# Patient Record
Sex: MALE | Race: BLACK OR AFRICAN AMERICAN | NOT HISPANIC OR LATINO | Employment: OTHER | URBAN - METROPOLITAN AREA
[De-identification: names, ages, dates, MRNs, and addresses within clinical notes are randomized per-mention and may not be internally consistent; named-entity substitution may affect disease eponyms.]

---

## 2019-01-14 ENCOUNTER — TELEPHONE (OUTPATIENT)
Dept: NEUROSURGERY | Facility: CLINIC | Age: 64
End: 2019-01-14

## 2019-01-15 ENCOUNTER — OFFICE VISIT (OUTPATIENT)
Dept: NEUROSURGERY | Facility: CLINIC | Age: 64
End: 2019-01-15
Payer: OTHER MISCELLANEOUS

## 2019-01-15 VITALS
WEIGHT: 200.88 LBS | DIASTOLIC BLOOD PRESSURE: 64 MMHG | TEMPERATURE: 98 F | HEART RATE: 60 BPM | BODY MASS INDEX: 30.45 KG/M2 | SYSTOLIC BLOOD PRESSURE: 131 MMHG | HEIGHT: 68 IN

## 2019-01-15 DIAGNOSIS — M25.519 SHOULDER PAIN, UNSPECIFIED CHRONICITY, UNSPECIFIED LATERALITY: ICD-10-CM

## 2019-01-15 DIAGNOSIS — M54.2 NECK PAIN: Primary | ICD-10-CM

## 2019-01-15 PROCEDURE — 99244 PR OFFICE CONSULTATION,LEVEL IV: ICD-10-PCS | Mod: S$GLB,,, | Performed by: NEUROLOGICAL SURGERY

## 2019-01-15 PROCEDURE — 99999 PR PBB SHADOW E&M-EST. PATIENT-LVL III: CPT | Mod: PBBFAC,,, | Performed by: NEUROLOGICAL SURGERY

## 2019-01-15 PROCEDURE — 99244 OFF/OP CNSLTJ NEW/EST MOD 40: CPT | Mod: S$GLB,,, | Performed by: NEUROLOGICAL SURGERY

## 2019-01-15 PROCEDURE — 99999 PR PBB SHADOW E&M-EST. PATIENT-LVL III: ICD-10-PCS | Mod: PBBFAC,,, | Performed by: NEUROLOGICAL SURGERY

## 2019-01-15 NOTE — PROGRESS NOTES
Subjective:    I, Janett Myers, attest that this documentation has been prepared under the direction and in the presence of MADELAINE Blair MD.     Patient ID: Jeff Hope is a 63 y.o. male.    Chief Complaint: No chief complaint on file.    HPI   Pt is a 63 y.o. male who presents per referral by Dr. Vinicius Sherman for evaluation of  cervical stenosis. Pt states that he endures neck pain radiating to shoulder and LUE into fingers for over 6 months, but is now beginning to endure weakness in RUE. Pt states that he has undergone 2 pervious shoulder surgeries done prior to 2005. Pt has failed conservative management including PT and injections.     Review of Systems   Constitutional: Negative for chills, diaphoresis, fatigue and fever.   HENT: Negative for congestion, rhinorrhea, sinus pressure, sneezing, sore throat and trouble swallowing.    Eyes: Negative.  Negative for visual disturbance.   Respiratory: Negative for cough, choking, chest tightness and shortness of breath.    Cardiovascular: Negative for chest pain.   Gastrointestinal: Negative for abdominal pain, diarrhea, nausea and vomiting.   Endocrine: Negative.    Genitourinary: Negative for dysuria.   Musculoskeletal: Positive for neck pain (Radiating to shoulder and LUE).   Skin: Negative for color change, pallor, rash and wound.   Neurological: Negative for syncope.   Hematological: Does not bruise/bleed easily.   Psychiatric/Behavioral: Negative for confusion.       Objective:      Physical Exam:  Nursing note and vitals reviewed.    Constitutional: He appears well-developed.     Eyes: Pupils are equal, round, and reactive to light. Conjunctivae and EOM are normal.     Cardiovascular: Normal rate, regular rhythm, normal pulses and intact distal pulses.     Abdominal: Soft.     Psych/Behavior: He is alert. He is oriented to person, place, and time. He has a normal mood and affect.     Musculoskeletal: Gait is normal.        Neck: Range of motion is full.  There is no tenderness. Muscle strength is 5/5. Tone is normal.        Back: Range of motion is full. There is no tenderness. Muscle strength is 5/5. Tone is normal.        Right Upper Extremities: Range of motion is full. There is no tenderness. Muscle strength is 5/5. Tone is normal.        Left Upper Extremities: Range of motion is full. There is no tenderness. Muscle strength is 5/5. Tone is normal.       Right Lower Extremities: Range of motion is full. There is no tenderness. Muscle strength is 5/5. Tone is normal.        Left Lower Extremities: Range of motion is full. There is no tenderness. Muscle strength is 5/5. Tone is normal.     Neurological:        Coordination: He has a normal Romberg Test, normal finger to nose coordination, normal heel to shin coordination and normal tandem walking coordination.        DTRs: DTRs are normal. Tricep reflexes are 2+ on the right side and 2+ on the left side. Bicep reflexes are 2+ on the right side and 2+ on the left side. Brachioradialis reflexes are 2+ on the right side and 2+ on the left side. Patellar reflexes are 2+ on the right side and 2+ on the left side. Achilles reflexes are 2+ on the right side and 2+ on the left side.        Cranial nerves: Cranial nerve(s) II, III, IV, V, VI, VII, VIII, IX, X, XI and XII are intact.       Pt has a normal neurologic exam.  He has a lot of paraspinal pain in the lower neck but it seems to be more around the shoulder area.  He has got some restricted range of motion in the shoulder and some pain with range of motion.  He has no Tanner's no clonus no weakness or numbness.    Imaging:     His MRI scan of the cervical and lumbar spine done at outside facility was reviewed I do not see any significant disc herniation no foraminal stenosis new central stenosis.  I, MADELAINE Blair MD, personally reviewed the imaging and interpreted independent of the radiology report.      Assessment/Plan:   Pt with possible referred neck pain from  shoulder issues.  He has had 2 previous shoulder surgery to.  I do not see anything on the MRI scan of the cervical or lumbar spine to explain any of his symptoms and dental seeding neurosurgical to offer him.  I went over the films with the and explain our position.  We have continue follow-up with his primary care physician.  He may need physical mass rehab or pain management [address].    I, MADELAINE Blair MD, personally performed the services described in this documentation. All medical record entries made by the scribe, Janett Myers, were at my direction and in my presence.  I have reviewed the chart and agree that the record reflects my personal performance and is accurate and complete.

## 2019-01-15 NOTE — LETTER
January 24, 2019      Vinicius Sherman MD  [address]  [facility] [address]           [facility] - Neurosurgery 7th Fl  [address]  [facility] [address]  Phone: [phone]          Patient: Jeff Hope   MR Number: [id number]   YOB: 1955   Date of Visit: 1/15/2019       Dear Dr. Vinicius Sherman:    Thank you for referring Jeff Hope to me for evaluation. Attached you will find relevant portions of my assessment and plan of care.    If you have questions, please do not hesitate to call me. I look forward to following Jeff Hope along with you.    Sincerely,    Romulo Blair MD    Enclosure  CC:  No Recipients    If you would like to receive this communication electronically, please contact [email or web address] or [phone] to request more information on [employer] Link access.    For providers and/or their staff who would like to refer a patient to [facility], please contact us through our one-stop-shop provider referral line, [facility], at [phone].    If you feel you have received this communication in error or would no longer like to receive these types of communications, please e-mail [email or web address]

## 2019-01-17 RX ORDER — HYDROXYZINE PAMOATE 50 MG/1
50 CAPSULE ORAL NIGHTLY
COMMUNITY
End: 2020-05-11

## 2019-01-17 RX ORDER — PREGABALIN 150 MG/1
150 CAPSULE ORAL DAILY
COMMUNITY

## 2019-01-17 RX ORDER — HYDROCODONE BITARTRATE AND ACETAMINOPHEN 7.5; 325 MG/1; MG/1
1 TABLET ORAL EVERY 6 HOURS PRN
COMMUNITY
End: 2020-01-28

## 2019-01-17 RX ORDER — CELECOXIB 200 MG/1
200 CAPSULE ORAL 2 TIMES DAILY
COMMUNITY
End: 2019-12-10

## 2019-09-23 ENCOUNTER — TELEPHONE (OUTPATIENT)
Dept: PRIMARY CARE CLINIC | Facility: CLINIC | Age: 64
End: 2019-09-23

## 2019-09-23 NOTE — TELEPHONE ENCOUNTER
----- Message from Nancy No sent at 9/23/2019  4:02 PM CDT -----  Contact: Patient [phone]  Requesting an appt for 10/01/2019.    Please call and advise.    Thank You

## 2019-09-24 ENCOUNTER — TELEPHONE (OUTPATIENT)
Dept: PRIMARY CARE CLINIC | Facility: CLINIC | Age: 64
End: 2019-09-24

## 2019-09-24 NOTE — TELEPHONE ENCOUNTER
----- Message from Aiyana Perales sent at 9/24/2019  8:40 AM CDT -----  Contact: [phone]  Patient is returning a phone call.  Who left a message for the patient:   Does patient know what this is regarding:    Comments: please advise, thanks .

## 2019-09-30 PROBLEM — Z79.01 CHRONIC ANTICOAGULATION: Status: ACTIVE | Noted: 2019-09-30

## 2019-09-30 PROBLEM — J47.9 BRONCHIECTASIS: Status: ACTIVE | Noted: 2019-02-12

## 2019-09-30 PROBLEM — C82.90 FOLLICULAR NON-HODGKIN'S LYMPHOMA: Status: ACTIVE | Noted: 2019-02-12

## 2019-09-30 PROBLEM — D70.9 NEUTROPENIA: Status: ACTIVE | Noted: 2019-09-30

## 2019-09-30 PROBLEM — I26.99 PULMONARY EMBOLISM, BILATERAL: Status: ACTIVE | Noted: 2019-04-15

## 2019-09-30 PROBLEM — F32.A DEPRESSION: Status: ACTIVE | Noted: 2019-02-12

## 2019-09-30 PROBLEM — E78.5 HYPERLIPIDEMIA: Status: ACTIVE | Noted: 2019-02-12

## 2019-09-30 PROBLEM — Z90.49 S/P LAPAROSCOPIC CHOLECYSTECTOMY: Status: ACTIVE | Noted: 2019-05-27

## 2019-11-07 ENCOUNTER — TELEPHONE (OUTPATIENT)
Dept: INTERNAL MEDICINE | Facility: CLINIC | Age: 64
End: 2019-11-07

## 2019-11-07 NOTE — TELEPHONE ENCOUNTER
----- Message from Kathi Kincaid sent at 11/7/2019  7:56 AM CST -----  Contact: wife/felicita/[phone]  Pt called in regards to getting a npp appointment with the dr. They would like the appointment  on 11-18 or 19 or 26 in the morning. I tried to schedule but not time came up.      Please advise

## 2019-11-18 NOTE — PROGRESS NOTES
Primary Care Provider Appointment    Subjective:      Patient ID: Jeff Hope is a 63 y.o. male here for establishment of care.      Chief Complaint: Establish Care (left testical pain, flu shot)    HPI:  This is a pleasant 63-year-old male with bronchiectasis hyperlipidemia history of pulmonary embolism on chronic anticoagulation with a follicular non-Hodgkin's lymphoma here for establishment of care.  He is a former patient of mine but no old records from [facility] available.  Records will be requested today.  In anticipation of this visit Care everywhere and [facility] was reviewed.  Greater than 45 min was spent in preparation for this visit.  He presents today with his wife.  Patient is followed by [facility] onc[facility], for his follicular lymphoma.  He just completed 6/6 CHOP with noted neutropenia and thrombocytopenia.  Current we are waiting on a PET CT for restaging.  He states that he is doing well but complains today of testicular pain that has been chronic in the past.  He is followed by Dr. Mancuso at [facility] for this chronic pain. He has had 3 procedures in the past for this without resolution of his pain. In the past he has had some improvement with muscle relaxers.  He notes a recent appointment with Dr. Mancuso.  Pt the also notes that he saw orthopedist for his left shoulder pain and was placed on Celebrex.  For his pulmonary embolism associated with his lymphoma, patient is to be taking daily anticoagulation.  He is out of his medication for over 1 month.  Patient also notes concerns for financial pressures.  Starting at the beginning of next year he will no longer have Medicaid and will be required to pay a co-pay for his medications and procedures.  This will be an issue since he will likely need further in, chemotherapy, and due to the cost of his anticoagulation.  He has also noted the food insecurities.  He denies any transportation issues.  Patient states he continues to have difficulty with  sleeping.  He also notes he also notes some nasal congestion. He has not been using his Flonase, which he has used in the past with good results.    Past Surgical History:   Procedure Laterality Date    GROIN EXPLORATION  2 pre 2005 and 1 post 2005    total of 3 procedures    LAPAROSCOPIC CHOLECYSTECTOMY  05/2018    SHOULDER SURGERY Left        No past medical history on file.    Social History     Socioeconomic History    Marital status:      Spouse name: Not on file    Number of children: 2    Years of education: Not on file    Highest education level: Not on file   Occupational History    Occupation: disabled [employer]    Social Needs    Financial resource strain: Somewhat hard    Food insecurity:     Worry: Often true     Inability: Sometimes true    Transportation needs:     Medical: No     Non-medical: No   Tobacco Use    Smoking status: Never Smoker    Smokeless tobacco: Never Used   Substance and Sexual Activity    Alcohol use: No     Frequency: Never    Drug use: No    Sexual activity: Yes     Partners: Female   Lifestyle    Physical activity:     Days per week: 0 days     Minutes per session: 0 min    Stress: Rather much   Relationships    Social connections:     Talks on phone: Twice a week     Gets together: Twice a week     Attends [employer] service: Never     Active member of club or organization: No     Attends meetings of clubs or organizations: Never     Relationship status:    Other Topics Concern    Not on file   Social History Narrative    Lives with wife.         Review of Systems   Constitutional: Negative for activity change, chills and fever.   HENT: Positive for congestion, sinus pressure and sinus pain. Negative for ear discharge, ear pain, postnasal drip, rhinorrhea, sneezing and sore throat.    Eyes: Negative.    Respiratory: Negative.    Cardiovascular: Negative.    Gastrointestinal: Negative for abdominal pain, anal bleeding, blood in stool,  "constipation, diarrhea, nausea, rectal pain and vomiting.        Hemorrhoids self treated   Endocrine: Negative.    Genitourinary: Positive for testicular pain (chronic pain). Negative for decreased urine volume, difficulty urinating, frequency, hematuria, penile swelling, scrotal swelling and urgency.   Musculoskeletal: Positive for arthralgias (shoulder pain) and back pain.       Objective:   /80 (BP Location: Right arm, Patient Position: Sitting, BP Method: Medium (Manual))   Pulse 69   Temp 97.9 °F (36.6 °C)   Ht 5' 9" (1.753 m)   Wt 91.2 kg (201 lb 1 oz)   SpO2 100%   BMI 29.69 kg/m²     Physical Exam   Constitutional: He appears well-developed and well-nourished. No distress.   HENT:   Head: Normocephalic and atraumatic.   Right Ear: Ear canal normal. A middle ear effusion is present.   Left Ear: Ear canal normal. A middle ear effusion is present.   Mouth/Throat: Oropharynx is clear and moist. No oropharyngeal exudate or posterior oropharyngeal erythema.   Eyes: Pupils are equal, round, and reactive to light. Conjunctivae are normal. No scleral icterus.   Neck: No thyromegaly present.   Cardiovascular: Normal rate, regular rhythm, S1 normal, S2 normal, normal heart sounds and intact distal pulses.   No murmur heard.  Pulmonary/Chest: Breath sounds normal. No respiratory distress. He has no wheezes. He has no rhonchi.   Abdominal: Soft. Bowel sounds are normal. He exhibits no distension. There is no tenderness. There is no rebound and no guarding.   Musculoskeletal: He exhibits no edema.   Lymphadenopathy:     He has no cervical adenopathy.   Neurological: He is alert.   Skin: No bruising and no ecchymosis noted. No erythema.   Psychiatric: He has a normal mood and affect.           No results found for: WBC, HGB, HCT, PLT, CHOL, TRIG, HDL, LDLDIRECT, ALT, AST, NA, K, CL, CREATININE, BUN, CO2, TSH, PSA, INR, GLUF, HGBA1C, MICROALBUR    Current Outpatient Medications on File Prior to Visit " "  Medication Sig Dispense Refill    celecoxib (CELEBREX) 200 MG capsule Take 200 mg by mouth 2 (two) times daily.      cyclobenzaprine (FLEXERIL) 10 MG tablet Take 10 mg by mouth 2 (two) times daily as needed.      HYDROcodone-acetaminophen (NORCO) 7.5-325 mg per tablet Take 1 tablet by mouth every 6 (six) hours as needed for Pain.      hydrOXYzine pamoate (VISTARIL) 50 MG Cap Take 50 mg by mouth nightly.      hydrOXYzine pamoate (VISTARIL) 50 MG Cap Take 50 mg by mouth daily as needed.      pregabalin (LYRICA) 150 MG capsule Take 150 mg by mouth once daily.       No current facility-administered medications on file prior to visit.          Assessment:   63 y.o. male with multiple co-morbid illnesses here for continued follow up of medical problems.      Plan:     Problem List Items Addressed This Visit        Psychiatric    Depression      Pt with vistaril only.  · Old records from [facility] and PHQ9 at next visit to further eval            Pulmonary    Bronchiectasis     Noted on CT of chest in [place] 11/2018:  "Minimal bibasilar bronchiectasis "  · Asymptomatic  · Flu vaccine in a few weeks once no longer acutely immune compromised.            Cardiac/Vascular    Hyperlipidemia     No meds currently.    · Old records from [facility] and last labs to eval if any tx needed.         Aortic atherosclerosis     On Ct 11/29/2018 in [place]: "Scattered calcified atheromatous disease of the aortic arch"   · Asymptomatic, will follow            Renal/    Testicular pain       Hematology    Pulmonary embolism, bilateral - Primary    Relevant Medications    rivaroxaban (XARELTO) 15 mg Tab    rivaroxaban (XARELTO) 20 mg Tab    Chronic anticoagulation     Patient self DC Xarelto.  High risk for recurrence of PE with lymphoma.  · Start Xarelto 15 mg b.i.d. for 21 days then 20 mg p.o. q.day until no longer high risk.         Thrombocytopenia     Noted on most recent labs with Dr Fraooq.  Pt on chronic anticoagulation.    · Will follow " for bleeding.  Likely associated with chemo.              Oncology    Follicular non-Hodgkin's lymphoma     Completed the 6/6 planned R-CHOP for recurrent follicular lymphoma. Now time for restaging.    · Continue f/u Dr Farooq.    · PET pending.                 Orthopedic    Chronic low back pain     Patient with chronic low back pain. Taking Celebrex 200 b.i.d..  · Patient is high risk for bleeding with Xarelto.  To stop the Celebrex.  Patient to use for narcotics and muscle relaxers for pain control.         Chronic left shoulder pain     Pt with chronic shoulder pain and followed by ortho.  Taking Celebrex daily.  · Patient is high risk for bleeding with Xarelto.  To stop the Celebrex.  Patient to use for narcotics and muscle relaxers for pain control.              Other    Healthcare maintenance     · PHQ9 at next visit  · Records from [facility]  · LW and POA from [facility] or to be completed at future visit.  · Records form [facility] with yearly labs  · Flu in a few weeks since completed chemo only about 10 days ago  ·            Other Visit Diagnoses     Allergic rhinitis, unspecified seasonality, unspecified trigger        Relevant Medications    fluticasone propionate (FLONASE) 50 mcg/actuation nasal spray          Health Maintenance       Date Due Completion Date    Hepatitis C Screening 1955 ---    Lipid Panel 1955 ---    TETANUS VACCINE 11/30/1973 ---    Pneumococcal Vaccine (Highest Risk) (1 of 3 - PCV13) 11/30/1974 ---    Shingles Vaccine (1 of 2) 11/30/2005 ---    Colonoscopy 11/30/2005 ---    Influenza Vaccine (1) 09/01/2019 ---      .  Next visit:  Review results of recent PET CT scan, eval if flu vaccine done, reviewed [facility] records    Follow up in about 3 weeks (around 12/10/2019). Total face-to-face time was 60 min, 50% of this was spent on counseling and coordination of care. The following issues were discussed:  Risk of bleeding with White 2 and NSAIDs combined with anticoagulation, fever  chronic anticoagulation, treatment for testicular pain and management, plan for future flu vaccine when no longer acutely immune compromised, risk of bleeding with current situation and medical problems    Medications Reconciliation:   I have reconciled the patient's home medications with the patient/family. I have updated all changes.  Refer to After-Visit Medication List.     Medication List           Accurate as of November 19, 2019  1:40 PM. If you have any questions, ask your nurse or doctor.               START taking these medications    fluticasone propionate 50 mcg/actuation nasal spray  Commonly known as:  FLONASE  2 sprays (100 mcg total) by Each Nostril route once daily.  Started by:  Roly Flannery MD     * rivaroxaban 15 mg Tab  Commonly known as:  XARELTO  Take 1 tablet (15 mg total) by mouth 2 (two) times daily with meals. for 21 days  Started by:  Roly Flannery MD     * rivaroxaban 20 mg Tab  Commonly known as:  XARELTO  Take 1 tablet (20 mg total) by mouth daily with dinner or evening meal.  Started by:  Roly Flannery MD         * This list has 2 medication(s) that are the same as other medications prescribed for you. Read the directions carefully, and ask your doctor or other care provider to review them with you.            CONTINUE taking these medications    celecoxib 200 MG capsule  Commonly known as:  CeleBREX     cyclobenzaprine 10 MG tablet  Commonly known as:  FLEXERIL     HYDROcodone-acetaminophen 7.5-325 mg per tablet  Commonly known as:  NORCO     * hydrOXYzine pamoate 50 MG Cap  Commonly known as:  VISTARIL     * hydrOXYzine pamoate 50 MG Cap  Commonly known as:  VISTARIL     pregabalin 150 MG capsule  Commonly known as:  LYRICA         * This list has 2 medication(s) that are the same as other medications prescribed for you. Read the directions carefully, and ask your doctor or other care provider to review them with you.               Where to Get Your Medications      These  medications were sent to [id number] - [place], [place] - [address]  [address], [facility] [address]    Phone:  [phone]   · fluticasone propionate 50 mcg/actuation nasal spray  · rivaroxaban 15 mg Tab  · rivaroxaban 20 mg Tab       Roly Flannery MD  Internal Medicine  [facility]  [phone]

## 2019-11-19 ENCOUNTER — OFFICE VISIT (OUTPATIENT)
Dept: PRIMARY CARE CLINIC | Facility: CLINIC | Age: 64
End: 2019-11-19
Payer: MEDICARE

## 2019-11-19 VITALS
HEIGHT: 69 IN | HEART RATE: 69 BPM | WEIGHT: 201.06 LBS | TEMPERATURE: 98 F | DIASTOLIC BLOOD PRESSURE: 80 MMHG | OXYGEN SATURATION: 100 % | BODY MASS INDEX: 29.78 KG/M2 | SYSTOLIC BLOOD PRESSURE: 120 MMHG

## 2019-11-19 DIAGNOSIS — Z00.00 HEALTHCARE MAINTENANCE: ICD-10-CM

## 2019-11-19 DIAGNOSIS — C82.90 FOLLICULAR NON-HODGKIN'S LYMPHOMA: ICD-10-CM

## 2019-11-19 DIAGNOSIS — Z79.01 CHRONIC ANTICOAGULATION: ICD-10-CM

## 2019-11-19 DIAGNOSIS — G89.29 CHRONIC LEFT SHOULDER PAIN: ICD-10-CM

## 2019-11-19 DIAGNOSIS — J30.9 ALLERGIC RHINITIS, UNSPECIFIED SEASONALITY, UNSPECIFIED TRIGGER: ICD-10-CM

## 2019-11-19 DIAGNOSIS — M54.50 CHRONIC LEFT-SIDED LOW BACK PAIN WITHOUT SCIATICA: ICD-10-CM

## 2019-11-19 DIAGNOSIS — N50.819 TESTICULAR PAIN: ICD-10-CM

## 2019-11-19 DIAGNOSIS — D69.6 THROMBOCYTOPENIA: ICD-10-CM

## 2019-11-19 DIAGNOSIS — E78.5 HYPERLIPIDEMIA, UNSPECIFIED HYPERLIPIDEMIA TYPE: ICD-10-CM

## 2019-11-19 DIAGNOSIS — J47.9 BRONCHIECTASIS WITHOUT COMPLICATION: ICD-10-CM

## 2019-11-19 DIAGNOSIS — F32.A DEPRESSION, UNSPECIFIED DEPRESSION TYPE: ICD-10-CM

## 2019-11-19 DIAGNOSIS — I26.99 PULMONARY EMBOLISM, BILATERAL: Primary | ICD-10-CM

## 2019-11-19 DIAGNOSIS — I70.0 AORTIC ATHEROSCLEROSIS: ICD-10-CM

## 2019-11-19 DIAGNOSIS — M25.512 CHRONIC LEFT SHOULDER PAIN: ICD-10-CM

## 2019-11-19 DIAGNOSIS — G89.29 CHRONIC LEFT-SIDED LOW BACK PAIN WITHOUT SCIATICA: ICD-10-CM

## 2019-11-19 PROCEDURE — 99499 UNLISTED E&M SERVICE: CPT | Mod: HCNC,S$GLB,, | Performed by: INTERNAL MEDICINE

## 2019-11-19 PROCEDURE — 99499 RISK ADDL DX/OHS AUDIT: ICD-10-PCS | Mod: HCNC,S$GLB,, | Performed by: INTERNAL MEDICINE

## 2019-11-19 PROCEDURE — 99205 PR OFFICE/OUTPT VISIT, NEW, LEVL V, 60-74 MIN: ICD-10-PCS | Mod: HCNC,S$GLB,, | Performed by: INTERNAL MEDICINE

## 2019-11-19 PROCEDURE — 3008F BODY MASS INDEX DOCD: CPT | Mod: HCNC,CPTII,S$GLB, | Performed by: INTERNAL MEDICINE

## 2019-11-19 PROCEDURE — 99205 OFFICE O/P NEW HI 60 MIN: CPT | Mod: HCNC,S$GLB,, | Performed by: INTERNAL MEDICINE

## 2019-11-19 PROCEDURE — 3008F PR BODY MASS INDEX (BMI) DOCUMENTED: ICD-10-PCS | Mod: HCNC,CPTII,S$GLB, | Performed by: INTERNAL MEDICINE

## 2019-11-19 RX ORDER — FLUTICASONE PROPIONATE 50 MCG
2 SPRAY, SUSPENSION (ML) NASAL DAILY
Qty: 1 BOTTLE | Refills: 11 | Status: SHIPPED | OUTPATIENT
Start: 2019-11-19 | End: 2020-05-11

## 2019-11-19 RX ORDER — HYDROXYZINE PAMOATE 50 MG/1
50 CAPSULE ORAL DAILY PRN
COMMUNITY
Start: 2017-07-10 | End: 2020-01-28 | Stop reason: SDUPTHER

## 2019-11-19 RX ORDER — CYCLOBENZAPRINE HCL 10 MG
10 TABLET ORAL 2 TIMES DAILY PRN
COMMUNITY
Start: 2019-09-16 | End: 2020-01-28

## 2019-11-19 NOTE — ASSESSMENT & PLAN NOTE
· PHQ9 at next visit  · Records from [facility]  · LW and POA from [facility] or to be completed at future visit.  · Records form [facility] with yearly labs  · Flu in a few weeks since completed chemo only about 10 days ago  ·

## 2019-11-19 NOTE — ASSESSMENT & PLAN NOTE
Completed the 6/6 planned R-CHOP for recurrent follicular lymphoma. Now time for restaging.    · Continue f/u Dr Farooq.    · PET pending.

## 2019-11-19 NOTE — ASSESSMENT & PLAN NOTE
"Noted on CT of chest in [place] 11/2018:  "Minimal bibasilar bronchiectasis "  · Asymptomatic  · Flu vaccine in a few weeks once no longer acutely immune compromised.  "

## 2019-11-19 NOTE — ASSESSMENT & PLAN NOTE
Pt with vistaril only.  · Old records from [facility] and PHQ9 at next visit to further eval

## 2019-11-19 NOTE — ASSESSMENT & PLAN NOTE
Noted on most recent labs with Dr Farooq.  Pt on chronic anticoagulation.    · Will follow for bleeding.  Likely associated with chemo.

## 2019-11-19 NOTE — ASSESSMENT & PLAN NOTE
Patient with chronic low back pain. Taking Celebrex 200 b.i.d..  · Patient is high risk for bleeding with Xarelto.  To stop the Celebrex.  Patient to use for narcotics and muscle relaxers for pain control.

## 2019-11-19 NOTE — ASSESSMENT & PLAN NOTE
No meds currently.    · Old records from [facility] and last labs to eval if any tx needed.

## 2019-11-19 NOTE — ASSESSMENT & PLAN NOTE
"On Ct 11/29/2018 in [facility]: "Scattered calcified atheromatous disease of the aortic arch"   · Asymptomatic, will follow  "

## 2019-11-19 NOTE — ASSESSMENT & PLAN NOTE
Pt with chronic shoulder pain and followed by ortho.  Taking Celebrex daily.  · Patient is high risk for bleeding with Xarelto.  To stop the Celebrex.  Patient to use for narcotics and muscle relaxers for pain control.

## 2019-11-19 NOTE — PATIENT INSTRUCTIONS
It was a pleasure to see you both today.  We will give you the flu shot today.  Please call me with any questions or concerns.  I will see back in 3-4 weeks after your PET scan.  We can schedule you to meet with the  at that time.  Take care and have a good day.

## 2019-11-19 NOTE — ASSESSMENT & PLAN NOTE
Patient self DC Xarelto.  High risk for recurrence of PE with lymphoma.  · Start Xarelto 15 mg b.i.d. for 21 days then 20 mg p.o. q.day until no longer high risk.

## 2019-11-21 ENCOUNTER — TELEPHONE (OUTPATIENT)
Dept: INTERNAL MEDICINE | Facility: CLINIC | Age: 64
End: 2019-11-21

## 2019-11-21 DIAGNOSIS — F32.A DEPRESSION, UNSPECIFIED DEPRESSION TYPE: Primary | ICD-10-CM

## 2019-11-25 ENCOUNTER — OUTPATIENT CASE MANAGEMENT (OUTPATIENT)
Dept: ADMINISTRATIVE | Facility: OTHER | Age: 64
End: 2019-11-25

## 2019-11-26 NOTE — PROGRESS NOTES
Outpatient Care Management   - Low Risk Patient Assessment    Patient: Jeff Hope  MRN:  [id number]  Date of Service:  11/26/2019  Completed by:  Monique Robertson LMSW  Referral Date: 11/21/2019  Program: [facility] Low Risk    Reason for Visit   Patient presents with    OPCM Chart Review     11/26/19    Social Work Assessment - Low/Mod Risk     11/26/19    Plan Of Care     11/26/19    PHQ-9     11/26/19       Patient Summary     OPCM Social Work Assessment (Low/Moderate Risk)    General  Level of Caregiver support:  Member independent and does not need caregiver assistance  Have you had to make a decision between paying for any of the following in the last 2 months?:  Medication (contacted [facility] pharmacy to provide assistance)  Transportation means:  Family  Employment status:  Not employed  Current symptoms:  None  Assessments  Was the PHQ Depression Screening completed this visit?:  Yes (score is 0)  Was the KATYA-7 Screening completed this visit?:  No         Complex Care Plan     Care plan was discussed and completed today with input from patient and/or caregiver.    Goals      Patient/caregiver will have knowledge of resources available in order to obtain the services that are needed prior to the end of this encounter.      Short Term Goals  Patient/caregiver will verbalize knowledge of available resources prior to the end of this encounter.   Interventions:  · Discuss and provide community resources telephonically and via US mail  Status:  · Met          Patient agrees with case closure.  Patient/Caregiver agrees to contact [facility] LMSW if any additional needs arise.     Patient Instructions     No follow-ups on file.    Todays OPC Self-Management Care Plan was developed with the patients/caregivers input and was based on identified barriers from todays assessment.  Goals were written today with the patient/caregiver and the patient has agreed to work towards these goals to improve his/her  overall well-being. Patient verbalized understanding of the care plan, goals, and all of today's instructions. Encouraged patient/caregiver to communicate with his/her physician and health care team about health conditions and the treatment plan.  Provided my contact information today and encouraged patient/caregiver to call me with any questions as needed.

## 2019-12-10 ENCOUNTER — TELEPHONE (OUTPATIENT)
Dept: PRIMARY CARE CLINIC | Facility: CLINIC | Age: 64
End: 2019-12-10

## 2019-12-10 ENCOUNTER — OFFICE VISIT (OUTPATIENT)
Dept: PRIMARY CARE CLINIC | Facility: CLINIC | Age: 64
End: 2019-12-10
Payer: MEDICARE

## 2019-12-10 VITALS
HEART RATE: 69 BPM | DIASTOLIC BLOOD PRESSURE: 80 MMHG | BODY MASS INDEX: 29.95 KG/M2 | WEIGHT: 202.19 LBS | OXYGEN SATURATION: 98 % | HEIGHT: 69 IN | SYSTOLIC BLOOD PRESSURE: 124 MMHG | TEMPERATURE: 98 F

## 2019-12-10 DIAGNOSIS — E55.9 VITAMIN D DEFICIENCY: ICD-10-CM

## 2019-12-10 DIAGNOSIS — F99 ABNORMAL MMSE: ICD-10-CM

## 2019-12-10 DIAGNOSIS — Z00.00 HEALTHCARE MAINTENANCE: ICD-10-CM

## 2019-12-10 DIAGNOSIS — E78.2 MIXED HYPERLIPIDEMIA: ICD-10-CM

## 2019-12-10 DIAGNOSIS — T46.6X5A MYALGIA DUE TO STATIN: ICD-10-CM

## 2019-12-10 DIAGNOSIS — J30.2 SEASONAL ALLERGIC RHINITIS, UNSPECIFIED TRIGGER: ICD-10-CM

## 2019-12-10 DIAGNOSIS — Z95.828 PORT-A-CATH IN PLACE: ICD-10-CM

## 2019-12-10 DIAGNOSIS — L85.3 DRY SKIN: Primary | ICD-10-CM

## 2019-12-10 DIAGNOSIS — D61.810 ANTINEOPLASTIC CHEMOTHERAPY INDUCED PANCYTOPENIA: ICD-10-CM

## 2019-12-10 DIAGNOSIS — C79.52 MALIGNANT NEOPLASM METASTATIC TO BONE MARROW: ICD-10-CM

## 2019-12-10 DIAGNOSIS — F33.41 RECURRENT MAJOR DEPRESSIVE DISORDER, IN PARTIAL REMISSION: ICD-10-CM

## 2019-12-10 DIAGNOSIS — M79.10 MYALGIA DUE TO STATIN: ICD-10-CM

## 2019-12-10 DIAGNOSIS — T45.1X5A ANTINEOPLASTIC CHEMOTHERAPY INDUCED PANCYTOPENIA: ICD-10-CM

## 2019-12-10 DIAGNOSIS — I27.20 PULMONARY HYPERTENSION: ICD-10-CM

## 2019-12-10 PROBLEM — J30.9 ALLERGIC RHINITIS: Status: ACTIVE | Noted: 2019-12-10

## 2019-12-10 PROCEDURE — 90662 IIV NO PRSV INCREASED AG IM: CPT | Mod: HCNC,S$GLB,, | Performed by: INTERNAL MEDICINE

## 2019-12-10 PROCEDURE — 3008F BODY MASS INDEX DOCD: CPT | Mod: HCNC,CPTII,S$GLB, | Performed by: INTERNAL MEDICINE

## 2019-12-10 PROCEDURE — 3008F PR BODY MASS INDEX (BMI) DOCUMENTED: ICD-10-PCS | Mod: HCNC,CPTII,S$GLB, | Performed by: INTERNAL MEDICINE

## 2019-12-10 PROCEDURE — G0008 FLU VACCINE - HIGH DOSE (65+) PRESERVATIVE FREE IM: ICD-10-PCS | Mod: HCNC,S$GLB,, | Performed by: INTERNAL MEDICINE

## 2019-12-10 PROCEDURE — 99499 RISK ADDL DX/OHS AUDIT: ICD-10-PCS | Mod: HCNC,S$GLB,, | Performed by: INTERNAL MEDICINE

## 2019-12-10 PROCEDURE — G0008 ADMIN INFLUENZA VIRUS VAC: HCPCS | Mod: HCNC,S$GLB,, | Performed by: INTERNAL MEDICINE

## 2019-12-10 PROCEDURE — 99499 UNLISTED E&M SERVICE: CPT | Mod: HCNC,S$GLB,, | Performed by: INTERNAL MEDICINE

## 2019-12-10 PROCEDURE — 99214 OFFICE O/P EST MOD 30 MIN: CPT | Mod: 25,HCNC,S$GLB, | Performed by: INTERNAL MEDICINE

## 2019-12-10 PROCEDURE — 99214 PR OFFICE/OUTPT VISIT, EST, LEVL IV, 30-39 MIN: ICD-10-PCS | Mod: 25,HCNC,S$GLB, | Performed by: INTERNAL MEDICINE

## 2019-12-10 PROCEDURE — 90662 FLU VACCINE - HIGH DOSE (65+) PRESERVATIVE FREE IM: ICD-10-PCS | Mod: HCNC,S$GLB,, | Performed by: INTERNAL MEDICINE

## 2019-12-10 NOTE — ASSESSMENT & PLAN NOTE
Still present with flonase.  Pt with nasal congestion and pressure.   · Add saline nasal spray daily

## 2019-12-10 NOTE — ASSESSMENT & PLAN NOTE
Pt able to complete ADL/iADL.  Nl labs 7/2019 for eval (b12/folate/rpr)  · Repeat in 1 year, aprox 6/2020 for eval.
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
## 2019-12-10 NOTE — ASSESSMENT & PLAN NOTE
· PHQ9 today.  · DEIRDRE and POA from [facility] completed 4/2019.  · Yearly lab 2/2019.  · Flu today.  · Hep C and HIV done 2/2018.    · Optometry ordered

## 2019-12-10 NOTE — ASSESSMENT & PLAN NOTE
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  PET not done but will be done in December.    · Encouraged compliance with scan.

## 2019-12-10 NOTE — TELEPHONE ENCOUNTER
Used 2 pt identifiers and reviewed allergies prior to giving FLUZONE HD injection in the L/arm, VIS given then asked pt to wait 15 mins post injection for s/sx of adverse reactions.

## 2019-12-10 NOTE — ASSESSMENT & PLAN NOTE
No meds currently due to statin induced myalgias.  Indication for high dose statin in this pt.  · Yearly lab 2/2019.

## 2019-12-10 NOTE — ASSESSMENT & PLAN NOTE
Indication for high dose statin with increased ACC risk score but intol of statins.    · Lab 2/2019.

## 2019-12-10 NOTE — ASSESSMENT & PLAN NOTE
Associated with chemo noted on labs 11/2019 in [facility] from [facility].  · Infection risks discussed with pt and red flags reviewed  · High dose flu today.

## 2020-01-27 ENCOUNTER — OFFICE VISIT (OUTPATIENT)
Dept: PRIMARY CARE CLINIC | Facility: CLINIC | Age: 65
End: 2020-01-27
Payer: MEDICARE

## 2020-01-27 VITALS
WEIGHT: 207 LBS | HEIGHT: 68 IN | TEMPERATURE: 99 F | SYSTOLIC BLOOD PRESSURE: 114 MMHG | HEART RATE: 73 BPM | OXYGEN SATURATION: 97 % | BODY MASS INDEX: 31.37 KG/M2 | DIASTOLIC BLOOD PRESSURE: 80 MMHG

## 2020-01-27 DIAGNOSIS — T45.1X5A CHEMOTHERAPY-INDUCED NEUTROPENIA: ICD-10-CM

## 2020-01-27 DIAGNOSIS — E27.8 ADRENAL HYPERTROPHY: ICD-10-CM

## 2020-01-27 DIAGNOSIS — C82.90 FOLLICULAR NON-HODGKIN'S LYMPHOMA: ICD-10-CM

## 2020-01-27 DIAGNOSIS — C79.52 MALIGNANT NEOPLASM METASTATIC TO BONE MARROW: ICD-10-CM

## 2020-01-27 DIAGNOSIS — J06.9 VIRAL UPPER RESPIRATORY TRACT INFECTION: ICD-10-CM

## 2020-01-27 DIAGNOSIS — T45.1X5A ANTINEOPLASTIC CHEMOTHERAPY INDUCED PANCYTOPENIA: ICD-10-CM

## 2020-01-27 DIAGNOSIS — D69.6 THROMBOCYTOPENIA: Primary | ICD-10-CM

## 2020-01-27 DIAGNOSIS — I70.0 AORTIC ATHEROSCLEROSIS: ICD-10-CM

## 2020-01-27 DIAGNOSIS — D61.810 ANTINEOPLASTIC CHEMOTHERAPY INDUCED PANCYTOPENIA: ICD-10-CM

## 2020-01-27 DIAGNOSIS — Z00.00 HEALTHCARE MAINTENANCE: ICD-10-CM

## 2020-01-27 DIAGNOSIS — M75.51 ACUTE BURSITIS OF RIGHT SHOULDER: ICD-10-CM

## 2020-01-27 DIAGNOSIS — E78.2 MIXED HYPERLIPIDEMIA: ICD-10-CM

## 2020-01-27 DIAGNOSIS — Z79.01 CHRONIC ANTICOAGULATION: ICD-10-CM

## 2020-01-27 DIAGNOSIS — D70.1 CHEMOTHERAPY-INDUCED NEUTROPENIA: ICD-10-CM

## 2020-01-27 DIAGNOSIS — I26.99 PULMONARY EMBOLISM, BILATERAL: ICD-10-CM

## 2020-01-27 LAB
BACTERIA #/AREA URNS AUTO: NORMAL /HPF
BILIRUB UR QL STRIP: NEGATIVE
CLARITY UR REFRACT.AUTO: CLEAR
COLOR UR AUTO: ABNORMAL
GLUCOSE UR QL STRIP: NEGATIVE
HGB UR QL STRIP: ABNORMAL
KETONES UR QL STRIP: NEGATIVE
LEUKOCYTE ESTERASE UR QL STRIP: NEGATIVE
MICROSCOPIC COMMENT: NORMAL
NITRITE UR QL STRIP: NEGATIVE
PH UR STRIP: 5 [PH] (ref 5–8)
PROT UR QL STRIP: NEGATIVE
RBC #/AREA URNS AUTO: 2 /HPF (ref 0–4)
SP GR UR STRIP: 1.02 (ref 1–1.03)
SQUAMOUS #/AREA URNS AUTO: 0 /HPF
URN SPEC COLLECT METH UR: ABNORMAL
WBC #/AREA URNS AUTO: 1 /HPF (ref 0–5)

## 2020-01-27 PROCEDURE — 3008F PR BODY MASS INDEX (BMI) DOCUMENTED: ICD-10-PCS | Mod: HCNC,CPTII,S$GLB, | Performed by: INTERNAL MEDICINE

## 2020-01-27 PROCEDURE — 81001 URINALYSIS AUTO W/SCOPE: CPT | Mod: HCNC

## 2020-01-27 PROCEDURE — 99214 OFFICE O/P EST MOD 30 MIN: CPT | Mod: HCNC,S$GLB,, | Performed by: INTERNAL MEDICINE

## 2020-01-27 PROCEDURE — 3008F BODY MASS INDEX DOCD: CPT | Mod: HCNC,CPTII,S$GLB, | Performed by: INTERNAL MEDICINE

## 2020-01-27 PROCEDURE — 99214 PR OFFICE/OUTPT VISIT, EST, LEVL IV, 30-39 MIN: ICD-10-PCS | Mod: HCNC,S$GLB,, | Performed by: INTERNAL MEDICINE

## 2020-01-27 NOTE — ASSESSMENT & PLAN NOTE
Pt with PE s/p hospitalization.  He was placed on xarelto with high risk with cancer.  He has been out of the medication for the past month.  · Restart Xarelto 15 mg b.i.d. for 21 days, then 20 mg q.day until cancer no longer active.

## 2020-01-27 NOTE — ASSESSMENT & PLAN NOTE
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 PET with some improvement and some new lesions noted.    · Pt will continue f/u Dr Farooq and maurice harris per pt in April.

## 2020-01-27 NOTE — ASSESSMENT & PLAN NOTE
· PHQ9 last visit.  · DEIRDRE and POA from [facility] completed 4/2019.  · Yearly lab today  · Hep C and HIV done 2/2018.    · Optometry ordered

## 2020-01-27 NOTE — ASSESSMENT & PLAN NOTE
Status post Medrol Dosepak.    · Patient to avoid NSAIDs.  · Patient plans to follow up Ortho for joint injection.

## 2020-01-27 NOTE — PATIENT INSTRUCTIONS
Thank you so much for coming in to see me today.  Please do not hesitate to call with any questions or concerns or if you feel that you need to be seen.

## 2020-01-27 NOTE — ASSESSMENT & PLAN NOTE
"Completed the 6/6 planned R-CHOP for recurrent follicular lymphoma. "PET 12/30/2019: Interval Near complete resolution of retroperitoneal and right retrocrural lymphadenopathy. Decrease in size and number of mesenteric nodes. Development of 2 sub centimeter mesenteric nodes with SUV 5.Interval development of 2 subcentimeter hypermetabolic nodules I the left lower lung. Inflammatory or infectious process is favored." .  · Continue f/u Dr Farooq.   · discussion today about a second opinion with concern by the wife.     "

## 2020-01-27 NOTE — ASSESSMENT & PLAN NOTE
No meds currently due to statin induced myalgias.  Indication for high dose statin in this pt.  · Yearly lab today

## 2020-01-28 ENCOUNTER — TELEPHONE (OUTPATIENT)
Dept: PRIMARY CARE CLINIC | Facility: CLINIC | Age: 65
End: 2020-01-28

## 2020-01-28 ENCOUNTER — HOSPITAL ENCOUNTER (OUTPATIENT)
Dept: RADIOLOGY | Facility: HOSPITAL | Age: 65
Discharge: HOME | End: 2020-01-28
Attending: INTERNAL MEDICINE
Payer: MEDICARE

## 2020-01-28 ENCOUNTER — PATIENT OUTREACH (OUTPATIENT)
Dept: ADMINISTRATIVE | Facility: OTHER | Age: 65
End: 2020-01-28

## 2020-01-28 ENCOUNTER — OFFICE VISIT (OUTPATIENT)
Dept: PRIMARY CARE CLINIC | Facility: CLINIC | Age: 65
End: 2020-01-28
Payer: MEDICARE

## 2020-01-28 VITALS
SYSTOLIC BLOOD PRESSURE: 120 MMHG | WEIGHT: 205.5 LBS | BODY MASS INDEX: 30.44 KG/M2 | OXYGEN SATURATION: 97 % | DIASTOLIC BLOOD PRESSURE: 82 MMHG | HEART RATE: 82 BPM | HEIGHT: 69 IN | TEMPERATURE: 98 F

## 2020-01-28 DIAGNOSIS — R74.8 ELEVATED LIVER ENZYMES: Primary | ICD-10-CM

## 2020-01-28 DIAGNOSIS — J47.9 BRONCHIECTASIS WITHOUT COMPLICATION: ICD-10-CM

## 2020-01-28 DIAGNOSIS — K76.9 LIVER DISEASE, UNSPECIFIED: ICD-10-CM

## 2020-01-28 DIAGNOSIS — K75.9 HEPATITIS: ICD-10-CM

## 2020-01-28 DIAGNOSIS — R74.8 ELEVATED LIVER ENZYMES: ICD-10-CM

## 2020-01-28 DIAGNOSIS — D68.8 OTHER SPECIFIED COAGULATION DEFECTS: ICD-10-CM

## 2020-01-28 PROCEDURE — 76700 US EXAM ABDOM COMPLETE: CPT | Mod: TC,HCNC

## 2020-01-28 PROCEDURE — 3008F BODY MASS INDEX DOCD: CPT | Mod: HCNC,CPTII,S$GLB, | Performed by: INTERNAL MEDICINE

## 2020-01-28 PROCEDURE — 99214 PR OFFICE/OUTPT VISIT, EST, LEVL IV, 30-39 MIN: ICD-10-PCS | Mod: HCNC,S$GLB,, | Performed by: INTERNAL MEDICINE

## 2020-01-28 PROCEDURE — 76700 US EXAM ABDOM COMPLETE: CPT | Mod: 26,HCNC,, | Performed by: RADIOLOGY

## 2020-01-28 PROCEDURE — 76700 US ABDOMEN COMPLETE: ICD-10-PCS | Mod: 26,HCNC,, | Performed by: RADIOLOGY

## 2020-01-28 PROCEDURE — 99499 UNLISTED E&M SERVICE: CPT | Mod: HCNC,S$GLB,, | Performed by: INTERNAL MEDICINE

## 2020-01-28 PROCEDURE — 3008F PR BODY MASS INDEX (BMI) DOCUMENTED: ICD-10-PCS | Mod: HCNC,CPTII,S$GLB, | Performed by: INTERNAL MEDICINE

## 2020-01-28 PROCEDURE — 99214 OFFICE O/P EST MOD 30 MIN: CPT | Mod: HCNC,S$GLB,, | Performed by: INTERNAL MEDICINE

## 2020-01-28 PROCEDURE — 99499 RISK ADDL DX/OHS AUDIT: ICD-10-PCS | Mod: HCNC,S$GLB,, | Performed by: INTERNAL MEDICINE

## 2020-01-28 NOTE — ASSESSMENT & PLAN NOTE
"Noted on CT of chest in [facility] 11/2018:  "Minimal bibasilar bronchiectasis "  · Asymptomatic, will follow    "

## 2020-01-28 NOTE — TELEPHONE ENCOUNTER
Elevated LFTs noted.  Stat abd u/s ordered and pt will come in to see me at 1 and we will also repeat labs and check hep panel.

## 2020-01-28 NOTE — PROGRESS NOTES
Primary Care Provider Appointment    Subjective:      Patient ID: Jeff Hope is a 64 y.o. male here for follow up.     Chief Complaint: Establish Care    HPI:  This is a pleasant 63-year-old male with bronchiectasis hyperlipidemia history of pulmonary embolism on chronic anticoagulation with a follicular non-Hodgkin's lymphoma here for acute f/u with very elevated LFT.  Pt without severe abd pain.  Mildly elevated LFT noted 12/30/2019 with oncology.    Patient denies any significant abdominal pain, no increased bleeding or bruising.  only new medication noted as Medrol Dosepak given about 1 week ago by Ortho.  Today all medications and their risk of hepatitis and hepatic dosing were reviewed with family.                  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    Social History     Socioeconomic History    Marital status:      Spouse name: Not on file    Number of children: 2    Years of education: Not on  "file    Highest education level: Not on file   Occupational History    Occupation: disabled [employer]    Social Needs    Financial resource strain: Somewhat hard    Food insecurity:     Worry: Often true     Inability: Sometimes true    Transportation needs:     Medical: No     Non-medical: No   Tobacco Use    Smoking status: Never Smoker    Smokeless tobacco: Never Used   Substance and Sexual Activity    Alcohol use: No     Frequency: Never    Drug use: No    Sexual activity: Yes     Partners: Female   Lifestyle    Physical activity:     Days per week: 0 days     Minutes per session: 0 min    Stress: Rather much   Relationships    Social connections:     Talks on phone: Twice a week     Gets together: Twice a week     Attends Voodoo service: Never     Active member of club or organization: No     Attends meetings of clubs or organizations: Never     Relationship status:    Other Topics Concern    Not on file   Social History Narrative    Lives with wife.         Review of Systems    PHQ9 12/10/2019   Total Score 3        Checklist of Daily Activities:        Objective:   /82 (BP Location: Left arm, Patient Position: Sitting, BP Method: Medium (Manual))   Pulse 82   Temp 97.8 °F (36.6 °C)   Ht 5' 9" (1.753 m)   Wt 93.2 kg (205 lb 7.5 oz)   SpO2 97%   BMI 30.34 kg/m²     Physical Exam   Constitutional: He appears well-developed and well-nourished.   HENT:   Head: Normocephalic and atraumatic.   Abdominal: Soft. Bowel sounds are normal. He exhibits no distension and no mass. There is no hepatosplenomegaly. There is no tenderness. There is no rebound and no guarding.           Lab Results   Component Value Date    WBC 3.83 (L) 01/27/2020    HGB 15.0 01/27/2020    HCT 43.6 01/27/2020     01/27/2020    CHOL 205 (H) 01/27/2020    TRIG 106 01/27/2020    HDL 65 01/27/2020    ALT 1,114 (H) 01/28/2020     (H) 01/28/2020     01/28/2020    K 4.1 01/28/2020     " "01/28/2020    CREATININE 1.0 01/28/2020    BUN 9 01/28/2020    CO2 29 01/28/2020    TSH 0.972 01/27/2020       Current Outpatient Medications on File Prior to Visit   Medication Sig Dispense Refill    fluticasone propionate (FLONASE) 50 mcg/actuation nasal spray 2 sprays (100 mcg total) by Each Nostril route once daily. 1 Bottle 11    hydrOXYzine pamoate (VISTARIL) 50 MG Cap Take 50 mg by mouth nightly.      pregabalin (LYRICA) 150 MG capsule Take 150 mg by mouth once daily.      rivaroxaban (XARELTO) 20 mg Tab Take 1 tablet (20 mg total) by mouth daily with dinner or evening meal. 90 tablet 3    [DISCONTINUED] cyclobenzaprine (FLEXERIL) 10 MG tablet Take 10 mg by mouth 2 (two) times daily as needed.      [DISCONTINUED] HYDROcodone-acetaminophen (NORCO) 7.5-325 mg per tablet Take 1 tablet by mouth every 6 (six) hours as needed for Pain.      [DISCONTINUED] hydrOXYzine pamoate (VISTARIL) 50 MG Cap Take 50 mg by mouth daily as needed.       No current facility-administered medications on file prior to visit.          Assessment:   64 y.o. male with multiple co-morbid illnesses here for continued follow up of medical problems.      Plan:     Problem List Items Addressed This Visit        Pulmonary    Bronchiectasis     Noted on CT of chest in [facility] 11/2018:  "Minimal bibasilar bronchiectasis "  · Asymptomatic, will follow              GI    Hepatitis     lymphoma stage 4 s/p chemo and recent PET without dz noted in liver but new labs with very high LFT.  Celebrex, tylenol and PPI all held.  Pt without symptoms.  Recently had a medrol dose pack.  Very slightly increased LFT notes at end of December with onc in care everywhere.   ·   Further labs ordered.  abd u/s today.   · Hepatology ordered  · Hydrocodone with acetaminophen, Celebrex, and omeprazole all held.  Will not home Eliquis with risk of further thrombotic event.  Will need to defer to hepatology concerning this.         Relevant Orders    Ambulatory " Referral to Hepatology      Other Visit Diagnoses     Elevated liver enzymes    -  Primary    Relevant Orders    SHAKILA Screen w/Reflex    ANTI-SMOOTH MUSCLE ANTIBODY    ANTI-LIVER, KIDNEY, MICROSOME AB    HEPATITIS C RNA, QUANTITATIVE, PCR    APTT    Protime-INR    Ambulatory Referral to Hepatology    Other specified coagulation defects         Liver disease, unspecified         Relevant Orders    APTT    Protime-INR          Health Maintenance       Date Due Completion Date    HIV Screening 11/30/1970 ---    TETANUS VACCINE 11/30/1973 ---    Shingles Vaccine (1 of 2) 11/30/2005 ---    Pneumococcal Vaccine (Highest Risk) (2 of 3 - PPSV23) 08/01/2019 6/6/2019    Colonoscopy 09/08/2020 9/8/2015 (Done)    Override on 9/8/2015: Done (at [facility])    Lipid Panel 01/27/2025 1/27/2020        Medications Reconciliation:   I have reconciled the patient's home medications with the patient/family. I have updated all changes.  Refer to After-Visit Medication List.      Medication List           Accurate as of January 28, 2020  4:20 PM. If you have any questions, ask your nurse or doctor.               CONTINUE taking these medications    fluticasone propionate 50 mcg/actuation nasal spray  Commonly known as:  FLONASE  2 sprays (100 mcg total) by Each Nostril route once daily.     hydrOXYzine pamoate 50 MG Cap  Commonly known as:  VISTARIL     pregabalin 150 MG capsule  Commonly known as:  LYRICA     rivaroxaban 20 mg Tab  Commonly known as:  XARELTO  Take 1 tablet (20 mg total) by mouth daily with dinner or evening meal.        STOP taking these medications    cyclobenzaprine 10 MG tablet  Commonly known as:  FLEXERIL  Stopped by:  Roly Flannery MD     HYDROcodone-acetaminophen 7.5-325 mg per tablet  Commonly known as:  NORCO  Stopped by:  Roly Flannery MD             Next/future visit:  eval liver numbers.    Follow up in about 1 week (around 2/4/2020). Total face-to-face time was 25 min, 50% of this was spent on counseling and  coordination of care. The following issues were discussed: hepatitis and all meds and risks reviewed.       Roly Flannery MD  Internal Medicine  [facility]  [phone]

## 2020-01-28 NOTE — ASSESSMENT & PLAN NOTE
lymphoma stage 4 s/p chemo and recent PET without dz noted in liver but new labs with very high LFT.  Celebrex, tylenol and PPI all held.  Pt without symptoms.  Recently had a medrol dose pack.  Very slightly increased LFT notes at end of December with onc in care everywhere.   ·   Further labs ordered.  abd u/s today.   · Hepatology ordered  · Hydrocodone with acetaminophen, Celebrex, and omeprazole all held.  Will not home Eliquis with risk of further thrombotic event.  Will need to defer to hepatology concerning this.

## 2020-01-29 ENCOUNTER — OFFICE VISIT (OUTPATIENT)
Dept: HEPATOLOGY | Facility: CLINIC | Age: 65
End: 2020-01-29
Payer: MEDICARE

## 2020-01-29 VITALS
HEIGHT: 68 IN | DIASTOLIC BLOOD PRESSURE: 79 MMHG | WEIGHT: 205.25 LBS | TEMPERATURE: 99 F | HEART RATE: 72 BPM | SYSTOLIC BLOOD PRESSURE: 126 MMHG | BODY MASS INDEX: 31.11 KG/M2 | OXYGEN SATURATION: 95 %

## 2020-01-29 DIAGNOSIS — R74.01 ELEVATED TRANSAMINASE LEVEL: ICD-10-CM

## 2020-01-29 DIAGNOSIS — K75.9 HEPATITIS: Primary | ICD-10-CM

## 2020-01-29 DIAGNOSIS — D61.810 ANTINEOPLASTIC CHEMOTHERAPY INDUCED PANCYTOPENIA: ICD-10-CM

## 2020-01-29 DIAGNOSIS — T45.1X5A ANTINEOPLASTIC CHEMOTHERAPY INDUCED PANCYTOPENIA: ICD-10-CM

## 2020-01-29 PROCEDURE — 99205 PR OFFICE/OUTPT VISIT, NEW, LEVL V, 60-74 MIN: ICD-10-PCS | Mod: HCNC,S$GLB,, | Performed by: INTERNAL MEDICINE

## 2020-01-29 PROCEDURE — 99499 UNLISTED E&M SERVICE: CPT | Mod: HCNC,S$GLB,, | Performed by: INTERNAL MEDICINE

## 2020-01-29 PROCEDURE — 99499 RISK ADDL DX/OHS AUDIT: ICD-10-PCS | Mod: HCNC,S$GLB,, | Performed by: INTERNAL MEDICINE

## 2020-01-29 PROCEDURE — 99205 OFFICE O/P NEW HI 60 MIN: CPT | Mod: HCNC,S$GLB,, | Performed by: INTERNAL MEDICINE

## 2020-01-29 PROCEDURE — 3008F BODY MASS INDEX DOCD: CPT | Mod: HCNC,CPTII,S$GLB, | Performed by: INTERNAL MEDICINE

## 2020-01-29 PROCEDURE — 99999 PR PBB SHADOW E&M-EST. PATIENT-LVL IV: ICD-10-PCS | Mod: PBBFAC,HCNC,, | Performed by: INTERNAL MEDICINE

## 2020-01-29 PROCEDURE — 99999 PR PBB SHADOW E&M-EST. PATIENT-LVL IV: CPT | Mod: PBBFAC,HCNC,, | Performed by: INTERNAL MEDICINE

## 2020-01-29 PROCEDURE — 3008F PR BODY MASS INDEX (BMI) DOCUMENTED: ICD-10-PCS | Mod: HCNC,CPTII,S$GLB, | Performed by: INTERNAL MEDICINE

## 2020-01-29 NOTE — LETTER
January 29, 2020      Roly Flannery MD  [address]  [place] [address]           [facility] - Hepatology  [address]  [facility] [address]  Phone: [phone]  Fax: [phone]          Patient: Jeff Hope   MR Number: [id number]   YOB: 1955   Date of Visit: 1/29/2020       Dear Dr. Roly Flannery:    Thank you for referring Jeff Hope to me for evaluation. Attached you will find relevant portions of my assessment and plan of care.    If you have questions, please do not hesitate to call me. I look forward to following Jeff Hope along with you.    Sincerely,    Alla Haney MD    Enclosure  CC:  No Recipients    If you would like to receive this communication electronically, please contact [email or web address] or [phone] to request more information on [employer] Link access.    For providers and/or their staff who would like to refer a patient to [facility], please contact us through our one-stop-shop provider referral line, [facility], at [phone].    If you feel you have received this communication in error or would no longer like to receive these types of communications, please e-mail [email or web address]

## 2020-01-29 NOTE — PATIENT INSTRUCTIONS
Recommendations:  -  Labs today:    CBC, CMP, PT INR, Hepatitis A IgG, ceruloplasmin, alpha-1 antitrypsin,   -  Labs on Mondays and thursdyas x 2 weeks, then weekly on Mondays after that: CBC, CMP, PT INR.    -  Low salt in the diet, avoid canned, bottled and processed foods.  -  Continue current meds  -  Avoid alcohol, smoking, sedatives and meds with codeine.  -  Avoid high intake of Tylenol (more than 4 extra-strength pills in one day)  -  Call us if any bleeding, fevers, confusion, disorientation occur  -  Return in 1 month.

## 2020-01-29 NOTE — PROGRESS NOTES
Chart reviewed. Care Everywhere updates requested. Immunizations reconciled. HM updated.  Updated colonoscopy from media to , updated hx w/dx of colon polyps

## 2020-01-29 NOTE — PROGRESS NOTES
"   [facility] Consultation Note    Reason for Consult:  The primary encounter diagnosis was Hepatitis. Diagnoses of Elevated transaminase level and Antineoplastic chemotherapy induced pancytopenia were also pertinent to this visit.    PCP: Roly Flannery   [address] / [place] [address]    HPI:  This is a 64 y.o. male here for evaluation of: transaminase elevation    65 y/o male with H/o follicular non-Hodgkin's lymphoma, malignant mets to bone, anti-neoplastic chemotherapy with pancytopenia, Pulm HTN, PE, mixed hyperlipidemia, was found to have elevated transaminases on 1/27/20 and again the next day.  AST in 500 range and ALT in 1100 range.  Alk phos and T bili normal.      History give by patient's wife:  Patient was diagnosed with NHL in Nov-Dec 2018 with diffuse abd lymph node enlargement, stage 4 with mets in bone marrow.  R-CHOP was started in Jan 2020 under the care of Dr. Dominik Farooq at [facility].  After the first treatment, patient had acute upper abd pain, so they held off treatment with R-CHOP.  Evaluation showed abd pain was due to gallbladder, so he had a lap cholecystectomy (at [facility]) .  After surgery, he had PEs (from "upper part of his body", not lower extremities).  All of this was treated, then R-CHOP was resumed around June/July 2019.  He received 6-7 cycles, every 21 days.  Last treatment was completed just before Christmas 2019.  Then, he was told he would return in 3 months (March 2020) for any future treatments.      He did have an evaluation with a PET-CT on 12/30/19 which showed near complete resolution of the lymphadenopathy, however, 2 small 6 mm subpleural nodules on the left lower lobe of the lung and 2 small lymph nodes in the left anterior mesentery were seen on this PET-CT.  Labs that day 12/30/19 showed AST was already elevated to 55 and ALT to 88.  Then there were no labs until Jan 27/2020, when AT, ALT were elevated as above.      HBsAg neg, HBeAg " and HBcAb IgM were neg on 3/15/19 (prior to starting R-CHOP).       Current meds:   flonase, vistaril, lyrica, xarelto.  Took something (?steroids) for bursitis.        Elevated liver enzymes: Yes  Abnormal imaging: Abd masses from NHL   Cirrhosis: No  Hepatitis C: No  Hepatitis B: No  Fatty liver: No  Encephalopathy: No  Post-hospital discharge: No  Symptoms: right shoulder pain    Primary hepatic manifestations:  Fatigue:Yes  Edema:Yes  Ascites:Yes  Encephalopathy:Yes  Abdominal pain:Yes  GI bleeds: Yes  Pruritus:Yes  Weight Changes:Yes  Changes in Bowel habits: Yes  Muscle cramps:No    Risk factors for liver disease:  No jaundice  No transfusions  No IVDU  Did not snort cocaine or similar agents  Did not live with anyone with hepatitis B or C  Sexual partner not tested  No hepatotoxic medications  No exposure to industrial toxins  Alcohol: None      ROS:  Constitutional: No fevers, chills, weight changes, fatigue  ENT: No allergies, nosebleeds,   CV: No chest pain  Pulm: No cough, shortness of breath  Ophtho: No vision changes  GI/Liver: see HPI  Derm: No rash, itching  Heme: No swollen glands, bruising  MSK: No joint pains, joint swelling  : No dysuria, hematuria, decrease in urine output  Endo: No hot or cold intolerance  Neuro: No confusion, disorientation, difficulty with sleep, memory, concentration, syncope, seizure  Psych: No anxiety, depression    Medical History:  has a past medical history of Personal history of colonic polyps (09/08/2015).    Surgical History:  has a past surgical history that includes Shoulder surgery (Left); Groin exploration (2 pre 2005 and 1 post 2005); and Laparoscopic cholecystectomy (05/2018).    Family History: family history includes Breast cancer in his mother; Diabetes in his brother; Emphysema in his father; No Known Problems in his daughter and daughter; Stroke in his sister; Ulcers in his brother..     Social History:  reports that he has never smoked. He has never used  "smokeless tobacco. He reports that he does not drink alcohol or use drugs.    Review of patient's allergies indicates:   Allergen Reactions    Rosuvastatin      myalgias       Current Outpatient Rx   Medication Sig Dispense Refill    fluticasone propionate (FLONASE) 50 mcg/actuation nasal spray 2 sprays (100 mcg total) by Each Nostril route once daily. 1 Bottle 11    hydrOXYzine pamoate (VISTARIL) 50 MG Cap Take 50 mg by mouth nightly.      pregabalin (LYRICA) 150 MG capsule Take 150 mg by mouth once daily.      rivaroxaban (XARELTO) 20 mg Tab Take 1 tablet (20 mg total) by mouth daily with dinner or evening meal. 90 tablet 3       Objective Findings:    Vital Signs:  /79 (BP Location: Right arm, Patient Position: Sitting, BP Method: Medium (Automatic))   Pulse 72   Temp 98.7 °F (37.1 °C) (Oral)   Ht 5' 8" (1.727 m)   Wt 93.1 kg (205 lb 4 oz)   SpO2 95%   BMI 31.21 kg/m²   Body mass index is 31.21 kg/m².    Physical Exam:  General Appearance: Well appearing in no acute distress  Head:   Normocephalic, without obvious abnormality  Eyes:    No scleral icterus, EOMI  ENT: Neck supple, Lips, mucosa, and tongue normal; teeth and gums normal  Lungs: CTA bilaterally in anterior and posterior fields, no wheezes, no crackles.  Heart:  Regular rate and rhythm, S1, S2 normal, no murmurs heard  Abdomen: Soft, non tender, non distended with positive bowel sounds in all four quadrants. No hepatosplenomegaly, ascites, or mass  Extremities: 2+ pulses, no clubbing, cyanosis or edema  Skin: No rash  Neurologic: CN II-XII intact, alert, oriented x 3. No asterixis      Labs:  Lab Results   Component Value Date    WBC 3.83 (L) 01/27/2020    HGB 15.0 01/27/2020    HCT 43.6 01/27/2020     01/27/2020    CHOL 205 (H) 01/27/2020    TRIG 106 01/27/2020    HDL 65 01/27/2020    INR 1.3 (H) 01/28/2020    CREATININE 1.0 01/28/2020    BUN 9 01/28/2020    BILITOT 0.7 01/28/2020    ALT 1,114 (H) 01/28/2020     (H) " 01/28/2020    ALKPHOS 124 01/28/2020     01/28/2020    K 4.1 01/28/2020     01/28/2020    CO2 29 01/28/2020    TSH 0.972 01/27/2020       Imaging:       Endoscopy:      Assessment:  1. Hepatitis    2. Elevated transaminase level    3. Antineoplastic chemotherapy induced pancytopenia    Pt with NHL, HLD, PE, Pulm HTN, s/p lap valentina, s/p R-CHOP 6-7 cycles.   Here fr elevated transaminases:  elevated transaminases first noted on 12/30/19 when R-CHOP therapy was completed, then no labs until on 1/27/20 (and again the next day with a slight downward trend).  AST in 500 range and ALT in 1100 range.  Alk phos and T bili normal.    These elevations could be due to chemotherapy, but will check viral hepatitis serologies, autoimmune markers,  and A1AT and ceruloplasmin.  Patient's PCP, Dr. Roly Flannery, has already ordered the viral hep and autoimmune markers, so I will get the remainder.      Since there is a slight downward trend in transaminases even over last 24-hr period, I will monitor the labs twice weekly starting today, for 2 weeks.  If they continue to improve, it is most likely due to chemo.  If not, he may need a liver biopsy.      Response of NHL to R-CHOP:   PET-CT 12/30/19: showed near complete resolution of the mesenteric lymph nodes, but interval development of 2 small 6 mm subpleural nodules on the left lower lung. And 2 left mesenteric lymph nodes.       US abd 1/28/20:  1.2 cm hypoechoic structure in the region of the janine hepatis.  This finding is indeterminate and may represent prominent periportal lymph node or cystic duct remnant in this patient that is status-post cholecystectomy.  CT abdomen without and with contrast could be done if clinically warranted.  Serologies have been ordered by Dr. Roly Flannery.  Will check results this week.     Recommendations:  -  Labs today:    CBC, CMP, PT INR, Hepatitis A IgG, ceruloplasmin, alpha-1 antitrypsin,   -  Labs on Mondays and thursdyas x 2 weeks,  then weekly on Mondays after that: CBC, CMP, PT INR.    -  Low salt in the diet, avoid canned, bottled and processed foods.  -  Continue current meds  -  Avoid alcohol, smoking, sedatives and meds with codeine.  -  Avoid high intake of Tylenol (more than 4 extra-strength pills in one day)  -  Call us if any bleeding, fevers, confusion, disorientation occur  -  Return in 1 month.       Follow up in about 1 month (around 2/29/2020).      Order summary:  Orders Placed This Encounter   Procedures    Alpha 1 Antitrypsin Phenotype    Hepatitis A antibody, IgG    Ceruloplasmin    Comprehensive metabolic panel    CBC auto differential    Protime-INR       Thank you so much for allowing me to participate in the care of Jeff Haney MD

## 2020-01-29 NOTE — Clinical Note
Recommendations:-  Labs today:  CBC, CMP, PT INR, Hepatitis A IgG, ceruloplasmin, alpha-1 antitrypsin, -  Labs on Mondays and thursdyas x 2 weeks, then weekly on Mondays after that: CBC, CMP, PT INR.  -  Low salt in the diet, avoid canned, bottled and processed foods.-  Continue current meds-  Avoid alcohol, smoking, sedatives and meds with codeine.-  Avoid high intake of Tylenol (more than 4 extra-strength pills in one day)-  Call us if any bleeding, fevers, confusion, disorientation occur-  Return in 1 month.

## 2020-01-30 ENCOUNTER — TELEPHONE (OUTPATIENT)
Dept: HEPATOLOGY | Facility: CLINIC | Age: 65
End: 2020-01-30

## 2020-01-30 DIAGNOSIS — Z29.9 PREVENTIVE MEASURE: Primary | ICD-10-CM

## 2020-01-30 PROBLEM — B19.10 HEPATITIS B: Status: ACTIVE | Noted: 2020-01-30

## 2020-01-31 ENCOUNTER — TELEPHONE (OUTPATIENT)
Dept: HEPATOLOGY | Facility: CLINIC | Age: 65
End: 2020-01-31

## 2020-01-31 NOTE — TELEPHONE ENCOUNTER
MA called patient to inform him of his lab results below. He is unable to reached left him  to please give us a callback.

## 2020-01-31 NOTE — TELEPHONE ENCOUNTER
----- Message from Alla Haney MD sent at 1/30/2020  3:21 PM CST -----  Pl inform pt:  Liver enzymes on 1/29/20 are remaining elevated, they are slightly higher than previous two measurements on 1/27 and 1/28.  It is not due to hepatitis A as his antibody is negative indicating he was not previously exposed to this virus.  Therefore, he should take vaccine against hep A.  Other results are still pending.    Please give injection appointment (series of 2, six months apart) for Hep A vaccine.  Order placed.

## 2020-01-31 NOTE — TELEPHONE ENCOUNTER
MA called patient inform him of his lab results below, he understood and verbalized understanding. He said he will get his vaccination in our pharmacy when he comes back here for his blood work.

## 2020-02-03 ENCOUNTER — LAB VISIT (OUTPATIENT)
Dept: LAB | Facility: HOSPITAL | Age: 65
End: 2020-02-03
Attending: INTERNAL MEDICINE
Payer: MEDICARE

## 2020-02-03 DIAGNOSIS — R74.01 ELEVATED TRANSAMINASE LEVEL: ICD-10-CM

## 2020-02-03 DIAGNOSIS — K75.9 HEPATITIS: ICD-10-CM

## 2020-02-03 LAB
ALBUMIN SERPL BCP-MCNC: 3.9 G/DL (ref 3.5–5.2)
ALP SERPL-CCNC: 136 U/L (ref 55–135)
ALT SERPL W/O P-5'-P-CCNC: 1237 U/L (ref 10–44)
ANION GAP SERPL CALC-SCNC: 13 MMOL/L (ref 8–16)
AST SERPL-CCNC: 620 U/L (ref 10–40)
BASOPHILS # BLD AUTO: 0.02 K/UL (ref 0–0.2)
BASOPHILS NFR BLD: 0.6 % (ref 0–1.9)
BILIRUB SERPL-MCNC: 0.9 MG/DL (ref 0.1–1)
BUN SERPL-MCNC: 13 MG/DL (ref 8–23)
CALCIUM SERPL-MCNC: 9.4 MG/DL (ref 8.7–10.5)
CHLORIDE SERPL-SCNC: 106 MMOL/L (ref 95–110)
CO2 SERPL-SCNC: 24 MMOL/L (ref 23–29)
CREAT SERPL-MCNC: 0.9 MG/DL (ref 0.5–1.4)
DIFFERENTIAL METHOD: ABNORMAL
EOSINOPHIL # BLD AUTO: 0 K/UL (ref 0–0.5)
EOSINOPHIL NFR BLD: 1.1 % (ref 0–8)
ERYTHROCYTE [DISTWIDTH] IN BLOOD BY AUTOMATED COUNT: 12.8 % (ref 11.5–14.5)
EST. GFR  (AFRICAN AMERICAN): >60 ML/MIN/1.73 M^2
EST. GFR  (NON AFRICAN AMERICAN): >60 ML/MIN/1.73 M^2
GLUCOSE SERPL-MCNC: 85 MG/DL (ref 70–110)
HCT VFR BLD AUTO: 43.7 % (ref 40–54)
HGB BLD-MCNC: 14.4 G/DL (ref 14–18)
IMM GRANULOCYTES # BLD AUTO: 0.03 K/UL (ref 0–0.04)
IMM GRANULOCYTES NFR BLD AUTO: 0.9 % (ref 0–0.5)
INR PPP: 1.4 (ref 0.8–1.2)
LYMPHOCYTES # BLD AUTO: 1.5 K/UL (ref 1–4.8)
LYMPHOCYTES NFR BLD: 42 % (ref 18–48)
MCH RBC QN AUTO: 30.3 PG (ref 27–31)
MCHC RBC AUTO-ENTMCNC: 33 G/DL (ref 32–36)
MCV RBC AUTO: 92 FL (ref 82–98)
MONOCYTES # BLD AUTO: 0.6 K/UL (ref 0.3–1)
MONOCYTES NFR BLD: 16.6 % (ref 4–15)
NEUTROPHILS # BLD AUTO: 1.4 K/UL (ref 1.8–7.7)
NEUTROPHILS NFR BLD: 38.8 % (ref 38–73)
NRBC BLD-RTO: 0 /100 WBC
PLATELET # BLD AUTO: 191 K/UL (ref 150–350)
PMV BLD AUTO: 10.3 FL (ref 9.2–12.9)
POTASSIUM SERPL-SCNC: 4.2 MMOL/L (ref 3.5–5.1)
PROT SERPL-MCNC: 6.9 G/DL (ref 6–8.4)
PROTHROMBIN TIME: 13.5 SEC (ref 9–12.5)
RBC # BLD AUTO: 4.76 M/UL (ref 4.6–6.2)
SODIUM SERPL-SCNC: 143 MMOL/L (ref 136–145)
WBC # BLD AUTO: 3.5 K/UL (ref 3.9–12.7)

## 2020-02-03 PROCEDURE — 85610 PROTHROMBIN TIME: CPT | Mod: HCNC

## 2020-02-03 PROCEDURE — 80053 COMPREHEN METABOLIC PANEL: CPT | Mod: HCNC

## 2020-02-03 PROCEDURE — 36415 COLL VENOUS BLD VENIPUNCTURE: CPT | Mod: HCNC

## 2020-02-03 PROCEDURE — 85025 COMPLETE CBC W/AUTO DIFF WBC: CPT | Mod: HCNC

## 2020-02-04 ENCOUNTER — TELEPHONE (OUTPATIENT)
Dept: PRIMARY CARE CLINIC | Facility: CLINIC | Age: 65
End: 2020-02-04

## 2020-02-04 ENCOUNTER — TELEPHONE (OUTPATIENT)
Dept: HEPATOLOGY | Facility: CLINIC | Age: 65
End: 2020-02-04

## 2020-02-04 NOTE — TELEPHONE ENCOUNTER
Returned call to pt and moved his lab visit to accommodate his appt w/PCP and his wife's work schedule.    ----- Message from Sabiha Hewitt sent at 2/4/2020  3:02 PM CST -----  Contact: self  or (Iz[place])  Pt is calling about geting an appt scheduled for 2/6/2020. Pt states he has a lab appt on 2/6/2020 for 7:10. Pt states he needs to know about the appt b/c of transportation.  Please call and advise.

## 2020-02-06 ENCOUNTER — TELEPHONE (OUTPATIENT)
Dept: HEPATOLOGY | Facility: CLINIC | Age: 65
End: 2020-02-06

## 2020-02-06 ENCOUNTER — LAB VISIT (OUTPATIENT)
Dept: LAB | Facility: HOSPITAL | Age: 65
End: 2020-02-06
Attending: INTERNAL MEDICINE
Payer: MEDICARE

## 2020-02-06 ENCOUNTER — OFFICE VISIT (OUTPATIENT)
Dept: PRIMARY CARE CLINIC | Facility: CLINIC | Age: 65
End: 2020-02-06
Payer: MEDICARE

## 2020-02-06 ENCOUNTER — IMMUNIZATION (OUTPATIENT)
Dept: PHARMACY | Facility: CLINIC | Age: 65
End: 2020-02-06
Payer: MEDICARE

## 2020-02-06 VITALS
SYSTOLIC BLOOD PRESSURE: 120 MMHG | TEMPERATURE: 98 F | WEIGHT: 205.25 LBS | BODY MASS INDEX: 31.11 KG/M2 | HEART RATE: 77 BPM | DIASTOLIC BLOOD PRESSURE: 74 MMHG | OXYGEN SATURATION: 97 % | HEIGHT: 68 IN

## 2020-02-06 DIAGNOSIS — Z00.00 HEALTHCARE MAINTENANCE: ICD-10-CM

## 2020-02-06 DIAGNOSIS — B16.9 ACUTE VIRAL HEPATITIS B WITHOUT COMA AND WITHOUT DELTA AGENT: ICD-10-CM

## 2020-02-06 DIAGNOSIS — I27.20 PULMONARY HYPERTENSION: ICD-10-CM

## 2020-02-06 DIAGNOSIS — R74.8 ELEVATED LIVER ENZYMES: Primary | ICD-10-CM

## 2020-02-06 DIAGNOSIS — K75.9 HEPATITIS: ICD-10-CM

## 2020-02-06 DIAGNOSIS — R76.8 HEPATITIS B SURFACE ANTIGEN POSITIVE: ICD-10-CM

## 2020-02-06 DIAGNOSIS — F33.41 RECURRENT MAJOR DEPRESSIVE DISORDER, IN PARTIAL REMISSION: ICD-10-CM

## 2020-02-06 DIAGNOSIS — R74.01 ELEVATED TRANSAMINASE LEVEL: ICD-10-CM

## 2020-02-06 LAB
ALBUMIN SERPL BCP-MCNC: 3.9 G/DL (ref 3.5–5.2)
ALP SERPL-CCNC: 150 U/L (ref 55–135)
ALT SERPL W/O P-5'-P-CCNC: 1563 U/L (ref 10–44)
ANION GAP SERPL CALC-SCNC: 7 MMOL/L (ref 8–16)
AST SERPL-CCNC: 850 U/L (ref 10–40)
BASOPHILS # BLD AUTO: 0.02 K/UL (ref 0–0.2)
BASOPHILS NFR BLD: 0.7 % (ref 0–1.9)
BILIRUB SERPL-MCNC: 0.9 MG/DL (ref 0.1–1)
BUN SERPL-MCNC: 10 MG/DL (ref 8–23)
CALCIUM SERPL-MCNC: 9.4 MG/DL (ref 8.7–10.5)
CHLORIDE SERPL-SCNC: 104 MMOL/L (ref 95–110)
CO2 SERPL-SCNC: 28 MMOL/L (ref 23–29)
CREAT SERPL-MCNC: 1 MG/DL (ref 0.5–1.4)
DIFFERENTIAL METHOD: ABNORMAL
EOSINOPHIL # BLD AUTO: 0 K/UL (ref 0–0.5)
EOSINOPHIL NFR BLD: 0.7 % (ref 0–8)
ERYTHROCYTE [DISTWIDTH] IN BLOOD BY AUTOMATED COUNT: 12.8 % (ref 11.5–14.5)
EST. GFR  (AFRICAN AMERICAN): >60 ML/MIN/1.73 M^2
EST. GFR  (NON AFRICAN AMERICAN): >60 ML/MIN/1.73 M^2
GLUCOSE SERPL-MCNC: 134 MG/DL (ref 70–110)
HCT VFR BLD AUTO: 46.3 % (ref 40–54)
HGB BLD-MCNC: 15.2 G/DL (ref 14–18)
IMM GRANULOCYTES # BLD AUTO: 0.03 K/UL (ref 0–0.04)
IMM GRANULOCYTES NFR BLD AUTO: 1 % (ref 0–0.5)
INR PPP: 1.3 (ref 0.8–1.2)
LYMPHOCYTES # BLD AUTO: 1.3 K/UL (ref 1–4.8)
LYMPHOCYTES NFR BLD: 45.2 % (ref 18–48)
MCH RBC QN AUTO: 29.7 PG (ref 27–31)
MCHC RBC AUTO-ENTMCNC: 32.8 G/DL (ref 32–36)
MCV RBC AUTO: 91 FL (ref 82–98)
MONOCYTES # BLD AUTO: 0.6 K/UL (ref 0.3–1)
MONOCYTES NFR BLD: 19 % (ref 4–15)
NEUTROPHILS # BLD AUTO: 1 K/UL (ref 1.8–7.7)
NEUTROPHILS NFR BLD: 33.4 % (ref 38–73)
NRBC BLD-RTO: 0 /100 WBC
PLATELET # BLD AUTO: 201 K/UL (ref 150–350)
PLATELET BLD QL SMEAR: ABNORMAL
PMV BLD AUTO: 10.3 FL (ref 9.2–12.9)
POTASSIUM SERPL-SCNC: 3.8 MMOL/L (ref 3.5–5.1)
PROT SERPL-MCNC: 7.2 G/DL (ref 6–8.4)
PROTHROMBIN TIME: 12.7 SEC (ref 9–12.5)
RBC # BLD AUTO: 5.11 M/UL (ref 4.6–6.2)
SODIUM SERPL-SCNC: 139 MMOL/L (ref 136–145)
WBC # BLD AUTO: 2.94 K/UL (ref 3.9–12.7)

## 2020-02-06 PROCEDURE — 3008F PR BODY MASS INDEX (BMI) DOCUMENTED: ICD-10-PCS | Mod: HCNC,CPTII,S$GLB, | Performed by: INTERNAL MEDICINE

## 2020-02-06 PROCEDURE — 99213 OFFICE O/P EST LOW 20 MIN: CPT | Mod: HCNC,S$GLB,, | Performed by: INTERNAL MEDICINE

## 2020-02-06 PROCEDURE — 99499 UNLISTED E&M SERVICE: CPT | Mod: HCNC,S$GLB,, | Performed by: INTERNAL MEDICINE

## 2020-02-06 PROCEDURE — 3008F BODY MASS INDEX DOCD: CPT | Mod: HCNC,CPTII,S$GLB, | Performed by: INTERNAL MEDICINE

## 2020-02-06 PROCEDURE — 36415 COLL VENOUS BLD VENIPUNCTURE: CPT | Mod: HCNC

## 2020-02-06 PROCEDURE — 99213 PR OFFICE/OUTPT VISIT, EST, LEVL III, 20-29 MIN: ICD-10-PCS | Mod: HCNC,S$GLB,, | Performed by: INTERNAL MEDICINE

## 2020-02-06 PROCEDURE — 85025 COMPLETE CBC W/AUTO DIFF WBC: CPT | Mod: HCNC

## 2020-02-06 PROCEDURE — 99499 RISK ADDL DX/OHS AUDIT: ICD-10-PCS | Mod: HCNC,S$GLB,, | Performed by: INTERNAL MEDICINE

## 2020-02-06 PROCEDURE — 85610 PROTHROMBIN TIME: CPT | Mod: HCNC

## 2020-02-06 PROCEDURE — 80053 COMPREHEN METABOLIC PANEL: CPT | Mod: HCNC

## 2020-02-06 NOTE — TELEPHONE ENCOUNTER
MA called patient inform him of his results below. He understood. Linked his additional labs to the lab appt he have on 2/10.

## 2020-02-06 NOTE — TELEPHONE ENCOUNTER
----- Message from Alla Haney MD sent at 2/6/2020 12:18 PM CST -----  Enzymes remain elevated, similar to before I saw patient.  Please get HBV DNA quant and HB e antigen and e antibody ASAP.  Order placed.

## 2020-02-06 NOTE — ASSESSMENT & PLAN NOTE
PHQ9 3 12/2019.  Increased stressors.  Patient currently on no medications, good family support.  · Repeat PHQ9 in 6 month or change in symptoms.

## 2020-02-06 NOTE — TELEPHONE ENCOUNTER
Labs showed HBsAg positive and HBcAb positive. Patient has most likely reactivation of hep B in the face of rituxan (R-CHOP) administration. Prognosis guarded. Need to start anti-hep B med right away.      Please get HBV DNA quant, HBeAg and HBeAb, ASAP.  I talked to patient he will come in today to do these blood tests.  Continue weekly CBC, CMP and PT INR, every Wednesday.   And   I am starting patient on Vemlidy 25 mg daily. I have informed him that he needs to be on Hep B treatment right away.  Escript sent to [facility].     Please send patient to injection area (next to ID clinic first floor near [address] entrance) for Hep A vaccine.     Please remind patient of his appointment to see me on 2/28/20.

## 2020-02-06 NOTE — ASSESSMENT & PLAN NOTE
· LW and POA from [facility] completed 4/2019 and if not obtained, will complete here.  · Yearly lab 1/2021  · Hep C and HIV done 2/2018.    · Optometry pending

## 2020-02-06 NOTE — ASSESSMENT & PLAN NOTE
Pt with acte/reactiviated hep B and mildly increased INR with very increased LFT.  Pt without symptoms.    · Continue f/u hepatology clinic  · Continue Hep A vaccine.  · Twice weekly labs.  · Dx discussed and transmission and it was suggested that wife be tested.    · To continue to avoid immune suppressing medication and meds metabolized thru the liver  · No stigmata of liver disease including encephalopathy and no bleeding

## 2020-02-06 NOTE — PROGRESS NOTES
Primary Care Provider Appointment    Subjective:      Patient ID: Jeff Hope is a 64 y.o. male here for follow up.     Chief Complaint: Follow-up (routine follow up visit. no complaints or medical concerns.) and Testicle Pain (reports of chronic pain on the L testicle(pelvis), has had minor surgeries done in the past. )  HPI:  This is a pleasant 63-year-old male with bronchiectasis, hyperlipidemia, history of pulmonary embolism on chronic anticoagulation with a follicular non-Hodgkin's lymphoma here for acute/reactivated hepatitis B.    1/29/2020 pt seen by hepatology and further labs done as hep B was not evidnet at the time.  Hep A immunizations ordered.  Most recent labs with further increase in LFTs.  Slightly increased INR also noted.    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    Pulmonary hypertension    Myalgia due to statin    Vitamin D deficiency    Port-A-Cath in place    Abnormal MMSE    Antineoplastic chemotherapy induced pancytopenia    Allergic rhinitis    Adrenal hypertrophy    Acute bursitis of right shoulder    Viral upper respiratory tract infection    Hepatitis    Elevated transaminase level    Acute viral hepatitis B without coma and without delta agent        Past Surgical History:   Procedure Laterality Date    GROIN EXPLORATION  2 pre 2005 and 1 post 2005    total of 3 procedures    LAPAROSCOPIC CHOLECYSTECTOMY  05/2018    SHOULDER SURGERY Left        Past Medical History:   Diagnosis Date    Personal history of colonic  "polyps 09/08/2015    per [facility] report - repeat 9/2020       Social History     Socioeconomic History    Marital status:      Spouse name: Not on file    Number of children: 2    Years of education: Not on file    Highest education level: Not on file   Occupational History    Occupation: disabled [employer]    Social Needs    Financial resource strain: Somewhat hard    Food insecurity:     Worry: Often true     Inability: Sometimes true    Transportation needs:     Medical: No     Non-medical: No   Tobacco Use    Smoking status: Never Smoker    Smokeless tobacco: Never Used   Substance and Sexual Activity    Alcohol use: No     Frequency: Never    Drug use: No    Sexual activity: Yes     Partners: Female   Lifestyle    Physical activity:     Days per week: 0 days     Minutes per session: 0 min    Stress: Rather much   Relationships    Social connections:     Talks on phone: Twice a week     Gets together: Twice a week     Attends Restoration[employer] service: Never     Active member of club or organization: No     Attends meetings of clubs or organizations: Never     Relationship status:    Other Topics Concern    Not on file   Social History Narrative    Lives with wife.         Review of Systems   Constitutional: Negative for activity change, appetite change and fatigue.   Gastrointestinal: Positive for rectal pain. Negative for blood in stool.   Genitourinary: Negative for hematuria.   Hematological: Does not bruise/bleed easily.   Psychiatric/Behavioral: Negative for behavioral problems and confusion.       PHQ9 12/10/2019   Total Score 3        Checklist of Daily Activities:        Objective:   /74 (BP Location: Left arm, Patient Position: Sitting, BP Method: Large (Manual))   Pulse 77   Temp 98.4 °F (36.9 °C) (Oral)   Ht 5' 8" (1.727 m)   Wt 93.1 kg (205 lb 4 oz)   SpO2 97%   BMI 31.21 kg/m²     Physical Exam   Constitutional: He appears well-developed and well-nourished. "   HENT:   Head: Normocephalic and atraumatic.   Abdominal: Soft. Bowel sounds are normal. There is no hepatosplenomegaly. There is no tenderness.           Lab Results   Component Value Date    WBC 3.50 (L) 02/03/2020    HGB 14.4 02/03/2020    HCT 43.7 02/03/2020     02/03/2020    CHOL 205 (H) 01/27/2020    TRIG 106 01/27/2020    HDL 65 01/27/2020    ALT 1,237 (H) 02/03/2020     (H) 02/03/2020     02/03/2020    K 4.2 02/03/2020     02/03/2020    CREATININE 0.9 02/03/2020    BUN 13 02/03/2020    CO2 24 02/03/2020    TSH 0.972 01/27/2020    INR 1.4 (H) 02/03/2020       Current Outpatient Medications on File Prior to Visit   Medication Sig Dispense Refill    fluticasone propionate (FLONASE) 50 mcg/actuation nasal spray 2 sprays (100 mcg total) by Each Nostril route once daily. 1 Bottle 11    hydrOXYzine pamoate (VISTARIL) 50 MG Cap Take 50 mg by mouth nightly.      pregabalin (LYRICA) 150 MG capsule Take 150 mg by mouth once daily.      rivaroxaban (XARELTO) 20 mg Tab Take 1 tablet (20 mg total) by mouth daily with dinner or evening meal. 90 tablet 3     No current facility-administered medications on file prior to visit.          Assessment:   64 y.o. male with multiple co-morbid illnesses here for continued follow up of medical problems.      Plan:     Problem List Items Addressed This Visit        Psychiatric    Recurrent major depressive disorder, in partial remission     PHQ9 3 12/2019.  Increased stressors.  Patient currently on no medications, good family support.  · Repeat PHQ9 in 6 month or change in symptoms.            Pulmonary    Pulmonary hypertension     Noted on ECHO at [facility]. 12/2013.  · Asymptomatic, will follow.            GI    Acute viral hepatitis B without coma and without delta agent     Pt with acte/reactiviated hep B and mildly increased INR with very increased LFT.  Pt without symptoms.    · Continue f/u hepatology clinic  · Continue Hep A vaccine.  · Twice  weekly labs.  · Dx discussed and transmission and it was suggested that wife be tested.    · To continue to avoid immune suppressing medication and meds metabolized thru the liver  · No stigmata of liver disease including encephalopathy and no bleeding            Other    Healthcare maintenance       · LW and POA from [facility] completed 4/2019 and if not obtained, will complete here.  · Yearly lab 1/2021  · Hep C and HIV done 2/2018.    · Optometry pending               Health Maintenance       Date Due Completion Date    HIV Screening 11/30/1970 ---    TETANUS VACCINE 11/30/1973 ---    Shingles Vaccine (1 of 2) 11/30/2005 ---    Pneumococcal Vaccine (Highest Risk) (2 of 3 - PPSV23) 08/01/2019 6/6/2019    Colonoscopy 09/08/2020 9/8/2015    Lipid Panel 01/27/2025 1/27/2020        Medications Reconciliation:   I have not reconciled the patient's home medications with the patient/family. I have updated all changes.  Refer to After-Visit Medication List.      Medication List           Accurate as of February 6, 2020 11:50 AM. If you have any questions, ask your nurse or doctor.               START taking these medications    hepatitis A virus vaccine (PF) 1,440 HUMAIRA unit/mL Syrg  Commonly known as:  Havrix (PF)  Inject 1 mL (1,140 Units total) into the muscle once. For one dose. for 1 dose        CONTINUE taking these medications    fluticasone propionate 50 mcg/actuation nasal spray  Commonly known as:  FLONASE  2 sprays (100 mcg total) by Each Nostril route once daily.     hydrOXYzine pamoate 50 MG Cap  Commonly known as:  VISTARIL     pregabalin 150 MG capsule  Commonly known as:  LYRICA     rivaroxaban 20 mg Tab  Commonly known as:  XARELTO  Take 1 tablet (20 mg total) by mouth daily with dinner or evening meal.           Where to Get Your Medications      These medications were sent to [facility]  [address], [place] [address]    Hours:  Mon-Fri, 8a-5:30p Phone:  [phone]    · hepatitis A virus vaccine (PF) 1,440 HUMAIRA unit/mL Syrg          Next/future visit:  Review last labs.    Follow up in about 2 weeks (around 2/20/2020). Total face-to-face time was 20 min, >50% of this was spent on counseling and coordination of care. The following issues were discussed: info on hep B and review of labs.       Roly Flannery MD  Internal Medicine  [facility]  [phone]

## 2020-02-07 ENCOUNTER — TELEPHONE (OUTPATIENT)
Dept: HEPATOLOGY | Facility: CLINIC | Age: 65
End: 2020-02-07

## 2020-02-07 NOTE — TELEPHONE ENCOUNTER
Ma CALLED patient inform him of his results below, he was not able to get the medication per [facility] still waiting for PA for the medication. Patient said [facility] suppose to give him a call once the medication is available.

## 2020-02-07 NOTE — TELEPHONE ENCOUNTER
----- Message from Alla Haney MD sent at 2/7/2020  4:36 AM CST -----  Pl inform patient liver enzymes slightly higher than prior levels, Hep B medication was sent to pharmacy yesterday.  Needs to start taking it as soon as it is available.  Continue CBC, CMP, PT INR every Monday, Thursday.  Please Check with [facility] when Hep B med will be available to patient.  Pls send me secure chat message.

## 2020-02-10 ENCOUNTER — TELEPHONE (OUTPATIENT)
Dept: HEPATOLOGY | Facility: CLINIC | Age: 65
End: 2020-02-10

## 2020-02-10 ENCOUNTER — LAB VISIT (OUTPATIENT)
Dept: LAB | Facility: HOSPITAL | Age: 65
End: 2020-02-10
Attending: INTERNAL MEDICINE
Payer: MEDICARE

## 2020-02-10 DIAGNOSIS — K75.9 HEPATITIS: ICD-10-CM

## 2020-02-10 DIAGNOSIS — R74.8 ELEVATED LIVER ENZYMES: ICD-10-CM

## 2020-02-10 DIAGNOSIS — R74.01 ELEVATED TRANSAMINASE LEVEL: ICD-10-CM

## 2020-02-10 LAB
ALBUMIN SERPL BCP-MCNC: 3.6 G/DL (ref 3.5–5.2)
ALP SERPL-CCNC: 141 U/L (ref 55–135)
ALT SERPL W/O P-5'-P-CCNC: 1475 U/L (ref 10–44)
ANION GAP SERPL CALC-SCNC: 8 MMOL/L (ref 8–16)
ANISOCYTOSIS BLD QL SMEAR: SLIGHT
AST SERPL-CCNC: 777 U/L (ref 10–40)
BASOPHILS # BLD AUTO: 0.02 K/UL (ref 0–0.2)
BASOPHILS NFR BLD: 0.6 % (ref 0–1.9)
BILIRUB SERPL-MCNC: 1.3 MG/DL (ref 0.1–1)
BUN SERPL-MCNC: 10 MG/DL (ref 8–23)
CALCIUM SERPL-MCNC: 9.5 MG/DL (ref 8.7–10.5)
CHLORIDE SERPL-SCNC: 106 MMOL/L (ref 95–110)
CO2 SERPL-SCNC: 29 MMOL/L (ref 23–29)
CREAT SERPL-MCNC: 0.9 MG/DL (ref 0.5–1.4)
DIFFERENTIAL METHOD: ABNORMAL
EOSINOPHIL # BLD AUTO: 0 K/UL (ref 0–0.5)
EOSINOPHIL NFR BLD: 0.6 % (ref 0–8)
ERYTHROCYTE [DISTWIDTH] IN BLOOD BY AUTOMATED COUNT: 13.1 % (ref 11.5–14.5)
EST. GFR  (AFRICAN AMERICAN): >60 ML/MIN/1.73 M^2
EST. GFR  (NON AFRICAN AMERICAN): >60 ML/MIN/1.73 M^2
GLUCOSE SERPL-MCNC: 87 MG/DL (ref 70–110)
HCT VFR BLD AUTO: 44.5 % (ref 40–54)
HGB BLD-MCNC: 14.4 G/DL (ref 14–18)
IMM GRANULOCYTES # BLD AUTO: 0.04 K/UL (ref 0–0.04)
IMM GRANULOCYTES NFR BLD AUTO: 1.2 % (ref 0–0.5)
INR PPP: 1.3 (ref 0.8–1.2)
LYMPHOCYTES # BLD AUTO: 1.8 K/UL (ref 1–4.8)
LYMPHOCYTES NFR BLD: 55 % (ref 18–48)
MCH RBC QN AUTO: 29.8 PG (ref 27–31)
MCHC RBC AUTO-ENTMCNC: 32.4 G/DL (ref 32–36)
MCV RBC AUTO: 92 FL (ref 82–98)
MONOCYTES # BLD AUTO: 0.6 K/UL (ref 0.3–1)
MONOCYTES NFR BLD: 18 % (ref 4–15)
NEUTROPHILS # BLD AUTO: 0.8 K/UL (ref 1.8–7.7)
NEUTROPHILS NFR BLD: 24.6 % (ref 38–73)
NRBC BLD-RTO: 0 /100 WBC
OVALOCYTES BLD QL SMEAR: ABNORMAL
PLATELET # BLD AUTO: 176 K/UL (ref 150–350)
PLATELET BLD QL SMEAR: ABNORMAL
PMV BLD AUTO: 10.2 FL (ref 9.2–12.9)
POIKILOCYTOSIS BLD QL SMEAR: SLIGHT
POTASSIUM SERPL-SCNC: 4.4 MMOL/L (ref 3.5–5.1)
PROT SERPL-MCNC: 6.6 G/DL (ref 6–8.4)
PROTHROMBIN TIME: 13 SEC (ref 9–12.5)
RBC # BLD AUTO: 4.84 M/UL (ref 4.6–6.2)
SODIUM SERPL-SCNC: 143 MMOL/L (ref 136–145)
WBC # BLD AUTO: 3.27 K/UL (ref 3.9–12.7)

## 2020-02-10 PROCEDURE — 85610 PROTHROMBIN TIME: CPT | Mod: HCNC

## 2020-02-10 PROCEDURE — 80053 COMPREHEN METABOLIC PANEL: CPT | Mod: HCNC

## 2020-02-10 PROCEDURE — 36415 COLL VENOUS BLD VENIPUNCTURE: CPT | Mod: HCNC

## 2020-02-10 PROCEDURE — 86707 HEPATITIS BE ANTIBODY: CPT | Mod: HCNC

## 2020-02-10 PROCEDURE — 85025 COMPLETE CBC W/AUTO DIFF WBC: CPT | Mod: HCNC

## 2020-02-10 PROCEDURE — 87350 HEPATITIS BE AG IA: CPT | Mod: HCNC

## 2020-02-10 NOTE — TELEPHONE ENCOUNTER
----- Message from Alla Haney MD sent at 2/10/2020  1:19 PM CST -----  Labs are about the same as last week on 2/6/20.  I have called patient, he feels ok. He said he has not received Vemlidy.  I have called [facility] to see if they can get it approved by insurance.

## 2020-02-11 ENCOUNTER — TELEPHONE (OUTPATIENT)
Dept: PHARMACY | Facility: CLINIC | Age: 65
End: 2020-02-11

## 2020-02-11 ENCOUNTER — TELEPHONE (OUTPATIENT)
Dept: HEPATOLOGY | Facility: CLINIC | Age: 65
End: 2020-02-11

## 2020-02-11 LAB
HBV E AB SER QL: NORMAL
HBV E AG SERPL QL IA: ABNORMAL

## 2020-02-11 NOTE — TELEPHONE ENCOUNTER
Initial Medication Consultation: Vemlidy    Confirmed 2 patient identifiers - name and . Patient received Vemlidy counseling in person at [facility] at time of pick-up on . Patient will start Vemlidy (tenofovir alafenamide) 25mg on . $0.00 copay. Address confirmed.     Vemlidy (tenofovir) 25mg- Take one tablet by mouth once daily WITH food.  Counseling was reviewed:   1. Patient MUST take Vemlidy at the SAME time every day with food to reduce stomach irritation.   2. Side effects include, but not limited to: headache, cough, upset stomach/vomiting, stomach pains, fatigue, back pain.   Headache: Patient may treat with OTC remedies. If Tylenol is used, dose should  not exceed 2000mg per day.   Contact MD if any signs of allergic reaction, kidney problems (weight gain, urine changes, blood in urine), liver problems (dark urine/light stools, jaundice, abdominal pains), too much lactic acid in the blood (fast heartbeat/breathing, upset stomach or vomiting, SOB, major fatigue/dizziness, feeling cold), bone pain, muscle pain/weakness.   4. Medication list reviewed. No DDIs or allergies noted. Patient MUST contact myself or provider prior to starting any new OTC, herbal, or prescription drugs to avoid potential DDIs.     This drug does not stop the spread of diseases like HIV or hepatitis that are passed through blood or having sex. Do not have any kind of sex without using a latex or polyurethane condom. Do not share needles or other things like toothbrushes or razors. Talk with your doctor.    Discussed the importance of staying well hydrated while on therapy. Compliance stressed - patient to take missed doses as soon as remembered, but NOT to take 2 doses in one day. Patient will report questions or concerns to myself or practitioner. Patient verbalizes understanding. Patient plans to start Vemlidy on . Consultation included: indication; goals of treatment; administration; storage and handling; side effects; how  to handle side effects; the importance of compliance; how to handle missed doses; the importance of laboratory monitoring; the importance of keeping all follow up appointments.  Patient understands to report any medication changes to [facility] and provider. All questions answered and addressed to patients satisfaction. I will f/u with her in 1 week from start, [facility] to contact patient in 3 weeks for refills.     Wendy VelásquezD, [facility]  Clinical Pharmacist   [facility]  Phone: [phone]

## 2020-02-11 NOTE — TELEPHONE ENCOUNTER
----- Message from Alal Haney MD sent at 2/10/2020  4:57 PM CST -----  Please inform patient he has hepatitis B, he needs to inform his oncology doctor.  I have ordered his medication, which the specialty pharmacy will get insurnace approval for and it should be available in a day or two.  [facility] will call him about this, and if they will send it to him or he picks it up.

## 2020-02-12 ENCOUNTER — TELEPHONE (OUTPATIENT)
Dept: HEPATOLOGY | Facility: CLINIC | Age: 65
End: 2020-02-12

## 2020-02-12 DIAGNOSIS — B16.9 ACUTE HEPATITIS B: Primary | ICD-10-CM

## 2020-02-13 ENCOUNTER — LAB VISIT (OUTPATIENT)
Dept: LAB | Facility: HOSPITAL | Age: 65
End: 2020-02-13
Attending: INTERNAL MEDICINE
Payer: MEDICARE

## 2020-02-13 DIAGNOSIS — B16.9 ACUTE HEPATITIS B: ICD-10-CM

## 2020-02-13 DIAGNOSIS — R74.01 ELEVATED TRANSAMINASE LEVEL: ICD-10-CM

## 2020-02-13 DIAGNOSIS — K75.9 HEPATITIS: ICD-10-CM

## 2020-02-13 PROCEDURE — 87517 HEPATITIS B DNA QUANT: CPT | Mod: HCNC

## 2020-02-13 PROCEDURE — 36415 COLL VENOUS BLD VENIPUNCTURE: CPT | Mod: HCNC

## 2020-02-17 ENCOUNTER — LAB VISIT (OUTPATIENT)
Dept: LAB | Facility: HOSPITAL | Age: 65
End: 2020-02-17
Attending: INTERNAL MEDICINE
Payer: MEDICARE

## 2020-02-17 ENCOUNTER — TELEPHONE (OUTPATIENT)
Dept: HEPATOLOGY | Facility: CLINIC | Age: 65
End: 2020-02-17

## 2020-02-17 DIAGNOSIS — B16.9 ACUTE HEPATITIS B: Primary | ICD-10-CM

## 2020-02-17 DIAGNOSIS — K75.9 HEPATITIS: ICD-10-CM

## 2020-02-17 DIAGNOSIS — R74.01 ELEVATED TRANSAMINASE LEVEL: ICD-10-CM

## 2020-02-17 LAB
ALBUMIN SERPL BCP-MCNC: 3.6 G/DL (ref 3.5–5.2)
ALP SERPL-CCNC: 145 U/L (ref 55–135)
ALT SERPL W/O P-5'-P-CCNC: 1507 U/L (ref 10–44)
ANION GAP SERPL CALC-SCNC: 10 MMOL/L (ref 8–16)
AST SERPL-CCNC: 844 U/L (ref 10–40)
BASOPHILS # BLD AUTO: 0.02 K/UL (ref 0–0.2)
BASOPHILS NFR BLD: 0.6 % (ref 0–1.9)
BILIRUB SERPL-MCNC: 1.7 MG/DL (ref 0.1–1)
BUN SERPL-MCNC: 10 MG/DL (ref 8–23)
CALCIUM SERPL-MCNC: 9.5 MG/DL (ref 8.7–10.5)
CHLORIDE SERPL-SCNC: 105 MMOL/L (ref 95–110)
CO2 SERPL-SCNC: 27 MMOL/L (ref 23–29)
CREAT SERPL-MCNC: 0.9 MG/DL (ref 0.5–1.4)
DIFFERENTIAL METHOD: ABNORMAL
EOSINOPHIL # BLD AUTO: 0 K/UL (ref 0–0.5)
EOSINOPHIL NFR BLD: 0.6 % (ref 0–8)
ERYTHROCYTE [DISTWIDTH] IN BLOOD BY AUTOMATED COUNT: 13.6 % (ref 11.5–14.5)
EST. GFR  (AFRICAN AMERICAN): >60 ML/MIN/1.73 M^2
EST. GFR  (NON AFRICAN AMERICAN): >60 ML/MIN/1.73 M^2
GLUCOSE SERPL-MCNC: 129 MG/DL (ref 70–110)
HBV DNA SERPL NAA+PROBE-ACNC: ABNORMAL IU/ML
HBV DNA SERPL NAA+PROBE-LOG IU: 6.05 LOG (10) IU/ML
HBV DNA SERPL QL NAA+PROBE: DETECTED
HCT VFR BLD AUTO: 47.5 % (ref 40–54)
HGB BLD-MCNC: 15.3 G/DL (ref 14–18)
IMM GRANULOCYTES # BLD AUTO: 0.01 K/UL (ref 0–0.04)
IMM GRANULOCYTES NFR BLD AUTO: 0.3 % (ref 0–0.5)
INR PPP: 1.1 (ref 0.8–1.2)
LYMPHOCYTES # BLD AUTO: 1.9 K/UL (ref 1–4.8)
LYMPHOCYTES NFR BLD: 57.2 % (ref 18–48)
MCH RBC QN AUTO: 29.4 PG (ref 27–31)
MCHC RBC AUTO-ENTMCNC: 32.2 G/DL (ref 32–36)
MCV RBC AUTO: 91 FL (ref 82–98)
MONOCYTES # BLD AUTO: 0.3 K/UL (ref 0.3–1)
MONOCYTES NFR BLD: 9.8 % (ref 4–15)
NEUTROPHILS # BLD AUTO: 1 K/UL (ref 1.8–7.7)
NEUTROPHILS NFR BLD: 31.5 % (ref 38–73)
NRBC BLD-RTO: 0 /100 WBC
PLATELET # BLD AUTO: 150 K/UL (ref 150–350)
PMV BLD AUTO: 10.6 FL (ref 9.2–12.9)
POTASSIUM SERPL-SCNC: 4.2 MMOL/L (ref 3.5–5.1)
PROT SERPL-MCNC: 6.8 G/DL (ref 6–8.4)
PROTHROMBIN TIME: 11.7 SEC (ref 9–12.5)
RBC # BLD AUTO: 5.2 M/UL (ref 4.6–6.2)
SODIUM SERPL-SCNC: 142 MMOL/L (ref 136–145)
WBC # BLD AUTO: 3.27 K/UL (ref 3.9–12.7)

## 2020-02-17 PROCEDURE — 85025 COMPLETE CBC W/AUTO DIFF WBC: CPT | Mod: HCNC

## 2020-02-17 PROCEDURE — 80053 COMPREHEN METABOLIC PANEL: CPT | Mod: HCNC

## 2020-02-17 PROCEDURE — 85610 PROTHROMBIN TIME: CPT | Mod: HCNC

## 2020-02-17 PROCEDURE — 36415 COLL VENOUS BLD VENIPUNCTURE: CPT | Mod: HCNC

## 2020-02-18 ENCOUNTER — TELEPHONE (OUTPATIENT)
Dept: HEPATOLOGY | Facility: CLINIC | Age: 65
End: 2020-02-18

## 2020-02-18 NOTE — TELEPHONE ENCOUNTER
----- Message from Alla Haney MD sent at 2/17/2020  3:39 PM CST -----  Please inform patient:  Liver enzymes are slightly higher than previous labs from a week ago.  Continue Vemlidy.  (medication for hep B). Continue weekly labs.

## 2020-02-18 NOTE — TELEPHONE ENCOUNTER
MA called patient inform him of his lab results below. He understood and verbalized understanding.

## 2020-02-24 ENCOUNTER — LAB VISIT (OUTPATIENT)
Dept: LAB | Facility: HOSPITAL | Age: 65
End: 2020-02-24
Attending: INTERNAL MEDICINE
Payer: MEDICARE

## 2020-02-24 DIAGNOSIS — K75.9 HEPATITIS: ICD-10-CM

## 2020-02-24 DIAGNOSIS — R74.01 ELEVATED TRANSAMINASE LEVEL: ICD-10-CM

## 2020-02-24 DIAGNOSIS — B16.9 ACUTE HEPATITIS B: ICD-10-CM

## 2020-02-24 PROBLEM — Z00.00 HEALTHCARE MAINTENANCE: Status: RESOLVED | Noted: 2019-11-19 | Resolved: 2020-02-24

## 2020-02-24 LAB
ALBUMIN SERPL BCP-MCNC: 3.5 G/DL (ref 3.5–5.2)
ALP SERPL-CCNC: 117 U/L (ref 55–135)
ALT SERPL W/O P-5'-P-CCNC: 1245 U/L (ref 10–44)
ANION GAP SERPL CALC-SCNC: 8 MMOL/L (ref 8–16)
AST SERPL-CCNC: 592 U/L (ref 10–40)
BASOPHILS # BLD AUTO: 0.02 K/UL (ref 0–0.2)
BASOPHILS NFR BLD: 0.7 % (ref 0–1.9)
BILIRUB SERPL-MCNC: 1.4 MG/DL (ref 0.1–1)
BUN SERPL-MCNC: 10 MG/DL (ref 8–23)
CALCIUM SERPL-MCNC: 9.4 MG/DL (ref 8.7–10.5)
CHLORIDE SERPL-SCNC: 104 MMOL/L (ref 95–110)
CO2 SERPL-SCNC: 28 MMOL/L (ref 23–29)
CREAT SERPL-MCNC: 1 MG/DL (ref 0.5–1.4)
DIFFERENTIAL METHOD: ABNORMAL
EOSINOPHIL # BLD AUTO: 0 K/UL (ref 0–0.5)
EOSINOPHIL NFR BLD: 0.7 % (ref 0–8)
ERYTHROCYTE [DISTWIDTH] IN BLOOD BY AUTOMATED COUNT: 13.9 % (ref 11.5–14.5)
EST. GFR  (AFRICAN AMERICAN): >60 ML/MIN/1.73 M^2
EST. GFR  (NON AFRICAN AMERICAN): >60 ML/MIN/1.73 M^2
GLUCOSE SERPL-MCNC: 155 MG/DL (ref 70–110)
HCT VFR BLD AUTO: 44.2 % (ref 40–54)
HGB BLD-MCNC: 14.5 G/DL (ref 14–18)
IMM GRANULOCYTES # BLD AUTO: 0.01 K/UL (ref 0–0.04)
IMM GRANULOCYTES NFR BLD AUTO: 0.4 % (ref 0–0.5)
INR PPP: 1.1 (ref 0.8–1.2)
LYMPHOCYTES # BLD AUTO: 1.4 K/UL (ref 1–4.8)
LYMPHOCYTES NFR BLD: 51.3 % (ref 18–48)
MCH RBC QN AUTO: 30.2 PG (ref 27–31)
MCHC RBC AUTO-ENTMCNC: 32.8 G/DL (ref 32–36)
MCV RBC AUTO: 92 FL (ref 82–98)
MONOCYTES # BLD AUTO: 0.3 K/UL (ref 0.3–1)
MONOCYTES NFR BLD: 11.8 % (ref 4–15)
NEUTROPHILS # BLD AUTO: 1 K/UL (ref 1.8–7.7)
NEUTROPHILS NFR BLD: 35.1 % (ref 38–73)
NRBC BLD-RTO: 0 /100 WBC
PLATELET # BLD AUTO: 146 K/UL (ref 150–350)
PMV BLD AUTO: 10.8 FL (ref 9.2–12.9)
POTASSIUM SERPL-SCNC: 4.1 MMOL/L (ref 3.5–5.1)
PROT SERPL-MCNC: 6.5 G/DL (ref 6–8.4)
PROTHROMBIN TIME: 11.6 SEC (ref 9–12.5)
RBC # BLD AUTO: 4.8 M/UL (ref 4.6–6.2)
SODIUM SERPL-SCNC: 140 MMOL/L (ref 136–145)
WBC # BLD AUTO: 2.71 K/UL (ref 3.9–12.7)

## 2020-02-24 PROCEDURE — 80053 COMPREHEN METABOLIC PANEL: CPT | Mod: HCNC

## 2020-02-24 PROCEDURE — 85610 PROTHROMBIN TIME: CPT | Mod: HCNC

## 2020-02-24 PROCEDURE — 87517 HEPATITIS B DNA QUANT: CPT | Mod: HCNC

## 2020-02-24 PROCEDURE — 85025 COMPLETE CBC W/AUTO DIFF WBC: CPT | Mod: HCNC

## 2020-02-25 LAB
HBV DNA SERPL NAA+PROBE-ACNC: ABNORMAL IU/ML
HBV DNA SERPL NAA+PROBE-LOG IU: 4.89 LOG (10) IU/ML
HBV DNA SERPL QL NAA+PROBE: DETECTED

## 2020-02-26 ENCOUNTER — TELEPHONE (OUTPATIENT)
Dept: HEPATOLOGY | Facility: CLINIC | Age: 65
End: 2020-02-26

## 2020-02-26 NOTE — TELEPHONE ENCOUNTER
----- Message from Alla Haney MD sent at 2/24/2020  8:51 PM CST -----  Please inform patient, his liver chemistries have improved since starting Vemlidy, the hep B medication. Continue to take the Vemlidy.

## 2020-02-27 ENCOUNTER — TELEPHONE (OUTPATIENT)
Dept: HEPATOLOGY | Facility: CLINIC | Age: 65
End: 2020-02-27

## 2020-02-27 ENCOUNTER — PATIENT OUTREACH (OUTPATIENT)
Dept: ADMINISTRATIVE | Facility: OTHER | Age: 65
End: 2020-02-27

## 2020-02-27 NOTE — TELEPHONE ENCOUNTER
----- Message from Alla Haney MD sent at 2/26/2020  8:09 PM CST -----  Please inform labs are improving, enzymes are lower and viral count has also gone down (from 1.1 million to 76,900).  Continue to take hep B medication.

## 2020-02-28 ENCOUNTER — OFFICE VISIT (OUTPATIENT)
Dept: HEPATOLOGY | Facility: CLINIC | Age: 65
End: 2020-02-28
Payer: MEDICARE

## 2020-02-28 VITALS
WEIGHT: 203.69 LBS | OXYGEN SATURATION: 98 % | HEART RATE: 85 BPM | SYSTOLIC BLOOD PRESSURE: 112 MMHG | HEIGHT: 69 IN | DIASTOLIC BLOOD PRESSURE: 74 MMHG | BODY MASS INDEX: 30.17 KG/M2

## 2020-02-28 DIAGNOSIS — B16.9 ACUTE VIRAL HEPATITIS B WITHOUT COMA AND WITHOUT DELTA AGENT: Primary | ICD-10-CM

## 2020-02-28 PROCEDURE — 99499 RISK ADDL DX/OHS AUDIT: ICD-10-PCS | Mod: HCNC,S$GLB,, | Performed by: INTERNAL MEDICINE

## 2020-02-28 PROCEDURE — 99999 PR PBB SHADOW E&M-EST. PATIENT-LVL III: ICD-10-PCS | Mod: PBBFAC,HCNC,, | Performed by: INTERNAL MEDICINE

## 2020-02-28 PROCEDURE — 3008F BODY MASS INDEX DOCD: CPT | Mod: HCNC,CPTII,S$GLB, | Performed by: INTERNAL MEDICINE

## 2020-02-28 PROCEDURE — 3008F PR BODY MASS INDEX (BMI) DOCUMENTED: ICD-10-PCS | Mod: HCNC,CPTII,S$GLB, | Performed by: INTERNAL MEDICINE

## 2020-02-28 PROCEDURE — 99499 UNLISTED E&M SERVICE: CPT | Mod: HCNC,S$GLB,, | Performed by: INTERNAL MEDICINE

## 2020-02-28 PROCEDURE — 99215 PR OFFICE/OUTPT VISIT, EST, LEVL V, 40-54 MIN: ICD-10-PCS | Mod: HCNC,S$GLB,, | Performed by: INTERNAL MEDICINE

## 2020-02-28 PROCEDURE — 99215 OFFICE O/P EST HI 40 MIN: CPT | Mod: HCNC,S$GLB,, | Performed by: INTERNAL MEDICINE

## 2020-02-28 PROCEDURE — 99999 PR PBB SHADOW E&M-EST. PATIENT-LVL III: CPT | Mod: PBBFAC,HCNC,, | Performed by: INTERNAL MEDICINE

## 2020-02-28 NOTE — PATIENT INSTRUCTIONS
Recommendations:  -  Labs CBC, CMP, PT INR on Mondays and thursdays until enzymes below 100, then biweekly on Mondays: CMP.    -  HBV DNA quant every 2 weeks x 4, then every 4 weeks.   -  Low salt in the diet, avoid canned, bottled and processed foods.  -  Continue current meds  -  Avoid alcohol, smoking, sedatives and meds with codeine.  -  Avoid high intake of Tylenol (more than 4 extra-strength pills in one day)  -  Call us if any bleeding, fevers, confusion, disorientation occur  -  Return in 2 months.

## 2020-02-28 NOTE — Clinical Note
Recommendations:-  Labs CBC, CMP, PT INR on Mondays and thursdays until enzymes below 100, then biweekly on Mondays: CMP.  -  HBV DNA quant every 2 weeks x 4, then every 4 weeks. -  Low salt in the diet, avoid canned, bottled and processed foods.-  Continue current meds-  Avoid alcohol, smoking, sedatives and meds with codeine.-  Avoid high intake of Tylenol (more than 4 extra-strength pills in one day)-  Call us if any bleeding, fevers, confusion, disorientation occur-  Return in 2 months.

## 2020-02-28 NOTE — Clinical Note
Recommendations:-  Labs CBC, CMP, PT INR on Mondays and thursdays until enzymes below 100, then biweekly on Mondays: CMP.  -  Low salt in the diet, avoid canned, bottled and processed foods.-  Continue current meds-  Avoid alcohol, smoking, sedatives and meds with codeine.-  Avoid high intake of Tylenol (more than 4 extra-strength pills in one day)-  Call us if any bleeding, fevers, confusion, disorientation occur-  Return in 2 months.

## 2020-02-28 NOTE — PROGRESS NOTES
"   [facility] Consultation Note    Reason for Consult:  There were no encounter diagnoses.    PCP: Roly Flannery       HPI:  This is a 64 y.o. male here for evaluation of: transaminase elevation    63 y/o male with H/o follicular non-Hodgkin's lymphoma, malignant mets to bone, anti-neoplastic chemotherapy with pancytopenia, Pulm HTN, PE, mixed hyperlipidemia, was found to have elevated transaminases on 1/27/20 and again the next day.  AST in 500 range and ALT in 1100 range.  Alk phos and T bili normal.      History give by patient's wife:  Patient was diagnosed with NHL in Nov-Dec 2018 with diffuse abd lymph node enlargement, stage 4 with mets in bone marrow.  R-CHOP was started in Jan 2020 under the care of Dr. Dominik Farooq at [facility].  After the first treatment, patient had acute upper abd pain, so they held off treatment with R-CHOP.  Evaluation showed abd pain was due to gallbladder, so he had a lap cholecystectomy (at [facility]) .  After surgery, he had PEs (from "upper part of his body", not lower extremities).  All of this was treated, then R-CHOP was resumed around June/July 2019.  He received 6-7 cycles, every 21 days.  Last treatment was completed just before Christmas 2019.  Then, he was told he would return in 3 months (March 2020) for any future treatments.      He did have an evaluation with a PET-CT on 12/30/19 which showed near complete resolution of the lymphadenopathy, however, 2 small 6 mm subpleural nodules on the left lower lobe of the lung and 2 small lymph nodes in the left anterior mesentery were seen on this PET-CT.  Labs that day 12/30/19 showed AST was already elevated to 55 and ALT to 88.  Then there were no labs until Jan 27/2020, when AT, ALT were elevated as above.      HBsAg neg, HBeAg and HBcAb IgM were neg on 3/15/19 (prior to starting R-CHOP).     Now HBsAg +, HBeAg +, HBV DNA 1.13 million IU/mL. Vemlidy started on 2/12/20.         Current meds:   flonase, " vistaril, lyrica, xarelto.  Took something (?steroids) for bursitis.        Elevated liver enzymes: Yes  Abnormal imaging: Abd masses from NHL   Cirrhosis: No  Hepatitis C: No  Hepatitis B: No  Fatty liver: No  Encephalopathy: No  Post-hospital discharge: No  Symptoms: right shoulder pain    Primary hepatic manifestations:  Fatigue:Yes  Edema:Yes  Ascites:Yes  Encephalopathy:Yes  Abdominal pain:Yes  GI bleeds: Yes  Pruritus:Yes  Weight Changes:Yes  Changes in Bowel habits: Yes  Muscle cramps:No    Risk factors for liver disease:  No jaundice  No transfusions  No IVDU  Did not snort cocaine or similar agents  Did not live with anyone with hepatitis B or C  Sexual partner not tested  No hepatotoxic medications  No exposure to industrial toxins  Alcohol: None      ROS:  Constitutional: No fevers, chills, weight changes, fatigue  ENT: No allergies, nosebleeds,   CV: No chest pain  Pulm: No cough, shortness of breath  Ophtho: No vision changes  GI/Liver: see HPI  Derm: No rash, itching  Heme: No swollen glands, bruising  MSK: No joint pains, joint swelling  : No dysuria, hematuria, decrease in urine output  Endo: No hot or cold intolerance  Neuro: No confusion, disorientation, difficulty with sleep, memory, concentration, syncope, seizure  Psych: No anxiety, depression    Medical History:  has a past medical history of Personal history of colonic polyps (09/08/2015).    Surgical History:  has a past surgical history that includes Shoulder surgery (Left); Groin exploration (2 pre 2005 and 1 post 2005); and Laparoscopic cholecystectomy (05/2018).    Family History: family history includes Breast cancer in his mother; Diabetes in his brother; Emphysema in his father; No Known Problems in his daughter and daughter; Stroke in his sister; Ulcers in his brother..     Social History:  reports that he has never smoked. He has never used smokeless tobacco. He reports that he does not drink alcohol or use drugs.    Review of  "patient's allergies indicates:   Allergen Reactions    Rosuvastatin      myalgias       Current Outpatient Rx   Medication Sig Dispense Refill    fluticasone propionate (FLONASE) 50 mcg/actuation nasal spray 2 sprays (100 mcg total) by Each Nostril route once daily. 1 Bottle 11    rivaroxaban (XARELTO) 20 mg Tab Take 1 tablet (20 mg total) by mouth daily with dinner or evening meal. 90 tablet 3    tenofovir alafenamide (VEMLIDY) 25 mg Tab Take 1 tablet (25 mg total) by mouth once daily. 30 tablet 11    hydrOXYzine pamoate (VISTARIL) 50 MG Cap Take 50 mg by mouth nightly.      pregabalin (LYRICA) 150 MG capsule Take 150 mg by mouth once daily.         Objective Findings:    Vital Signs:  /74 (BP Location: Right arm, Patient Position: Sitting, BP Method: Medium (Automatic))   Pulse 85   Ht 5' 9" (1.753 m)   Wt 92.4 kg (203 lb 11.3 oz)   SpO2 98%   BMI 30.08 kg/m²   Body mass index is 30.08 kg/m².    Physical Exam:  General Appearance: Well appearing in no acute distress  Head:   Normocephalic, without obvious abnormality  Eyes:    No scleral icterus, EOMI  ENT: Neck supple, Lips, mucosa, and tongue normal; teeth and gums normal  Lungs: CTA bilaterally in anterior and posterior fields, no wheezes, no crackles.  Heart:  Regular rate and rhythm, S1, S2 normal, no murmurs heard  Abdomen: Soft, non tender, non distended with positive bowel sounds in all four quadrants. No hepatosplenomegaly, ascites, or mass  Extremities: 2+ pulses, no clubbing, cyanosis or edema  Skin: No rash  Neurologic: CN II-XII intact, alert, oriented x 3. No asterixis      Labs:  Lab Results   Component Value Date    WBC 2.71 (L) 02/24/2020    HGB 14.5 02/24/2020    HCT 44.2 02/24/2020     (L) 02/24/2020    CHOL 205 (H) 01/27/2020    TRIG 106 01/27/2020    HDL 65 01/27/2020    INR 1.1 02/24/2020    CREATININE 1.0 02/24/2020    BUN 10 02/24/2020    BILITOT 1.4 (H) 02/24/2020    ALT 1,245 (H) 02/24/2020     (H) " 02/24/2020    ALKPHOS 117 02/24/2020     02/24/2020    K 4.1 02/24/2020     02/24/2020    CO2 28 02/24/2020    TSH 0.972 01/27/2020       Imaging:       Endoscopy:      Assessment:  No diagnosis found.     Pt with NHL, HLD, PE, Pulm HTN, s/p lap valentina, s/p R-CHOP 6-7 cycles.   Here fr elevated transaminases:  elevated transaminases first noted on 12/30/19 when R-CHOP therapy was completed, then no labs until on 1/27/20 (and again the next day with a slight downward trend).  AST in 500 range and ALT in 1100 range.  Alk phos and T bili normal.    These elevations could be due to chemotherapy, but will check viral hepatitis serologies, autoimmune markers,  and A1AT and ceruloplasmin.  Patient's PCP, Dr. Roly Flannery, has already ordered the viral hep and autoimmune markers, so I will get the remainder.      Since there is a downward trend in transaminases over last 7 days, I will monitor the labs twice weekly  Mondays and Thursdays, until enzymes below 100.  If they continue to improve, it is most likely due to chemo.  If not, he may need a liver biopsy.      Response of NHL to R-CHOP:   PET-CT 12/30/19: showed near complete resolution of the mesenteric lymph nodes, but interval development of 2 small 6 mm subpleural nodules on the left lower lung. And 2 left mesenteric lymph nodes.       US abd 1/28/20:  1.2 cm hypoechoic structure in the region of the janine hepatis.  This finding is indeterminate and may represent prominent periportal lymph node or cystic duct remnant in this patient that is status-post cholecystectomy.  CT abdomen without and with contrast could be done if clinically warranted.    Serologies have been ordered by Dr. Roly Flannery.  Will check results this week.     Recommendations:  -  Labs CBC, CMP, PT INR on Mondays and thursdays until enzymes below 100, then biweekly on Mondays: CMP.    -  HBV DNA quant every 2 weeks x 4, then every 4 weeks.   -  Low salt in the diet, avoid canned,  bottled and processed foods.  -  Continue current meds  -  Avoid alcohol, smoking, sedatives and meds with codeine.  -  Avoid high intake of Tylenol (more than 4 extra-strength pills in one day)  -  Call us if any bleeding, fevers, confusion, disorientation occur  -  Return in 2 months.       Follow up in about 2 months (around 4/28/2020).      Order summary:  No orders of the defined types were placed in this encounter.      Thank you so much for allowing me to participate in the care of Jeff Haney MD

## 2020-03-02 ENCOUNTER — OFFICE VISIT (OUTPATIENT)
Dept: PRIMARY CARE CLINIC | Facility: CLINIC | Age: 65
End: 2020-03-02
Payer: MEDICARE

## 2020-03-02 ENCOUNTER — LAB VISIT (OUTPATIENT)
Dept: LAB | Facility: HOSPITAL | Age: 65
End: 2020-03-02
Attending: INTERNAL MEDICINE
Payer: MEDICARE

## 2020-03-02 VITALS
WEIGHT: 209.56 LBS | DIASTOLIC BLOOD PRESSURE: 72 MMHG | BODY MASS INDEX: 31.04 KG/M2 | SYSTOLIC BLOOD PRESSURE: 116 MMHG | RESPIRATION RATE: 18 BRPM | TEMPERATURE: 98 F | HEIGHT: 69 IN | HEART RATE: 70 BPM | OXYGEN SATURATION: 98 %

## 2020-03-02 DIAGNOSIS — R74.01 ELEVATED TRANSAMINASE LEVEL: ICD-10-CM

## 2020-03-02 DIAGNOSIS — B16.9 ACUTE VIRAL HEPATITIS B WITHOUT COMA AND WITHOUT DELTA AGENT: ICD-10-CM

## 2020-03-02 DIAGNOSIS — M75.51 ACUTE BURSITIS OF RIGHT SHOULDER: ICD-10-CM

## 2020-03-02 DIAGNOSIS — K75.9 HEPATITIS: ICD-10-CM

## 2020-03-02 DIAGNOSIS — L30.9 DERMATITIS: ICD-10-CM

## 2020-03-02 DIAGNOSIS — Z00.00 HEALTHCARE MAINTENANCE: ICD-10-CM

## 2020-03-02 PROBLEM — J06.9 VIRAL UPPER RESPIRATORY TRACT INFECTION: Status: RESOLVED | Noted: 2020-01-27 | Resolved: 2020-03-02

## 2020-03-02 LAB
ALBUMIN SERPL BCP-MCNC: 3.4 G/DL (ref 3.5–5.2)
ALP SERPL-CCNC: 94 U/L (ref 55–135)
ALT SERPL W/O P-5'-P-CCNC: 634 U/L (ref 10–44)
ANION GAP SERPL CALC-SCNC: 9 MMOL/L (ref 8–16)
AST SERPL-CCNC: 247 U/L (ref 10–40)
BASOPHILS # BLD AUTO: 0.02 K/UL (ref 0–0.2)
BASOPHILS NFR BLD: 0.6 % (ref 0–1.9)
BILIRUB SERPL-MCNC: 0.9 MG/DL (ref 0.1–1)
BUN SERPL-MCNC: 10 MG/DL (ref 8–23)
CALCIUM SERPL-MCNC: 9.2 MG/DL (ref 8.7–10.5)
CHLORIDE SERPL-SCNC: 105 MMOL/L (ref 95–110)
CO2 SERPL-SCNC: 28 MMOL/L (ref 23–29)
CREAT SERPL-MCNC: 0.9 MG/DL (ref 0.5–1.4)
DIFFERENTIAL METHOD: ABNORMAL
EOSINOPHIL # BLD AUTO: 0 K/UL (ref 0–0.5)
EOSINOPHIL NFR BLD: 0.9 % (ref 0–8)
ERYTHROCYTE [DISTWIDTH] IN BLOOD BY AUTOMATED COUNT: 13.8 % (ref 11.5–14.5)
EST. GFR  (AFRICAN AMERICAN): >60 ML/MIN/1.73 M^2
EST. GFR  (NON AFRICAN AMERICAN): >60 ML/MIN/1.73 M^2
GLUCOSE SERPL-MCNC: 119 MG/DL (ref 70–110)
HCT VFR BLD AUTO: 43.3 % (ref 40–54)
HGB BLD-MCNC: 14.2 G/DL (ref 14–18)
IMM GRANULOCYTES # BLD AUTO: 0.03 K/UL (ref 0–0.04)
IMM GRANULOCYTES NFR BLD AUTO: 0.9 % (ref 0–0.5)
INR PPP: 1.4 (ref 0.8–1.2)
LYMPHOCYTES # BLD AUTO: 1.9 K/UL (ref 1–4.8)
LYMPHOCYTES NFR BLD: 56.2 % (ref 18–48)
MCH RBC QN AUTO: 30 PG (ref 27–31)
MCHC RBC AUTO-ENTMCNC: 32.8 G/DL (ref 32–36)
MCV RBC AUTO: 92 FL (ref 82–98)
MONOCYTES # BLD AUTO: 0.4 K/UL (ref 0.3–1)
MONOCYTES NFR BLD: 12.1 % (ref 4–15)
NEUTROPHILS # BLD AUTO: 1 K/UL (ref 1.8–7.7)
NEUTROPHILS NFR BLD: 29.3 % (ref 38–73)
NRBC BLD-RTO: 0 /100 WBC
PLATELET # BLD AUTO: 166 K/UL (ref 150–350)
PMV BLD AUTO: 10.3 FL (ref 9.2–12.9)
POTASSIUM SERPL-SCNC: 4.1 MMOL/L (ref 3.5–5.1)
PROT SERPL-MCNC: 6.3 G/DL (ref 6–8.4)
PROTHROMBIN TIME: 13.7 SEC (ref 9–12.5)
RBC # BLD AUTO: 4.73 M/UL (ref 4.6–6.2)
SODIUM SERPL-SCNC: 142 MMOL/L (ref 136–145)
WBC # BLD AUTO: 3.31 K/UL (ref 3.9–12.7)

## 2020-03-02 PROCEDURE — 85025 COMPLETE CBC W/AUTO DIFF WBC: CPT | Mod: HCNC

## 2020-03-02 PROCEDURE — 36415 COLL VENOUS BLD VENIPUNCTURE: CPT | Mod: HCNC

## 2020-03-02 PROCEDURE — 85610 PROTHROMBIN TIME: CPT | Mod: HCNC

## 2020-03-02 PROCEDURE — 99214 PR OFFICE/OUTPT VISIT, EST, LEVL IV, 30-39 MIN: ICD-10-PCS | Mod: HCNC,S$GLB,, | Performed by: INTERNAL MEDICINE

## 2020-03-02 PROCEDURE — 99214 OFFICE O/P EST MOD 30 MIN: CPT | Mod: HCNC,S$GLB,, | Performed by: INTERNAL MEDICINE

## 2020-03-02 PROCEDURE — 3008F BODY MASS INDEX DOCD: CPT | Mod: HCNC,CPTII,S$GLB, | Performed by: INTERNAL MEDICINE

## 2020-03-02 PROCEDURE — 99499 UNLISTED E&M SERVICE: CPT | Mod: HCNC,S$GLB,, | Performed by: INTERNAL MEDICINE

## 2020-03-02 PROCEDURE — 80053 COMPREHEN METABOLIC PANEL: CPT | Mod: HCNC

## 2020-03-02 PROCEDURE — 99499 RISK ADDL DX/OHS AUDIT: ICD-10-PCS | Mod: HCNC,S$GLB,, | Performed by: INTERNAL MEDICINE

## 2020-03-02 PROCEDURE — 3008F PR BODY MASS INDEX (BMI) DOCUMENTED: ICD-10-PCS | Mod: HCNC,CPTII,S$GLB, | Performed by: INTERNAL MEDICINE

## 2020-03-02 NOTE — PATIENT INSTRUCTIONS
Thank you so much for coming in to see me today.  Please do not hesitate to call with any questions or concerns or if you feel that you need to be seen.    Please try hydrocortisone cream over the counter on your forehead.

## 2020-03-02 NOTE — PROGRESS NOTES
Primary Care Provider Appointment    Subjective:      Patient ID: Jeff Hope is a 64 y.o. male here for follow up.     Chief Complaint: Follow-up (4 week follow up); Shoulder Pain (Bilateral shoulder pain for 2 months); and Testicle Pain  HPI:  This is a pleasant 63-year-old male with bronchiectasis, hyperlipidemia, history of pulmonary embolism on chronic anticoagulation with a follicular non-Hodgkin's lymphoma here for acute/reactivated hepatitis B.  Followed by Hepatology.  Improving LFT with treatment. Vemlidy started on 2/12/20.   Rash noted on forehead.  No puritits.  Notes increased crawfish.  No other place but forehead.  No oral lesion/swelling.  No chg in lotions/face wash.  No chg in detergent.      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    Pulmonary hypertension    Myalgia due to statin    Vitamin D deficiency    Port-A-Cath in place    Abnormal MMSE    Antineoplastic chemotherapy induced pancytopenia    Allergic rhinitis    Adrenal hypertrophy    Acute bursitis of right shoulder    Elevated transaminase level    Acute viral hepatitis B without coma and without delta agent    Dermatitis        Past Surgical History:   Procedure Laterality Date    GROIN EXPLORATION  2 pre 2005 and 1 post 2005    total of 3 procedures    LAPAROSCOPIC CHOLECYSTECTOMY  05/2018    SHOULDER SURGERY Left        Past Medical History:   Diagnosis Date    Personal history of colonic polyps 09/08/2015    per [facility] report - repeat 9/2020       Social History  "    Socioeconomic History    Marital status:      Spouse name: Not on file    Number of children: 2    Years of education: Not on file    Highest education level: Not on file   Occupational History    Occupation: disabled [employer]    Social Needs    Financial resource strain: Somewhat hard    Food insecurity:     Worry: Often true     Inability: Sometimes true    Transportation needs:     Medical: No     Non-medical: No   Tobacco Use    Smoking status: Never Smoker    Smokeless tobacco: Never Used   Substance and Sexual Activity    Alcohol use: No     Frequency: Never    Drug use: No    Sexual activity: Yes     Partners: Female   Lifestyle    Physical activity:     Days per week: 0 days     Minutes per session: 0 min    Stress: Rather much   Relationships    Social connections:     Talks on phone: Twice a week     Gets together: Twice a week     Attends Synagogue service: Never     Active member of club or organization: No     Attends meetings of clubs or organizations: Never     Relationship status:    Other Topics Concern    Not on file   Social History Narrative    Lives with wife.         Review of Systems    PHQ9 12/10/2019   Total Score 3        Checklist of Daily Activities:        Objective:   /72 (BP Location: Left arm, Patient Position: Sitting, BP Method: Medium (Manual))   Pulse 70   Temp 98.4 °F (36.9 °C)   Resp 18   Ht 5' 9" (1.753 m)   Wt 95.1 kg (209 lb 8.8 oz)   SpO2 98%   BMI 30.94 kg/m²     Physical Exam   Constitutional: He appears well-developed and well-nourished.   HENT:   Head: Normocephalic and atraumatic.   Cardiovascular: Normal rate, regular rhythm and normal heart sounds.   Pulmonary/Chest: Effort normal and breath sounds normal.   Abdominal: Soft. Bowel sounds are normal.   Musculoskeletal:   No assistive devise   Skin:   Fine rash noted over forehead.  Fine.  No pustules noted           Lab Results   Component Value Date    WBC 3.31 " (L) 03/02/2020    HGB 14.2 03/02/2020    HCT 43.3 03/02/2020     03/02/2020    CHOL 205 (H) 01/27/2020    TRIG 106 01/27/2020    HDL 65 01/27/2020     (H) 03/02/2020     (H) 03/02/2020     03/02/2020    K 4.1 03/02/2020     03/02/2020    CREATININE 0.9 03/02/2020    BUN 10 03/02/2020    CO2 28 03/02/2020    TSH 0.972 01/27/2020    INR 1.4 (H) 03/02/2020       Current Outpatient Medications on File Prior to Visit   Medication Sig Dispense Refill    fluticasone propionate (FLONASE) 50 mcg/actuation nasal spray 2 sprays (100 mcg total) by Each Nostril route once daily. 1 Bottle 11    rivaroxaban (XARELTO) 20 mg Tab Take 1 tablet (20 mg total) by mouth daily with dinner or evening meal. 90 tablet 3    tenofovir alafenamide (VEMLIDY) 25 mg Tab Take 1 tablet (25 mg total) by mouth once daily. 30 tablet 11    hydrOXYzine pamoate (VISTARIL) 50 MG Cap Take 50 mg by mouth nightly.      pregabalin (LYRICA) 150 MG capsule Take 150 mg by mouth once daily.       No current facility-administered medications on file prior to visit.          Assessment:   64 y.o. male with multiple co-morbid illnesses here for continued follow up of medical problems.      Plan:     Problem List Items Addressed This Visit        Derm    Dermatitis     nonpuritic rash noted on forehead.  ROS negative.  · Pt to start using free and clear detergents, facial wash and lotion   · Possibly due to antiviral meds.  Will follow  · OTC hydrocortisone cream BID for 10 days            GI    Acute viral hepatitis B without coma and without delta agent     Pt with acute/reactiviated hep B.  Improved liver numbers.  · Continue f/u hepatology clinic  · To continue to avoid immune suppressing medication and meds metabolized thru the liver  · No stigmata of liver disease including encephalopathy and no bleeding            Orthopedic    Acute bursitis of right shoulder     No NSAIDS.  No tylenol for now.    · ROM demonstrated             Other    Healthcare maintenance       · LW and POA from [facility] completed 4/2019 and if not obtained, will complete here.  · Yearly lab 1/2021  · Hep C and HIV done 2/2018.    · Optometry pending  · Immunizations from [facility] needed               Health Maintenance       Date Due Completion Date    HIV Screening 11/30/1970 ---    TETANUS VACCINE 11/30/1973 ---    Shingles Vaccine (1 of 2) 11/30/2005 ---    Pneumococcal Vaccine (Highest Risk) (2 of 3 - PPSV23) 08/01/2019 6/6/2019    Colonoscopy 09/08/2020 9/8/2015    Lipid Panel 01/27/2025 1/27/2020        Medications Reconciliation:   I have not reconciled the patient's home medications with the patient/family. I have updated all changes.  Refer to After-Visit Medication List.      Medication List           Accurate as of March 2, 2020  4:58 PM. If you have any questions, ask your nurse or doctor.               CONTINUE taking these medications    fluticasone propionate 50 mcg/actuation nasal spray  Commonly known as:  FLONASE  2 sprays (100 mcg total) by Each Nostril route once daily.     hydrOXYzine pamoate 50 MG Cap  Commonly known as:  VISTARIL     pregabalin 150 MG capsule  Commonly known as:  LYRICA     rivaroxaban 20 mg Tab  Commonly known as:  XARELTO  Take 1 tablet (20 mg total) by mouth daily with dinner or evening meal.     Vemlidy 25 mg Tab  Generic drug:  tenofovir alafenamide  Take 1 tablet (25 mg total) by mouth once daily.             Next/future visit:  eval rash on forehead, eval labs, ACP.  immunizations from [facility]    Follow up in about 6 weeks (around 4/13/2020). Total face-to-face time was 25 min, >50% of this was spent on counseling and coordination of care. The following issues were discussed: rash, reviewed labs.       Roly Flannery MD  Internal Medicine  [facility]  [phone]

## 2020-03-02 NOTE — ASSESSMENT & PLAN NOTE
· LW and POA from [facility] completed 4/2019 and if not obtained, will complete here.  · Yearly lab 1/2021  · Hep C and HIV done 2/2018.    · Optometry pending  · Immunizations from [facility] needed

## 2020-03-02 NOTE — PROGRESS NOTES
Identified  Pt by name and  , pt stated he is having problems with his right shoulder. Pt stated the orthopedic dr said he has bursitis. Pt stated he has stiffness and pain, he does do exercises that he was instructed to to. Pain 5 out of 10.  Pt also stated he has left testicular pain, 5 out of 10

## 2020-03-02 NOTE — ASSESSMENT & PLAN NOTE
nonpuritic rash noted on forehead.  ROS negative.  · Pt to start using free and clear detergents, facial wash and lotion   · Possibly due to antiviral meds.  Will follow  · OTC hydrocortisone cream BID for 10 days

## 2020-03-02 NOTE — ASSESSMENT & PLAN NOTE
Pt with acute/reactiviated hep B.  Improved liver numbers.  · Continue f/u hepatology clinic  · To continue to avoid immune suppressing medication and meds metabolized thru the liver  · No stigmata of liver disease including encephalopathy and no bleeding

## 2020-03-04 ENCOUNTER — TELEPHONE (OUTPATIENT)
Dept: HEPATOLOGY | Facility: CLINIC | Age: 65
End: 2020-03-04

## 2020-03-04 NOTE — TELEPHONE ENCOUNTER
----- Message from Alla Haney MD sent at 3/3/2020  7:01 PM CST -----  Pl inform patient:  Liver enzymes have improved significantly.  Continue the hep B medication, vemlidy

## 2020-03-05 ENCOUNTER — TELEPHONE (OUTPATIENT)
Dept: PHARMACY | Facility: CLINIC | Age: 65
End: 2020-03-05

## 2020-03-05 NOTE — TELEPHONE ENCOUNTER
Refill and call for Vemlidy. Patient confirmed need of the refill. Will pickup from [facility] on 3/5 to with patient consent. Copay $8.95 at 004. Patient has 8 doses remaining (8 day supply). Patient denies missed doses and no side effects.  No new medications/allergies/medical conditions. Labs are stable. No ER/Urgent care visits in past month. Patient taking the medication as directed. Patient denies any further questions. Confirmed 2 patient identifiers - Name and .    Jean-Pierre Chou, PharmD  Clinical Pharmacist  [facility]  P: [phone]

## 2020-03-09 ENCOUNTER — TELEPHONE (OUTPATIENT)
Dept: HEPATOLOGY | Facility: CLINIC | Age: 65
End: 2020-03-09

## 2020-03-09 NOTE — TELEPHONE ENCOUNTER
----- Message from Alla Haney MD sent at 2/28/2020  1:49 PM CST -----  Recommendations:  -  Labs CBC, CMP, PT INR on Mondays and thursdays until enzymes below 100, then biweekly on Mondays: CMP.    -  Low salt in the diet, avoid canned, bottled and processed foods.  -  Continue current meds  -  Avoid alcohol, smoking, sedatives and meds with codeine.  -  Avoid high intake of Tylenol (more than 4 extra-strength pills in one day)  -  Call us if any bleeding, fevers, confusion, disorientation occur  -  Return in 2 months.

## 2020-03-23 ENCOUNTER — TELEPHONE (OUTPATIENT)
Dept: PRIMARY CARE CLINIC | Facility: CLINIC | Age: 65
End: 2020-03-23

## 2020-03-23 ENCOUNTER — LAB VISIT (OUTPATIENT)
Dept: LAB | Facility: HOSPITAL | Age: 65
End: 2020-03-23
Attending: INTERNAL MEDICINE
Payer: MEDICARE

## 2020-03-23 DIAGNOSIS — B16.9 ACUTE HEPATITIS B: ICD-10-CM

## 2020-03-23 PROCEDURE — 36415 COLL VENOUS BLD VENIPUNCTURE: CPT | Mod: HCNC

## 2020-03-23 PROCEDURE — 87517 HEPATITIS B DNA QUANT: CPT | Mod: HCNC

## 2020-03-23 NOTE — TELEPHONE ENCOUNTER
Spoke to patient, he states he is doing well. No flu like symptoms. He is doing self quarantining and does not need any refills on his meds at this. Patient will call back when his wife returns home and set up his MY chart over the phone.

## 2020-03-24 NOTE — TELEPHONE ENCOUNTER
Spoke to the patient's daughter regarding the virtual visit, informed daughter that We set up his [employer] account yesterday and is scheduled for 4/6/2020.

## 2020-03-24 NOTE — TELEPHONE ENCOUNTER
Pt called, he wants to speak with you this morning, his dtr can help him with the telemed, she can do this around 1pm,     Please call him.  Thx,

## 2020-03-25 LAB
HBV DNA SERPL NAA+PROBE-ACNC: 424 IU/ML
HBV DNA SERPL NAA+PROBE-LOG IU: 2.63 LOG (10) IU/ML
HBV DNA SERPL QL NAA+PROBE: DETECTED

## 2020-03-26 ENCOUNTER — TELEPHONE (OUTPATIENT)
Dept: HEPATOLOGY | Facility: CLINIC | Age: 65
End: 2020-03-26

## 2020-03-26 NOTE — TELEPHONE ENCOUNTER
MA called patient inform him of his results below, he understood and verbalized understanding.     Schedule all his future labs.

## 2020-03-26 NOTE — TELEPHONE ENCOUNTER
----- Message from Alla Haney MD sent at 3/25/2020  5:15 PM CDT -----  Please inform patient his hep B viral count is now down to 424 from 77,000.    My clinic note asked for following on 3/2/2020 and that's not what's happening:   -  Labs CBC, CMP, PT INR on Mondays and thursdays until enzymes below 100, (they are not below 100), then biweekly on Mondays: CMP.    -  HBV DNA quant every 2 weeks x 4, then every 4 weeks.     To make it easier, please get CBC, CMP, every 2 weeks, start on 4/6/2020 and get HBV DNA quant every 4 weeks (start 4 wks from 3/23/20).

## 2020-03-31 ENCOUNTER — TELEPHONE (OUTPATIENT)
Dept: PRIMARY CARE CLINIC | Facility: CLINIC | Age: 65
End: 2020-03-31

## 2020-03-31 ENCOUNTER — TELEPHONE (OUTPATIENT)
Dept: PHARMACY | Facility: CLINIC | Age: 65
End: 2020-03-31

## 2020-03-31 NOTE — TELEPHONE ENCOUNTER
----- Message from Sabiha Hewitt sent at 3/31/2020 12:30 PM CDT -----  Contact: self   Patient is returning a phone call.  Who left a message for the patient: Joanne Garcia MA  Does patient know what this is regarding: pt well being    Comments:

## 2020-03-31 NOTE — TELEPHONE ENCOUNTER
Spoke to patient, he states he is doing well. He will continue to self quarantine. He states he is not currently taking any meds that require a refill. He is complaining of a sore right thumb that has a lump on it for the past two weeks. Requesting a call back from Dr. Flannery

## 2020-03-31 NOTE — TELEPHONE ENCOUNTER
Refill call regarding Vem[facility] at [facility]. Will prepare for shipment with consent of patient on  to arrive 4/3. Copay 8.95 AR. Patient has not started any new medications including OTC drugs. Patient has not had any medication/ dose or instruction changes. No new allergies or side effects reported with this shipment. Medication is being taken as prescribed by physician and properly stored. Two patient identifiers:  and Address verified. Patient has no questions or concerns for RP. Patient has 8 days of medication on hand.

## 2020-04-01 NOTE — TELEPHONE ENCOUNTER
[employer] Check In Call:    Patient called to evaluate wellbeing and needs during the COVID pandemic.    Pt location:  home  Length of call:  18m  Pt concerns addressed:  COVID testing.  Thumb swelling.    Epic risk score:  3 %    Patient called and we discussed her health factors of cancer and hepatitis which could indicate a higher risk of poor outcome should they develop a COVID infection.  Patient encouraged to self isolate and avoid community contacts.  Encouraged to have groceries delivered.  Encouraged to not run out of medications and reach out to the clinic for refills if needed.  Discussed that medication delivery is available.  Community services also discussed including:   [employer](Free delivery of groceries, prescriptions and toiletries, free laundry service call [phone]) and [employer] (delivering food to senior citizens > 61yo.  Call 311).  Pt encouraged to call the clinic to be evaluated for any acute issues including possible COVID infection to reduce any preventable or inappropriate ED utilization.      Pt offered telemed, telephone, or face to face visit for followup:  Next week.      Pt notes that his right thumb with some swelling and tenderness for 3 to 4 days.  COVID testing on Sunday.  No symptoms.  No fever.  Drive thru.  No sick contacts.  Does not sound like a trigger finger.  No erythema.  Pt has been exercising the hand.  Notes a small nodule on palmar surface at PIP joint?.  Slight tenderness.  He will call with any issues or problems or red flags.  Pt admits that he going to the grocery store himself and states that he needs to get out of the house at times.

## 2020-04-06 ENCOUNTER — OFFICE VISIT (OUTPATIENT)
Dept: PRIMARY CARE CLINIC | Facility: CLINIC | Age: 65
End: 2020-04-06
Payer: MEDICARE

## 2020-04-06 ENCOUNTER — LAB VISIT (OUTPATIENT)
Dept: LAB | Facility: HOSPITAL | Age: 65
End: 2020-04-06
Attending: INTERNAL MEDICINE
Payer: MEDICARE

## 2020-04-06 ENCOUNTER — TELEPHONE (OUTPATIENT)
Dept: HEPATOLOGY | Facility: CLINIC | Age: 65
End: 2020-04-06

## 2020-04-06 DIAGNOSIS — M75.51 CHRONIC BURSITIS OF RIGHT SHOULDER: ICD-10-CM

## 2020-04-06 DIAGNOSIS — B16.9 ACUTE VIRAL HEPATITIS B WITHOUT COMA AND WITHOUT DELTA AGENT: ICD-10-CM

## 2020-04-06 DIAGNOSIS — M65.312 TRIGGER FINGER OF LEFT THUMB: ICD-10-CM

## 2020-04-06 DIAGNOSIS — K75.9 HEPATITIS: ICD-10-CM

## 2020-04-06 DIAGNOSIS — R74.01 ELEVATED TRANSAMINASE LEVEL: ICD-10-CM

## 2020-04-06 DIAGNOSIS — L30.9 DERMATITIS: ICD-10-CM

## 2020-04-06 LAB
ALBUMIN SERPL BCP-MCNC: 4.3 G/DL (ref 3.5–5.2)
ALP SERPL-CCNC: 73 U/L (ref 55–135)
ALT SERPL W/O P-5'-P-CCNC: 16 U/L (ref 10–44)
ANION GAP SERPL CALC-SCNC: 10 MMOL/L (ref 8–16)
AST SERPL-CCNC: 20 U/L (ref 10–40)
BASOPHILS # BLD AUTO: 0.03 K/UL (ref 0–0.2)
BASOPHILS NFR BLD: 0.6 % (ref 0–1.9)
BILIRUB SERPL-MCNC: 0.5 MG/DL (ref 0.1–1)
BUN SERPL-MCNC: 13 MG/DL (ref 8–23)
CALCIUM SERPL-MCNC: 9.9 MG/DL (ref 8.7–10.5)
CHLORIDE SERPL-SCNC: 101 MMOL/L (ref 95–110)
CO2 SERPL-SCNC: 28 MMOL/L (ref 23–29)
CREAT SERPL-MCNC: 1 MG/DL (ref 0.5–1.4)
DIFFERENTIAL METHOD: NORMAL
EOSINOPHIL # BLD AUTO: 0 K/UL (ref 0–0.5)
EOSINOPHIL NFR BLD: 0.4 % (ref 0–8)
ERYTHROCYTE [DISTWIDTH] IN BLOOD BY AUTOMATED COUNT: 13.4 % (ref 11.5–14.5)
EST. GFR  (AFRICAN AMERICAN): >60 ML/MIN/1.73 M^2
EST. GFR  (NON AFRICAN AMERICAN): >60 ML/MIN/1.73 M^2
GLUCOSE SERPL-MCNC: 85 MG/DL (ref 70–110)
HCT VFR BLD AUTO: 46.3 % (ref 40–54)
HGB BLD-MCNC: 15.4 G/DL (ref 14–18)
IMM GRANULOCYTES # BLD AUTO: 0.02 K/UL (ref 0–0.04)
IMM GRANULOCYTES NFR BLD AUTO: 0.4 % (ref 0–0.5)
INR PPP: 1 (ref 0.8–1.2)
LYMPHOCYTES # BLD AUTO: 2.2 K/UL (ref 1–4.8)
LYMPHOCYTES NFR BLD: 45.5 % (ref 18–48)
MCH RBC QN AUTO: 30.3 PG (ref 27–31)
MCHC RBC AUTO-ENTMCNC: 33.3 G/DL (ref 32–36)
MCV RBC AUTO: 91 FL (ref 82–98)
MONOCYTES # BLD AUTO: 0.5 K/UL (ref 0.3–1)
MONOCYTES NFR BLD: 10.7 % (ref 4–15)
NEUTROPHILS # BLD AUTO: 2.1 K/UL (ref 1.8–7.7)
NEUTROPHILS NFR BLD: 42.4 % (ref 38–73)
NRBC BLD-RTO: 0 /100 WBC
PLATELET # BLD AUTO: 168 K/UL (ref 150–350)
PMV BLD AUTO: 10.1 FL (ref 9.2–12.9)
POTASSIUM SERPL-SCNC: 3.9 MMOL/L (ref 3.5–5.1)
PROT SERPL-MCNC: 7.6 G/DL (ref 6–8.4)
PROTHROMBIN TIME: 10.5 SEC (ref 9–12.5)
RBC # BLD AUTO: 5.09 M/UL (ref 4.6–6.2)
SODIUM SERPL-SCNC: 139 MMOL/L (ref 136–145)
WBC # BLD AUTO: 4.84 K/UL (ref 3.9–12.7)

## 2020-04-06 PROCEDURE — 85610 PROTHROMBIN TIME: CPT | Mod: HCNC

## 2020-04-06 PROCEDURE — 85025 COMPLETE CBC W/AUTO DIFF WBC: CPT | Mod: HCNC

## 2020-04-06 PROCEDURE — 99214 PR OFFICE/OUTPT VISIT, EST, LEVL IV, 30-39 MIN: ICD-10-PCS | Mod: HCNC,95,, | Performed by: INTERNAL MEDICINE

## 2020-04-06 PROCEDURE — 36415 COLL VENOUS BLD VENIPUNCTURE: CPT | Mod: HCNC

## 2020-04-06 PROCEDURE — 80053 COMPREHEN METABOLIC PANEL: CPT | Mod: HCNC

## 2020-04-06 PROCEDURE — 99214 OFFICE O/P EST MOD 30 MIN: CPT | Mod: HCNC,95,, | Performed by: INTERNAL MEDICINE

## 2020-04-06 NOTE — TELEPHONE ENCOUNTER
----- Message from Alla Haney MD sent at 4/6/2020 12:53 PM CDT -----  Please inform patient results are OK.

## 2020-04-06 NOTE — ASSESSMENT & PLAN NOTE
Patient with continued shoulder pain.  He has been exercising the shoulder.  Worsening symptoms at night.  Left shoulder has now started to have problems.  Patient notes that he is frequently wiping down the house.  · Patient to decrease activity was shoulder.  Continue discussed range of motion.  · Discussed topical over-the-counter treatments.  Will avoid NSAIDs for now, even topical.  · Suggested patient consider a mattress pad to help with nocturnal symptoms.  Discussed trial of egg crate.

## 2020-04-06 NOTE — ASSESSMENT & PLAN NOTE
Patient with swelling tenderness for a few weeks.  He has been massaging and exercising the affected area.  · Trial of ice 3 to 4 times a day.  · Okay to use topical over-the-counter treatments.  · Today images demonstrating the pathology of trigger finger were shown with brace use.  · Patient to stop exercising and massaging areas and allow rest.  Brace if needed to limited range of motion.

## 2020-04-06 NOTE — ASSESSMENT & PLAN NOTE
nonpuritic rash noted on forehead.  Improved.  Has not tried hydrocortisone OTC.  · OTC hydrocortisone cream BID for 10 days

## 2020-04-06 NOTE — PROGRESS NOTES
Primary Care Provider Appointment    Subjective:      Patient ID: Jeff Hope is a 64 y.o. male here for follow up via telemed visit.       Chief Complaint: No chief complaint on file.  The patient location is: [facility].  The chief complaint leading to consultation is: thumb pain.    Visit type: Virtual visit with synchronous audio and video  Total time spent with patient: 25  Each patient to whom he or she provides medical services by telemedicine is:  (1) informed of the relationship between the physician and patient and the respective role of any other health care provider with respect to management of the patient; and (2) notified that he or she may decline to receive medical services by telemedicine and may withdraw from such care at any time.    HPI:  This is a pleasant 63-year-old male with bronchiectasis, hyperlipidemia, history of pulmonary embolism on chronic anticoagulation with a follicular non-Hodgkin's lymphoma, acute/reactivated hepatitis B here via telemed for f/u.    Patient complain of some pain for the past few weeks.  Pain located in the PIP joint with a nodule noted by patient.  Patient states that the pain is with movement.  He has been exercising and massaging the area.  He does note that the thumb pops when he opens his hand.  Patient with continued bilateral shoulder pain right greater than left.  This is been present for many months now the patient has been unable to have an injection or anti-inflammatories due to acute hepatitis-B.  He states he has been exercising the shoulder.  Symptoms exacerbated at night.  Patient was to have labs today for hepatology but unfortunately thought those labs were from me.  Patient has labs scheduled tomorrow for Dr. nunez his oncologist.  Patient and wife state that oncology was to be contacted about his acute hepatitis B by hepatology.  We will fax those labs to Dr. medrano office to be sure they know about this reactivation.  Patient has a  video call with Dr. medrano office on the 13th of this month.  Patient continues to take antiviral medication without any side effects.  Forehead rash has improved continues to be present.  Patient is not wearing his hat nearly as much.  He is not using any topical.    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    Pulmonary hypertension    Myalgia due to statin    Vitamin D deficiency    Port-A-Cath in place    Abnormal MMSE    Antineoplastic chemotherapy induced pancytopenia    Allergic rhinitis    Adrenal hypertrophy    Chronic bursitis of right shoulder    Elevated transaminase level    Acute viral hepatitis B without coma and without delta agent    Dermatitis    Trigger finger of left thumb        Past Surgical History:   Procedure Laterality Date    GROIN EXPLORATION  2 pre 2005 and 1 post 2005    total of 3 procedures    LAPAROSCOPIC CHOLECYSTECTOMY  05/2018    SHOULDER SURGERY Left        Past Medical History:   Diagnosis Date    Personal history of colonic polyps 09/08/2015    per [facility] report - repeat 9/2020       Social History     Socioeconomic History    Marital status:      Spouse name: Not on file    Number of children: 2    Years of education: Not on file    Highest education level: Not on file   Occupational History    Occupation: disabled [employer]    Social Needs    Financial resource strain: Somewhat hard    Food insecurity:     Worry: Often true     Inability: Sometimes true    Transportation needs:     Medical:  No     Non-medical: No   Tobacco Use    Smoking status: Never Smoker    Smokeless tobacco: Never Used   Substance and Sexual Activity    Alcohol use: No     Frequency: Never    Drug use: No    Sexual activity: Yes     Partners: Female   Lifestyle    Physical activity:     Days per week: 0 days     Minutes per session: 0 min    Stress: Rather much   Relationships    Social connections:     Talks on phone: Twice a week     Gets together: Twice a week     Attends Spiritism service: Never     Active member of club or organization: No     Attends meetings of clubs or organizations: Never     Relationship status:    Other Topics Concern    Not on file   Social History Narrative    Lives with wife.         Review of Systems    PHQ9 12/10/2019   Total Score 3        Checklist of Daily Activities:        Objective:   There were no vitals taken for this visit.    Physical Exam   Musculoskeletal:   L thumb--unable to appreciate any swelling on video   Neurological: He is alert.   Psychiatric: He has a normal mood and affect.           Lab Results   Component Value Date    WBC 3.31 (L) 03/02/2020    HGB 14.2 03/02/2020    HCT 43.3 03/02/2020     03/02/2020    CHOL 205 (H) 01/27/2020    TRIG 106 01/27/2020    HDL 65 01/27/2020     (H) 03/02/2020     (H) 03/02/2020     03/02/2020    K 4.1 03/02/2020     03/02/2020    CREATININE 0.9 03/02/2020    BUN 10 03/02/2020    CO2 28 03/02/2020    TSH 0.972 01/27/2020    INR 1.4 (H) 03/02/2020       Current Outpatient Medications on File Prior to Visit   Medication Sig Dispense Refill    fluticasone propionate (FLONASE) 50 mcg/actuation nasal spray 2 sprays (100 mcg total) by Each Nostril route once daily. 1 Bottle 11    hydrOXYzine pamoate (VISTARIL) 50 MG Cap Take 50 mg by mouth nightly.      pregabalin (LYRICA) 150 MG capsule Take 150 mg by mouth once daily.      rivaroxaban (XARELTO) 20 mg Tab Take 1 tablet (20 mg total) by mouth  daily with dinner or evening meal. 90 tablet 3    tenofovir alafenamide (VEMLIDY) 25 mg Tab Take 1 tablet (25 mg total) by mouth once daily. 30 tablet 11     No current facility-administered medications on file prior to visit.          Assessment:   64 y.o. male with multiple co-morbid illnesses here for continued follow up of medical problems.      Plan:     Problem List Items Addressed This Visit        Derm    Dermatitis     nonpuritic rash noted on forehead.  Improved.  Has not tried hydrocortisone OTC.  · OTC hydrocortisone cream BID for 10 days            GI    Acute viral hepatitis B without coma and without delta agent     Pt with acute/reactiviated hep B.  Improving liver numbers.  · Continue f/u hepatology clinic  · To continue to avoid immune suppressing medication and meds metabolized thru the liver  · No stigmata of liver disease including encephalopathy and no bleeding  · Will reschedule labs were hepatology today.  Will fax most recent labs to Dr. medrano office-oncology.            Orthopedic    Chronic bursitis of right shoulder     Patient with continued shoulder pain.  He has been exercising the shoulder.  Worsening symptoms at night.  Left shoulder has now started to have problems.  Patient notes that he is frequently wiping down the house.  · Patient to decrease activity was shoulder.  Continue discussed range of motion.  · Discussed topical over-the-counter treatments.  Will avoid NSAIDs for now, even topical.  · Suggested patient consider a mattress pad to help with nocturnal symptoms.  Discussed trial of egg crate.         Trigger finger of left thumb     Patient with swelling tenderness for a few weeks.  He has been massaging and exercising the affected area.  · Trial of ice 3 to 4 times a day.  · Okay to use topical over-the-counter treatments.  · Today images demonstrating the pathology of trigger finger were shown with brace use.  · Patient to stop exercising and massaging areas and allow  rest.  Brace if needed to limited range of motion.               Health Maintenance       Date Due Completion Date    HIV Screening 11/30/1970 ---    TETANUS VACCINE 11/30/1973 ---    Shingles Vaccine (1 of 2) 11/30/2005 ---    Pneumococcal Vaccine (Highest Risk) (2 of 3 - PPSV23) 08/01/2019 6/6/2019    Colonoscopy 09/08/2020 9/8/2015    Lipid Panel 01/27/2025 1/27/2020        Medications Reconciliation:   I have not reconciled the patient's home medications with the patient/family. I have updated all changes.  Refer to After-Visit Medication List.      Medication List           Accurate as of April 6, 2020  9:17 AM. If you have any questions, ask your nurse or doctor.               CONTINUE taking these medications    fluticasone propionate 50 mcg/actuation nasal spray  Commonly known as:  FLONASE  2 sprays (100 mcg total) by Each Nostril route once daily.     hydrOXYzine pamoate 50 MG Cap  Commonly known as:  VISTARIL     pregabalin 150 MG capsule  Commonly known as:  LYRICA     rivaroxaban 20 mg Tab  Commonly known as:  XARELTO  Take 1 tablet (20 mg total) by mouth daily with dinner or evening meal.     VEMLIDY 25 mg Tab  Generic drug:  tenofovir alafenamide  Take 1 tablet (25 mg total) by mouth once daily.             Next/future visit:  eval trigger finger, eval shoulder pain.  Review onc visit and labs from hepatology.      Follow up in about 4 weeks (around 5/4/2020) for telemed visit. . Total phone follow-up  time was 25 min, >50% of this was spent on counseling and coordination of care. The following issues were discussed: options for shoulder bursitis, trigger finger, hep B eval and review of COVID precautions.       Roly Flannery MD  Internal Medicine  [facility]  [phone]

## 2020-04-06 NOTE — ASSESSMENT & PLAN NOTE
Pt with acute/reactiviated hep B.  Improving liver numbers.  · Continue f/u hepatology clinic  · To continue to avoid immune suppressing medication and meds metabolized thru the liver  · No stigmata of liver disease including encephalopathy and no bleeding  · Will reschedule labs were hepatology today.  Will fax most recent labs to Dr. medrano office-oncology.

## 2020-04-20 ENCOUNTER — LAB VISIT (OUTPATIENT)
Dept: LAB | Facility: HOSPITAL | Age: 65
End: 2020-04-20
Attending: INTERNAL MEDICINE
Payer: MEDICARE

## 2020-04-20 ENCOUNTER — TELEPHONE (OUTPATIENT)
Dept: HEPATOLOGY | Facility: CLINIC | Age: 65
End: 2020-04-20

## 2020-04-20 DIAGNOSIS — K75.9 HEPATITIS: ICD-10-CM

## 2020-04-20 DIAGNOSIS — R74.01 ELEVATED TRANSAMINASE LEVEL: ICD-10-CM

## 2020-04-20 DIAGNOSIS — B16.9 ACUTE HEPATITIS B: ICD-10-CM

## 2020-04-20 LAB
ALBUMIN SERPL BCP-MCNC: 3.6 G/DL (ref 3.5–5.2)
ALP SERPL-CCNC: 68 U/L (ref 55–135)
ALT SERPL W/O P-5'-P-CCNC: 11 U/L (ref 10–44)
ANION GAP SERPL CALC-SCNC: 6 MMOL/L (ref 8–16)
AST SERPL-CCNC: 17 U/L (ref 10–40)
BASOPHILS # BLD AUTO: 0.02 K/UL (ref 0–0.2)
BASOPHILS NFR BLD: 0.4 % (ref 0–1.9)
BILIRUB SERPL-MCNC: 0.6 MG/DL (ref 0.1–1)
BUN SERPL-MCNC: 8 MG/DL (ref 8–23)
CALCIUM SERPL-MCNC: 8.9 MG/DL (ref 8.7–10.5)
CHLORIDE SERPL-SCNC: 102 MMOL/L (ref 95–110)
CO2 SERPL-SCNC: 30 MMOL/L (ref 23–29)
CREAT SERPL-MCNC: 1 MG/DL (ref 0.5–1.4)
DIFFERENTIAL METHOD: NORMAL
EOSINOPHIL # BLD AUTO: 0 K/UL (ref 0–0.5)
EOSINOPHIL NFR BLD: 0.6 % (ref 0–8)
ERYTHROCYTE [DISTWIDTH] IN BLOOD BY AUTOMATED COUNT: 13.1 % (ref 11.5–14.5)
EST. GFR  (AFRICAN AMERICAN): >60 ML/MIN/1.73 M^2
EST. GFR  (NON AFRICAN AMERICAN): >60 ML/MIN/1.73 M^2
GLUCOSE SERPL-MCNC: 128 MG/DL (ref 70–110)
HCT VFR BLD AUTO: 43.9 % (ref 40–54)
HGB BLD-MCNC: 14.8 G/DL (ref 14–18)
IMM GRANULOCYTES # BLD AUTO: 0.01 K/UL (ref 0–0.04)
IMM GRANULOCYTES NFR BLD AUTO: 0.2 % (ref 0–0.5)
INR PPP: 1.3 (ref 0.8–1.2)
LYMPHOCYTES # BLD AUTO: 1.9 K/UL (ref 1–4.8)
LYMPHOCYTES NFR BLD: 40.5 % (ref 18–48)
MCH RBC QN AUTO: 30.1 PG (ref 27–31)
MCHC RBC AUTO-ENTMCNC: 33.7 G/DL (ref 32–36)
MCV RBC AUTO: 89 FL (ref 82–98)
MONOCYTES # BLD AUTO: 0.3 K/UL (ref 0.3–1)
MONOCYTES NFR BLD: 6.3 % (ref 4–15)
NEUTROPHILS # BLD AUTO: 2.4 K/UL (ref 1.8–7.7)
NEUTROPHILS NFR BLD: 52 % (ref 38–73)
NRBC BLD-RTO: 0 /100 WBC
PLATELET # BLD AUTO: 150 K/UL (ref 150–350)
PMV BLD AUTO: 9.8 FL (ref 9.2–12.9)
POTASSIUM SERPL-SCNC: 4.1 MMOL/L (ref 3.5–5.1)
PROT SERPL-MCNC: 6.5 G/DL (ref 6–8.4)
PROTHROMBIN TIME: 12.6 SEC (ref 9–12.5)
RBC # BLD AUTO: 4.92 M/UL (ref 4.6–6.2)
SODIUM SERPL-SCNC: 138 MMOL/L (ref 136–145)
WBC # BLD AUTO: 4.62 K/UL (ref 3.9–12.7)

## 2020-04-20 PROCEDURE — 85025 COMPLETE CBC W/AUTO DIFF WBC: CPT | Mod: HCNC

## 2020-04-20 PROCEDURE — 85610 PROTHROMBIN TIME: CPT | Mod: HCNC

## 2020-04-20 PROCEDURE — 80053 COMPREHEN METABOLIC PANEL: CPT | Mod: HCNC

## 2020-04-20 PROCEDURE — 36415 COLL VENOUS BLD VENIPUNCTURE: CPT | Mod: HCNC

## 2020-04-20 PROCEDURE — 87517 HEPATITIS B DNA QUANT: CPT | Mod: HCNC

## 2020-04-20 NOTE — TELEPHONE ENCOUNTER
----- Message from Alla Haney MD sent at 4/20/2020 10:09 AM CDT -----  Please inform patient results are OK.

## 2020-04-24 LAB
HBV DNA SERPL NAA+PROBE-ACNC: 71 IU/ML
HBV DNA SERPL NAA+PROBE-LOG IU: 1.85 LOG (10) IU/ML
HBV DNA SERPL QL NAA+PROBE: DETECTED

## 2020-04-27 ENCOUNTER — TELEPHONE (OUTPATIENT)
Dept: HEPATOLOGY | Facility: CLINIC | Age: 65
End: 2020-04-27

## 2020-04-27 ENCOUNTER — LAB VISIT (OUTPATIENT)
Dept: LAB | Facility: HOSPITAL | Age: 65
End: 2020-04-27
Attending: INTERNAL MEDICINE
Payer: MEDICARE

## 2020-04-27 DIAGNOSIS — B16.9 ACUTE HEPATITIS B: ICD-10-CM

## 2020-04-27 PROCEDURE — 36415 COLL VENOUS BLD VENIPUNCTURE: CPT | Mod: HCNC

## 2020-04-27 PROCEDURE — 87517 HEPATITIS B DNA QUANT: CPT | Mod: HCNC

## 2020-04-27 NOTE — TELEPHONE ENCOUNTER
MA called patient to inform him that we have convert his appt to VIDEO VISIT. He accepted the appt.

## 2020-04-27 NOTE — TELEPHONE ENCOUNTER
----- Message from Alla Haney MD sent at 4/24/2020  5:44 PM CDT -----  Hepatitis B responding well to treatment, only a small amount of viral DNA is in the blood. continue treatment

## 2020-04-29 LAB
HBV DNA SERPL NAA+PROBE-ACNC: 49 IU/ML
HBV DNA SERPL NAA+PROBE-LOG IU: 1.69 LOG (10) IU/ML
HBV DNA SERPL QL NAA+PROBE: DETECTED

## 2020-04-30 ENCOUNTER — TELEPHONE (OUTPATIENT)
Dept: PHARMACY | Facility: CLINIC | Age: 65
End: 2020-04-30

## 2020-04-30 ENCOUNTER — TELEPHONE (OUTPATIENT)
Dept: PRIMARY CARE CLINIC | Facility: CLINIC | Age: 65
End: 2020-04-30

## 2020-04-30 ENCOUNTER — TELEPHONE (OUTPATIENT)
Dept: HEPATOLOGY | Facility: CLINIC | Age: 65
End: 2020-04-30

## 2020-04-30 NOTE — TELEPHONE ENCOUNTER
----- Message from Alla Haney MD sent at 4/30/2020 11:07 AM CDT -----  Please inform patient results are OK.

## 2020-05-02 ENCOUNTER — TELEPHONE (OUTPATIENT)
Dept: INTERNAL MEDICINE | Facility: CLINIC | Age: 65
End: 2020-05-02

## 2020-05-02 NOTE — TELEPHONE ENCOUNTER
----- Message from Yasmin Aviles sent at 5/1/2020  2:32 PM CDT -----  Contact: Yasmin Aviles, MS4  Pt was told that Dr. Flannery wouldn't be in the office on Monday (5/4/2020) so his appointment needs to be rescheduled. The pt prefers an appt next Monday or Tuesday at 9 am.

## 2020-05-04 ENCOUNTER — LAB VISIT (OUTPATIENT)
Dept: LAB | Facility: HOSPITAL | Age: 65
End: 2020-05-04
Attending: INTERNAL MEDICINE
Payer: MEDICARE

## 2020-05-04 DIAGNOSIS — K75.9 HEPATITIS: ICD-10-CM

## 2020-05-04 DIAGNOSIS — R74.01 ELEVATED TRANSAMINASE LEVEL: ICD-10-CM

## 2020-05-04 LAB
ALBUMIN SERPL BCP-MCNC: 3.9 G/DL (ref 3.5–5.2)
ALP SERPL-CCNC: 66 U/L (ref 55–135)
ALT SERPL W/O P-5'-P-CCNC: 12 U/L (ref 10–44)
ANION GAP SERPL CALC-SCNC: 9 MMOL/L (ref 8–16)
AST SERPL-CCNC: 19 U/L (ref 10–40)
BASOPHILS # BLD AUTO: 0.03 K/UL (ref 0–0.2)
BASOPHILS NFR BLD: 0.7 % (ref 0–1.9)
BILIRUB SERPL-MCNC: 0.6 MG/DL (ref 0.1–1)
BUN SERPL-MCNC: 13 MG/DL (ref 8–23)
CALCIUM SERPL-MCNC: 9.2 MG/DL (ref 8.7–10.5)
CHLORIDE SERPL-SCNC: 102 MMOL/L (ref 95–110)
CO2 SERPL-SCNC: 29 MMOL/L (ref 23–29)
CREAT SERPL-MCNC: 1 MG/DL (ref 0.5–1.4)
DIFFERENTIAL METHOD: NORMAL
EOSINOPHIL # BLD AUTO: 0 K/UL (ref 0–0.5)
EOSINOPHIL NFR BLD: 0.7 % (ref 0–8)
ERYTHROCYTE [DISTWIDTH] IN BLOOD BY AUTOMATED COUNT: 13 % (ref 11.5–14.5)
EST. GFR  (AFRICAN AMERICAN): >60 ML/MIN/1.73 M^2
EST. GFR  (NON AFRICAN AMERICAN): >60 ML/MIN/1.73 M^2
GLUCOSE SERPL-MCNC: 146 MG/DL (ref 70–110)
HCT VFR BLD AUTO: 43.5 % (ref 40–54)
HGB BLD-MCNC: 14.4 G/DL (ref 14–18)
IMM GRANULOCYTES # BLD AUTO: 0 K/UL (ref 0–0.04)
IMM GRANULOCYTES NFR BLD AUTO: 0 % (ref 0–0.5)
INR PPP: 1.1 (ref 0.8–1.2)
LYMPHOCYTES # BLD AUTO: 1.9 K/UL (ref 1–4.8)
LYMPHOCYTES NFR BLD: 44.4 % (ref 18–48)
MCH RBC QN AUTO: 30.4 PG (ref 27–31)
MCHC RBC AUTO-ENTMCNC: 33.1 G/DL (ref 32–36)
MCV RBC AUTO: 92 FL (ref 82–98)
MONOCYTES # BLD AUTO: 0.4 K/UL (ref 0.3–1)
MONOCYTES NFR BLD: 8.5 % (ref 4–15)
NEUTROPHILS # BLD AUTO: 2 K/UL (ref 1.8–7.7)
NEUTROPHILS NFR BLD: 45.7 % (ref 38–73)
NRBC BLD-RTO: 0 /100 WBC
PLATELET # BLD AUTO: 154 K/UL (ref 150–350)
PMV BLD AUTO: 10.2 FL (ref 9.2–12.9)
POTASSIUM SERPL-SCNC: 4 MMOL/L (ref 3.5–5.1)
PROT SERPL-MCNC: 6.8 G/DL (ref 6–8.4)
PROTHROMBIN TIME: 11.5 SEC (ref 9–12.5)
RBC # BLD AUTO: 4.74 M/UL (ref 4.6–6.2)
SODIUM SERPL-SCNC: 140 MMOL/L (ref 136–145)
WBC # BLD AUTO: 4.35 K/UL (ref 3.9–12.7)

## 2020-05-04 PROCEDURE — 80053 COMPREHEN METABOLIC PANEL: CPT | Mod: HCNC

## 2020-05-04 PROCEDURE — 85610 PROTHROMBIN TIME: CPT | Mod: HCNC

## 2020-05-04 PROCEDURE — 85025 COMPLETE CBC W/AUTO DIFF WBC: CPT | Mod: HCNC

## 2020-05-04 PROCEDURE — 36415 COLL VENOUS BLD VENIPUNCTURE: CPT | Mod: HCNC

## 2020-05-05 ENCOUNTER — TELEPHONE (OUTPATIENT)
Dept: HEPATOLOGY | Facility: CLINIC | Age: 65
End: 2020-05-05

## 2020-05-05 NOTE — TELEPHONE ENCOUNTER
----- Message from Alla Haney MD sent at 5/4/2020  4:37 PM CDT -----  All labs (blood counts, chemistry panel and clotting function) look fine. Pl inform pt.

## 2020-05-06 ENCOUNTER — PATIENT MESSAGE (OUTPATIENT)
Dept: HEPATOLOGY | Facility: CLINIC | Age: 65
End: 2020-05-06

## 2020-05-06 ENCOUNTER — TELEPHONE (OUTPATIENT)
Dept: HEPATOLOGY | Facility: CLINIC | Age: 65
End: 2020-05-06

## 2020-05-06 NOTE — PROGRESS NOTES
"   [facility] Consultation Note    THIS IS A VIDEO VISIT, THEREFORE, SOME ELEMENTS OF THE PHYSICAL EXAM, SUCH AS VITAL SIGNS, HEART SOUNDS OR BREATH SOUNDS ARE NOT INCLUDED.  ANY SYMPTOMS OR SIGNS THAT WERE VISUALIZED OR STATED BY THE PATIENT MAY BE INCLUDED IN THIS NOTE..       Reason for Consult:  There were no encounter diagnoses.    PCP: Roly Flannery       HPI:  This is a 64 y.o. male here for evaluation of: transaminase elevation.    65 y/o male with H/o follicular non-Hodgkin's lymphoma, malignant mets to bone, anti-neoplastic chemotherapy with pancytopenia, Pulm HTN, PE, mixed hyperlipidemia, was found to have elevated transaminases on 1/27/20 and again the next day.  AST in 500 range and ALT in 1100 range.  Alk phos and T bili normal.  On entecavir, enzymes have normalized AST 19, ALT 12, HBV DNA has declined to 49 from 1.1 million IU/mL.      History give by patient's wife:  Patient was diagnosed with NHL in Nov-Dec 2018 with diffuse abd lymph node enlargement, stage 4 with mets in bone marrow.  R-CHOP was started in Jan 2020 under the care of Dr. Dominik Farooq at [facility].  After the first treatment, patient had acute upper abd pain, so they held off treatment with R-CHOP.  Evaluation showed abd pain was due to gallbladder, so he had a lap cholecystectomy (at [facility]) .  After surgery, he had PEs (from "upper part of his body", not lower extremities).  All of this was treated, then R-CHOP was resumed around June/July 2019.  He received 6-7 cycles, every 21 days.  Last treatment was completed just before Christmas 2019.  Then, he was told he would return in 3 months (March 2020) for any future treatments.      He did have an evaluation with a PET-CT on 12/30/19 which showed near complete resolution of the lymphadenopathy, however, 2 small 6 mm subpleural nodules on the left lower lobe of the lung and 2 small lymph nodes in the left anterior mesentery were seen on this PET-CT.  Labs that " day 12/30/19 showed AST was already elevated to 55 and ALT to 88.  Then there were no labs until Jan 27/2020, when AT, ALT were elevated as above.      HBsAg neg, HBeAg and HBcAb IgM were neg on 3/15/19 (prior to starting R-CHOP).     Now HBsAg +, HBeAg +, HBV DNA 1.13 million IU/mL. Vemlidy started on 2/12/20.         Current meds:   flonase, vistaril, lyrica, xarelto.  Took something (?steroids) for bursitis.        Elevated liver enzymes: Yes  Abnormal imaging: Abd masses from NHL   Cirrhosis: No  Hepatitis C: No  Hepatitis B: No  Fatty liver: No  Encephalopathy: No  Post-hospital discharge: No  Symptoms: right shoulder pain    Primary hepatic manifestations:  Fatigue:Yes  Edema:Yes  Ascites:Yes  Encephalopathy:Yes  Abdominal pain:Yes  GI bleeds: Yes  Pruritus:Yes  Weight Changes:Yes  Changes in Bowel habits: Yes  Muscle cramps:No    Risk factors for liver disease:  No jaundice  No transfusions  No IVDU  Did not snort cocaine or similar agents  Did not live with anyone with hepatitis B or C  Sexual partner not tested  No hepatotoxic medications  No exposure to industrial toxins  Alcohol: None      ROS:  Constitutional: No fevers, chills, weight changes, fatigue  ENT: No allergies, nosebleeds,   CV: No chest pain  Pulm: No cough, shortness of breath  Ophtho: No vision changes  GI/Liver: see HPI  Derm: No rash, itching  Heme: No swollen glands, bruising  MSK: No joint pains, joint swelling  : No dysuria, hematuria, decrease in urine output  Endo: No hot or cold intolerance  Neuro: No confusion, disorientation, difficulty with sleep, memory, concentration, syncope, seizure  Psych: No anxiety, depression    Medical History:  has a past medical history of Personal history of colonic polyps (09/08/2015).    Surgical History:  has a past surgical history that includes Shoulder surgery (Left); Groin exploration (2 pre 2005 and 1 post 2005); and Laparoscopic cholecystectomy (05/2018).    Family History: family history  includes Breast cancer in his mother; Diabetes in his brother; Emphysema in his father; No Known Problems in his daughter and daughter; Stroke in his sister; Ulcers in his brother..     Social History:  reports that he has never smoked. He has never used smokeless tobacco. He reports that he does not drink alcohol or use drugs.    Review of patient's allergies indicates:   Allergen Reactions    Rosuvastatin      myalgias       Current Outpatient Rx   Medication Sig Dispense Refill    rivaroxaban (XARELTO) 20 mg Tab Take 1 tablet (20 mg total) by mouth daily with dinner or evening meal. 90 tablet 3    tenofovir alafenamide (VEMLIDY) 25 mg Tab Take 1 tablet (25 mg total) by mouth once daily. 30 tablet 11    fluticasone propionate (FLONASE) 50 mcg/actuation nasal spray 2 sprays (100 mcg total) by Each Nostril route once daily. (Patient not taking: Reported on 5/7/2020) 1 Bottle 11    hydrOXYzine pamoate (VISTARIL) 50 MG Cap Take 50 mg by mouth nightly.      pregabalin (LYRICA) 150 MG capsule Take 150 mg by mouth once daily.         Objective Findings:    Vital Signs:  There were no vitals taken for this visit.  There is no height or weight on file to calculate BMI.    Physical Exam:  General Appearance: Well appearing in no acute distress  Head:   Normocephalic, without obvious abnormality  Eyes:    No scleral icterus, EOMI  ENT: Neck supple, Lips, mucosa, and tongue normal; teeth and gums normal  Lungs: CTA bilaterally in anterior and posterior fields, no wheezes, no crackles.  Heart:  Regular rate and rhythm, S1, S2 normal, no murmurs heard  Abdomen: Soft, non tender, non distended with positive bowel sounds in all four quadrants. No hepatosplenomegaly, ascites, or mass  Extremities: 2+ pulses, no clubbing, cyanosis or edema  Skin: No rash  Neurologic: CN II-XII intact, alert, oriented x 3. No asterixis      Labs:  Lab Results   Component Value Date    WBC 4.35 05/04/2020    HGB 14.4 05/04/2020    HCT 43.5  05/04/2020     05/04/2020    CHOL 205 (H) 01/27/2020    TRIG 106 01/27/2020    HDL 65 01/27/2020    INR 1.1 05/04/2020    CREATININE 1.0 05/04/2020    BUN 13 05/04/2020    BILITOT 0.6 05/04/2020    ALT 12 05/04/2020    AST 19 05/04/2020    ALKPHOS 66 05/04/2020     05/04/2020    K 4.0 05/04/2020     05/04/2020    CO2 29 05/04/2020    TSH 0.972 01/27/2020       Imaging:       Endoscopy:      Assessment:  No diagnosis found.     Pt with NHL, HLD, PE, Pulm HTN, s/p lap valentina, s/p R-CHOP 6-7 cycles.   Here fr elevated transaminases:  elevated transaminases first noted on 12/30/19 when R-CHOP therapy was completed, then no labs until on 1/27/20 (and again the next day with a slight downward trend).  AST in 500 range and ALT in 1100 range.  Alk phos and T bili normal.    These elevations could be due to chemotherapy, but will check viral hepatitis serologies, autoimmune markers,  and A1AT and ceruloplasmin.  Patient's PCP, Dr. Roly Flannery, has already ordered the viral hep and autoimmune markers, so I will get the remainder.      Since there is a downward trend in transaminases over last 7 days, I will monitor the labs twice weekly  Mondays and Thursdays, until enzymes below 100.  If they continue to improve, it is most likely due to chemo.  If not, he may need a liver biopsy.      Response of NHL to R-CHOP:   PET-CT 12/30/19: showed near complete resolution of the mesenteric lymph nodes, but interval development of 2 small 6 mm subpleural nodules on the left lower lung. And 2 left mesenteric lymph nodes.       US abd 1/28/20:  1.2 cm hypoechoic structure in the region of the janine hepatis.  This finding is indeterminate and may represent prominent periportal lymph node or cystic duct remnant in this patient that is status-post cholecystectomy.  CT abdomen without and with contrast could be done if clinically warranted.    Serologies have been ordered by Dr. Roly Flannery.  Will check results this week.      Recommendations:  -  Labs CBC, CMP, HBV DNA quant every 2 months, next July 5, 2020. Cancel all previously scheduled lab appointments.    -  Steroid pills postpone until HBV DNA is below the limit of measurement.  -  Continue Vemlidy daily (script runs out early Feb 2021).   -  Oncologist retired, he will be seen by new doctor, Michael, not sure.    -  Avoid alcohol, smoking, sedatives and meds with codeine.  -  Avoid high intake of Tylenol (more than 4 extra-strength pills in one day)  -  Call us if any bleeding, fevers, confusion, disorientation occur  -  Return in 2 months, video/audio visit.       Follow up in about 2 months (around 7/7/2020).      Order summary:  No orders of the defined types were placed in this encounter.      Thank you so much for allowing me to participate in the care of Jeff Haney MD

## 2020-05-07 ENCOUNTER — OFFICE VISIT (OUTPATIENT)
Dept: HEPATOLOGY | Facility: CLINIC | Age: 65
End: 2020-05-07
Payer: MEDICARE

## 2020-05-07 ENCOUNTER — PATIENT OUTREACH (OUTPATIENT)
Dept: ADMINISTRATIVE | Facility: OTHER | Age: 65
End: 2020-05-07

## 2020-05-07 DIAGNOSIS — B16.9 ACUTE HEPATITIS B: Primary | ICD-10-CM

## 2020-05-07 PROCEDURE — 99213 OFFICE O/P EST LOW 20 MIN: CPT | Mod: HCNC,95,, | Performed by: INTERNAL MEDICINE

## 2020-05-07 PROCEDURE — 99499 RISK ADDL DX/OHS AUDIT: ICD-10-PCS | Mod: HCNC,95,, | Performed by: INTERNAL MEDICINE

## 2020-05-07 PROCEDURE — 99499 UNLISTED E&M SERVICE: CPT | Mod: HCNC,95,, | Performed by: INTERNAL MEDICINE

## 2020-05-07 PROCEDURE — 99213 PR OFFICE/OUTPT VISIT, EST, LEVL III, 20-29 MIN: ICD-10-PCS | Mod: HCNC,95,, | Performed by: INTERNAL MEDICINE

## 2020-05-07 NOTE — PATIENT INSTRUCTIONS
Recommendations:  -  Labs CBC, CMP, HBV DNA quant every 2 months, next July 5, 2020. Cancel all previously scheduled lab appointments.    -  Steroid pills postpone until HBV DNA is below the limit of measurement.  -  Continue Vemlidy daily (script runs out early Feb 2021).   -  Oncologist retired, he will be seen by new doctor, Michael, not sure.    -  Avoid alcohol, smoking, sedatives and meds with codeine.  -  Avoid high intake of Tylenol (more than 4 extra-strength pills in one day)  -  Call us if any bleeding, fevers, confusion, disorientation occur  -  Return in 2 months, video/audio visit.

## 2020-05-07 NOTE — Clinical Note
Recommendations:-  Labs CBC, CMP, HBV DNA quant every 2 months, next July 5, 2020. Cancel all previously scheduled lab appointments.  -  Steroid pills postpone until HBV DNA is below the limit of measurement.-  Continue Vemlidy daily (script runs out early Feb 2021). -  Oncologist retired, he will be seen by new doctor, Michael, not sure.  -  Avoid alcohol, smoking, sedatives and meds with codeine.-  Avoid high intake of Tylenol (more than 4 extra-strength pills in one day)-  Call us if any bleeding, fevers, confusion, disorientation occur-  Return in 2 months, video/audio visit.

## 2020-05-11 ENCOUNTER — OFFICE VISIT (OUTPATIENT)
Dept: PRIMARY CARE CLINIC | Facility: CLINIC | Age: 65
End: 2020-05-11
Payer: MEDICARE

## 2020-05-11 DIAGNOSIS — I26.99 PULMONARY EMBOLISM, BILATERAL: ICD-10-CM

## 2020-05-11 DIAGNOSIS — C82.90 FOLLICULAR NON-HODGKIN'S LYMPHOMA: ICD-10-CM

## 2020-05-11 DIAGNOSIS — M65.312 TRIGGER FINGER OF LEFT THUMB: Primary | ICD-10-CM

## 2020-05-11 DIAGNOSIS — N50.819 TESTICULAR PAIN: ICD-10-CM

## 2020-05-11 DIAGNOSIS — Z00.00 HEALTHCARE MAINTENANCE: ICD-10-CM

## 2020-05-11 DIAGNOSIS — M75.52 BILATERAL SHOULDER BURSITIS: ICD-10-CM

## 2020-05-11 DIAGNOSIS — M75.51 BILATERAL SHOULDER BURSITIS: ICD-10-CM

## 2020-05-11 DIAGNOSIS — B16.9 ACUTE HEPATITIS B: ICD-10-CM

## 2020-05-11 PROBLEM — G89.29 CHRONIC LEFT SHOULDER PAIN: Status: RESOLVED | Noted: 2019-11-19 | Resolved: 2020-05-11

## 2020-05-11 PROBLEM — M25.512 CHRONIC LEFT SHOULDER PAIN: Status: RESOLVED | Noted: 2019-11-19 | Resolved: 2020-05-11

## 2020-05-11 PROCEDURE — 99499 RISK ADDL DX/OHS AUDIT: ICD-10-PCS | Mod: HCNC,95,, | Performed by: INTERNAL MEDICINE

## 2020-05-11 PROCEDURE — 99213 PR OFFICE/OUTPT VISIT, EST, LEVL III, 20-29 MIN: ICD-10-PCS | Mod: HCNC,95,, | Performed by: INTERNAL MEDICINE

## 2020-05-11 PROCEDURE — 99499 UNLISTED E&M SERVICE: CPT | Mod: HCNC,95,, | Performed by: INTERNAL MEDICINE

## 2020-05-11 PROCEDURE — 99213 OFFICE O/P EST LOW 20 MIN: CPT | Mod: HCNC,95,, | Performed by: INTERNAL MEDICINE

## 2020-05-11 RX ORDER — DICLOFENAC SODIUM 10 MG/G
2 GEL TOPICAL 2 TIMES DAILY
Qty: 100 G | Refills: 0 | Status: SHIPPED | OUTPATIENT
Start: 2020-05-11 | End: 2020-06-15

## 2020-05-11 NOTE — ASSESSMENT & PLAN NOTE
Patient is status post 3 surgeries.  No current medications.  Patient is asymptomatic at this time.

## 2020-05-11 NOTE — ASSESSMENT & PLAN NOTE
Patient with acute/reactivated hepatitis-B.  Last LFTs within normal limits.  · Patient to continue follow-up with hepatology.  · Continue antiviral therapy.

## 2020-05-11 NOTE — ASSESSMENT & PLAN NOTE
"Completed the 6/6 planned R-CHOP for recurrent follicular lymphoma. "PET 12/30/2019: Interval Near complete resolution of retroperitoneal and right retrocrural lymphadenopathy. Decrease in size and number of mesenteric nodes. Development of 2 sub centimeter mesenteric nodes with SUV 5.Interval development of 2 subcentimeter hypermetabolic nodules I the left lower lung. Inflammatory or infectious process is favored." .  · Per patient, hepatology states that he cannot have any further CHOP chemotherapy.  · Patient to establish care July 13th with new oncologist.  · Patient encouraged to call today to schedule PET scan.  "

## 2020-05-11 NOTE — ASSESSMENT & PLAN NOTE
Continued symptoms.  Patient is wearing brace.  Patient is using ice as needed.  · Trial of Voltaren gel as needed.  Careful use with Xarelto.  Patient encouraged no massaging.  · Continue brace and ice.

## 2020-05-11 NOTE — PROGRESS NOTES
This note has been generated using voice-recognition software. There may be typographical errors that have been missed during proof-reading.  The patient location is: [place]  The chief complaint leading to consultation is: f/u Hep B and oncology  Visit type: audiovisual  Total time spent with patient: 23m  Each patient to whom he or she provides medical services by telemedicine is:  (1) informed of the relationship between the physician and patient and the respective role of any other health care provider with respect to management of the patient; and (2) notified that he or she may decline to receive medical services by telemedicine and may withdraw from such care at any time.    Primary Care Provider Appointment    Subjective:      Patient ID: Jeff Hope is a 64 y.o. male here for follow up.     Chief Complaint: No chief complaint on file.    HPI:  This is a pleasant 63-year-old male with bronchiectasis, hyperlipidemia, history of pulmonary embolism on chronic anticoagulation with a follicular non-Hodgkin's lymphoma, acute/reactivated hepatitis B here via telemed for f/u.  Patient states he is doing well and tolerating hepatitis-B treatment.  Recent hepatology appointment.  Oncology appointment recently with PET scan reordered.  Per patient his oncologist will be retiring at the end of the month.  He is scheduled on July 13th  to establish care.  Her last oncology notes patient was to be scheduled for PET scan but they have not heard back from the office.  Encouraged to call today to get reschedule.  Patient denies any current testicular pain.  He is not taking Lyrica.  He does note some trigger finger.  He is currently wearing a brace and states at times he feels the brace make the situation worse.  Patient is using ice on his hand.  Patient also continues to suffer bilateral shoulder pain.  Patient was scheduled for bilateral shoulder injections but these have been held due to his hepatitis-B flare.   Patient states his thumb is fine at times, but sometimes is very sore with minimal activity.  Patient has not tried any topical medications for this.  Per patient hepatology stated he can use an occasional Aleve.  Patient Active Problem List   Diagnosis    Bronchiectasis    Follicular non-Hodgkin's lymphoma    Mixed hyperlipidemia    Neutropenia    S/P laparoscopic cholecystectomy    Recurrent major depressive disorder, in partial remission    Pulmonary embolism, bilateral    Chronic anticoagulation    Aortic atherosclerosis    Thrombocytopenia    Chronic low back pain    Testicular pain    Healthcare maintenance    Malignant neoplasm metastatic to bone marrow    Pulmonary hypertension    Myalgia due to statin    Vitamin D deficiency    Port-A-Cath in place    Abnormal MMSE    Antineoplastic chemotherapy induced pancytopenia    Allergic rhinitis    Adrenal hypertrophy    Bilateral shoulder bursitis    Elevated transaminase level    Acute hepatitis B    Dermatitis    Trigger finger of left thumb        Past Surgical History:   Procedure Laterality Date    GROIN EXPLORATION  2 pre 2005 and 1 post 2005    total of 3 procedures    LAPAROSCOPIC CHOLECYSTECTOMY  05/2018    SHOULDER SURGERY Left        Past Medical History:   Diagnosis Date    Personal history of colonic polyps 09/08/2015    per [facility] report - repeat 9/2020       Social History     Socioeconomic History    Marital status:      Spouse name: Not on file    Number of children: 2    Years of education: Not on file    Highest education level: Not on file   Occupational History    Occupation: disabled [employer]    Social Needs    Financial resource strain: Somewhat hard    Food insecurity:     Worry: Often true     Inability: Sometimes true    Transportation needs:     Medical: No     Non-medical: No   Tobacco Use    Smoking status: Never Smoker    Smokeless tobacco: Never Used   Substance and Sexual Activity     Alcohol use: No     Frequency: Never    Drug use: No    Sexual activity: Yes     Partners: Female   Lifestyle    Physical activity:     Days per week: 0 days     Minutes per session: 0 min    Stress: Rather much   Relationships    Social connections:     Talks on phone: Twice a week     Gets together: Twice a week     Attends [employer] service: Never     Active member of club or organization: No     Attends meetings of clubs or organizations: Never     Relationship status:    Other Topics Concern    Not on file   Social History Narrative    Lives with wife.         Review of Systems    PHQ9 12/10/2019   Total Score 3        Checklist of Daily Activities:        Objective:   There were no vitals taken for this visit.    Physical Exam        Lab Results   Component Value Date    WBC 4.35 05/04/2020    HGB 14.4 05/04/2020    HCT 43.5 05/04/2020     05/04/2020    CHOL 205 (H) 01/27/2020    TRIG 106 01/27/2020    HDL 65 01/27/2020    ALT 12 05/04/2020    AST 19 05/04/2020     05/04/2020    K 4.0 05/04/2020     05/04/2020    CREATININE 1.0 05/04/2020    BUN 13 05/04/2020    CO2 29 05/04/2020    TSH 0.972 01/27/2020    INR 1.1 05/04/2020       Current Outpatient Medications on File Prior to Visit   Medication Sig Dispense Refill    pregabalin (LYRICA) 150 MG capsule Take 150 mg by mouth once daily.      tenofovir alafenamide (VEMLIDY) 25 mg Tab Take 1 tablet (25 mg total) by mouth once daily. 30 tablet 11    [DISCONTINUED] fluticasone propionate (FLONASE) 50 mcg/actuation nasal spray 2 sprays (100 mcg total) by Each Nostril route once daily. (Patient not taking: Reported on 5/7/2020) 1 Bottle 11    [DISCONTINUED] hydrOXYzine pamoate (VISTARIL) 50 MG Cap Take 50 mg by mouth nightly.      [DISCONTINUED] rivaroxaban (XARELTO) 20 mg Tab Take 1 tablet (20 mg total) by mouth daily with dinner or evening meal. 90 tablet 3     No current facility-administered medications on file prior to  "visit.          Assessment:   64 y.o. male with multiple co-morbid illnesses here for continued follow up of medical problems.      Plan:     Problem List Items Addressed This Visit        Renal/    Testicular pain     Patient is status post 3 surgeries.  No current medications.  Patient is asymptomatic at this time.            Hematology    Pulmonary embolism, bilateral    Relevant Medications    rivaroxaban (XARELTO) 20 mg Tab       Oncology    Follicular non-Hodgkin's lymphoma     Completed the 6/6 planned R-CHOP for recurrent follicular lymphoma. "PET 12/30/2019: Interval Near complete resolution of retroperitoneal and right retrocrural lymphadenopathy. Decrease in size and number of mesenteric nodes. Development of 2 sub centimeter mesenteric nodes with SUV 5.Interval development of 2 subcentimeter hypermetabolic nodules I the left lower lung. Inflammatory or infectious process is favored." .  · Per patient, hepatology states that he cannot have any further CHOP chemotherapy.  · Patient to establish care July 13th with new oncologist.  · Patient encouraged to call today to schedule PET scan.            GI    Acute hepatitis B     Patient with acute/reactivated hepatitis-B.  Last LFTs within normal limits.  · Patient to continue follow-up with hepatology.  · Continue antiviral therapy.            Orthopedic    Bilateral shoulder bursitis     Patient with continued bilateral shoulder pain.  Per hepatology patient can take p.r.n. Aleve.  Discussed today my concerns with this medication with patient Xarelto.  · Tolerated, may try Voltaren gel.  · Will contact hepatology concerning restarting Lyrica.  · Patient is not a candidate for steroid injections.         Trigger finger of left thumb - Primary     Continued symptoms.  Patient is wearing brace.  Patient is using ice as needed.  · Trial of Voltaren gel as needed.  Careful use with Xarelto.  Patient encouraged no massaging.  · Continue brace and ice.         " Relevant Medications    diclofenac sodium (VOLTAREN) 1 % Gel       Other    Healthcare maintenance     · ACP needed.  · Yearly lap 01/2021.  · Immunizations from gyn care.               Health Maintenance       Date Due Completion Date    HIV Screening 11/30/1970 ---    TETANUS VACCINE 11/30/1973 ---    Shingles Vaccine (1 of 2) 11/30/2005 ---    Pneumococcal Vaccine (Highest Risk) (2 of 3 - PPSV23) 08/01/2019 6/6/2019    Colonoscopy 09/08/2020 9/8/2015    Lipid Panel 01/27/2025 1/27/2020        Medications Reconciliation:   I have reconciled the patient's home medications with the patient/family. I have updated all changes.  Refer to After-Visit Medication List.      Medication List           Accurate as of May 11, 2020 11:05 AM. If you have any questions, ask your nurse or doctor.               START taking these medications    diclofenac sodium 1 % Gel  Commonly known as:  VOLTAREN  Apply 2 g topically 2 (two) times daily.        CONTINUE taking these medications    pregabalin 150 MG capsule  Commonly known as:  LYRICA     rivaroxaban 20 mg Tab  Commonly known as:  XARELTO  Take 1 tablet (20 mg total) by mouth daily with dinner or evening meal.     VEMLIDY 25 mg Tab  Generic drug:  tenofovir alafenamide  Take 1 tablet (25 mg total) by mouth once daily.        STOP taking these medications    fluticasone propionate 50 mcg/actuation nasal spray  Commonly known as:  FLONASE     hydrOXYzine pamoate 50 MG Cap  Commonly known as:  VISTARIL           Where to Get Your Medications      These medications were sent to [id number] - [facility] [address] AT [place] [address]  [address] [facility] [address]    Phone:  [phone]   · diclofenac sodium 1 % Gel  · rivaroxaban 20 mg Tab          Next/future visit:  ACP, evaluate trigger finger on Voltaren gel, immunizations if indicated, evaluated PET scan done, evaluate hepatology notes.    Follow up in  about 4 weeks (around 6/8/2020). Total face-to-face time was 23m min, >50% of this was spent on counseling and coordination of care. The following issues were discussed:  Oncology need for Pet, shoulder pain, trigger finger, hepatitis-B     Roly Flannery MD  Internal Medicine  [facility]  [phone]

## 2020-05-11 NOTE — ASSESSMENT & PLAN NOTE
Patient with continued bilateral shoulder pain.  Per hepatology patient can take p.r.n. Aleve.  Discussed today my concerns with this medication with patient Xarelto.  · Tolerated, may try Voltaren gel.  · Will contact hepatology concerning restarting Lyrica.  · Patient is not a candidate for steroid injections.

## 2020-05-12 ENCOUNTER — TELEPHONE (OUTPATIENT)
Dept: HEPATOLOGY | Facility: CLINIC | Age: 65
End: 2020-05-12

## 2020-05-12 NOTE — TELEPHONE ENCOUNTER
----- Message from Amy Hutchison MA sent at 5/7/2020  3:37 PM CDT -----      ----- Message -----  From: Alla Haney MD  Sent: 5/7/2020   3:28 PM CDT  To: [facility] Staff    Recommendations:  -  Labs CBC, CMP, HBV DNA quant every 2 months, next July 5, 2020. Cancel all previously scheduled lab appointments.    -  Steroid pills postpone until HBV DNA is below the limit of measurement.  -  Continue Vemlidy daily (script runs out early Feb 2021).   -  Oncologist retired, he will be seen by new doctor, Michael, not sure.    -  Avoid alcohol, smoking, sedatives and meds with codeine.  -  Avoid high intake of Tylenol (more than 4 extra-strength pills in one day)  -  Call us if any bleeding, fevers, confusion, disorientation occur  -  Return in 2 months, video/audio visit.

## 2020-05-13 ENCOUNTER — TELEPHONE (OUTPATIENT)
Dept: INTERNAL MEDICINE | Facility: CLINIC | Age: 65
End: 2020-05-13

## 2020-05-13 NOTE — TELEPHONE ENCOUNTER
Pt will restart lyrica that was given for shoulder pain after confirming with hepatology that will just need to monitor if any encephalopathy.  SE discussed with pt.

## 2020-05-13 NOTE — TELEPHONE ENCOUNTER
----- Message from Kevon Alvarez sent at 5/13/2020 10:56 AM CDT -----  Contact: Self [phone]  Patient is returning a phone call.  Who left a message for the patient: Unknown  Does patient know what this is regarding:  Missed call  Comments:

## 2020-05-13 NOTE — TELEPHONE ENCOUNTER
Spoke with pt, he is asking about taking lyrica.    I could not find anything in the chart about lyrica.     Please advise.

## 2020-05-18 ENCOUNTER — LAB VISIT (OUTPATIENT)
Dept: LAB | Facility: HOSPITAL | Age: 65
End: 2020-05-18
Attending: INTERNAL MEDICINE
Payer: MEDICARE

## 2020-05-18 ENCOUNTER — TELEPHONE (OUTPATIENT)
Dept: HEPATOLOGY | Facility: CLINIC | Age: 65
End: 2020-05-18

## 2020-05-18 DIAGNOSIS — R74.01 ELEVATED TRANSAMINASE LEVEL: ICD-10-CM

## 2020-05-18 DIAGNOSIS — K75.9 HEPATITIS: ICD-10-CM

## 2020-05-18 DIAGNOSIS — B16.9 ACUTE HEPATITIS B: ICD-10-CM

## 2020-05-18 LAB
ALBUMIN SERPL BCP-MCNC: 3.6 G/DL (ref 3.5–5.2)
ALP SERPL-CCNC: 63 U/L (ref 55–135)
ALT SERPL W/O P-5'-P-CCNC: 11 U/L (ref 10–44)
ANION GAP SERPL CALC-SCNC: 5 MMOL/L (ref 8–16)
AST SERPL-CCNC: 15 U/L (ref 10–40)
BASOPHILS # BLD AUTO: 0.02 K/UL (ref 0–0.2)
BASOPHILS NFR BLD: 0.5 % (ref 0–1.9)
BILIRUB SERPL-MCNC: 0.4 MG/DL (ref 0.1–1)
BUN SERPL-MCNC: 11 MG/DL (ref 8–23)
CALCIUM SERPL-MCNC: 9 MG/DL (ref 8.7–10.5)
CHLORIDE SERPL-SCNC: 105 MMOL/L (ref 95–110)
CO2 SERPL-SCNC: 30 MMOL/L (ref 23–29)
CREAT SERPL-MCNC: 1 MG/DL (ref 0.5–1.4)
DIFFERENTIAL METHOD: ABNORMAL
EOSINOPHIL # BLD AUTO: 0 K/UL (ref 0–0.5)
EOSINOPHIL NFR BLD: 0.5 % (ref 0–8)
ERYTHROCYTE [DISTWIDTH] IN BLOOD BY AUTOMATED COUNT: 13 % (ref 11.5–14.5)
EST. GFR  (AFRICAN AMERICAN): >60 ML/MIN/1.73 M^2
EST. GFR  (NON AFRICAN AMERICAN): >60 ML/MIN/1.73 M^2
GLUCOSE SERPL-MCNC: 107 MG/DL (ref 70–110)
HCT VFR BLD AUTO: 41.3 % (ref 40–54)
HGB BLD-MCNC: 14 G/DL (ref 14–18)
IMM GRANULOCYTES # BLD AUTO: 0.02 K/UL (ref 0–0.04)
IMM GRANULOCYTES NFR BLD AUTO: 0.5 % (ref 0–0.5)
INR PPP: 1.1 (ref 0.8–1.2)
LYMPHOCYTES # BLD AUTO: 2.1 K/UL (ref 1–4.8)
LYMPHOCYTES NFR BLD: 46.5 % (ref 18–48)
MCH RBC QN AUTO: 31.4 PG (ref 27–31)
MCHC RBC AUTO-ENTMCNC: 33.9 G/DL (ref 32–36)
MCV RBC AUTO: 93 FL (ref 82–98)
MONOCYTES # BLD AUTO: 0.4 K/UL (ref 0.3–1)
MONOCYTES NFR BLD: 8.4 % (ref 4–15)
NEUTROPHILS # BLD AUTO: 1.9 K/UL (ref 1.8–7.7)
NEUTROPHILS NFR BLD: 43.6 % (ref 38–73)
NRBC BLD-RTO: 0 /100 WBC
PLATELET # BLD AUTO: 140 K/UL (ref 150–350)
PMV BLD AUTO: 9.9 FL (ref 9.2–12.9)
POTASSIUM SERPL-SCNC: 4.2 MMOL/L (ref 3.5–5.1)
PROT SERPL-MCNC: 6.4 G/DL (ref 6–8.4)
PROTHROMBIN TIME: 10.8 SEC (ref 9–12.5)
RBC # BLD AUTO: 4.46 M/UL (ref 4.6–6.2)
SODIUM SERPL-SCNC: 140 MMOL/L (ref 136–145)
WBC # BLD AUTO: 4.43 K/UL (ref 3.9–12.7)

## 2020-05-18 PROCEDURE — 87517 HEPATITIS B DNA QUANT: CPT | Mod: HCNC

## 2020-05-18 PROCEDURE — 85025 COMPLETE CBC W/AUTO DIFF WBC: CPT | Mod: HCNC

## 2020-05-18 PROCEDURE — 36415 COLL VENOUS BLD VENIPUNCTURE: CPT | Mod: HCNC

## 2020-05-18 PROCEDURE — 85610 PROTHROMBIN TIME: CPT | Mod: HCNC

## 2020-05-18 PROCEDURE — 80053 COMPREHEN METABOLIC PANEL: CPT | Mod: HCNC

## 2020-05-18 NOTE — TELEPHONE ENCOUNTER
----- Message from Alla Haney MD sent at 5/18/2020  1:13 PM CDT -----  Please inform patient results are OK.

## 2020-05-22 LAB
HBV DNA SERPL NAA+PROBE-ACNC: 31 IU/ML
HBV DNA SERPL NAA+PROBE-LOG IU: 1.49 LOG (10) IU/ML
HBV DNA SERPL QL NAA+PROBE: DETECTED

## 2020-05-25 ENCOUNTER — TELEPHONE (OUTPATIENT)
Dept: HEPATOLOGY | Facility: CLINIC | Age: 65
End: 2020-05-25

## 2020-05-25 NOTE — TELEPHONE ENCOUNTER
----- Message from Alla Haney MD sent at 5/22/2020  6:14 PM CDT -----  Please inform patient results are OK.

## 2020-05-28 ENCOUNTER — TELEPHONE (OUTPATIENT)
Dept: PHARMACY | Facility: CLINIC | Age: 65
End: 2020-05-28

## 2020-06-01 ENCOUNTER — LAB VISIT (OUTPATIENT)
Dept: PRIMARY CARE CLINIC | Facility: CLINIC | Age: 65
End: 2020-06-01
Payer: MEDICARE

## 2020-06-01 DIAGNOSIS — Z11.59 ENCOUNTER FOR SCREENING FOR OTHER VIRAL DISEASES: Primary | ICD-10-CM

## 2020-06-01 PROBLEM — Z00.00 HEALTHCARE MAINTENANCE: Status: RESOLVED | Noted: 2019-11-19 | Resolved: 2020-06-01

## 2020-06-01 PROCEDURE — U0003 INFECTIOUS AGENT DETECTION BY NUCLEIC ACID (DNA OR RNA); SEVERE ACUTE RESPIRATORY SYNDROME CORONAVIRUS 2 (SARS-COV-2) (CORONAVIRUS DISEASE [COVID-19]), AMPLIFIED PROBE TECHNIQUE, MAKING USE OF HIGH THROUGHPUT TECHNOLOGIES AS DESCRIBED BY CMS-2020-01-R: HCPCS | Mod: HCNC

## 2020-06-01 NOTE — PROGRESS NOTES
Nasal swab collected   [FreeTextEntry1] : Well growing 9-month-old premature female with developmental delays followed by early intervention and get in services at home. Mother happy with improvement.Continue breastmilk or formula as desired. Increase table foods, 3 meals with 2-3 snacks per day. Incorporate up to 6 oz of flourinated water daily in a sippy cup. Discussed weaning of bottle and pacifier. Wipe teeth daily with washcloth. When in car, patient should be in rear-facing car seat in back seat. Put baby to sleep in own crib with no loose or soft bedding. Lower crib matress. Help baby to maintain consistent daily routines and sleep schedule. Recognize stranger anxiety. Ensure home is safe since baby is increasingly mobile. Be within arm's reach of baby at all times. Use consistent, positive discipline. Avoid screen time. Read aloud to baby.\par Synagis 15 mg per kilogram given IM.\par The components of today's vaccine(s) include  Hepatitis B   .   Counseling for all components completed.  The risk(s) of the vaccine and the disease(s) for which they are intended to prevent have been discussed with the care taker.  The caretaker has given consent to vaccinate.\par Return to office in one month

## 2020-06-02 LAB — SARS-COV-2 RNA RESP QL NAA+PROBE: NOT DETECTED

## 2020-06-04 ENCOUNTER — TELEPHONE (OUTPATIENT)
Dept: PRIMARY CARE CLINIC | Facility: CLINIC | Age: 65
End: 2020-06-04
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
## 2020-06-15 ENCOUNTER — OFFICE VISIT (OUTPATIENT)
Dept: PRIMARY CARE CLINIC | Facility: CLINIC | Age: 65
End: 2020-06-15
Payer: MEDICARE

## 2020-06-15 ENCOUNTER — CLINICAL SUPPORT (OUTPATIENT)
Dept: INTERNAL MEDICINE | Facility: CLINIC | Age: 65
End: 2020-06-15
Payer: MEDICARE

## 2020-06-15 VITALS
HEART RATE: 76 BPM | OXYGEN SATURATION: 98 % | BODY MASS INDEX: 32 KG/M2 | HEIGHT: 69 IN | WEIGHT: 216.06 LBS | DIASTOLIC BLOOD PRESSURE: 60 MMHG | SYSTOLIC BLOOD PRESSURE: 118 MMHG

## 2020-06-15 DIAGNOSIS — M75.52 BILATERAL SHOULDER BURSITIS: Primary | ICD-10-CM

## 2020-06-15 DIAGNOSIS — M65.312 TRIGGER FINGER OF LEFT THUMB: ICD-10-CM

## 2020-06-15 DIAGNOSIS — B16.9 ACUTE HEPATITIS B: ICD-10-CM

## 2020-06-15 DIAGNOSIS — D69.6 THROMBOCYTOPENIA: ICD-10-CM

## 2020-06-15 DIAGNOSIS — Z23 NEED FOR DIPHTHERIA-TETANUS-PERTUSSIS (TDAP) VACCINE: ICD-10-CM

## 2020-06-15 DIAGNOSIS — N50.819 TESTICULAR PAIN: ICD-10-CM

## 2020-06-15 DIAGNOSIS — M75.51 BILATERAL SHOULDER BURSITIS: Primary | ICD-10-CM

## 2020-06-15 DIAGNOSIS — Z00.00 HEALTHCARE MAINTENANCE: ICD-10-CM

## 2020-06-15 PROBLEM — D70.9 NEUTROPENIA: Status: RESOLVED | Noted: 2019-09-30 | Resolved: 2020-06-15

## 2020-06-15 PROBLEM — R74.01 ELEVATED TRANSAMINASE LEVEL: Status: RESOLVED | Noted: 2020-01-29 | Resolved: 2020-06-15

## 2020-06-15 PROBLEM — T45.1X5A ANTINEOPLASTIC CHEMOTHERAPY INDUCED PANCYTOPENIA: Status: RESOLVED | Noted: 2019-12-10 | Resolved: 2020-06-15

## 2020-06-15 PROBLEM — D61.810 ANTINEOPLASTIC CHEMOTHERAPY INDUCED PANCYTOPENIA: Status: RESOLVED | Noted: 2019-12-10 | Resolved: 2020-06-15

## 2020-06-15 PROCEDURE — 99215 OFFICE O/P EST HI 40 MIN: CPT | Mod: HCNC,25,S$GLB, | Performed by: INTERNAL MEDICINE

## 2020-06-15 PROCEDURE — 99999 PR PBB SHADOW E&M-EST. PATIENT-LVL I: CPT | Mod: PBBFAC,HCNC,,

## 2020-06-15 PROCEDURE — 3008F BODY MASS INDEX DOCD: CPT | Mod: HCNC,CPTII,S$GLB, | Performed by: INTERNAL MEDICINE

## 2020-06-15 PROCEDURE — 3008F PR BODY MASS INDEX (BMI) DOCUMENTED: ICD-10-PCS | Mod: HCNC,CPTII,S$GLB, | Performed by: INTERNAL MEDICINE

## 2020-06-15 PROCEDURE — 90715 TDAP VACCINE GREATER THAN OR EQUAL TO 7YO IM: ICD-10-PCS | Mod: HCNC,S$GLB,, | Performed by: INTERNAL MEDICINE

## 2020-06-15 PROCEDURE — 90471 IMMUNIZATION ADMIN: CPT | Mod: HCNC,S$GLB,, | Performed by: INTERNAL MEDICINE

## 2020-06-15 PROCEDURE — 90471 TDAP VACCINE GREATER THAN OR EQUAL TO 7YO IM: ICD-10-PCS | Mod: HCNC,S$GLB,, | Performed by: INTERNAL MEDICINE

## 2020-06-15 PROCEDURE — 99215 PR OFFICE/OUTPT VISIT, EST, LEVL V, 40-54 MIN: ICD-10-PCS | Mod: HCNC,25,S$GLB, | Performed by: INTERNAL MEDICINE

## 2020-06-15 PROCEDURE — 99999 PR PBB SHADOW E&M-EST. PATIENT-LVL I: ICD-10-PCS | Mod: PBBFAC,HCNC,,

## 2020-06-15 PROCEDURE — 90715 TDAP VACCINE 7 YRS/> IM: CPT | Mod: HCNC,S$GLB,, | Performed by: INTERNAL MEDICINE

## 2020-06-15 NOTE — ASSESSMENT & PLAN NOTE
· ACP needed  · Immunization never received from [facility].  Will start here  · Tdap today  · Yearly labs 1/2021.

## 2020-06-15 NOTE — ASSESSMENT & PLAN NOTE
Pathology discussed today.  Pt has been over exercising.  ROM demonstrated.  Unable to give antiinflammatories/tylenol with hepatitis/predinosone with hepatitis.  All these limitation discussed with pt.  No better with voltaren gel or lyrica.    · Stop voltaren gel  · To PT for possible U/S and also TENS  · Continue lyrica.    · Trial of OTC lidocaine/capsasin cream

## 2020-06-15 NOTE — ASSESSMENT & PLAN NOTE
L>R.  Pathology discussed today.  Pt has been over exercising.  ROM demonstrated.  Unable to give antiinflammatories/tylenol with hepatitis/predinosone with hepatitis.  All these limitation discussed with pt.  No better with voltaren gel or lyrica.    · Stop voltaren gel  · To PT for possible U/S and also TENS  · Continue lyrica.    · Trial of OTC lidocaine/capsasin cream

## 2020-06-15 NOTE — ASSESSMENT & PLAN NOTE
Noted on most recent labs with hepatology.  No bleeding/bruising.    · Will follow with hepatitis.

## 2020-06-15 NOTE — PROGRESS NOTES
This note has been generated using voice-recognition software. There may be typographical errors that have been missed during proof-reading.  Primary Care Provider Appointment    Subjective:      Patient ID: Jeff Hope is a 64 y.o. male here for follow up.     Chief Complaint: Hand Pain (trigger thiumbs ) and Shoulder Pain (both sides)    HPI:  This is a pleasant 63-year-old male with bronchiectasis, hyperlipidemia, history of pulmonary embolism on chronic anticoagulation with a follicular non-Hodgkin's lymphoma, acute/reactivated hepatitis B here via telemed for f/u.  Since last visit pt has tested negative for COVID X 2.  After last visit, hepatology contacted to eval if any contraindication for lyrica.  Pt called to restart medication.    Pt with hand pain B and B shoulder pain.  No better with lyrica.  B hand pain/trigger finger.  Only taking lyrica once day at night due to sedation.    Pt notes that his shoulder pain is worsened with movement but specifically in the biceps region.  B hand pain and swelling of hands fabian on the L 1st digit.  Pain with movement.  Reviewed medications, specifically antiviral, but not a common SE.  Pt notes some worsening recently of chronic pain.  No trauma.    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    Pulmonary hypertension    Myalgia due to statin    Vitamin D deficiency    Port-A-Cath in place    Abnormal MMSE    Allergic rhinitis    Adrenal hypertrophy    Bilateral shoulder bursitis    Acute hepatitis B    Dermatitis    Trigger finger of left thumb     "    Past Surgical History:   Procedure Laterality Date    GROIN EXPLORATION  2 pre 2005 and 1 post 2005    total of 3 procedures    LAPAROSCOPIC CHOLECYSTECTOMY  05/2018    SHOULDER SURGERY Left        Past Medical History:   Diagnosis Date    Personal history of colonic polyps 09/08/2015    per [facility] report - repeat 9/2020       Social History     Socioeconomic History    Marital status:      Spouse name: Not on file    Number of children: 2    Years of education: Not on file    Highest education level: Not on file   Occupational History    Occupation: disabled [employer]    Social Needs    Financial resource strain: Somewhat hard    Food insecurity     Worry: Often true     Inability: Sometimes true    Transportation needs     Medical: No     Non-medical: No   Tobacco Use    Smoking status: Never Smoker    Smokeless tobacco: Never Used   Substance and Sexual Activity    Alcohol use: No     Frequency: Never    Drug use: No    Sexual activity: Yes     Partners: Female   Lifestyle    Physical activity     Days per week: 0 days     Minutes per session: 0 min    Stress: Rather much   Relationships    Social connections     Talks on phone: Twice a week     Gets together: Twice a week     Attends [employer] service: Never     Active member of club or organization: No     Attends meetings of clubs or organizations: Never     Relationship status:    Other Topics Concern    Not on file   Social History Narrative    Lives with wife.         Review of Systems    PHQ9 12/10/2019   Total Score 3        Checklist of Daily Activities:        Objective:   /60   Pulse 76   Ht 5' 9" (1.753 m)   Wt 98 kg (216 lb 0.8 oz)   SpO2 98%   BMI 31.91 kg/m²     Physical Exam  Constitutional:       Appearance: Normal appearance.   HENT:      Head: Normocephalic and atraumatic.   Cardiovascular:      Rate and Rhythm: Normal rate and regular rhythm.   Pulmonary:      Effort: Pulmonary effort is " normal.      Breath sounds: Normal breath sounds.   Abdominal:      General: Abdomen is flat. Bowel sounds are normal.      Tenderness: There is no abdominal tenderness.   Musculoskeletal:         General: Swelling (specifically Left thumb.  ) present.      Right lower leg: No edema.      Left lower leg: No edema.   Neurological:      Mental Status: He is alert.   Psychiatric:         Mood and Affect: Mood normal.             Lab Results   Component Value Date    WBC 4.43 05/18/2020    HGB 14.0 05/18/2020    HCT 41.3 05/18/2020     (L) 05/18/2020    CHOL 205 (H) 01/27/2020    TRIG 106 01/27/2020    HDL 65 01/27/2020    ALT 11 05/18/2020    AST 15 05/18/2020     05/18/2020    K 4.2 05/18/2020     05/18/2020    CREATININE 1.0 05/18/2020    BUN 11 05/18/2020    CO2 30 (H) 05/18/2020    TSH 0.972 01/27/2020    INR 1.1 05/18/2020       Current Outpatient Medications on File Prior to Visit   Medication Sig Dispense Refill    pregabalin (LYRICA) 150 MG capsule Take 150 mg by mouth once daily.      rivaroxaban (XARELTO) 20 mg Tab Take 1 tablet (20 mg total) by mouth daily with dinner or evening meal. 90 tablet 3    tenofovir alafenamide (VEMLIDY) 25 mg Tab Take 1 tablet (25 mg total) by mouth once daily. 30 tablet 11    [DISCONTINUED] diclofenac sodium (VOLTAREN) 1 % Gel Apply 2 g topically 2 (two) times daily. 100 g 0     No current facility-administered medications on file prior to visit.          Assessment:   64 y.o. male with multiple co-morbid illnesses here for continued follow up of medical problems.      Plan:     Problem List Items Addressed This Visit        Renal/    Testicular pain     Patient is status post 3 surgeries.  No pain currently but occasional flares noted in the past.              Hematology    Thrombocytopenia     Noted on most recent labs with hepatology.  No bleeding/bruising.    · Will follow with hepatitis.              GI    Acute hepatitis B     Continued viral load.   Continue therapy.    · Continue antivirals and f/u hepatology.              Orthopedic    Bilateral shoulder bursitis - Primary     Pathology discussed today.  Pt has been over exercising.  ROM demonstrated.  Unable to give antiinflammatories/tylenol with hepatitis/predinosone with hepatitis.  All these limitation discussed with pt.  No better with voltaren gel or lyrica.    · Stop voltaren gel  · To PT for possible U/S and also TENS  · Continue lyrica.    · Trial of OTC lidocaine/capsasin cream         Trigger finger of left thumb     L>R.  Pathology discussed today.  Pt has been over exercising.  ROM demonstrated.  Unable to give antiinflammatories/tylenol with hepatitis/predinosone with hepatitis.  All these limitation discussed with pt.  No better with voltaren gel or lyrica.    · Stop voltaren gel  · To PT for possible U/S and also TENS  · Continue lyrica.    · Trial of OTC lidocaine/capsasin cream           Relevant Orders    Ambulatory referral/consult to Physical/Occupational Therapy       Other    Healthcare maintenance     · ACP needed  · Immunization never received from [facility].  Will start here  · Tdap today  · Yearly labs 1/2021.             Other Visit Diagnoses     Need for diphtheria-tetanus-pertussis (Tdap) vaccine              Health Maintenance       Date Due Completion Date    TETANUS VACCINE 11/30/1973 ---    Shingles Vaccine (1 of 2) 11/30/2005 ---    Pneumococcal Vaccine (Highest Risk) (2 of 3 - PPSV23) 08/01/2019 6/6/2019    Colorectal Cancer Screening 09/08/2020 9/8/2015    Lipid Panel 01/27/2025 1/27/2020        Medications Reconciliation:   I have reconciled the patient's home medications with the patient/family. I have updated all changes.  Refer to After-Visit Medication List.      Medication List          Accurate as of Jovana 15, 2020  9:06 AM. If you have any questions, ask your nurse or doctor.            START taking these medications    BOOSTRIX TDAP 2.5-8-5 Lf-mcg-Lf/0.5mL Syrg  injection  Generic drug: diphth,pertus(acell),tetanus  Inject 0.5 mLs into the muscle once. For one dose. for 1 dose        CONTINUE taking these medications    pregabalin 150 MG capsule  Commonly known as: LYRICA     rivaroxaban 20 mg Tab  Commonly known as: XARELTO  Take 1 tablet (20 mg total) by mouth daily with dinner or evening meal.     VEMLIDY 25 mg Tab  Generic drug: tenofovir alafenamide  Take 1 tablet (25 mg total) by mouth once daily.        STOP taking these medications    diclofenac sodium 1 % Gel  Commonly known as: VOLTAREN  Stopped by: Roly Flannery MD           Where to Get Your Medications      These medications were sent to [facility]  [address] [address]    Hours: Mon-Fri, 8a-5:30p Phone: [phone]   · BOOSTRIX TDAP 2.5-8-5 Lf-mcg-Lf/0.5mL Syrg injection          Next/future visit:  ACP, eval if PT assisting with hand/shoulder pain.  Immunizations.  onc appt with pet?    Follow up in about 5 weeks (around 7/20/2020). Total face-to-face time was 40 min, >50% of this was spent on counseling and coordination of care. The following issues were discussed: pathology of pain and limitations, labs reviewed, meds and SE reviewed.       Roly Flannery MD  Internal Medicine  [facility]  [phone]

## 2020-06-19 ENCOUNTER — CLINICAL SUPPORT (OUTPATIENT)
Dept: REHABILITATION | Facility: HOSPITAL | Age: 65
End: 2020-06-19
Attending: INTERNAL MEDICINE
Payer: MEDICARE

## 2020-06-19 DIAGNOSIS — G89.29 CHRONIC THUMB PAIN, BILATERAL: ICD-10-CM

## 2020-06-19 DIAGNOSIS — M79.644 CHRONIC THUMB PAIN, BILATERAL: ICD-10-CM

## 2020-06-19 DIAGNOSIS — M25.511 CHRONIC RIGHT SHOULDER PAIN: ICD-10-CM

## 2020-06-19 DIAGNOSIS — M65.312 TRIGGER FINGER OF LEFT THUMB: ICD-10-CM

## 2020-06-19 DIAGNOSIS — G89.29 CHRONIC RIGHT SHOULDER PAIN: ICD-10-CM

## 2020-06-19 DIAGNOSIS — M79.645 CHRONIC THUMB PAIN, BILATERAL: ICD-10-CM

## 2020-06-19 PROCEDURE — 97166 OT EVAL MOD COMPLEX 45 MIN: CPT | Mod: HCNC,PO

## 2020-06-19 PROCEDURE — 97110 THERAPEUTIC EXERCISES: CPT | Mod: HCNC,PO

## 2020-06-19 NOTE — PATIENT INSTRUCTIONS
MP Flexion (Active Blocked)        Using other hand to brace base of thumb, bend as far as possible with tip joint held straight.  Repeat 10-30 times. Do 3 sessions per day.    IP Flexion (Active Blocked)        Brace thumb below tip joint. Bend joint as far as possible.  Repeat 10-30 times. Do 3 sessions per day.                MP Extension (Active)        With palm on table, lift thumb up. Relax and lower thumb.  Repeat 10-30 times. Do 3 sessions per day.    Radial Adduction/Abduction (Active)        Move thumb out to side. Move back alongside index finger.  Repeat 10-30 times. Do 3 sessions per day.       Palmar Adduction/Abduction (Active)        Move thumb down, away from palm. Move back to rest along palm.  Repeat 10-30 times. Do 3 sessions per day.    Composite Movement Circumduction (Active)        Make circles with thumb clockwise and counter-clockwise.  Repeat 10-30 times each direction. Do 3 sessions per day.     ROM: Pendulum (Side-to-Side)        Let right arm swing freely from side to side by rocking body weight from side to side.  Repeat 20 times. Do 3 sessions per day.     ROM: Pendulum (Circular)        Let right arm move in [place] clockwise, then counterclockwise, by rocking body weight in circular pattern.  [place] 20 times each direction. Do 3 sessions per day.      ROM: Cross (Horizontal Abduction / Adduction)        Reach right arm across body as far as possible, then pull arm out from side.  Repeat 20 times. Do 3 sessions per day.

## 2020-06-19 NOTE — PLAN OF CARE
[facility] Occupational Therapy  Initial Evaluation     Date: 6/19/2020  Patient: Jeff Hope  Chart Number: [id number]  Referring Physician: Roly Flannery MD  Therapy Diagnosis: B shoulder pain and B hand pain and trigger fingers/arthritis    Medical Diagnosis:  B shoulder pain and B hand pain and trigger fingers/arthritis.   Physician Orders:    Pt with B shoulder pain and B hand pain and trigger fingers/arthritis.  Unable to give antiinflammatories or injection due to anticoagulation  and due to hepatitis B therapy and therefore no steroids.  Please assist with pain control if possible.      Evaluation Date: 6/19/2020  Plan of Care Certification Date: 9/19/2020  Authorization Period: 7/18/2020  Date of Return to MD: THOMAS    Visit #: 1 of 1  Time In: 12:30 PM  Time Out: 1:15 PM  Total Billable Time: 20 min    Precautions: Standard     Subjective     Involved Side: Bilateral  Dominant Side: Left  Date of Onset: 4 mos R shld, 3 mos right thumb, Several years left shld w/ h/o 2 surgeries, 2 weeks left thumb  History of Current Condition: Pt w/ recent onset R shld/thumb pain, left thumb triggering. No trauma. Left shoulder at baseline.  Imaging: None  Previous Therapy: Multiple years of therapy for the left shoulder    Patient's Goals for Therapy: Reduce pain    Pain:  Functional Pain Scale Rating 0-10:   7/10 on average  7/10 at best  8/10 at worst  Location: Right shld  Description: Aching, Throbbing and Sharp  Aggravating Factors: Flexing thumbs, right shoulder mvt  Easing Factors: relaxation and rest    Previous Level of function Some limitations of LUE use     Current Level of Function Difficulty reaching w/ the right; difficulty w/ grasping objects.    Occupation:  Not employed    Past Medical History/Physical Systems Review:   Jeff Hope  has a past medical history of Personal history of colonic polyps.    Jeff Hope  has a past surgical history that includes Shoulder surgery  (Left); Groin exploration (2 pre 2005 and 1 post 2005); and Laparoscopic cholecystectomy (05/2018).    Jeff has a current medication list which includes the following prescription(s): pregabalin, rivaroxaban, and tenofovir alafenamide.    Review of patient's allergies indicates:   Allergen Reactions    Rosuvastatin      myalgias          Objective     Mental status: alert    Observation:   Edema right thumb    Sensation:   WNL    Edema: Circumferential measurements: In centimters     6/19/2020 6/19/2020    Left Right   Index:       P1      PIP     P2      DIP     P3     Long:       P1      PIP     P2      DIP     P3     Ring:       P1                 PIP                P2                  DIP     P3     Small:        P1                 PIP            P2             DIP     P3     Thumb:     Prox. Phalanx 7.5 7.9   IP 7.3 7.4   Distal Phalanx 5.8 6.5     Range of Motion:   Right    Shoulder AROM  SF  SE  SA  IR  ER    85 60 72 Low  back 18     Thumb AROM  MP  IP  RA  PA  OPP    0/36 +25/  38 50 52 SF P1       Left    Shoulder AROM  SF  SE  SA  IR  ER    68 34 60 8 Butt     Thumb AROM  MP  IP  RA  PA  OPP    0/45 +28/  70 50 55 SF MP          Strength: (ALVINA Dynamometer in psi.)      6/19/2020 6/19/2020    Left Right   Rung II 27 40       Pinch Strength (Measured in psi)     6/19/2020 6/19/2020    Left Right   Key Pinch 4  5    3pt Pinch 3  2        Treatment     Treatment Time In: 12:55 PM  Treatment Time Out: 1:15 PM  Total Treatment time separate from Evaluation time:20 min    Jeff performed therapeutic exercises for 20 minutes including:  -Pendulums R shld  -Bilateral MP/IP blocking, lifts, circles, RA/PA    Home Exercise Program/Education:  Issued HEP (see patient instructions in EMR) and educated on modality use for pain management . Exercises were reviewed and Jeff was able to demonstrate them prior to the end of the session.   Pt received a written copy of exercises to perform at home. Jeff  demonstrated good  understanding of the education provided.  Pt was advised to perform these exercises free of pain, and to stop performing them if pain occurs.    Patient/Family Education: role of OT, goals for OT, scheduling/cancellations - pt verbalized understanding. Discussed insurance limitations with patient.    Additional Education provided: Use of heat prior to HEP      Assessment     Jeff Hope is a 64 y.o. male presents with limitations as described in problem list. Patient can benefit from Occupational Therapy services for Iontophoresis, ultrasound, moist heat, therapeutic exercises, home exercise program provied with written instructions, ice and strengthening and orthotics, if deemed necessary . The following goals were discussed with the patient and she is in agreement with them as to be addressed in the treatment plan.    The patient's rehab potential is Fair.     Anticipated barriers to occupational therapy: Multiple comorbidities  Pt has no cultural, educational or language barriers to learning provided.    Profile and History Assessment of Occupational Performance Level of Clinical Decision Making Complexity Score   Occupational Profile:   Jeff Hope is a 64 y.o. male who is retired Jeff Hope has difficulty with  ADLs and IADLs as listed previously, which  affecting his/her daily functional abilities.      Comorbidities:    has a past medical history of Personal history of colonic polyps.    Medical and Therapy History Review:   Expanded               Performance Deficits    Physical:  Joint Mobility  Muscle Power/Strength  Muscle Endurance  Edema   Strength  Pinch Strength  Pain    Cognitive:  No Deficits    Psychosocial:    No Deficits     Clinical Decision Making:  moderate    Assessment Process:  Problem-Focused Assessments    Modification/Need for Assistance:  Not Necessary    Intervention Selection:  Several Treatment Options       moderate  Based on PMHX, co morbidities  , data from assessments and functional level of assistance required with task and clinical presentation directly impacting function.         Goals:    LTG's (12 weeks):  1)   Increase ROM 40 degrees in shoulder flex to increase functional hand use for overhead reaching.  2)   Increase ROM 30 degrees in shoulder external rotation to increase functional hand use for UB dressing.  3)   Increase  strength 10 lbs. to grasp cooking pot handle.  4)   Increase lat pinch 4 psis for opening condiment packages.  5)   Decrease complaints of pain to  4 out of 10 at worst to increase functional hand use for ADL/work/leisure activities.  6)   Pt will return to near to prior level of function for ADLs and household management reporting I or Mod I with ADLs (dressing, feeding, grooming, toileting).     STG's (6 weeks)  1)   Increase ROM 20 degrees in shoulder flex to increase functional hand use for overhead reaching.  2)   Increase ROM 15 degrees in shoulder external rotation to increase functional hand use for UB dressing.  3)   Increase  strength 5 lbs. to improve functional grasp for ADLs/work/leisure activities.   4)   Increase 3J pinch 2 psi's to increase independence with button and FM Coordination.   5)   Patient to be IND wiht Orthotic use, wear and care precautions.   6)   Decrease complaints of pain to  6 out of 10 at worst to increase functional hand use for ADL/work/leisure activities.          Plan     Pt to be treated by Occupational Therapy 2 times per week for 12 weeks during the certification period from 6/19/2020 to 9/19/2020 to achieve the established goals.     Treatment to include: Paraffin, Fluidotherapy, Manual therapy/joint mobilizations, Modalities for pain management, US 3 mhz, Therapeutic exercises/activities. and Orthotic Fabrication/Fit/Training, as well as any other treatments deemed necessary based on the patient's needs or progress.     CHETNA Styles  OTR/L, CHT  Occupational therapist,  Certified Hand Therapist

## 2020-06-25 ENCOUNTER — TELEPHONE (OUTPATIENT)
Dept: PHARMACY | Facility: CLINIC | Age: 65
End: 2020-06-25

## 2020-07-02 ENCOUNTER — CLINICAL SUPPORT (OUTPATIENT)
Dept: REHABILITATION | Facility: HOSPITAL | Age: 65
End: 2020-07-02
Attending: INTERNAL MEDICINE
Payer: MEDICARE

## 2020-07-02 DIAGNOSIS — M25.511 CHRONIC RIGHT SHOULDER PAIN: ICD-10-CM

## 2020-07-02 DIAGNOSIS — M79.644 CHRONIC THUMB PAIN, BILATERAL: ICD-10-CM

## 2020-07-02 DIAGNOSIS — M79.645 CHRONIC THUMB PAIN, BILATERAL: ICD-10-CM

## 2020-07-02 DIAGNOSIS — G89.29 CHRONIC THUMB PAIN, BILATERAL: ICD-10-CM

## 2020-07-02 DIAGNOSIS — G89.29 CHRONIC RIGHT SHOULDER PAIN: ICD-10-CM

## 2020-07-02 PROCEDURE — 97035 APP MDLTY 1+ULTRASOUND EA 15: CPT | Mod: HCNC,PO

## 2020-07-02 PROCEDURE — L3913 HFO W/O JOINTS CF: HCPCS | Mod: HCNC,PO

## 2020-07-02 NOTE — PROGRESS NOTES
Occupational Therapy Daily Treatment Note     Date: 7/2/2020  Name: Jeff Hope  Clinic Number: [id number]    Therapy Diagnosis:   Encounter Diagnoses   Name Primary?    Chronic right shoulder pain     Chronic thumb pain, bilateral      Physician: Roly Flannery MD    Medical Diagnosis:  B shoulder pain and B hand pain and trigger fingers/arthritis.   Physician Orders:     Pt with B shoulder pain and B hand pain and trigger fingers/arthritis.  Unable to give antiinflammatories or injection due to anticoagulation  and due to hepatitis B therapy and therefore no steroids.  Please assist with pain control if possible.      Evaluation Date: 6/19/2020  Plan of Care Certification Date: 9/19/2020  Authorization Period: 7/18/2020  Date of Return to MD: THOMAS     Visit #: 1 of 1  Time In: 8:45 AM  Time Out: 9:30 AM  Total Billable Time: 8 min     Precautions: Standard       Subjective     Pt reports: Pain w/ all exercises  Response to previous treatment:Jonas well    Pain: 6/10  Location: both thumbs, right shoulder    Objective     Patient received ultrasound  for pain control and decreased inflammation @ 100% duty cycle, 1.0 Mhz, applied to right shoulder, intensity = 1.5 w/cm2 for 8 minutes.    Fabricated bilateral thumb spica orthoses to maintain immobilization of both thumbs to minimize pain and triggering. Instructed to wear during the day with removal for HEP, bathing/hygiene.  Instructed to monitor for pain/pressure, increased edema, or redness and to contact office for adjustments as needed. Patient reported good understanding of orthotic wear and care schedule.       Home Exercises and Education Provided     Education provided:  - Progress towards goals     Written Home Exercises Provided: Patient instructed to cont prior HEP.  Exercises were reviewed and Jeff was able to demonstrate them prior to the end of the session.  Jeff demonstrated good  understanding of the HEP provided.   .   See EMR under Patient  Instructions for exercises provided prior visit.        Assessment     Pt would continue to benefit from skilled OT to maximize BUE function.     Jeff is not progressing towards his goals and there are no updates to goals at this time. Pt prognosis is Fair.     Pt will continue to benefit from skilled outpatient occupational therapy to address the deficits listed in the problem list on initial evaluation provide pt/family education and to maximize pt's level of independence in the home and community environment.       Pt's spiritual, cultural and educational needs considered and pt agreeable to plan of care and goals.    Goals:  LTG's (12 weeks):  1)   Increase ROM 40 degrees in shoulder flex to increase functional hand use for overhead reaching.  2)   Increase ROM 30 degrees in shoulder external rotation to increase functional hand use for UB dressing.  3)   Increase  strength 10 lbs. to grasp cooking pot handle.  4)   Increase lat pinch 4 psis for opening condiment packages.  5)   Decrease complaints of pain to  4 out of 10 at worst to increase functional hand use for ADL/work/leisure activities.  6)   Pt will return to near to prior level of function for ADLs and household management reporting I or Mod I with ADLs (dressing, feeding, grooming, toileting).      STG's (6 weeks)  1)   Increase ROM 20 degrees in shoulder flex to increase functional hand use for overhead reaching.  2)   Increase ROM 15 degrees in shoulder external rotation to increase functional hand use for UB dressing.  3)   Increase  strength 5 lbs. to improve functional grasp for ADLs/work/leisure activities.   4)   Increase 3J pinch 2 psi's to increase independence with button and FM Coordination.   5)   Patient to be IND wiht Orthotic use, wear and care precautions.   6)   Decrease complaints of pain to  6 out of 10 at worst to increase functional hand use for ADL/work/leisure activities.    Plan   Cont OT to address above  goals.        CHETNA Styles OTR/L, CHT

## 2020-07-10 ENCOUNTER — LAB VISIT (OUTPATIENT)
Dept: LAB | Facility: HOSPITAL | Age: 65
End: 2020-07-10
Attending: INTERNAL MEDICINE
Payer: MEDICARE

## 2020-07-10 DIAGNOSIS — B16.9 ACUTE HEPATITIS B: ICD-10-CM

## 2020-07-10 PROCEDURE — 87517 HEPATITIS B DNA QUANT: CPT | Mod: HCNC

## 2020-07-10 PROCEDURE — 36415 COLL VENOUS BLD VENIPUNCTURE: CPT | Mod: HCNC

## 2020-07-13 ENCOUNTER — TELEPHONE (OUTPATIENT)
Dept: HEPATOLOGY | Facility: CLINIC | Age: 65
End: 2020-07-13

## 2020-07-14 LAB
HBV DNA SERPL NAA+PROBE-ACNC: <10 IU/ML
HBV DNA SERPL NAA+PROBE-LOG IU: <1 LOG (10) IU/ML
HBV DNA SERPL QL NAA+PROBE: DETECTED

## 2020-07-15 ENCOUNTER — TELEPHONE (OUTPATIENT)
Dept: HEPATOLOGY | Facility: CLINIC | Age: 65
End: 2020-07-15

## 2020-07-15 NOTE — TELEPHONE ENCOUNTER
----- Message from Alla Haney MD sent at 7/14/2020  5:04 PM CDT -----  Please inform patient results are OK.

## 2020-07-19 ENCOUNTER — PATIENT OUTREACH (OUTPATIENT)
Dept: ADMINISTRATIVE | Facility: OTHER | Age: 65
End: 2020-07-19

## 2020-07-20 ENCOUNTER — TELEPHONE (OUTPATIENT)
Dept: HEPATOLOGY | Facility: CLINIC | Age: 65
End: 2020-07-20

## 2020-07-20 ENCOUNTER — TELEPHONE (OUTPATIENT)
Dept: PHARMACY | Facility: CLINIC | Age: 65
End: 2020-07-20

## 2020-07-20 ENCOUNTER — OFFICE VISIT (OUTPATIENT)
Dept: HEPATOLOGY | Facility: CLINIC | Age: 65
End: 2020-07-20
Payer: MEDICARE

## 2020-07-20 VITALS
HEART RATE: 80 BPM | SYSTOLIC BLOOD PRESSURE: 139 MMHG | HEIGHT: 68 IN | OXYGEN SATURATION: 98 % | BODY MASS INDEX: 32.11 KG/M2 | RESPIRATION RATE: 16 BRPM | DIASTOLIC BLOOD PRESSURE: 85 MMHG | WEIGHT: 211.88 LBS

## 2020-07-20 DIAGNOSIS — B16.9 ACUTE HEPATITIS B: Primary | ICD-10-CM

## 2020-07-20 PROCEDURE — 99215 OFFICE O/P EST HI 40 MIN: CPT | Mod: HCNC,S$GLB,, | Performed by: INTERNAL MEDICINE

## 2020-07-20 PROCEDURE — 99499 UNLISTED E&M SERVICE: CPT | Mod: S$GLB,,, | Performed by: INTERNAL MEDICINE

## 2020-07-20 PROCEDURE — 99499 RISK ADDL DX/OHS AUDIT: ICD-10-PCS | Mod: S$GLB,,, | Performed by: INTERNAL MEDICINE

## 2020-07-20 PROCEDURE — 99215 PR OFFICE/OUTPT VISIT, EST, LEVL V, 40-54 MIN: ICD-10-PCS | Mod: HCNC,S$GLB,, | Performed by: INTERNAL MEDICINE

## 2020-07-20 PROCEDURE — 99999 PR PBB SHADOW E&M-EST. PATIENT-LVL III: ICD-10-PCS | Mod: PBBFAC,HCNC,, | Performed by: INTERNAL MEDICINE

## 2020-07-20 PROCEDURE — 3008F BODY MASS INDEX DOCD: CPT | Mod: HCNC,CPTII,S$GLB, | Performed by: INTERNAL MEDICINE

## 2020-07-20 PROCEDURE — 99999 PR PBB SHADOW E&M-EST. PATIENT-LVL III: CPT | Mod: PBBFAC,HCNC,, | Performed by: INTERNAL MEDICINE

## 2020-07-20 PROCEDURE — 3008F PR BODY MASS INDEX (BMI) DOCUMENTED: ICD-10-PCS | Mod: HCNC,CPTII,S$GLB, | Performed by: INTERNAL MEDICINE

## 2020-07-20 NOTE — PROGRESS NOTES
Requested updates within Care Everywhere.  Patient's chart was reviewed for overdue ANDRES topics.  Immunizations reconciled.

## 2020-07-20 NOTE — TELEPHONE ENCOUNTER
Refill call regarding Vemlidy at [facility].  Will prepare for  with patient consent on .  Copay $8.95.  Patient states he has 0 days on hand.  Patient has not started any new medications including OTC drugs. Patient has not had any medication/ dose or instruction changes. No new allergies or side effects reported with this shipment. Medication is being taken as prescribed by physician and properly stored. Two patient identifiers:  and Address verified. Patient has no questions or concerns for [facility].

## 2020-07-20 NOTE — TELEPHONE ENCOUNTER
----- Message from Alla Haney MD sent at 7/20/2020  3:07 PM CDT -----  Recommendations:-  Cancel all previously scheduled lab appointments.  -  Labs CBC, CMP, HBV DNA quant every 2 months, next in Sept 2020. -  Steroid pills may take now that HBV DNA is below the limit of measurement.-  Continue Vemlidy daily (script runs out early Feb 2021). -  Oncologist retired, he will be seen by new doctor, Michael, not sure.  -  Avoid alcohol, smoking, sedatives and meds with codeine.-  Avoid high intake of Tylenol (more than 4 extra-strength pills in one day)-  Call us if any bleeding, fevers, confusion, disorientation occur-  Return in 4 months, video/audio visit.

## 2020-07-20 NOTE — Clinical Note
Recommendations:  -  Cancel all previously scheduled lab appointments.    -  Labs CBC, CMP, HBV DNA quant every 2 months, next in Sept 2020.   -  Steroid pills may take now that HBV DNA is below the limit of measurement.  -  Continue Vemlidy daily (script runs out early Feb 2021).   -  Oncologist retired, he will be seen by new doctor, Michael, not sure.    -  Avoid alcohol, smoking, sedatives and meds with codeine.  -  Avoid high intake of Tylenol (more than 4 extra-strength pills in one day)  -  Call us if any bleeding, fevers, confusion, disorientation occur  -  Return in 4 months, video/audio visit.

## 2020-07-21 ENCOUNTER — TELEPHONE (OUTPATIENT)
Dept: HEPATOLOGY | Facility: CLINIC | Age: 65
End: 2020-07-21

## 2020-07-21 ENCOUNTER — TELEPHONE (OUTPATIENT)
Dept: PRIMARY CARE CLINIC | Facility: CLINIC | Age: 65
End: 2020-07-21

## 2020-07-21 ENCOUNTER — OFFICE VISIT (OUTPATIENT)
Dept: PRIMARY CARE CLINIC | Facility: CLINIC | Age: 65
End: 2020-07-21
Payer: MEDICARE

## 2020-07-21 VITALS
SYSTOLIC BLOOD PRESSURE: 102 MMHG | WEIGHT: 212.75 LBS | DIASTOLIC BLOOD PRESSURE: 72 MMHG | HEIGHT: 68 IN | HEART RATE: 69 BPM | OXYGEN SATURATION: 97 % | BODY MASS INDEX: 32.24 KG/M2

## 2020-07-21 DIAGNOSIS — G89.29 CHRONIC PAIN OF BOTH SHOULDERS: ICD-10-CM

## 2020-07-21 DIAGNOSIS — K73.9 CHRONIC HEPATITIS: ICD-10-CM

## 2020-07-21 DIAGNOSIS — M65.312 TRIGGER FINGER OF LEFT THUMB: ICD-10-CM

## 2020-07-21 DIAGNOSIS — C82.90 FOLLICULAR NON-HODGKIN'S LYMPHOMA: ICD-10-CM

## 2020-07-21 DIAGNOSIS — B16.9 ACUTE HEPATITIS B: Primary | ICD-10-CM

## 2020-07-21 DIAGNOSIS — Z00.00 HEALTHCARE MAINTENANCE: ICD-10-CM

## 2020-07-21 DIAGNOSIS — B16.9 ACUTE HEPATITIS B: ICD-10-CM

## 2020-07-21 DIAGNOSIS — M25.512 CHRONIC PAIN OF BOTH SHOULDERS: ICD-10-CM

## 2020-07-21 DIAGNOSIS — M25.511 CHRONIC PAIN OF BOTH SHOULDERS: ICD-10-CM

## 2020-07-21 PROBLEM — L30.9 DERMATITIS: Status: RESOLVED | Noted: 2020-03-02 | Resolved: 2020-07-21

## 2020-07-21 PROCEDURE — 3008F BODY MASS INDEX DOCD: CPT | Mod: HCNC,CPTII,S$GLB, | Performed by: INTERNAL MEDICINE

## 2020-07-21 PROCEDURE — 99497 ADVNCD CARE PLAN 30 MIN: CPT | Mod: 25,HCNC,S$GLB, | Performed by: INTERNAL MEDICINE

## 2020-07-21 PROCEDURE — 99214 OFFICE O/P EST MOD 30 MIN: CPT | Mod: HCNC,S$GLB,, | Performed by: INTERNAL MEDICINE

## 2020-07-21 PROCEDURE — 3008F PR BODY MASS INDEX (BMI) DOCUMENTED: ICD-10-PCS | Mod: HCNC,CPTII,S$GLB, | Performed by: INTERNAL MEDICINE

## 2020-07-21 PROCEDURE — 99497 PR ADVNCD CARE PLAN 30 MIN: ICD-10-PCS | Mod: 25,HCNC,S$GLB, | Performed by: INTERNAL MEDICINE

## 2020-07-21 PROCEDURE — 99214 PR OFFICE/OUTPT VISIT, EST, LEVL IV, 30-39 MIN: ICD-10-PCS | Mod: HCNC,S$GLB,, | Performed by: INTERNAL MEDICINE

## 2020-07-21 RX ORDER — TRAMADOL HYDROCHLORIDE 50 MG/1
50 TABLET ORAL EVERY 6 HOURS
Qty: 60 TABLET | Refills: 0 | Status: SHIPPED | OUTPATIENT
Start: 2020-07-21 | End: 2020-12-31

## 2020-07-21 NOTE — ASSESSMENT & PLAN NOTE
"Completed the 6/6 planned R-CHOP for recurrent follicular lymphoma. "PET 12/30/2019: Interval Near complete resolution of retroperitoneal and right retrocrural lymphadenopathy. Decrease in size and number of mesenteric nodes. Development of 2 sub centimeter mesenteric nodes with SUV 5.Interval development of 2 subcentimeter hypermetabolic nodules I the left lower lung. Inflammatory or infectious process is favored." .  · Per patient, hepatology states that he cannot have any further CHOP chemotherapy.  · Patient was seen by onc and PET is pending.  he will call once completed.    "

## 2020-07-21 NOTE — PROGRESS NOTES
This note has been generated using voice-recognition software. There may be typographical errors that have been missed during proof-reading.  Primary Care Provider Appointment    Subjective:      Patient ID: Jeff Hope is a 64 y.o. male here for follow up.     Chief Complaint: Follow-up    HPI:  This is a pleasant 63-year-old male with bronchiectasis, hyperlipidemia, history of pulmonary embolism on chronic anticoagulation with a follicular non-Hodgkin's lymphoma, acute/reactivated hepatitis B here for f/u.  7/15/2020 pt established care with new oncologist.  F/u PET pending.    7/20/2020 seen by hepatology and txed with vemlidy with gradual response.  Will need to remain on this as long as getting chemo until the loss of hepBsAg which could be several more months.  Labs ordered every 2 months.   F/u in 4 months.    Labs from oncology reviewed.    Pt went to PT and was given braces for B thumbs and exercise for shoulders.  Continues shoulder pain R>L.  Pt restarted NSAIDS and I clarified today that he cannot take with AC.  He has been taking OTC NSAIDS as he thought this was OKed by hepatology.  We discussed that he cannot take with AC.  .  Pt with continued B shoulder, B thumb pain, but no testicular pain and no abd /lateral side pain.  He feels that PT have not been helpful for pain.        Advance Care Planning     Power of   I initiated the process of advance care planning today and explained the importance of this process to the patient.  I introduced the concept of advance directives to the patient, as well. Then the patient received detailed information about the importance of designating a Health Care Power of  (HCPOA). He was also instructed to communicate with this person about their wishes for future healthcare, should he become sick and lose decision-making capacity. The patient has previously appointed a HCPOA. After our discussion, the patient has decided to complete a HCPOA and  has appointed his wife.   I spent a total time of 10 minutes discussing this issue with the patient.  Advance Care Planning     Living Will  During this visit, I engaged the patient  in the advance care planning process.  The patient and I reviewed the role for advance directives and their purpose in directing future healthcare if the patient's unable to speak for him/herself.  At this point in time, the patient does have full decision-making capacity.  We discussed different extreme health states that he could experience, and reviewed what kind of medical care he would want in those situations.  The patient communicated that if he were comatose and had little chance of a meaningful recovery, he would not want machines/life-sustaining treatments used. In addition to the above preference, other important end-of-life issues for the patient include pt wishes to be a DNR. The patient has completed a living will to reflect these preferences and an LAPOST.  I spent a total of 15 minutes engaging the patient in this advance care planning discussion.                  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    Pulmonary hypertension    Myalgia due to statin    Vitamin D deficiency    Port-A-Cath in place    Abnormal MMSE    Allergic rhinitis    Adrenal hypertrophy    Bilateral shoulder bursitis    Acute hepatitis B    Trigger finger of left thumb    Bilateral shoulder pain    Chronic thumb pain, bilateral        Past Surgical History:   Procedure Laterality Date    GROIN EXPLORATION  2 pre 2005 and 1 post  "2005    total of 3 procedures    LAPAROSCOPIC CHOLECYSTECTOMY  05/2018    SHOULDER SURGERY Left        Past Medical History:   Diagnosis Date    Personal history of colonic polyps 09/08/2015    per [facility] report - repeat 9/2020       Social History     Socioeconomic History    Marital status:      Spouse name: Not on file    Number of children: 2    Years of education: Not on file    Highest education level: Not on file   Occupational History    Occupation: disabled [employer]    Social Needs    Financial resource strain: Somewhat hard    Food insecurity     Worry: Often true     Inability: Sometimes true    Transportation needs     Medical: No     Non-medical: No   Tobacco Use    Smoking status: Never Smoker    Smokeless tobacco: Never Used   Substance and Sexual Activity    Alcohol use: No     Frequency: Never    Drug use: No    Sexual activity: Yes     Partners: Female   Lifestyle    Physical activity     Days per week: 0 days     Minutes per session: 0 min    Stress: Rather much   Relationships    Social connections     Talks on phone: Twice a week     Gets together: Twice a week     Attends Mandaen service: Never     Active member of club or organization: No     Attends meetings of clubs or organizations: Never     Relationship status:    Other Topics Concern    Not on file   Social History Narrative    Lives with wife.         Review of Systems    PHQ9 12/10/2019   Total Score 3        Checklist of Daily Activities:        Objective:   /72 (BP Location: Left arm, Patient Position: Sitting, BP Method: Large (Manual))   Pulse 69   Ht 5' 8" (1.727 m)   Wt 96.5 kg (212 lb 11.9 oz)   SpO2 97%   BMI 32.35 kg/m²     Physical Exam  Constitutional:       General: He is not in acute distress.     Appearance: Normal appearance. He is not ill-appearing, toxic-appearing or diaphoretic.   Cardiovascular:      Rate and Rhythm: Normal rate and regular rhythm.      Heart sounds: " "Normal heart sounds.   Pulmonary:      Effort: Pulmonary effort is normal.      Breath sounds: Normal breath sounds. No wheezing, rhonchi or rales.   Abdominal:      General: Abdomen is flat. Bowel sounds are normal.      Palpations: Abdomen is soft.      Tenderness: There is no abdominal tenderness.   Musculoskeletal:      Right lower leg: No edema.      Left lower leg: No edema.   Neurological:      Mental Status: He is alert.   Psychiatric:         Attention and Perception: Attention normal.         Mood and Affect: Mood normal.         Cognition and Memory: Cognition normal.             Lab Results   Component Value Date    WBC 4.43 05/18/2020    HGB 14.0 05/18/2020    HCT 41.3 05/18/2020     (L) 05/18/2020    CHOL 205 (H) 01/27/2020    TRIG 106 01/27/2020    HDL 65 01/27/2020    ALT 11 05/18/2020    AST 15 05/18/2020     05/18/2020    K 4.2 05/18/2020     05/18/2020    CREATININE 1.0 05/18/2020    BUN 11 05/18/2020    CO2 30 (H) 05/18/2020    TSH 0.972 01/27/2020    INR 1.1 05/18/2020       Current Outpatient Medications on File Prior to Visit   Medication Sig Dispense Refill    pregabalin (LYRICA) 150 MG capsule Take 150 mg by mouth once daily.      rivaroxaban (XARELTO) 20 mg Tab Take 1 tablet (20 mg total) by mouth daily with dinner or evening meal. 90 tablet 3    tenofovir alafenamide (VEMLIDY) 25 mg Tab Take 1 tablet (25 mg total) by mouth once daily. 30 tablet 11     No current facility-administered medications on file prior to visit.          Assessment:   64 y.o. male with multiple co-morbid illnesses here for continued follow up of medical problems.      Plan:     Problem List Items Addressed This Visit        Oncology    Follicular non-Hodgkin's lymphoma     Completed the 6/6 planned R-CHOP for recurrent follicular lymphoma. "PET 12/30/2019: Interval Near complete resolution of retroperitoneal and right retrocrural lymphadenopathy. Decrease in size and number of mesenteric nodes. " "Development of 2 sub centimeter mesenteric nodes with SUV 5.Interval development of 2 subcentimeter hypermetabolic nodules I the left lower lung. Inflammatory or infectious process is favored." .  · Per patient, hepatology states that he cannot have any further CHOP chemotherapy.  · Patient was seen by onc and PET is pending.  he will call once completed.              GI    Acute hepatitis B     Continued viral load.  Continue therapy.  Pt will likely need to be treated until no viral load thru any chemo.    · Continue antivirals and f/u hepatology.              Orthopedic    Trigger finger of left thumb     B.  Brace from PT.  Not better.    · Message sent to clarify if steroid injections OK with hepatology.    · tyenol as needed.    · No NSAIDS on AC.           Relevant Medications    traMADoL (ULTRAM) 50 mg tablet    Bilateral shoulder pain     Not better with PT.   Pt encouraged to d/w PT trial of TENS unit.   ·  Pt to stop all NSAIDS with AC.    · Tylenol OK.    · Await response from hepatology concerning safety and feasibility of steroid joint injections.           Relevant Medications    traMADoL (ULTRAM) 50 mg tablet       Other    Healthcare maintenance     · ACP completed today  · Immunization to be completed here.  · Yearly labs 1/2021.                 Health Maintenance       Date Due Completion Date    Shingles Vaccine (1 of 2) 11/30/2005 ---    Pneumococcal Vaccine (Highest Risk) (2 of 3 - PPSV23) 08/01/2019 6/6/2019    Influenza Vaccine (1) 09/01/2020 12/10/2019    Colorectal Cancer Screening 09/08/2020 9/8/2015    Lipid Panel 01/27/2025 1/27/2020    TETANUS VACCINE 06/15/2030 6/15/2020        Medications Reconciliation:   I have reconciled the patient's home medications with the patient/family. I have updated all changes.  Refer to After-Visit Medication List.      Medication List          Accurate as of July 21, 2020  1:14 PM. If you have any questions, ask your nurse or doctor.            START " taking these medications    traMADoL 50 mg tablet  Commonly known as: ULTRAM  Take 1 tablet (50 mg total) by mouth every 6 (six) hours.  Started by: Roly Flannery MD        CONTINUE taking these medications    pregabalin 150 MG capsule  Commonly known as: LYRICA     rivaroxaban 20 mg Tab  Commonly known as: XARELTO  Take 1 tablet (20 mg total) by mouth daily with dinner or evening meal.     VEMLIDY 25 mg Tab  Generic drug: tenofovir alafenamide  Take 1 tablet (25 mg total) by mouth once daily.           Where to Get Your Medications      These medications were sent to [facility] #[id number] - [facility] [address] AT [place] & [address]  [address] [facility] [address]    Phone: [phone]   · traMADoL 50 mg tablet          Next/future visit:  eval PET results and onc plan.  Steroid injections?  TENS trial?  Trial of tramadol and tylenol for pain. (pt called to discuss since response back from hepatology after pt left)      Follow up in about 4 weeks (around 8/18/2020). Total face-to-face time was 50 min, >50% of this was spent on counseling and coordination of care. 25 minutes of this spent on ACPThe following issues were discussed: ACP--LW, POA,LAPOST.  Reviewed labs, hepatology and oncology.  Message left for hepatology to clarify if we can give steroid injections for B shoulder pain.       Roly Flannery MD  Internal Medicine  [facility]  [phone]

## 2020-07-21 NOTE — ASSESSMENT & PLAN NOTE
Continued viral load.  Continue therapy.  Pt will likely need to be treated until no viral load thru any chemo.    · Continue antivirals and f/u hepatology.

## 2020-07-21 NOTE — ASSESSMENT & PLAN NOTE
JEMMA Chaudhry from PT.  Not better.    · Message sent to clarify if steroid injections OK with hepatology.    · tyenol as needed.    · No NSAIDS on AC.

## 2020-07-21 NOTE — ASSESSMENT & PLAN NOTE
Not better with PT.   Pt encouraged to d/w PT trial of TENS unit.   ·  Pt to stop all NSAIDS with AC.    · Tylenol OK.    · Await response from hepatology concerning safety and feasibility of steroid joint injections.

## 2020-07-21 NOTE — TELEPHONE ENCOUNTER
----- Message from Romi Shrestha sent at 7/21/2020  9:21 AM CDT -----  Type:  Needs Medical Advice    Who Called:roseline     Symptoms (please be specific): right arm      Would the patient rather a call back or a response via [facility]? Call back     Best Call Back Number: [phone]    Additional Information: roseline would to speak with the nurse about getting the MRI done

## 2020-07-22 NOTE — PROGRESS NOTES
Occupational Therapy Daily Treatment Note     Date: 7/23/2020  Name: Jeff Hope  Clinic Number: [id number]    Therapy Diagnosis:   Encounter Diagnoses   Name Primary?    Chronic pain of both shoulders     Chronic thumb pain, bilateral      Physician: Roly Flannery MD    Medical Diagnosis:  B shoulder pain and B hand pain and trigger fingers/arthritis.   Physician Orders:     Pt with B shoulder pain and B hand pain and trigger fingers/arthritis.  Unable to give antiinflammatories or injection due to anticoagulation  and due to hepatitis B therapy and therefore no steroids.  Please assist with pain control if possible.      Evaluation Date: 6/19/2020  Plan of Care Certification Date: 9/19/2020  Authorization Period: 7/18/2020  Date of Return to MD: THOMAS     Visit #: 2 of 1  Time In: 8:45 AM  Time Out: 9:30 AM  Total Billable Time: 15 min     Precautions: Standard     Subjective     Pt reports: Left thumb still triggering  Response to previous treatment:Jonas well    Pain: 8/10  Location: Right shoulder    Objective     Patient received ultrasound  for pain control and decreased inflammation @ 100% duty cycle, 1.0 Mhz, applied to right shoulder, intensity = 1.5 w/cm2 for 8 minutes.    Patient receives IFC electrical stimulation for pain control applied to right shoulder,   frequency = 4000kHz,  @ 45% scan,  for 20 minutes.     Jeff performed therapeutic exercises for 15 minutes including:  -Pendulum F/B, circles 2' ea  -UBE 5'    Home Exercises and Education Provided     Education provided:   - Discussed acquisition of TENS unit  - Progress towards goals     Written Home Exercises Provided: Patient instructed to cont prior HEP.  Exercises were reviewed and Jeff was able to demonstrate them prior to the end of the session.  Jeff demonstrated good  understanding of the HEP provided.   .   See EMR under Patient Instructions for exercises provided prior visit.        Assessment     Pt would continue to benefit  from skilled OT to maximize BUE function.     Jeff is progressing towards his goals and there are no updates to goals at this time. Pt prognosis is Fair.     Pt will continue to benefit from skilled outpatient occupational therapy to address the deficits listed in the problem list on initial evaluation provide pt/family education and to maximize pt's level of independence in the home and community environment.       Pt's spiritual, cultural and educational needs considered and pt agreeable to plan of care and goals.    Goals:  LTG's (12 weeks):  1)   Increase ROM 40 degrees in shoulder flex to increase functional hand use for overhead reaching.  2)   Increase ROM 30 degrees in shoulder external rotation to increase functional hand use for UB dressing.  3)   Increase  strength 10 lbs. to grasp cooking pot handle.  4)   Increase lat pinch 4 psis for opening condiment packages.  5)   Decrease complaints of pain to  4 out of 10 at worst to increase functional hand use for ADL/work/leisure activities.  6)   Pt will return to near to prior level of function for ADLs and household management reporting I or Mod I with ADLs (dressing, feeding, grooming, toileting).      STG's (6 weeks)  1)   Increase ROM 20 degrees in shoulder flex to increase functional hand use for overhead reaching.  2)   Increase ROM 15 degrees in shoulder external rotation to increase functional hand use for UB dressing.  3)   Increase  strength 5 lbs. to improve functional grasp for ADLs/work/leisure activities.   4)   Increase 3J pinch 2 psi's to increase independence with button and FM Coordination.   5)   Patient to be IND wiht Orthotic use, wear and care precautions.   6)   Decrease complaints of pain to  6 out of 10 at worst to increase functional hand use for ADL/work/leisure activities.    Plan   Cont OT to address above goals.        CHETNA Styles OTR/L, CHT

## 2020-07-23 ENCOUNTER — CLINICAL SUPPORT (OUTPATIENT)
Dept: REHABILITATION | Facility: HOSPITAL | Age: 65
End: 2020-07-23
Attending: INTERNAL MEDICINE
Payer: MEDICARE

## 2020-07-23 DIAGNOSIS — G89.29 CHRONIC PAIN OF BOTH SHOULDERS: ICD-10-CM

## 2020-07-23 DIAGNOSIS — M79.645 CHRONIC THUMB PAIN, BILATERAL: ICD-10-CM

## 2020-07-23 DIAGNOSIS — G89.29 CHRONIC THUMB PAIN, BILATERAL: ICD-10-CM

## 2020-07-23 DIAGNOSIS — M79.644 CHRONIC THUMB PAIN, BILATERAL: ICD-10-CM

## 2020-07-23 DIAGNOSIS — M25.512 CHRONIC PAIN OF BOTH SHOULDERS: ICD-10-CM

## 2020-07-23 DIAGNOSIS — M25.511 CHRONIC PAIN OF BOTH SHOULDERS: ICD-10-CM

## 2020-07-23 PROCEDURE — 97110 THERAPEUTIC EXERCISES: CPT | Mod: HCNC,PO

## 2020-07-23 PROCEDURE — 97035 APP MDLTY 1+ULTRASOUND EA 15: CPT | Mod: HCNC,PO

## 2020-07-23 PROCEDURE — 97014 ELECTRIC STIMULATION THERAPY: CPT | Mod: HCNC,PO

## 2020-07-28 ENCOUNTER — CLINICAL SUPPORT (OUTPATIENT)
Dept: REHABILITATION | Facility: HOSPITAL | Age: 65
End: 2020-07-28
Attending: INTERNAL MEDICINE
Payer: MEDICARE

## 2020-07-28 DIAGNOSIS — M79.645 CHRONIC THUMB PAIN, BILATERAL: ICD-10-CM

## 2020-07-28 DIAGNOSIS — M79.644 CHRONIC THUMB PAIN, BILATERAL: ICD-10-CM

## 2020-07-28 DIAGNOSIS — M25.511 CHRONIC PAIN OF BOTH SHOULDERS: ICD-10-CM

## 2020-07-28 DIAGNOSIS — G89.29 CHRONIC THUMB PAIN, BILATERAL: ICD-10-CM

## 2020-07-28 DIAGNOSIS — M25.512 CHRONIC PAIN OF BOTH SHOULDERS: ICD-10-CM

## 2020-07-28 DIAGNOSIS — G89.29 CHRONIC PAIN OF BOTH SHOULDERS: ICD-10-CM

## 2020-07-28 PROCEDURE — 97010 HOT OR COLD PACKS THERAPY: CPT | Mod: HCNC,PO

## 2020-07-28 PROCEDURE — 97140 MANUAL THERAPY 1/> REGIONS: CPT | Mod: HCNC,PO

## 2020-07-28 PROCEDURE — 97035 APP MDLTY 1+ULTRASOUND EA 15: CPT | Mod: HCNC,PO

## 2020-07-28 PROCEDURE — 97110 THERAPEUTIC EXERCISES: CPT | Mod: HCNC,PO

## 2020-07-28 NOTE — PROGRESS NOTES
Occupational Therapy Daily Treatment Note     Date: 7/28/2020  Name: Jeff Hope  Clinic Number: [id number]    Therapy Diagnosis:   Encounter Diagnoses   Name Primary?    Chronic pain of both shoulders     Chronic thumb pain, bilateral      Physician: Roly Flannery MD    Medical Diagnosis:  B shoulder pain and B hand pain and trigger fingers/arthritis.   Physician Orders:     Pt with B shoulder pain and B hand pain and trigger fingers/arthritis.  Unable to give antiinflammatories or injection due to anticoagulation  and due to hepatitis B therapy and therefore no steroids.  Please assist with pain control if possible.      Evaluation Date: 6/19/2020  Plan of Care Certification Date: 9/19/2020  Authorization Period: 7/18/2020  Date of Return to MD: THOMAS     Visit #: 2 of 12  Time In: 8:45 AM  Time Out: 9:30 AM  Total Billable Time: 15 min     Precautions: Standard       Subjective     Pt reports: No new c/o. Pt acquired a TENS unit, however contraindications included with TENS unit literature include cancer which pt has. Pt decided based on this information to not use any electrical modalities.  Response to previous treatment:Jonas well    Pain: 5/10  Location: Right shoulder    Objective     Jeff received the following supervised modalities after being cleared for contraindications for 15 minutes:   -MH right shoulder    Jeff received the following manual therapy techniques for 15 minutes:   -Scraping bilateral volar thumbs/thenar eminences    Jeff performed therapeutic exercises for 10 minutes including:  -Pendulum 3 directions, supine dowel SF/SA    Patient received ultrasound  for pain control and decreased inflammation @ 100 % duty cycle, 3.3 Mhz, applied to bilateral volar thumbs, intensity = 0.8 w/cm2 for 16 minutes.    Home Exercises and Education Provided     Education provided:   - Progress towards goals     Written Home Exercises Provided: Patient instructed to cont prior HEP.  Exercises were  reviewed and Jeff was able to demonstrate them prior to the end of the session.  Jeff demonstrated good  understanding of the HEP provided.   .   See EMR under Patient Instructions for exercises provided prior visit.        Assessment     Pt would continue to benefit from skilled OT to maximize BUE function.     Jeff is progressing minimally towards his goals and there are no updates to goals at this time. Pt prognosis is Fair.     Pt will continue to benefit from skilled outpatient occupational therapy to address the deficits listed in the problem list on initial evaluation provide pt/family education and to maximize pt's level of independence in the home and community environment.       Pt's spiritual, cultural and educational needs considered and pt agreeable to plan of care and goals.    Goals:  LTG's (12 weeks):  1)   Increase ROM 40 degrees in shoulder flex to increase functional hand use for overhead reaching.  2)   Increase ROM 30 degrees in shoulder external rotation to increase functional hand use for UB dressing.  3)   Increase  strength 10 lbs. to grasp cooking pot handle.  4)   Increase lat pinch 4 psis for opening condiment packages.  5)   Decrease complaints of pain to  4 out of 10 at worst to increase functional hand use for ADL/work/leisure activities.  6)   Pt will return to near to prior level of function for ADLs and household management reporting I or Mod I with ADLs (dressing, feeding, grooming, toileting).      STG's (6 weeks)  1)   Increase ROM 20 degrees in shoulder flex to increase functional hand use for overhead reaching.  2)   Increase ROM 15 degrees in shoulder external rotation to increase functional hand use for UB dressing.  3)   Increase  strength 5 lbs. to improve functional grasp for ADLs/work/leisure activities.   4)   Increase 3J pinch 2 psi's to increase independence with button and FM Coordination.   5)   Patient to be IND wiht Orthotic use, wear and care  precautions.   6)   Decrease complaints of pain to  6 out of 10 at worst to increase functional hand use for ADL/work/leisure activities.    Plan   Cont OT to address above goals.        CHETNA Styles OTR/L, CHT

## 2020-08-06 ENCOUNTER — CLINICAL SUPPORT (OUTPATIENT)
Dept: REHABILITATION | Facility: HOSPITAL | Age: 65
End: 2020-08-06
Attending: INTERNAL MEDICINE
Payer: MEDICARE

## 2020-08-06 DIAGNOSIS — G89.29 CHRONIC THUMB PAIN, BILATERAL: ICD-10-CM

## 2020-08-06 DIAGNOSIS — M25.511 CHRONIC PAIN OF BOTH SHOULDERS: ICD-10-CM

## 2020-08-06 DIAGNOSIS — M79.644 CHRONIC THUMB PAIN, BILATERAL: ICD-10-CM

## 2020-08-06 DIAGNOSIS — G89.29 CHRONIC PAIN OF BOTH SHOULDERS: ICD-10-CM

## 2020-08-06 DIAGNOSIS — M25.512 CHRONIC PAIN OF BOTH SHOULDERS: ICD-10-CM

## 2020-08-06 DIAGNOSIS — M79.645 CHRONIC THUMB PAIN, BILATERAL: ICD-10-CM

## 2020-08-06 PROCEDURE — 97150 GROUP THERAPEUTIC PROCEDURES: CPT | Mod: HCNC,PO

## 2020-08-06 PROCEDURE — 97140 MANUAL THERAPY 1/> REGIONS: CPT | Mod: HCNC,PO

## 2020-08-06 PROCEDURE — 97035 APP MDLTY 1+ULTRASOUND EA 15: CPT | Mod: HCNC,PO

## 2020-08-06 PROCEDURE — 97110 THERAPEUTIC EXERCISES: CPT | Mod: HCNC,PO

## 2020-08-06 PROCEDURE — 97010 HOT OR COLD PACKS THERAPY: CPT | Mod: HCNC,PO

## 2020-08-06 NOTE — PROGRESS NOTES
Occupational Therapy Daily Treatment Note     Date: 8/6/2020  Name: Jeff Hope  Clinic Number: [id number]    Therapy Diagnosis:   Encounter Diagnoses   Name Primary?    Chronic pain of both shoulders     Chronic thumb pain, bilateral      Physician: Roly Flannery MD    Medical Diagnosis:  B shoulder pain and B hand pain and trigger fingers/arthritis.   Physician Orders:     Pt with B shoulder pain and B hand pain and trigger fingers/arthritis.  Unable to give antiinflammatories or injection due to anticoagulation  and due to hepatitis B therapy and therefore no steroids.  Please assist with pain control if possible.      Evaluation Date: 6/19/2020  Plan of Care Certification Date: 9/19/2020  Authorization Period: 7/18/2020  Date of Return to MD: THOMAS     Visit #: 3 of 12  Time In: 8:00 AM  Time Out: 9:15 AM  Total Billable Time: 15 min     Precautions: Standard       Subjective     Pt reports: Increased pain in the right shoulder, denies trauma  Response to previous treatment:Jonas well, reports decrease in pain afterwards    Pain: 7/10  Location: Right shoulder    Objective     Jeff received the following supervised modalities after being cleared for contraindications for 10 minutes:   -MH right shoulder     Jeff received the following manual therapy techniques for 15 minutes:   -Soft tissue mobs and PROM to right shoulder      Jeff performed therapeutic exercises for 10 minutes including:  -Pendulum 3 directions, supine dowel SF/SA     Patient received ultrasound  for pain control and decreased inflammation @ 100 % duty cycle, 3.3 Mhz, applied to right shoulder, intensity = 0.8 w/cm2 for 8 minutes    Home Exercises and Education Provided     Education provided:   - Progress towards goals     Written Home Exercises Provided: Patient instructed to cont prior HEP.  Exercises were reviewed and Jeff was able to demonstrate them prior to the end of the session.  Jeff demonstrated good  understanding of  the HEP provided.   .   See EMR under Patient Instructions for exercises provided prior visit.        Assessment     Pt would continue to benefit from skilled OT to maximize BUE function.     Jeff is not progressing towards his goals and there are no updates to goals at this time. Pt prognosis is Guarded. He demonstrated very limited tolerance for tx today and participated minimally.    Pt will continue to benefit from skilled outpatient occupational therapy to address the deficits listed in the problem list on initial evaluation provide pt/family education and to maximize pt's level of independence in the home and community environment.       Pt's spiritual, cultural and educational needs considered and pt agreeable to plan of care and goals.    Goals:  LTG's (12 weeks):  1)   Increase ROM 40 degrees in shoulder flex to increase functional hand use for overhead reaching.  2)   Increase ROM 30 degrees in shoulder external rotation to increase functional hand use for UB dressing.  3)   Increase  strength 10 lbs. to grasp cooking pot handle.  4)   Increase lat pinch 4 psis for opening condiment packages.  5)   Decrease complaints of pain to  4 out of 10 at worst to increase functional hand use for ADL/work/leisure activities.  6)   Pt will return to near to prior level of function for ADLs and household management reporting I or Mod I with ADLs (dressing, feeding, grooming, toileting).      STG's (6 weeks)  1)   Increase ROM 20 degrees in shoulder flex to increase functional hand use for overhead reaching.  2)   Increase ROM 15 degrees in shoulder external rotation to increase functional hand use for UB dressing.  3)   Increase  strength 5 lbs. to improve functional grasp for ADLs/work/leisure activities.   4)   Increase 3J pinch 2 psi's to increase independence with button and FM Coordination.   5)   Patient to be IND wiht Orthotic use, wear and care precautions.   6)   Decrease complaints of pain to  6  out of 10 at worst to increase functional hand use for ADL/work/leisure activities.       Plan   Cont OT to address above goals.        CHETNA Styles OTR/L, CHT

## 2020-08-07 ENCOUNTER — TELEPHONE (OUTPATIENT)
Dept: PHARMACY | Facility: CLINIC | Age: 65
End: 2020-08-07

## 2020-08-07 NOTE — TELEPHONE ENCOUNTER
Incoming call - Vemlidy F/U complete. Confirmed 2 patient identifiers - name and . Therapy appropriate.     Patient states he takes Vemlidy 1 tablet daily in the morning. Patient states that he misses a few doses but is rare and non-consecutive. Patient has not missed any doses within last 4 weeks. Patient advised of medication adherence importance and decreased effectiveness of medication with missed doses. Patient advised to use phone alarm to aid in medication adherence. Patient also counseled on how to handle missed doses. Patient verbalized understanding. Pt reports they are not having any side effects. No new medications, no new allergies or health conditions reported at this time. Allergies reviewed and medication reconciliation complete (reviewed and documented in [facility] and [facility]). Disease education reviewed (including transmission and prevention). Patient counseled on importance of maintaining adherence and keeping lab appointments which were scheduled. HBV DNA VL detected at less than 10 IU/mL (7/10/20) - decreased from last level of 31 IU/mL. CrCl 84 mL/min - med/dose appropriate. All questions answered and addressed to patients satisfaction. Advised to call [facility] and provider if any issues arise.  Pt verbalized understanding.

## 2020-08-11 ENCOUNTER — CLINICAL SUPPORT (OUTPATIENT)
Dept: REHABILITATION | Facility: HOSPITAL | Age: 65
End: 2020-08-11
Attending: INTERNAL MEDICINE
Payer: MEDICARE

## 2020-08-11 DIAGNOSIS — G89.29 CHRONIC PAIN OF BOTH SHOULDERS: ICD-10-CM

## 2020-08-11 DIAGNOSIS — M79.644 CHRONIC THUMB PAIN, BILATERAL: ICD-10-CM

## 2020-08-11 DIAGNOSIS — M79.645 CHRONIC THUMB PAIN, BILATERAL: ICD-10-CM

## 2020-08-11 DIAGNOSIS — M25.511 CHRONIC PAIN OF BOTH SHOULDERS: ICD-10-CM

## 2020-08-11 DIAGNOSIS — G89.29 CHRONIC THUMB PAIN, BILATERAL: ICD-10-CM

## 2020-08-11 DIAGNOSIS — M25.512 CHRONIC PAIN OF BOTH SHOULDERS: ICD-10-CM

## 2020-08-11 PROCEDURE — 97010 HOT OR COLD PACKS THERAPY: CPT | Mod: HCNC,PO

## 2020-08-11 PROCEDURE — 97035 APP MDLTY 1+ULTRASOUND EA 15: CPT | Mod: HCNC,PO

## 2020-08-11 PROCEDURE — 97110 THERAPEUTIC EXERCISES: CPT | Mod: HCNC,PO

## 2020-08-11 NOTE — PATIENT INSTRUCTIONS
Scapular Retraction (Standing)        With arms at sides, pinch shoulder blades together.  Repeat 20 times. Do 3 sessions per day.       Strengthening: Isometric Abduction        Using wall for resistance, press left arm into ball using light pressure. Hold 5 seconds.  Repeat 10 times per set. Do 1-2 sets per session. Do 3 sessions per day.     Strengthening: Isometric Extension        Using wall for resistance, press back of left arm into ball using light pressure. Hold 5 seconds.  Repeat 10 times per set. Do 1-2 sets per session. Do 3 sessions per day.     Strengthening: Isometric External Rotation        Using wall to provide resistance, and keeping right arm at side, press back of hand into ball using light pressure. Hold 5 seconds.  Repeat 10 times per set. Do 1-2 sets per session. Do 3 sessions per day.     Strengthening: Isometric Flexion        Using wall for resistance, press right fist into ball using light pressure. Hold 5 seconds.  Repeat 10 times per set. Do 1-2 sets per session. Do 3 sessions per day.     Strengthening: Isometric Internal Rotation        Using door frame for resistance, press palm of right hand into ball using light pressure. Keep elbow in at side. Hold 5 seconds.  Repeat 10 times per set. Do 1-2 sets per session. Do 3 sessions per day.

## 2020-08-11 NOTE — PROGRESS NOTES
Occupational Therapy Daily Treatment Note     Date: 8/11/2020  Name: Jeff Hope  Clinic Number: [id number]    Therapy Diagnosis:   Encounter Diagnoses   Name Primary?    Chronic pain of both shoulders     Chronic thumb pain, bilateral      Physician: Roly Flannery MD  Medical Diagnosis:  B shoulder pain and B hand pain and trigger fingers/arthritis.   Physician Orders:     Pt with B shoulder pain and B hand pain and trigger fingers/arthritis.  Unable to give antiinflammatories or injection due to anticoagulation  and due to hepatitis B therapy and therefore no steroids.  Please assist with pain control if possible.      Evaluation Date: 6/19/2020  Plan of Care Certification Date: 9/19/2020  Authorization Period: 7/18/2020  Date of Return to MD: THOMAS     Visit #: 4 of 12  Time In: 8:30 AM  Time Out: 9:15 AM  Total Billable Time: 15 min     Precautions: Standard       Subjective     Pt reports: No significant change in right shoulder, decreased episodes of triggering in thumbs.  Response to previous treatment:Jonas well    Pain: 7/10  Location: Right shoulder    Objective   Jeff received the following supervised modalities after being cleared for contraindications for 10 minutes:   -MH right shoulder     Jeff performed therapeutic exercises for 25 minutes including:  -Pendulum 3 directions 20 ea  -Right shld isometrics all directions 10 ea  -UBE 3' F     Patient received ultrasound  for pain control and decreased inflammation @ 100 % duty cycle, 3.3 Mhz, applied to right shoulder, intensity = 0.8 w/cm2 for 8 minutes    Home Exercises and Education Provided     Education provided:   - Upgraded HEP  - Progress towards goals     Written Home Exercises Provided: yes.  Exercises were reviewed and Jeff was able to demonstrate them prior to the end of the session.  Jeff demonstrated good  understanding of the HEP provided.   .   See EMR under Patient Instructions for exercises provided 8/11/2020.         Assessment     Pt would continue to benefit from skilled OT to maximize BUE function.     Jeff is progressing minimally towards his goals and there are no updates to goals at this time. Pt prognosis is Guarded.     Pt will continue to benefit from skilled outpatient occupational therapy to address the deficits listed in the problem list on initial evaluation provide pt/family education and to maximize pt's level of independence in the home and community environment.       Pt's spiritual, cultural and educational needs considered and pt agreeable to plan of care and goals.    Goals:  LTG's (12 weeks):  1)   Increase ROM 40 degrees in shoulder flex to increase functional hand use for overhead reaching. Progressing  2)   Increase ROM 30 degrees in shoulder external rotation to increase functional hand use for UB dressing. Progressing  3)   Increase  strength 10 lbs. to grasp cooking pot handle. Progressing  4)   Increase lat pinch 4 psis for opening condiment packages. Progressing  5)   Decrease complaints of pain to  4 out of 10 at worst to increase functional hand use for ADL/work/leisure activities. Progressing  6)   Pt will return to near to prior level of function for ADLs and household management reporting I or Mod I with ADLs (dressing, feeding, grooming, toileting). Progressing     STG's (6 weeks)  1)   Increase ROM 20 degrees in shoulder flex to increase functional hand use for overhead reaching. Progressing  2)   Increase ROM 15 degrees in shoulder external rotation to increase functional hand use for UB dressing. Progressing  3)   Increase  strength 5 lbs. to improve functional grasp for ADLs/work/leisure activities. Progressing  4)   Increase 3J pinch 2 psi's to increase independence with button and FM Coordination. Progressing   5)   Patient to be IND wiht Orthotic use, wear and care precautions. Met  6)   Decrease complaints of pain to  6 out of 10 at worst to increase functional hand use  for ADL/work/leisure activities. Progressing       Plan   Cont OT to address above goals.        CHETNA Styles OTR/L, CHT

## 2020-08-15 ENCOUNTER — OFFICE VISIT (OUTPATIENT)
Dept: URGENT CARE | Facility: CLINIC | Age: 65
End: 2020-08-15
Payer: MEDICARE

## 2020-08-15 VITALS
BODY MASS INDEX: 31.98 KG/M2 | TEMPERATURE: 98 F | OXYGEN SATURATION: 96 % | DIASTOLIC BLOOD PRESSURE: 69 MMHG | SYSTOLIC BLOOD PRESSURE: 138 MMHG | HEART RATE: 92 BPM | HEIGHT: 68 IN | WEIGHT: 211 LBS | RESPIRATION RATE: 18 BRPM

## 2020-08-15 DIAGNOSIS — H66.003 NON-RECURRENT ACUTE SUPPURATIVE OTITIS MEDIA OF BOTH EARS WITHOUT SPONTANEOUS RUPTURE OF TYMPANIC MEMBRANES: Primary | ICD-10-CM

## 2020-08-15 DIAGNOSIS — H60.331 ACUTE SWIMMER'S EAR OF RIGHT SIDE: ICD-10-CM

## 2020-08-15 PROCEDURE — 99214 PR OFFICE/OUTPT VISIT, EST, LEVL IV, 30-39 MIN: ICD-10-PCS | Mod: S$GLB,,, | Performed by: STUDENT IN AN ORGANIZED HEALTH CARE EDUCATION/TRAINING PROGRAM

## 2020-08-15 PROCEDURE — 99214 OFFICE O/P EST MOD 30 MIN: CPT | Mod: S$GLB,,, | Performed by: STUDENT IN AN ORGANIZED HEALTH CARE EDUCATION/TRAINING PROGRAM

## 2020-08-15 RX ORDER — CIPROFLOXACIN AND DEXAMETHASONE 3; 1 MG/ML; MG/ML
4 SUSPENSION/ DROPS AURICULAR (OTIC) 2 TIMES DAILY
Qty: 7.5 ML | Refills: 0 | Status: SHIPPED | OUTPATIENT
Start: 2020-08-15 | End: 2020-08-22

## 2020-08-15 RX ORDER — AMOXICILLIN AND CLAVULANATE POTASSIUM 875; 125 MG/1; MG/1
1 TABLET, FILM COATED ORAL EVERY 12 HOURS
Qty: 10 TABLET | Refills: 0 | Status: SHIPPED | OUTPATIENT
Start: 2020-08-15 | End: 2020-08-20

## 2020-08-15 NOTE — PATIENT INSTRUCTIONS
Middle Ear Infection (Adult)  You have an infection of the middle ear, the space behind the eardrum. This is also called acute otitis media (AOM). Sometimes it is caused by the common cold. This is because congestion can block the internal passage (eustachian tube) that drains fluid from the middle ear. When the middle ear fills with fluid, bacteria can grow there and cause an infection. Oral antibiotics are used to treat this illness, not ear drops. Symptoms usually start to improve within 1 to 2 days of treatment.    Home care  The following are general care guidelines:  · Finish all of the antibiotic medicine given, even though you may feel better after the first few days.  · You may use over-the-counter medicine, such as acetaminophen or ibuprofen, to control pain and fever, unless something else was prescribed. If you have chronic liver or kidney disease or have ever had a stomach ulcer or gastrointestinal bleeding, talk with your healthcare provider before using these medicines. Do not give aspirin to anyone under 18 years of age who has a fever. It may cause severe illness or death.  Follow-up care  Follow up with your healthcare provider, or as advised, in 2 weeks if all symptoms have not gotten better, or if hearing doesn't go back to normal within 1 month.  When to seek medical advice  Call your healthcare provider right away if any of these occur:  · Ear pain gets worse or does not improve after 3 days of treatment  · Unusual drowsiness or confusion  · Neck pain, stiff neck, or headache  · Fluid or blood draining from the ear canal  · Fever of 100.4°F (38°C) or as advised   · Seizure  Date Last Reviewed: 6/1/2016  © 6871-5921 [employer]. [address] [place] [address], [place] [address]. All rights reserved. This information is not intended as a substitute for professional medical care. Always follow your healthcare professional's instructions.

## 2020-08-15 NOTE — PROGRESS NOTES
"Subjective:       Patient ID: Jeff Hope is a 64 y.o. male.    Vitals:  height is 5' 8" (1.727 m) and weight is 95.7 kg (211 lb). His temperature is 97.5 °F (36.4 °C). His blood pressure is 138/69 and his pulse is 92. His respiration is 18 and oxygen saturation is 96%.     Chief Complaint: Otalgia (right)    Pt presents for R ear pain x3d. Reports pain worsened with chewing. Denied f/c, decreased hearing, ear drainage, tinnitus, or URI sx's.    Otalgia   There is pain in the right ear. This is a new problem. The current episode started in the past 7 days. The problem occurs constantly. The problem has been gradually worsening. There has been no fever. The pain is at a severity of 5/10. The pain is moderate. Pertinent negatives include no abdominal pain, coughing, diarrhea, ear discharge, headaches, hearing loss, neck pain, rash, rhinorrhea, sore throat or vomiting. He has tried ear drops for the symptoms. The treatment provided no relief. There is no history of a chronic ear infection.       Constitution: Negative. Negative for chills, sweating, fatigue and fever.   HENT: Positive for ear pain. Negative for ear discharge, tinnitus, hearing loss, congestion, sinus pain, sinus pressure, sore throat and voice change.    Neck: Negative for neck pain and painful lymph nodes.   Cardiovascular: Negative for chest pain, leg swelling and sob on exertion.   Eyes: Negative.  Negative for eye discharge, eye itching and eye redness.   Respiratory: Negative for chest tightness, cough, sputum production, bloody sputum, COPD, shortness of breath, stridor, wheezing and asthma.    Gastrointestinal: Negative.  Negative for abdominal pain, nausea, vomiting and diarrhea.   Musculoskeletal: Negative.  Negative for joint pain, joint swelling and muscle ache.   Skin: Negative for color change, rash and lesion.   Allergic/Immunologic: Negative for seasonal allergies and asthma.   Neurological: Negative for dizziness, light-headedness " and headaches.   Hematologic/Lymphatic: Negative for swollen lymph nodes.   Psychiatric/Behavioral: Negative for confusion, agitation and sleep disturbance.       Objective:      Physical Exam   Constitutional: He is oriented to person, place, and time. He appears well-developed. No distress.   HENT:   Head: Normocephalic and atraumatic.   Ears:   Right Ear: Hearing and external ear normal. There is swelling and tenderness. No drainage. No mastoid tenderness. Tympanic membrane is erythematous and retracted. Tympanic membrane is not perforated and not bulging.   Left Ear: Hearing, external ear and ear canal normal. No tenderness. No mastoid tenderness. Tympanic membrane is erythematous. Tympanic membrane is not perforated and not bulging. A middle ear effusion is present.   Nose: Nose normal.   Eyes: Conjunctivae and EOM are normal. Right eye exhibits no discharge. Left eye exhibits no discharge.   Cardiovascular: Normal rate, regular rhythm and normal heart sounds. Exam reveals no gallop and no friction rub.   No murmur heard.  Pulmonary/Chest: Effort normal and breath sounds normal. No stridor. No respiratory distress. He has no wheezes. He has no rales.   Neurological: He is alert and oriented to person, place, and time. No cranial nerve deficit (CN II-XII grossly intact).   Skin: Skin is warm, dry and no rash. Psychiatric: His behavior is normal. Judgment and thought content normal.   Nursing note and vitals reviewed.        Assessment:       1. Non-recurrent acute suppurative otitis media of both ears without spontaneous rupture of tympanic membranes    2. Acute swimmer's ear of right side        Plan:         Non-recurrent acute suppurative otitis media of both ears without spontaneous rupture of tympanic membranes  -     amoxicillin-clavulanate 875-125mg (AUGMENTIN) 875-125 mg per tablet; Take 1 tablet by mouth every 12 (twelve) hours. for 5 days  Dispense: 10 tablet; Refill: 0    Acute swimmer's ear of right  side  -     ciprofloxacin-dexamethasone 0.3-0.1% (CIPRODEX) 0.3-0.1 % DrpS; Place 4 drops into both ears 2 (two) times daily. for 7 days  Dispense: 7.5 mL; Refill: 0    Medications and diagnosis reviewed with patient, questions answered, and return precautions given.    Follow up if symptoms worsen or fail to improve.    Sharath John MD/MPH  [facility]  [facility]

## 2020-08-18 ENCOUNTER — OFFICE VISIT (OUTPATIENT)
Dept: PRIMARY CARE CLINIC | Facility: CLINIC | Age: 65
End: 2020-08-18
Payer: MEDICARE

## 2020-08-18 ENCOUNTER — IMMUNIZATION (OUTPATIENT)
Dept: PHARMACY | Facility: CLINIC | Age: 65
End: 2020-08-18
Payer: MEDICARE

## 2020-08-18 ENCOUNTER — LAB VISIT (OUTPATIENT)
Dept: LAB | Facility: HOSPITAL | Age: 65
End: 2020-08-18
Attending: INTERNAL MEDICINE
Payer: MEDICARE

## 2020-08-18 VITALS
TEMPERATURE: 98 F | OXYGEN SATURATION: 96 % | SYSTOLIC BLOOD PRESSURE: 114 MMHG | HEIGHT: 68 IN | DIASTOLIC BLOOD PRESSURE: 82 MMHG | WEIGHT: 213.19 LBS | RESPIRATION RATE: 20 BRPM | BODY MASS INDEX: 32.31 KG/M2 | HEART RATE: 86 BPM

## 2020-08-18 DIAGNOSIS — I26.99 PULMONARY EMBOLISM, BILATERAL: ICD-10-CM

## 2020-08-18 DIAGNOSIS — B16.9 ACUTE HEPATITIS B: ICD-10-CM

## 2020-08-18 DIAGNOSIS — M25.512 CHRONIC PAIN OF BOTH SHOULDERS: ICD-10-CM

## 2020-08-18 DIAGNOSIS — M25.511 CHRONIC PAIN OF BOTH SHOULDERS: ICD-10-CM

## 2020-08-18 DIAGNOSIS — B16.9 ACUTE HEPATITIS B: Primary | ICD-10-CM

## 2020-08-18 DIAGNOSIS — G89.29 CHRONIC PAIN OF BOTH SHOULDERS: ICD-10-CM

## 2020-08-18 PROBLEM — Z00.00 HEALTHCARE MAINTENANCE: Status: RESOLVED | Noted: 2019-11-19 | Resolved: 2020-08-18

## 2020-08-18 LAB
ALBUMIN SERPL BCP-MCNC: 4.1 G/DL (ref 3.5–5.2)
ALP SERPL-CCNC: 77 U/L (ref 55–135)
ALT SERPL W/O P-5'-P-CCNC: 15 U/L (ref 10–44)
ANION GAP SERPL CALC-SCNC: 9 MMOL/L (ref 8–16)
AST SERPL-CCNC: 20 U/L (ref 10–40)
BILIRUB SERPL-MCNC: 0.5 MG/DL (ref 0.1–1)
BUN SERPL-MCNC: 13 MG/DL (ref 8–23)
CALCIUM SERPL-MCNC: 9.6 MG/DL (ref 8.7–10.5)
CHLORIDE SERPL-SCNC: 103 MMOL/L (ref 95–110)
CO2 SERPL-SCNC: 28 MMOL/L (ref 23–29)
CREAT SERPL-MCNC: 1.1 MG/DL (ref 0.5–1.4)
EST. GFR  (AFRICAN AMERICAN): >60 ML/MIN/1.73 M^2
EST. GFR  (NON AFRICAN AMERICAN): >60 ML/MIN/1.73 M^2
GLUCOSE SERPL-MCNC: 111 MG/DL (ref 70–110)
POTASSIUM SERPL-SCNC: 4.2 MMOL/L (ref 3.5–5.1)
PROT SERPL-MCNC: 7.6 G/DL (ref 6–8.4)
SODIUM SERPL-SCNC: 140 MMOL/L (ref 136–145)

## 2020-08-18 PROCEDURE — 99214 OFFICE O/P EST MOD 30 MIN: CPT | Mod: HCNC,S$GLB,, | Performed by: INTERNAL MEDICINE

## 2020-08-18 PROCEDURE — 3008F BODY MASS INDEX DOCD: CPT | Mod: HCNC,CPTII,S$GLB, | Performed by: INTERNAL MEDICINE

## 2020-08-18 PROCEDURE — 80053 COMPREHEN METABOLIC PANEL: CPT | Mod: HCNC

## 2020-08-18 PROCEDURE — 36415 COLL VENOUS BLD VENIPUNCTURE: CPT | Mod: HCNC

## 2020-08-18 PROCEDURE — 99499 UNLISTED E&M SERVICE: CPT | Mod: S$GLB,,, | Performed by: INTERNAL MEDICINE

## 2020-08-18 PROCEDURE — 99214 PR OFFICE/OUTPT VISIT, EST, LEVL IV, 30-39 MIN: ICD-10-PCS | Mod: HCNC,S$GLB,, | Performed by: INTERNAL MEDICINE

## 2020-08-18 PROCEDURE — 3008F PR BODY MASS INDEX (BMI) DOCUMENTED: ICD-10-PCS | Mod: HCNC,CPTII,S$GLB, | Performed by: INTERNAL MEDICINE

## 2020-08-18 PROCEDURE — 99499 RISK ADDL DX/OHS AUDIT: ICD-10-PCS | Mod: S$GLB,,, | Performed by: INTERNAL MEDICINE

## 2020-08-18 NOTE — ASSESSMENT & PLAN NOTE
Pt tired TENS with PT--slight improvement, encouraged to get from daughter and use more often.  · Continue tramadol/tylenol  · Continue PT  · Injections of shoulders with ortho once approved by hep  · Trial of NSAIDS once off AC  · Please try TENS unit.

## 2020-08-18 NOTE — ASSESSMENT & PLAN NOTE
Viral load <10.  Continue therapy.   ·  Continue antivirals and f/u hepatology.    · steoid injection of shoulders once cleared by hep.

## 2020-08-18 NOTE — ASSESSMENT & PLAN NOTE
Pt with PE s/p hospitalization.  He was placed on xarelto with high risk with cancer. Most recent PET with almost resolution of abnl LN.    · Per pt plan with onc to hold AC if repeat PET is as good.

## 2020-08-18 NOTE — PROGRESS NOTES
"This note has been generated using voice-recognition software. There may be typographical errors that have been missed during proof-reading.  Primary Care Provider Appointment    Subjective:      Patient ID: Jeff Hope is a 64 y.o. male here for follow up.     Chief Complaint: Follow-up (right ear pain and right shoulder pain)    HPI:  This is a pleasant 63-year-old male with bronchiectasis, hyperlipidemia, history of pulmonary embolism on chronic anticoagulation with a follicular non-Hodgkin's lymphoma, acute/reactivated hepatitis B here for f/u.  Most recent PET 8/2020:"Interval improvement with nearly resolved hypermetabolic left mesenteric adenopathy as detailed above and with no other significant/malignant hypermetabolic foci felt to be present"  Pt notes some abd pain on the RUQ.  No SOB/PND/othopnea.  Plan possibly for stopping the AC.    Has tried tens in PT but only some relief.  B shoulder pain and B trigger thumb.  Pt wishes to have injections but concern with active hep B.  Pt will have to be cleared with hepatology.    Ear pain is improving with cipro and augmentin.      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    Pulmonary hypertension    Myalgia due to statin    Vitamin D deficiency    Port-A-Cath in place    Abnormal MMSE    Allergic rhinitis    Adrenal hypertrophy    Bilateral shoulder bursitis    Acute hepatitis B    Trigger finger of left thumb    Bilateral shoulder pain    Chronic thumb pain, bilateral        Past Surgical History:   Procedure Laterality Date    GROIN " "EXPLORATION  2 pre 2005 and 1 post 2005    total of 3 procedures    LAPAROSCOPIC CHOLECYSTECTOMY  05/2018    SHOULDER SURGERY Left        Past Medical History:   Diagnosis Date    Personal history of colonic polyps 09/08/2015    per [facility] report - repeat 9/2020       Social History     Socioeconomic History    Marital status:      Spouse name: Not on file    Number of children: 2    Years of education: Not on file    Highest education level: Not on file   Occupational History    Occupation: disabled [employer]    Social Needs    Financial resource strain: Somewhat hard    Food insecurity     Worry: Often true     Inability: Sometimes true    Transportation needs     Medical: No     Non-medical: No   Tobacco Use    Smoking status: Never Smoker    Smokeless tobacco: Never Used   Substance and Sexual Activity    Alcohol use: No     Frequency: Never    Drug use: No    Sexual activity: Yes     Partners: Female   Lifestyle    Physical activity     Days per week: 0 days     Minutes per session: 0 min    Stress: Rather much   Relationships    Social connections     Talks on phone: Twice a week     Gets together: Twice a week     Attends Nondenominational service: Never     Active member of club or organization: No     Attends meetings of clubs or organizations: Never     Relationship status:    Other Topics Concern    Not on file   Social History Narrative    Lives with wife.         Review of Systems    PHQ9 12/10/2019   Total Score 3        Checklist of Daily Activities:        Objective:   BP (P) 114/82 (BP Location: Left arm, Patient Position: Sitting, BP Method: Large (Manual))   Pulse 86   Temp 98.3 °F (36.8 °C) (Oral)   Resp 20   Ht 5' 8" (1.727 m)   Wt 96.7 kg (213 lb 3 oz)   SpO2 96%   BMI 32.41 kg/m²     Physical Exam  Constitutional:       General: He is not in acute distress.     Appearance: Normal appearance. He is diaphoretic. He is not ill-appearing or toxic-appearing. "   HENT:      Head: Normocephalic and atraumatic.      Ears:      Comments: B canal with debris and difficulty to fully evaluate.    Neck:      Thyroid: No thyromegaly.   Cardiovascular:      Rate and Rhythm: Normal rate and regular rhythm.      Heart sounds: Normal heart sounds.   Pulmonary:      Effort: Pulmonary effort is normal.      Breath sounds: Normal breath sounds.   Abdominal:      Palpations: Abdomen is soft.      Tenderness: There is no abdominal tenderness. There is no guarding or rebound.   Musculoskeletal:      Right lower leg: No edema.      Left lower leg: No edema.   Neurological:      Mental Status: He is alert.             Lab Results   Component Value Date    WBC 4.43 05/18/2020    HGB 14.0 05/18/2020    HCT 41.3 05/18/2020     (L) 05/18/2020    CHOL 205 (H) 01/27/2020    TRIG 106 01/27/2020    HDL 65 01/27/2020    ALT 11 05/18/2020    AST 15 05/18/2020     05/18/2020    K 4.2 05/18/2020     05/18/2020    CREATININE 1.0 05/18/2020    BUN 11 05/18/2020    CO2 30 (H) 05/18/2020    TSH 0.972 01/27/2020    INR 1.1 05/18/2020       Current Outpatient Medications on File Prior to Visit   Medication Sig Dispense Refill    amoxicillin-clavulanate 875-125mg (AUGMENTIN) 875-125 mg per tablet Take 1 tablet by mouth every 12 (twelve) hours. for 5 days 10 tablet 0    ciprofloxacin-dexamethasone 0.3-0.1% (CIPRODEX) 0.3-0.1 % DrpS Place 4 drops into both ears 2 (two) times daily. for 7 days 7.5 mL 0    pregabalin (LYRICA) 150 MG capsule Take 150 mg by mouth once daily.      rivaroxaban (XARELTO) 20 mg Tab Take 1 tablet (20 mg total) by mouth daily with dinner or evening meal. 90 tablet 3    tenofovir alafenamide (VEMLIDY) 25 mg Tab Take 1 tablet (25 mg total) by mouth once daily. 30 tablet 11    traMADoL (ULTRAM) 50 mg tablet Take 1 tablet (50 mg total) by mouth every 6 (six) hours. 60 tablet 0     No current facility-administered medications on file prior to visit.          Assessment:    64 y.o. male with multiple co-morbid illnesses here for continued follow up of medical problems.      Plan:     Problem List Items Addressed This Visit        Hematology    Pulmonary embolism, bilateral     Pt with PE s/p hospitalization.  He was placed on xarelto with high risk with cancer. Most recent PET with almost resolution of abnl LN.    · Per pt plan with onc to hold AC if repeat PET is as good.              GI    Acute hepatitis B - Primary     Viral load <10.  Continue therapy.   ·  Continue antivirals and f/u hepatology.    · steoid injection of shoulders once cleared by hep.           Relevant Orders    Comprehensive metabolic panel       Orthopedic    Bilateral shoulder pain     Pt tired TENS with PT--slight improvement, encouraged to get from daughter and use more often.  · Continue tramadol/tylenol  · Continue PT  · Injections of shoulders with ortho once approved by hep  · Trial of NSAIDS once off AC  · Please try TENS unit.                   Health Maintenance       Date Due Completion Date    Shingles Vaccine (1 of 2) 11/30/2005 ---    Pneumococcal Vaccine (Highest Risk) (2 of 3 - PPSV23) 08/01/2019 6/6/2019    Influenza Vaccine (1) 09/01/2020 12/10/2019    Colorectal Cancer Screening 09/08/2020 9/8/2015    Lipid Panel 01/27/2025 1/27/2020    TETANUS VACCINE 06/15/2030 6/15/2020        Medications Reconciliation:   I have reconciled the patient's home medications with the patient/family. I have updated all changes.  Refer to After-Visit Medication List.      Medication List          Accurate as of August 18, 2020  8:37 AM. If you have any questions, ask your nurse or doctor.            START taking these medications    PNEUMOVAX-23 25 mcg/0.5 mL vaccine  Generic drug: pneumococcal 23-andrea ps  Inject 0.5 mLs into the muscle once. For one dose. for 1 dose        CONTINUE taking these medications    amoxicillin-clavulanate 875-125mg 875-125 mg per tablet  Commonly known as: AUGMENTIN  Take 1 tablet  by mouth every 12 (twelve) hours. for 5 days     ciprofloxacin-dexamethasone 0.3-0.1% 0.3-0.1 % Drps  Commonly known as: CIPRODEX  Place 4 drops into both ears 2 (two) times daily. for 7 days     pregabalin 150 MG capsule  Commonly known as: LYRICA     rivaroxaban 20 mg Tab  Commonly known as: XARELTO  Take 1 tablet (20 mg total) by mouth daily with dinner or evening meal.     traMADoL 50 mg tablet  Commonly known as: ULTRAM  Take 1 tablet (50 mg total) by mouth every 6 (six) hours.     VEMLIDY 25 mg Tab  Generic drug: tenofovir alafenamide  Take 1 tablet (25 mg total) by mouth once daily.           Where to Get Your Medications      These medications were sent to [facility]  [address] [place] [id number]    Hours: Mon-Fri, 8a-5:30p Phone: [phone]   · PNEUMOVAX-23 25 mcg/0.5 mL vaccine          Next/future visit:  eval if AC stopped by onc.  eval pain with TENS, PT, ?can steroids be done with hep B?  vaccines    Follow up in about 2 months (around 10/18/2020). Total face-to-face time was 25 min, >50% of this was spent on counseling and coordination of care. The following issues were discussed: results of PET, hep B, vaccines.  Shoulder/thumb/back pain and trial of TENS, pneumovax today.       Roly Flannery MD  Internal Medicine  [facility]  [phone]

## 2020-08-19 ENCOUNTER — TELEPHONE (OUTPATIENT)
Dept: PRIMARY CARE CLINIC | Facility: CLINIC | Age: 65
End: 2020-08-19

## 2020-08-19 NOTE — TELEPHONE ENCOUNTER
----- Message from Roly Flannery MD sent at 8/19/2020 10:49 AM CDT -----  Please call patient with results.  Normal liver and renal fx on labs

## 2020-08-25 ENCOUNTER — CLINICAL SUPPORT (OUTPATIENT)
Dept: REHABILITATION | Facility: HOSPITAL | Age: 65
End: 2020-08-25
Attending: INTERNAL MEDICINE
Payer: MEDICARE

## 2020-08-25 DIAGNOSIS — M79.644 CHRONIC THUMB PAIN, BILATERAL: ICD-10-CM

## 2020-08-25 DIAGNOSIS — M25.512 CHRONIC PAIN OF BOTH SHOULDERS: ICD-10-CM

## 2020-08-25 DIAGNOSIS — G89.29 CHRONIC THUMB PAIN, BILATERAL: ICD-10-CM

## 2020-08-25 DIAGNOSIS — M25.511 CHRONIC PAIN OF BOTH SHOULDERS: ICD-10-CM

## 2020-08-25 DIAGNOSIS — M79.645 CHRONIC THUMB PAIN, BILATERAL: ICD-10-CM

## 2020-08-25 DIAGNOSIS — G89.29 CHRONIC PAIN OF BOTH SHOULDERS: ICD-10-CM

## 2020-08-25 PROCEDURE — 97110 THERAPEUTIC EXERCISES: CPT | Mod: HCNC,PO

## 2020-08-25 PROCEDURE — 97010 HOT OR COLD PACKS THERAPY: CPT | Mod: HCNC,PO

## 2020-08-25 PROCEDURE — 97035 APP MDLTY 1+ULTRASOUND EA 15: CPT | Mod: HCNC,PO

## 2020-08-25 NOTE — PROGRESS NOTES
Occupational Therapy Daily Treatment Note     Date: 8/25/2020  Name: Jeff Hope  Clinic Number: [id number]    Therapy Diagnosis:   Encounter Diagnoses   Name Primary?    Chronic pain of both shoulders     Chronic thumb pain, bilateral      Physician: Roly Flannery MD    Medical Diagnosis:  B shoulder pain and B hand pain and trigger fingers/arthritis.   Physician Orders:     Pt with B shoulder pain and B hand pain and trigger fingers/arthritis.  Unable to give antiinflammatories or injection due to anticoagulation  and due to hepatitis B therapy and therefore no steroids.  Please assist with pain control if possible.      Evaluation Date: 6/19/2020  Plan of Care Certification Date: 9/19/2020  Authorization Period: 7/18/2020  Date of Return to MD: THOMAS     Visit #: 5 of 12  Time In: 8:30 AM  Time Out: 9:30 AM  Total Billable Time: 25 min     Precautions: Standard       Subjective     Pt reports: Right thumb triggering decreased, left relatively unchanged. Right shoulder minimally improved.  Response to previous treatment:Jonas well    Pain: 5/10  Location: Right shld    Objective     Jeff received the following supervised modalities after being cleared for contraindications for 28 minutes:   -Moist heat to right shld w/ IFC electrical stimulation for pain control applied to right shoulder,   frequency = 4000KHz,  @ 45% scan,  for 20 minutes.  -Patient received ultrasound  for pain control and decreased inflammation @ 100 % duty cycle, 3.3 Mhz, applied to right shoulder, intensity = 0.8 w/cm2 for 8 minutes    Jeff performed therapeutic exercises for 25 minutes including:  -Pendulum 3 directions 20 ea  -Right shld isometrics all directions 10 ea  -UBE 5' F    Home Exercises and Education Provided     Education provided:   - Progress towards goals     Written Home Exercises Provided: Patient instructed to cont prior HEP.  Exercises were reviewed and Jeff was able to demonstrate them prior to the end of the  session.  Jeff demonstrated good  understanding of the HEP provided.   .   See EMR under Patient Instructions for exercises provided prior visit.        Assessment     Pt would continue to benefit from skilled OT to maximize BUE function.     Jeff is progressing minimally towards his goals and there are no updates to goals at this time. Pt prognosis is Guarded.     Pt will continue to benefit from skilled outpatient occupational therapy to address the deficits listed in the problem list on initial evaluation provide pt/family education and to maximize pt's level of independence in the home and community environment.       Pt's spiritual, cultural and educational needs considered and pt agreeable to plan of care and goals.    Goals:  LTG's (12 weeks):  1)   Increase ROM 40 degrees in shoulder flex to increase functional hand use for overhead reaching. Progressing  2)   Increase ROM 30 degrees in shoulder external rotation to increase functional hand use for UB dressing. Progressing  3)   Increase  strength 10 lbs. to grasp cooking pot handle. Progressing  4)   Increase lat pinch 4 psis for opening condiment packages. Progressing  5)   Decrease complaints of pain to  4 out of 10 at worst to increase functional hand use for ADL/work/leisure activities. Progressing  6)   Pt will return to near to prior level of function for ADLs and household management reporting I or Mod I with ADLs (dressing, feeding, grooming, toileting). Progressing     STG's (6 weeks)  1)   Increase ROM 20 degrees in shoulder flex to increase functional hand use for overhead reaching. Progressing  2)   Increase ROM 15 degrees in shoulder external rotation to increase functional hand use for UB dressing. Progressing  3)   Increase  strength 5 lbs. to improve functional grasp for ADLs/work/leisure activities. Progressing  4)   Increase 3J pinch 2 psi's to increase independence with button and FM Coordination. Progressing   5)   Patient  to be IND wiht Orthotic use, wear and care precautions. Met  6)   Decrease complaints of pain to  6 out of 10 at worst to increase functional hand use for ADL/work/leisure activities. Progressing    Plan   Cont OT to address above goals.        CHETNA Styles OTR/L, CHT

## 2020-09-18 ENCOUNTER — LAB VISIT (OUTPATIENT)
Dept: LAB | Facility: HOSPITAL | Age: 65
End: 2020-09-18
Attending: INTERNAL MEDICINE
Payer: MEDICARE

## 2020-09-18 ENCOUNTER — TELEPHONE (OUTPATIENT)
Dept: HEPATOLOGY | Facility: CLINIC | Age: 65
End: 2020-09-18

## 2020-09-18 DIAGNOSIS — B16.9 ACUTE HEPATITIS B: ICD-10-CM

## 2020-09-18 LAB
ALBUMIN SERPL BCP-MCNC: 4 G/DL (ref 3.5–5.2)
ALP SERPL-CCNC: 72 U/L (ref 55–135)
ALT SERPL W/O P-5'-P-CCNC: 12 U/L (ref 10–44)
ANION GAP SERPL CALC-SCNC: 7 MMOL/L (ref 8–16)
AST SERPL-CCNC: 19 U/L (ref 10–40)
BASOPHILS # BLD AUTO: 0.03 K/UL (ref 0–0.2)
BASOPHILS NFR BLD: 0.6 % (ref 0–1.9)
BILIRUB SERPL-MCNC: 0.3 MG/DL (ref 0.1–1)
BUN SERPL-MCNC: 11 MG/DL (ref 8–23)
CALCIUM SERPL-MCNC: 9.5 MG/DL (ref 8.7–10.5)
CHLORIDE SERPL-SCNC: 103 MMOL/L (ref 95–110)
CO2 SERPL-SCNC: 29 MMOL/L (ref 23–29)
CREAT SERPL-MCNC: 1 MG/DL (ref 0.5–1.4)
DIFFERENTIAL METHOD: NORMAL
EOSINOPHIL # BLD AUTO: 0 K/UL (ref 0–0.5)
EOSINOPHIL NFR BLD: 0.7 % (ref 0–8)
ERYTHROCYTE [DISTWIDTH] IN BLOOD BY AUTOMATED COUNT: 12.7 % (ref 11.5–14.5)
EST. GFR  (AFRICAN AMERICAN): >60 ML/MIN/1.73 M^2
EST. GFR  (NON AFRICAN AMERICAN): >60 ML/MIN/1.73 M^2
GLUCOSE SERPL-MCNC: 103 MG/DL (ref 70–110)
HCT VFR BLD AUTO: 43.7 % (ref 40–54)
HGB BLD-MCNC: 14.3 G/DL (ref 14–18)
IMM GRANULOCYTES # BLD AUTO: 0.02 K/UL (ref 0–0.04)
IMM GRANULOCYTES NFR BLD AUTO: 0.4 % (ref 0–0.5)
LYMPHOCYTES # BLD AUTO: 2.4 K/UL (ref 1–4.8)
LYMPHOCYTES NFR BLD: 45.1 % (ref 18–48)
MCH RBC QN AUTO: 29.7 PG (ref 27–31)
MCHC RBC AUTO-ENTMCNC: 32.7 G/DL (ref 32–36)
MCV RBC AUTO: 91 FL (ref 82–98)
MONOCYTES # BLD AUTO: 0.4 K/UL (ref 0.3–1)
MONOCYTES NFR BLD: 6.7 % (ref 4–15)
NEUTROPHILS # BLD AUTO: 2.5 K/UL (ref 1.8–7.7)
NEUTROPHILS NFR BLD: 46.5 % (ref 38–73)
NRBC BLD-RTO: 0 /100 WBC
PLATELET # BLD AUTO: 153 K/UL (ref 150–350)
PMV BLD AUTO: 9.9 FL (ref 9.2–12.9)
POTASSIUM SERPL-SCNC: 4.3 MMOL/L (ref 3.5–5.1)
PROT SERPL-MCNC: 7 G/DL (ref 6–8.4)
RBC # BLD AUTO: 4.82 M/UL (ref 4.6–6.2)
SODIUM SERPL-SCNC: 139 MMOL/L (ref 136–145)
WBC # BLD AUTO: 5.34 K/UL (ref 3.9–12.7)

## 2020-09-18 PROCEDURE — 80053 COMPREHEN METABOLIC PANEL: CPT | Mod: HCNC

## 2020-09-18 PROCEDURE — 85025 COMPLETE CBC W/AUTO DIFF WBC: CPT | Mod: HCNC

## 2020-09-18 PROCEDURE — 36415 COLL VENOUS BLD VENIPUNCTURE: CPT | Mod: HCNC

## 2020-09-18 PROCEDURE — 87517 HEPATITIS B DNA QUANT: CPT | Mod: HCNC

## 2020-09-22 ENCOUNTER — TELEPHONE (OUTPATIENT)
Dept: HEPATOLOGY | Facility: CLINIC | Age: 65
End: 2020-09-22

## 2020-09-22 LAB
HBV DNA SERPL NAA+PROBE-ACNC: <10 IU/ML
HBV DNA SERPL NAA+PROBE-LOG IU: <1 LOG (10) IU/ML
HBV DNA SERPL QL NAA+PROBE: NOT DETECTED

## 2020-09-22 NOTE — TELEPHONE ENCOUNTER
----- Message from Pili Kang sent at 9/22/2020  2:19 PM CDT -----  Regarding: PT  Contact: PT  PT called bc he thought he had an appointment along with labs scheduled but his daughter checked his portal and he doesn't have anything scheduled. Please call back     Callback: [phone]

## 2020-09-22 NOTE — TELEPHONE ENCOUNTER
Called patient back, inform patient he is not due till November and our schedule for November is not open yet. We are going to call him once November schedule open. Patient understood.

## 2020-09-24 ENCOUNTER — TELEPHONE (OUTPATIENT)
Dept: HEPATOLOGY | Facility: CLINIC | Age: 65
End: 2020-09-24

## 2020-09-24 DIAGNOSIS — B16.9 ACUTE HEPATITIS B: Primary | ICD-10-CM

## 2020-09-24 NOTE — TELEPHONE ENCOUNTER
----- Message from Alla Haney MD sent at 9/24/2020 12:20 AM CDT -----  Hepatitis B virus not detected in the blood.  Continue hep B medication. Do not stop.  Will check for antigen (HBsAg) with next lab to see if it is cleared.  Please inform patient

## 2020-09-25 ENCOUNTER — LAB VISIT (OUTPATIENT)
Dept: LAB | Facility: HOSPITAL | Age: 65
End: 2020-09-25
Attending: INTERNAL MEDICINE
Payer: MEDICARE

## 2020-09-25 ENCOUNTER — TELEPHONE (OUTPATIENT)
Dept: HEPATOLOGY | Facility: CLINIC | Age: 65
End: 2020-09-25

## 2020-09-25 DIAGNOSIS — B16.9 ACUTE HEPATITIS B: ICD-10-CM

## 2020-09-25 PROCEDURE — 36415 COLL VENOUS BLD VENIPUNCTURE: CPT | Mod: HCNC

## 2020-09-25 PROCEDURE — 86707 HEPATITIS BE ANTIBODY: CPT | Mod: HCNC

## 2020-09-25 PROCEDURE — 87350 HEPATITIS BE AG IA: CPT | Mod: HCNC

## 2020-09-25 PROCEDURE — 86706 HEP B SURFACE ANTIBODY: CPT | Mod: HCNC

## 2020-09-25 PROCEDURE — 87340 HEPATITIS B SURFACE AG IA: CPT | Mod: HCNC

## 2020-09-25 NOTE — TELEPHONE ENCOUNTER
Called patient to schedule his labs. He is unable to reached left him  to please give us a callback.

## 2020-09-25 NOTE — TELEPHONE ENCOUNTER
----- Message from Dario Del Rio sent at 9/25/2020 10:09 AM CDT -----  Pt returning missed call            [phone] (NATAN)

## 2020-09-28 LAB
HBV SURFACE AB SER-ACNC: POSITIVE M[IU]/ML
HBV SURFACE AG SERPL QL IA: NEGATIVE

## 2020-09-29 LAB
HBV E AB SER QL: ABNORMAL
HBV E AG SERPL QL IA: NORMAL

## 2020-10-01 ENCOUNTER — TELEPHONE (OUTPATIENT)
Dept: HEPATOLOGY | Facility: CLINIC | Age: 65
End: 2020-10-01

## 2020-10-01 DIAGNOSIS — B19.10 HEPATITIS B INFECTION WITHOUT DELTA AGENT WITHOUT HEPATIC COMA, UNSPECIFIED CHRONICITY: ICD-10-CM

## 2020-10-01 DIAGNOSIS — B19.10 VIRAL HEPATITIS B WITH HEPATITIS DELTA, WITHOUT HEPATIC COMA, UNSPECIFIED CHRONICITY: Primary | ICD-10-CM

## 2020-10-01 NOTE — TELEPHONE ENCOUNTER
----- Message from Alla Haney MD sent at 10/1/2020  4:24 AM CDT -----  Hepatitis B markers indicate he has cleared hepatitis B for now.  Continue hep B medication.  Will confirm with labs in 3 months. HBsAg and HBsAb and HBV DNA quant ordered - pl give lab appt in 3 months.

## 2020-10-06 ENCOUNTER — TELEPHONE (OUTPATIENT)
Dept: PHARMACY | Facility: CLINIC | Age: 65
End: 2020-10-06

## 2020-10-14 ENCOUNTER — TELEPHONE (OUTPATIENT)
Dept: HEPATOLOGY | Facility: CLINIC | Age: 65
End: 2020-10-14

## 2020-10-14 NOTE — TELEPHONE ENCOUNTER
----- Message from Daniela Alvarenga sent at 10/14/2020 12:41 PM CDT -----  Pt is returning a call to Donya Barraza contact [phone]

## 2020-10-14 NOTE — TELEPHONE ENCOUNTER
----- Message from Amy Hutchison MA sent at 9/22/2020  2:42 PM CDT -----  Regarding: patient due NOV

## 2020-10-19 ENCOUNTER — OFFICE VISIT (OUTPATIENT)
Dept: PRIMARY CARE CLINIC | Facility: CLINIC | Age: 65
End: 2020-10-19
Payer: MEDICARE

## 2020-10-19 ENCOUNTER — IMMUNIZATION (OUTPATIENT)
Dept: INTERNAL MEDICINE | Facility: CLINIC | Age: 65
End: 2020-10-19
Payer: MEDICARE

## 2020-10-19 VITALS
WEIGHT: 216.06 LBS | DIASTOLIC BLOOD PRESSURE: 74 MMHG | SYSTOLIC BLOOD PRESSURE: 110 MMHG | BODY MASS INDEX: 32.74 KG/M2 | TEMPERATURE: 99 F | HEART RATE: 75 BPM | OXYGEN SATURATION: 98 % | HEIGHT: 68 IN | RESPIRATION RATE: 20 BRPM

## 2020-10-19 DIAGNOSIS — N48.6 PEYRONIE'S DISEASE: ICD-10-CM

## 2020-10-19 DIAGNOSIS — Z79.01 CHRONIC ANTICOAGULATION: ICD-10-CM

## 2020-10-19 DIAGNOSIS — M75.52 BILATERAL SHOULDER BURSITIS: ICD-10-CM

## 2020-10-19 DIAGNOSIS — C82.90 FOLLICULAR NON-HODGKIN'S LYMPHOMA: ICD-10-CM

## 2020-10-19 DIAGNOSIS — N63.10 BREAST MASS, RIGHT: ICD-10-CM

## 2020-10-19 DIAGNOSIS — Z00.00 HEALTHCARE MAINTENANCE: ICD-10-CM

## 2020-10-19 DIAGNOSIS — M75.51 BILATERAL SHOULDER BURSITIS: ICD-10-CM

## 2020-10-19 PROCEDURE — 3008F PR BODY MASS INDEX (BMI) DOCUMENTED: ICD-10-PCS | Mod: HCNC,CPTII,S$GLB, | Performed by: INTERNAL MEDICINE

## 2020-10-19 PROCEDURE — 90694 FLU VACCINE - QUADRIVALENT - ADJUVANTED: ICD-10-PCS | Mod: HCNC,S$GLB,, | Performed by: INTERNAL MEDICINE

## 2020-10-19 PROCEDURE — 99214 PR OFFICE/OUTPT VISIT, EST, LEVL IV, 30-39 MIN: ICD-10-PCS | Mod: 25,HCNC,S$GLB, | Performed by: INTERNAL MEDICINE

## 2020-10-19 PROCEDURE — G0008 FLU VACCINE - QUADRIVALENT - ADJUVANTED: ICD-10-PCS | Mod: HCNC,S$GLB,, | Performed by: INTERNAL MEDICINE

## 2020-10-19 PROCEDURE — 90694 VACC AIIV4 NO PRSRV 0.5ML IM: CPT | Mod: HCNC,S$GLB,, | Performed by: INTERNAL MEDICINE

## 2020-10-19 PROCEDURE — 99499 UNLISTED E&M SERVICE: CPT | Mod: S$GLB,,, | Performed by: INTERNAL MEDICINE

## 2020-10-19 PROCEDURE — 99499 RISK ADDL DX/OHS AUDIT: ICD-10-PCS | Mod: S$GLB,,, | Performed by: INTERNAL MEDICINE

## 2020-10-19 PROCEDURE — 99214 OFFICE O/P EST MOD 30 MIN: CPT | Mod: 25,HCNC,S$GLB, | Performed by: INTERNAL MEDICINE

## 2020-10-19 PROCEDURE — 3008F BODY MASS INDEX DOCD: CPT | Mod: HCNC,CPTII,S$GLB, | Performed by: INTERNAL MEDICINE

## 2020-10-19 PROCEDURE — G0008 ADMIN INFLUENZA VIRUS VAC: HCPCS | Mod: HCNC,S$GLB,, | Performed by: INTERNAL MEDICINE

## 2020-10-19 NOTE — ASSESSMENT & PLAN NOTE
Improved imaging recently.  No tx at this time.    · Pt to discuss any surg with onc prior to with continued findings on imaging  · onc has d/c xarelto but pt just stopped taking 5 days ago

## 2020-10-19 NOTE — PROGRESS NOTES
This note has been generated using voice-recognition software. There may be typographical errors that have been missed during proof-reading.  Primary Care Provider Appointment    Subjective:      Patient ID: Jeff Hope is a 64 y.o. male here for follow up.     Chief Complaint: Follow-up (pain in breast area, right side, comes and goes, inteferes with sleeping)    HPI:  This is a pleasant 63-year-old male with bronchiectasis, hyperlipidemia, history of pulmonary embolism on chronic anticoagulation with a follicular non-Hodgkin's lymphoma, acute/reactivated hepatitis B here for f/u.  Pt last seen 8/18.   Since last visit, patient has been seen by physical therapy for right shoulder pain and discharge.  MRI of shoulder to be done today with orthopedist.  Patient is considering surgery for right shoulder pain.  Discussed the need for informing both Oncology and hepatology prior to surgery.  Patient anticoagulation was stopped at last visit with oncology.  Patient did not want to stop without discussing with me.  Ran out of medication 5 days ago and patient instructed to stop.  Patient has not been using TENS unit out of concern with cancer.  Have discussed in the past that is safe.  Patient notes Peyronie disease.  Followed by urology  in the past.  Notes curvature is likely past 30°.  Continue trigger finger pain.  Specifically right.  Not better with physical therapy.  Patient is not interested in surgery at this time.    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    Pulmonary hypertension    Myalgia due to  statin    Vitamin D deficiency    Port-A-Cath in place    Abnormal MMSE    Allergic rhinitis    Adrenal hypertrophy    Bilateral shoulder bursitis    Acute hepatitis B    Trigger finger of left thumb    Bilateral shoulder pain    Chronic thumb pain, bilateral    Breast mass, right    Peyronie's disease        Past Surgical History:   Procedure Laterality Date    GROIN EXPLORATION  2 pre 2005 and 1 post 2005    total of 3 procedures    LAPAROSCOPIC CHOLECYSTECTOMY  05/2018    SHOULDER SURGERY Left        Past Medical History:   Diagnosis Date    Personal history of colonic polyps 09/08/2015    per [facility] report - repeat 9/2020       Social History     Socioeconomic History    Marital status:      Spouse name: Not on file    Number of children: 2    Years of education: Not on file    Highest education level: Not on file   Occupational History    Occupation: disabled [employer]    Social Needs    Financial resource strain: Somewhat hard    Food insecurity     Worry: Often true     Inability: Sometimes true    Transportation needs     Medical: No     Non-medical: No   Tobacco Use    Smoking status: Never Smoker    Smokeless tobacco: Never Used   Substance and Sexual Activity    Alcohol use: No     Frequency: Never    Drug use: No    Sexual activity: Yes     Partners: Female   Lifestyle    Physical activity     Days per week: 0 days     Minutes per session: 0 min    Stress: Rather much   Relationships    Social connections     Talks on phone: Twice a week     Gets together: Twice a week     Attends Anabaptism service: Never     Active member of club or organization: No     Attends meetings of clubs or organizations: Never     Relationship status:    Other Topics Concern    Not on file   Social History Narrative    Lives with wife.         Review of Systems    PHQ9 12/10/2019   Total Score 3        Checklist of Daily Activities:        Objective:   /74 (BP Location:  "Left arm, Patient Position: Sitting, BP Method: Medium (Manual))   Pulse 75   Temp 98.5 °F (36.9 °C) (Oral)   Resp 20   Ht 5' 8" (1.727 m)   Wt 98 kg (216 lb 0.8 oz)   SpO2 98%   BMI 32.85 kg/m²     Physical Exam  Constitutional:       Appearance: Normal appearance.   HENT:      Head: Normocephalic and atraumatic.   Chest:      Breasts: Breasts are asymmetrical.         Right: Mass and tenderness present. No swelling, bleeding, inverted nipple, nipple discharge or skin change.         Left: Normal.          Comments: R breast with tenderness under R nipple.  Prominent breast tissue most likely gynecomastia.  L without any tenderness.    Neurological:      Mental Status: He is alert.             Lab Results   Component Value Date    WBC 5.34 09/18/2020    HGB 14.3 09/18/2020    HCT 43.7 09/18/2020     09/18/2020    CHOL 205 (H) 01/27/2020    TRIG 106 01/27/2020    HDL 65 01/27/2020    ALT 12 09/18/2020    AST 19 09/18/2020     09/18/2020    K 4.3 09/18/2020     09/18/2020    CREATININE 1.0 09/18/2020    BUN 11 09/18/2020    CO2 29 09/18/2020    TSH 0.972 01/27/2020    INR 1.1 05/18/2020       Current Outpatient Medications on File Prior to Visit   Medication Sig Dispense Refill    pregabalin (LYRICA) 150 MG capsule Take 150 mg by mouth once daily.      tenofovir alafenamide (VEMLIDY) 25 mg Tab Take 1 tablet (25 mg total) by mouth once daily. 30 tablet 11    traMADoL (ULTRAM) 50 mg tablet Take 1 tablet (50 mg total) by mouth every 6 (six) hours. 60 tablet 0    [DISCONTINUED] rivaroxaban (XARELTO) 20 mg Tab Take 1 tablet (20 mg total) by mouth daily with dinner or evening meal. 90 tablet 3     No current facility-administered medications on file prior to visit.          Assessment:   64 y.o. male with multiple co-morbid illnesses here for continued follow up of medical problems.      Plan:     Problem List Items Addressed This Visit        Renal/    Breast mass, right     R breast " tenderness with increased tissue  · Reorder breast U/S (was already ordered by onc)         Relevant Orders    US Breast Right Complete    Peyronie's disease     Significant curve per pt.  No ED.    · Back to Dr Mancuso, urology at [facility]            Hematology    Chronic anticoagulation     Med d/c with onc.  Pt considered  In remission.              Oncology    Follicular non-Hodgkin's lymphoma     Improved imaging recently.  No tx at this time.    · Pt to discuss any surg with onc prior to with continued findings on imaging  · onc has d/c xarelto but pt just stopped taking 5 days ago            Orthopedic    Bilateral shoulder bursitis     Followed by ortho.  R >L pain.  Pt completed PT.  Stopped TENS with worry about safety.  Considering surg.  MRI today.    · To discuss any surg with onc/hepatology.              Other    Healthcare maintenance     · ACP completed 7/2020  · Flu today--high dose with NHL.    · Yearly labs 1/2021.                 Health Maintenance       Date Due Completion Date    Shingles Vaccine (1 of 2) 11/30/2005 ---    Influenza Vaccine (1) 08/01/2020 12/10/2019    Colorectal Cancer Screening 09/08/2020 9/8/2015    Lipid Panel 01/27/2025 1/27/2020    Pneumococcal Vaccine (Highest Risk) (3 of 3 - PPSV23) 08/18/2025 8/18/2020    TETANUS VACCINE 06/15/2030 6/15/2020        Medications Reconciliation:   I have not reconciled the patient's home medications with the patient/family. I have updated all changes.  Refer to After-Visit Medication List.      Medication List          Accurate as of October 19, 2020  8:51 AM. If you have any questions, ask your nurse or doctor.            CONTINUE taking these medications    pregabalin 150 MG capsule  Commonly known as: LYRICA     traMADoL 50 mg tablet  Commonly known as: ULTRAM  Take 1 tablet (50 mg total) by mouth every 6 (six) hours.     VEMLIDY 25 mg Tab  Generic drug: tenofovir alafenamide  Take 1 tablet (25 mg total) by mouth once daily.        STOP  taking these medications    rivaroxaban 20 mg Tab  Commonly known as: XARELTO             Next/future visit:  MRI shoulder and ?surg,  vaccines, breast u/s for mass, urology for peyronie's     Follow up in about 2 months (around 12/19/2020). Total face-to-face time was 35 min, >50% of this was spent on counseling and coordination of care. The following issues were discussed: shoulder pain and MRI, urology and Peyronie's, flu, breast mass, AC d/c.       Roly Flannery MD  Internal Medicine  [facility]  [phone]

## 2020-10-19 NOTE — ASSESSMENT & PLAN NOTE
Followed by ortho.  R >L pain.  Pt completed PT.  Stopped TENS with worry about safety.  Considering surg.  MRI today.    · To discuss any surg with onc/hepatology.

## 2020-10-19 NOTE — ASSESSMENT & PLAN NOTE
Significant curve per pt.  No ED.    · Back to Dr Mancuso, urology at [facility]

## 2020-10-23 ENCOUNTER — TELEPHONE (OUTPATIENT)
Dept: PRIMARY CARE CLINIC | Facility: CLINIC | Age: 65
End: 2020-10-23

## 2020-10-23 ENCOUNTER — TELEPHONE (OUTPATIENT)
Dept: SURGERY | Facility: CLINIC | Age: 65
End: 2020-10-23

## 2020-10-23 NOTE — TELEPHONE ENCOUNTER
----- Message from Roly Flannery MD sent at 10/23/2020 11:44 AM CDT -----  Please call patient with results.  No concerning findings on MMG.  Please also fax to his oncologist

## 2020-10-23 NOTE — TELEPHONE ENCOUNTER
Contacted the patient regarding the message below.  The patient did not answer, message left with my anme and direct number for patient to contact back.      ----- Message from Tyree Brandt LPN sent at 10/23/2020  8:21 AM CDT -----  Regarding: clinical correlation  Radiologist is recommending. Thank you. Tyree          Recommendation:  While gynecomastia is commonly idiopathic, clinical correlation for a potential underlying etiology is recommended

## 2020-11-02 ENCOUNTER — SPECIALTY PHARMACY (OUTPATIENT)
Dept: PHARMACY | Facility: CLINIC | Age: 65
End: 2020-11-02

## 2020-11-02 NOTE — TELEPHONE ENCOUNTER
Specialty Pharmacy - Refill Coordination    Specialty Medication Orders Linked to Encounter      Most Recent Value   Medication #1  tenofovir alafenamide (VEMLIDY) 25 mg Tab (Order#[id number], Rx#[id number])          Refill Questions - Documented Responses      Most Recent Value   Relationship to patient of person spoken to?  Self   HIPAA/medical authority confirmed?  Yes   Any changes in contact preferences or allowed representatives?  No   Has the patient had any insurance changes?  No   Has the patient had any changes to specialty medication, dose, or instructions?  No   Has the patient started taking any new medications, herbals, or supplements?  No   Has the patient been diagnosed with any new medical conditions?  No   Does the patient have any new allergies to medications or foods?  No   Does the patient have any concerns about side effects?  No   Can the patient store medication/sharps container properly (at the correct temperature, away from children/pets, etc.)?  Yes   Can the patient call emergency services (911) in the event of an emergency?  Yes   Does the patient have any concerns or questions about taking or administering this medication as prescribed?  No   How many doses did the patient miss in the past 4 weeks or since the last fill?  0   How many doses does the patient have on hand?  7   How many days does the patient report on hand quantity will last?  7   Does the number of doses/days supply remaining match pharmacy expected amounts?  Yes   How will the patient receive the medication?  Pickup   When does the patient need to receive the medication?  11/06/20   Expected Copay ($)  0   Is the patient able to afford the medication copay?  Yes   Payment Method  zero copay   Days supply of Refill  30   Would patient like to speak to a pharmacist?  No   Do you want to trigger an intervention?  No   Do you want to trigger an additional referral task?  No   Refill activity completed?  Yes   Refill  activity plan  Refill scheduled   Shipment/Pickup Date:  11/03/20          Current Outpatient Medications   Medication Sig    pregabalin (LYRICA) 150 MG capsule Take 150 mg by mouth once daily.    tenofovir alafenamide (VEMLIDY) 25 mg Tab Take 1 tablet (25 mg total) by mouth once daily.    traMADoL (ULTRAM) 50 mg tablet Take 1 tablet (50 mg total) by mouth every 6 (six) hours.   Last reviewed on 11/2/2020  4:03 PM by Leora Esparza    Review of patient's allergies indicates:   Allergen Reactions    Rosuvastatin      myalgias    Last reviewed on  11/2/2020 4:03 PM by Leora Esparza      Tasks added this encounter   No tasks added.   Tasks due within next 3 months   10/28/2020 - Refill Call  1/27/2021 - Clinical - Follow Up Assesement (180 day)     Leora Esparza  [facility]  [address] [place] [id number]  Phone: [phone]  Fax: [phone]

## 2020-11-04 ENCOUNTER — LAB VISIT (OUTPATIENT)
Dept: LAB | Facility: HOSPITAL | Age: 65
End: 2020-11-04
Attending: INTERNAL MEDICINE
Payer: MEDICARE

## 2020-11-04 DIAGNOSIS — B19.10 HEPATITIS B INFECTION WITHOUT DELTA AGENT WITHOUT HEPATIC COMA, UNSPECIFIED CHRONICITY: ICD-10-CM

## 2020-11-04 DIAGNOSIS — B16.9 ACUTE HEPATITIS B: ICD-10-CM

## 2020-11-04 LAB
ALBUMIN SERPL BCP-MCNC: 3.9 G/DL (ref 3.5–5.2)
ALP SERPL-CCNC: 73 U/L (ref 55–135)
ALT SERPL W/O P-5'-P-CCNC: 18 U/L (ref 10–44)
ANION GAP SERPL CALC-SCNC: 9 MMOL/L (ref 8–16)
AST SERPL-CCNC: 24 U/L (ref 10–40)
BASOPHILS # BLD AUTO: 0.02 K/UL (ref 0–0.2)
BASOPHILS NFR BLD: 0.4 % (ref 0–1.9)
BILIRUB SERPL-MCNC: 0.8 MG/DL (ref 0.1–1)
BUN SERPL-MCNC: 12 MG/DL (ref 8–23)
CALCIUM SERPL-MCNC: 8.9 MG/DL (ref 8.7–10.5)
CHLORIDE SERPL-SCNC: 101 MMOL/L (ref 95–110)
CO2 SERPL-SCNC: 28 MMOL/L (ref 23–29)
CREAT SERPL-MCNC: 1.1 MG/DL (ref 0.5–1.4)
DIFFERENTIAL METHOD: ABNORMAL
EOSINOPHIL # BLD AUTO: 0 K/UL (ref 0–0.5)
EOSINOPHIL NFR BLD: 0.6 % (ref 0–8)
ERYTHROCYTE [DISTWIDTH] IN BLOOD BY AUTOMATED COUNT: 12.7 % (ref 11.5–14.5)
EST. GFR  (AFRICAN AMERICAN): >60 ML/MIN/1.73 M^2
EST. GFR  (NON AFRICAN AMERICAN): >60 ML/MIN/1.73 M^2
GLUCOSE SERPL-MCNC: 144 MG/DL (ref 70–110)
HCT VFR BLD AUTO: 42.5 % (ref 40–54)
HGB BLD-MCNC: 14.2 G/DL (ref 14–18)
IMM GRANULOCYTES # BLD AUTO: 0.01 K/UL (ref 0–0.04)
IMM GRANULOCYTES NFR BLD AUTO: 0.2 % (ref 0–0.5)
LYMPHOCYTES # BLD AUTO: 2.7 K/UL (ref 1–4.8)
LYMPHOCYTES NFR BLD: 49.3 % (ref 18–48)
MCH RBC QN AUTO: 30.1 PG (ref 27–31)
MCHC RBC AUTO-ENTMCNC: 33.4 G/DL (ref 32–36)
MCV RBC AUTO: 90 FL (ref 82–98)
MONOCYTES # BLD AUTO: 0.3 K/UL (ref 0.3–1)
MONOCYTES NFR BLD: 5.2 % (ref 4–15)
NEUTROPHILS # BLD AUTO: 2.4 K/UL (ref 1.8–7.7)
NEUTROPHILS NFR BLD: 44.3 % (ref 38–73)
NRBC BLD-RTO: 0 /100 WBC
PLATELET # BLD AUTO: 149 K/UL (ref 150–350)
PMV BLD AUTO: 10.2 FL (ref 9.2–12.9)
POTASSIUM SERPL-SCNC: 3.6 MMOL/L (ref 3.5–5.1)
PROT SERPL-MCNC: 6.7 G/DL (ref 6–8.4)
RBC # BLD AUTO: 4.72 M/UL (ref 4.6–6.2)
SODIUM SERPL-SCNC: 138 MMOL/L (ref 136–145)
WBC # BLD AUTO: 5.38 K/UL (ref 3.9–12.7)

## 2020-11-04 PROCEDURE — 80053 COMPREHEN METABOLIC PANEL: CPT

## 2020-11-04 PROCEDURE — 36415 COLL VENOUS BLD VENIPUNCTURE: CPT

## 2020-11-04 PROCEDURE — 85025 COMPLETE CBC W/AUTO DIFF WBC: CPT

## 2020-11-04 PROCEDURE — 87517 HEPATITIS B DNA QUANT: CPT

## 2020-11-05 ENCOUNTER — TELEPHONE (OUTPATIENT)
Dept: HEPATOLOGY | Facility: CLINIC | Age: 65
End: 2020-11-05

## 2020-11-05 NOTE — TELEPHONE ENCOUNTER
----- Message from Alla Haney MD sent at 11/5/2020  4:58 AM CST -----  Please inform patient results are OK.

## 2020-11-06 ENCOUNTER — TELEPHONE (OUTPATIENT)
Dept: PRIMARY CARE CLINIC | Facility: CLINIC | Age: 65
End: 2020-11-06

## 2020-11-06 NOTE — TELEPHONE ENCOUNTER
----- Message from Roly Flannery MD sent at 11/6/2020  6:36 AM CST -----  Please call patient with results.  Normal renal and liver function noted.

## 2020-11-10 ENCOUNTER — PATIENT OUTREACH (OUTPATIENT)
Dept: ADMINISTRATIVE | Facility: OTHER | Age: 65
End: 2020-11-10

## 2020-11-10 ENCOUNTER — TELEPHONE (OUTPATIENT)
Dept: SURGERY | Facility: CLINIC | Age: 65
End: 2020-11-10

## 2020-11-10 ENCOUNTER — TELEPHONE (OUTPATIENT)
Dept: HEPATOLOGY | Facility: CLINIC | Age: 65
End: 2020-11-10

## 2020-11-10 NOTE — TELEPHONE ENCOUNTER
----- Message from Alla Haney MD sent at 11/10/2020  8:51 AM CST -----  Please inform patient:  Hep B viral test remains negative.  Continue hep B medication.

## 2020-11-10 NOTE — TELEPHONE ENCOUNTER
"2nd attempt to contact the patient regarding the message below.  Patient declined appointment, stating that he saw his "cancer doctor" on today Tuesday 11/10/2020 and that his doctor examined that area.  Tyree Brandt LPN breast imaging notified.           ----- Message from Tyree Brandt LPN sent at 10/23/2020  8:21 AM CDT -----  Regarding: clinical correlation  Radiologist is recommending. Thank you. Tyree          Recommendation:  While gynecomastia is commonly idiopathic, clinical correlation for a potential underlying etiology is recommended      "

## 2020-11-10 NOTE — PROGRESS NOTES
Care Everywhere: updated  Immunization: updated, links delay   Health Maintenance: updated  Media Review: review for outside colon cancer report   Legacy Review:   Order placed:   Upcoming appts:

## 2020-11-11 ENCOUNTER — OFFICE VISIT (OUTPATIENT)
Dept: HEPATOLOGY | Facility: CLINIC | Age: 65
End: 2020-11-11
Payer: MEDICARE

## 2020-11-11 VITALS
HEIGHT: 69 IN | OXYGEN SATURATION: 98 % | SYSTOLIC BLOOD PRESSURE: 136 MMHG | WEIGHT: 213.88 LBS | RESPIRATION RATE: 18 BRPM | BODY MASS INDEX: 31.68 KG/M2 | DIASTOLIC BLOOD PRESSURE: 76 MMHG | HEART RATE: 83 BPM | TEMPERATURE: 97 F

## 2020-11-11 DIAGNOSIS — B16.9 ACUTE HEPATITIS B: Primary | ICD-10-CM

## 2020-11-11 PROCEDURE — 99499 RISK ADDL DX/OHS AUDIT: ICD-10-PCS | Mod: S$GLB,,, | Performed by: INTERNAL MEDICINE

## 2020-11-11 PROCEDURE — 99999 PR PBB SHADOW E&M-EST. PATIENT-LVL IV: ICD-10-PCS | Mod: PBBFAC,,, | Performed by: INTERNAL MEDICINE

## 2020-11-11 PROCEDURE — 3008F BODY MASS INDEX DOCD: CPT | Mod: CPTII,S$GLB,, | Performed by: INTERNAL MEDICINE

## 2020-11-11 PROCEDURE — 99499 UNLISTED E&M SERVICE: CPT | Mod: S$GLB,,, | Performed by: INTERNAL MEDICINE

## 2020-11-11 PROCEDURE — 99999 PR PBB SHADOW E&M-EST. PATIENT-LVL IV: CPT | Mod: PBBFAC,,, | Performed by: INTERNAL MEDICINE

## 2020-11-11 PROCEDURE — 3008F PR BODY MASS INDEX (BMI) DOCUMENTED: ICD-10-PCS | Mod: CPTII,S$GLB,, | Performed by: INTERNAL MEDICINE

## 2020-11-11 PROCEDURE — 99214 PR OFFICE/OUTPT VISIT, EST, LEVL IV, 30-39 MIN: ICD-10-PCS | Mod: S$GLB,,, | Performed by: INTERNAL MEDICINE

## 2020-11-11 PROCEDURE — 99214 OFFICE O/P EST MOD 30 MIN: CPT | Mod: S$GLB,,, | Performed by: INTERNAL MEDICINE

## 2020-11-11 RX ORDER — HYDROXYZINE PAMOATE 50 MG/1
50 CAPSULE ORAL
COMMUNITY
Start: 2019-03-15 | End: 2023-07-18

## 2020-11-11 RX ORDER — CELECOXIB 200 MG/1
CAPSULE ORAL
COMMUNITY
Start: 2020-09-30

## 2020-11-11 RX ORDER — AMITRIPTYLINE HYDROCHLORIDE 25 MG/1
TABLET, FILM COATED ORAL
COMMUNITY
Start: 2020-09-30 | End: 2020-12-31

## 2020-11-11 NOTE — PROGRESS NOTES
"   [facility] Follow-up Note 7/20/2020    Reason for Visit:  The encounter diagnosis was Acute hepatitis B.    PCP: Roly Flannery       HPI:  This is a 64 y.o. male here for evaluation of: transaminase elevation.    63 y/o male with H/o follicular non-Hodgkin's lymphoma, malignant mets to bone, anti-neoplastic chemotherapy R-CHOP in Jan 2020, with pancytopenia, Pulm HTN, PE, mixed hyperlipidemia, was found to have elevated transaminases on 1/27/20 and again the next day.  AST in 500-700 range and ALT in 5684-0905 range.  Alk phos and T bili normal.  On Vemlidy, enzymes have normalized AST 24, ALT 18, HBV DNA has declined to <10, not detected (from 1.1 million IU/mL).  Hep B sAg has seroconverted to Hep B sAb positive on 9/25/2020 (approx. 9 weeks ago).  Thus, he has achieved "remission" what we used to refer to as "cure".  His latest blood tests at [facility] ([place] from [facility] [place]in Care Everywhere) on 11/4/20:  CBC ok, CMP also Ok, In this patient's case, he will remain on Vemlidy for 1 more year, due to possibility he may need chemotherapy again.  )-  Today, patient states: he feels fine, except he has right shoulder pain and right breast swelling (Mammogram: gynecomastia) and pain.  He sees Dr. José Antonio Del Cid, Oncologist at [facility], (phone- [phone]) he saw patient yesterday, and has blood tests today at [facility].        History given by patient's wife:  Patient was diagnosed with NHL in Nov-Dec 2018 with diffuse abd lymph node enlargement, stage 4 with mets in bone marrow.  R-CHOP was started in Jan 2020 under the care of Dr. Dominik Farooq at [facility].  After the first treatment, patient had acute upper abd pain, so they held off treatment with R-CHOP.  Evaluation showed abd pain was due to gallbladder, so he had a lap cholecystectomy (at [facility]) .  After surgery, he had PEs (from "upper part of his body", not lower extremities).  All of this was treated, then R-CHOP was resumed around June/July " "2019.  He received 6-7 cycles, every 21 days.  Last treatment was completed just before Christmas 2019.  Then, he was told he would return in 3 months (March 2020) for any future treatments.      He did have an evaluation with a PET-CT on 12/30/19 which showed near complete resolution of the lymphadenopathy, however, 2 small 6 mm subpleural nodules on the left lower lobe of the lung and 2 small lymph nodes in the left anterior mesentery were seen on this PET-CT.  Labs that day 12/30/19 showed AST was already elevated to 55 and ALT to 88.  Then there were no labs until Jan 27/2020, when AT, ALT were elevated as above.      HBsAg neg, HBeAg and HBcAb IgM were neg on 3/15/19 (prior to starting R-CHOP).     Now HBsAg +, HBeAg +, HBV DNA 1.13 million IU/mL. Vemlidy started on 2/12/20.     Patient has been on Vemlidy 25 mg daily and with this treatment, his transaminases have normalized and his HBV DNA is now <10, detetcted.  This is down from 1.13 million IU/ ml.      Thus, patient has responded well to Vemlidy and will need to remain on it, as long he gets chemotherapy until loss of hep B surface antigen, which could be several months.  If we stop it prematurely, I am afraid he may reactivate as he did being treated with Rituxan.            Current meds:   Current Outpatient Medications   Medication Sig    amitriptyline (ELAVIL) 25 MG tablet TK 1 T PO QD HS    celecoxib (CELEBREX) 200 MG capsule TK ONE C PO BID    hydrOXYzine pamoate (VISTARIL) 50 MG Cap Take 50 mg by mouth.    tenofovir alafenamide (VEMLIDY) 25 mg Tab Take 1 tablet (25 mg total) by mouth once daily.    pregabalin (LYRICA) 150 MG capsule Take 150 mg by mouth once daily.    traMADoL (ULTRAM) 50 mg tablet Take 1 tablet (50 mg total) by mouth every 6 (six) hours. (Patient not taking: Reported on 11/11/2020)     No current facility-administered medications for this visit.      Patient's symptoms are revolving around a "bad shoulder".      Elevated " liver enzymes: Yes  Abnormal imaging: Abd masses from NHL   Cirrhosis: No  Hepatitis C: No  Hepatitis B: No  Fatty liver: No  Encephalopathy: No  Post-hospital discharge: No  Symptoms: right shoulder pain    Primary hepatic manifestations:  Fatigue:Yes  Edema:Yes  Ascites:Yes  Encephalopathy:Yes  Abdominal pain:Yes  GI bleeds: Yes  Pruritus:Yes  Weight Changes:Yes  Changes in Bowel habits: Yes  Muscle cramps:No    Risk factors for liver disease:  No jaundice  No transfusions  No IVDU  Did not snort cocaine or similar agents  Did not live with anyone with hepatitis B or C  Sexual partner not tested  No hepatotoxic medications  No exposure to industrial toxins  Alcohol: None      ROS:  Constitutional: No fevers, chills, weight changes, fatigue  ENT: No allergies, nosebleeds,   CV: No chest pain  Pulm: No cough, shortness of breath  Ophtho: No vision changes  GI/Liver: see HPI  Derm: No rash, itching  Heme: No swollen glands, bruising  MSK: No joint pains, joint swelling  : No dysuria, hematuria, decrease in urine output  Endo: No hot or cold intolerance  Neuro: No confusion, disorientation, difficulty with sleep, memory, concentration, syncope, seizure  Psych: No anxiety, depression    Medical History:  has a past medical history of Personal history of colonic polyps (09/08/2015).    Surgical History:  has a past surgical history that includes Shoulder surgery (Left); Groin exploration (2 pre 2005 and 1 post 2005); and Laparoscopic cholecystectomy (05/2018).    Family History: family history includes Breast cancer in his mother; Diabetes in his brother; Emphysema in his father; No Known Problems in his daughter and daughter; Stroke in his sister; Ulcers in his brother..     Social History:  reports that he has never smoked. He has never used smokeless tobacco. He reports that he does not drink alcohol or use drugs.    Review of patient's allergies indicates:   Allergen Reactions    Rosuvastatin      myalgias  "      Current Outpatient Rx   Medication Sig Dispense Refill    amitriptyline (ELAVIL) 25 MG tablet TK 1 T PO QD HS      celecoxib (CELEBREX) 200 MG capsule TK ONE C PO BID      hydrOXYzine pamoate (VISTARIL) 50 MG Cap Take 50 mg by mouth.      tenofovir alafenamide (VEMLIDY) 25 mg Tab Take 1 tablet (25 mg total) by mouth once daily. 30 tablet 11    pregabalin (LYRICA) 150 MG capsule Take 150 mg by mouth once daily.      traMADoL (ULTRAM) 50 mg tablet Take 1 tablet (50 mg total) by mouth every 6 (six) hours. (Patient not taking: Reported on 11/11/2020) 60 tablet 0       Objective Findings:    Vital Signs:  /76 (BP Location: Left arm, Patient Position: Sitting, BP Method: Medium (Automatic))   Pulse 83   Temp 97 °F (36.1 °C) (Oral)   Resp 18   Ht 5' 9" (1.753 m)   Wt 97 kg (213 lb 13.5 oz)   SpO2 98%   BMI 31.58 kg/m²   Body mass index is 31.58 kg/m².    Physical Exam:  General Appearance: Well appearing in no acute distress  Head:   Normocephalic, without obvious abnormality  Eyes:    No scleral icterus, EOMI  ENT: Neck supple, Lips, mucosa, and tongue normal; teeth and gums normal  Lungs: CTA bilaterally in anterior and posterior fields, no wheezes, no crackles.  Heart:  Regular rate and rhythm, S1, S2 normal, no murmurs heard  Abdomen: Soft, non tender, non distended with positive bowel sounds in all four quadrants. No hepatosplenomegaly, ascites, or mass  Extremities: 2+ pulses, no clubbing, cyanosis or edema  Skin: No rash  Neurologic: CN II-XII intact, alert, oriented x 3. No asterixis      Labs:  Lab Results   Component Value Date    WBC 5.38 11/04/2020    HGB 14.2 11/04/2020    HCT 42.5 11/04/2020     (L) 11/04/2020    CHOL 205 (H) 01/27/2020    TRIG 106 01/27/2020    HDL 65 01/27/2020    INR 1.1 05/18/2020    CREATININE 1.1 11/04/2020    BUN 12 11/04/2020    BILITOT 0.8 11/04/2020    ALT 18 11/04/2020    AST 24 11/04/2020    ALKPHOS 73 11/04/2020     11/04/2020    K 3.6 " "11/04/2020     11/04/2020    CO2 28 11/04/2020    TSH 0.972 01/27/2020       Imaging:       Endoscopy:      Assessment:  1. Acute hepatitis B       Acute Hepatitis (or acute exacerbation of chronic) "reactivation" after Rituxan:    Pt with NHL, HLD, PE, Pulm HTN, s/p lap valentina, s/p R-CHOP 6-7 cycles.   Here fr elevated transaminases:  elevated transaminases first noted on 12/30/19 when R-CHOP therapy was completed, then no labs until on 1/27/20 (and again the next day with a slight downward trend).  AST in 500 range and ALT in 1100 range.  Alk phos and T bili normal.      -  Acute hepatitis B: resolving, on treatment with tenofovir alafenamide with normal enzymes and HBV DNA <10, detected.    Patient has been on Vemlidy 25 mg daily and with this treatment, his transaminases have normalized and his HBV DNA is now <10, detetcted.  This is down from 1.13 million IU/ ml.      Thus, patient has responded well to Vemlidy and will need to remain on it, as long he gets chemotherapy until loss of hep B surface antigen, which could be several months.  If we stop it prematurely, I am afraid he may reactivate as he did being treated with Rituxan.           Response of NHL to R-CHOP:   PET-CT 12/30/19: showed near complete resolution of the mesenteric lymph nodes, but interval development of 2 small 6 mm subpleural nodules on the left lower lung. And 2 left mesenteric lymph nodes.   Patient now sees Dr. José Antonio Del Cid, [place] [facility] (phone- [phone]).          US abd 1/28/20:  1.2 cm hypoechoic structure in the region of the janine hepatis.  This finding is indeterminate and may represent prominent periportal lymph node or cystic duct remnant in this patient that is status-post cholecystectomy.  CT abdomen without and with contrast could be done if clinically warranted.    Serologies have been ordered by Dr. Roly Flannery.  Will check results this week.     Recommendations:  -  Labs CBC, CMP, HBV DNA quant every 3 " months, next in December 2020.   -  Steroid pills may take now that HBV DNA is below the limit of measurement.  -  Continue Vemlidy daily (script runs out early Feb 2021).   -  Oncologist retired, he will be seen by new doctor, Michael, not sure.    -  Avoid alcohol, smoking, sedatives and meds with codeine.  -  Avoid high intake of Tylenol (more than 4 extra-strength pills in one day)  -  Call us if any bleeding, fevers, confusion, disorientation occur  -  Return in 6 months, video/audio visit.       Follow up in about 6 months (around 5/11/2021).      Order summary:  No orders of the defined types were placed in this encounter.      Thank you so much for allowing me to participate in the care of Jeff Haney MD

## 2020-11-11 NOTE — PATIENT INSTRUCTIONS
Recommendations:  -  Labs CBC, CMP, HBV DNA quant every 3 months, next in December 2020.   -  Steroid pills may take now that HBV DNA is below the limit of measurement.  -  Continue Vemlidy daily (script runs out early Feb 2021).   -  Oncologist retired, he will be seen by new doctor, Michael, not sure.    -  Avoid alcohol, smoking, sedatives and meds with codeine.  -  Avoid high intake of Tylenol (more than 4 extra-strength pills in one day)  -  Call us if any bleeding, fevers, confusion, disorientation occur  -  Return in 6 months, video/audio visit.

## 2020-11-23 PROBLEM — Z00.00 HEALTHCARE MAINTENANCE: Status: RESOLVED | Noted: 2019-11-19 | Resolved: 2020-11-23

## 2020-11-30 ENCOUNTER — SPECIALTY PHARMACY (OUTPATIENT)
Dept: PHARMACY | Facility: CLINIC | Age: 65
End: 2020-11-30

## 2020-11-30 NOTE — TELEPHONE ENCOUNTER
Specialty Pharmacy - Refill Coordination    Specialty Medication Orders Linked to Encounter      Most Recent Value   Medication #1  tenofovir alafenamide (VEMLIDY) 25 mg Tab (Order#[id number], Rx#[id number])          Refill Questions - Documented Responses      Most Recent Value   Relationship to patient of person spoken to?  Self   HIPAA/medical authority confirmed?  Yes   Any changes in contact preferences or allowed representatives?  No   Has the patient had any insurance changes?  No   Has the patient had any changes to specialty medication, dose, or instructions?  No   Has the patient started taking any new medications, herbals, or supplements?  No   Has the patient been diagnosed with any new medical conditions?  No   Does the patient have any new allergies to medications or foods?  No   Does the patient have any concerns about side effects?  No   Can the patient store medication/sharps container properly (at the correct temperature, away from children/pets, etc.)?  Yes   Can the patient call emergency services (911) in the event of an emergency?  Yes   Does the patient have any concerns or questions about taking or administering this medication as prescribed?  No   How many doses did the patient miss in the past 4 weeks or since the last fill?  0   How many doses does the patient have on hand?  7   How many days does the patient report on hand quantity will last?  7   Does the number of doses/days supply remaining match pharmacy expected amounts?  Yes   Does the patient feel that this medication is effective?  Yes   How will the patient receive the medication?  Pickup   When does the patient need to receive the medication?  12/07/20   Expected Copay ($)  0   Is the patient able to afford the medication copay?  Yes   Payment Method  zero copay   Days supply of Refill  30   Would patient like to speak to a pharmacist?  No   Do you want to trigger an intervention?  No   Do you want to trigger an additional  referral task?  No   Refill activity completed?  Yes   Refill activity plan  Refill scheduled   Shipment/Pickup Date:  12/01/20          Current Outpatient Medications   Medication Sig    amitriptyline (ELAVIL) 25 MG tablet TK 1 T PO QD HS    celecoxib (CELEBREX) 200 MG capsule TK ONE C PO BID    hydrOXYzine pamoate (VISTARIL) 50 MG Cap Take 50 mg by mouth.    pregabalin (LYRICA) 150 MG capsule Take 150 mg by mouth once daily.    tenofovir alafenamide (VEMLIDY) 25 mg Tab Take 1 tablet (25 mg total) by mouth once daily.    traMADoL (ULTRAM) 50 mg tablet Take 1 tablet (50 mg total) by mouth every 6 (six) hours. (Patient not taking: Reported on 11/11/2020)   Last reviewed on 11/30/2020  1:35 PM by Leora Esparza    Review of patient's allergies indicates:   Allergen Reactions    Rosuvastatin      myalgias    Last reviewed on  11/30/2020 1:34 PM by Leora Esparza      Tasks added this encounter   No tasks added.   Tasks due within next 3 months   1/27/2021 - Clinical - Follow Up Assesement (180 day)  11/25/2020 - Refill Call (Auto Added)     Leora Esparza  [facility]  [address] [place] [address]  Phone: [phone]  Fax: [phone]

## 2020-12-11 ENCOUNTER — LAB VISIT (OUTPATIENT)
Dept: LAB | Facility: HOSPITAL | Age: 65
End: 2020-12-11
Attending: INTERNAL MEDICINE
Payer: MEDICARE

## 2020-12-11 DIAGNOSIS — B19.10 HEPATITIS B INFECTION WITHOUT DELTA AGENT WITHOUT HEPATIC COMA, UNSPECIFIED CHRONICITY: ICD-10-CM

## 2020-12-11 LAB
HBV SURFACE AB SER-ACNC: POSITIVE M[IU]/ML
HBV SURFACE AG SERPL QL IA: NEGATIVE

## 2020-12-11 PROCEDURE — 36415 COLL VENOUS BLD VENIPUNCTURE: CPT | Mod: HCNC

## 2020-12-11 PROCEDURE — 87517 HEPATITIS B DNA QUANT: CPT | Mod: HCNC

## 2020-12-11 PROCEDURE — 87340 HEPATITIS B SURFACE AG IA: CPT | Mod: HCNC

## 2020-12-11 PROCEDURE — 86706 HEP B SURFACE ANTIBODY: CPT | Mod: HCNC

## 2020-12-14 ENCOUNTER — TELEPHONE (OUTPATIENT)
Dept: HEPATOLOGY | Facility: CLINIC | Age: 65
End: 2020-12-14

## 2020-12-14 NOTE — TELEPHONE ENCOUNTER
----- Message from Alla Haney MD sent at 12/11/2020  5:14 PM CST -----  Hepatitis B markers indicate his hep B is cured.  But he needs to stay on the Vemlidy because rituxan-induced flare of hep B can recur for up to 1 year.

## 2020-12-14 NOTE — TELEPHONE ENCOUNTER
Called patient to inform him of his lab results he is unable to reached left him  to please give us a callback.

## 2020-12-15 ENCOUNTER — TELEPHONE (OUTPATIENT)
Dept: HEPATOLOGY | Facility: CLINIC | Age: 65
End: 2020-12-15

## 2020-12-15 NOTE — TELEPHONE ENCOUNTER
----- Message from Alla Haney MD sent at 12/15/2020  4:42 AM CST -----  Please inform patient results are OK.

## 2020-12-23 ENCOUNTER — OFFICE VISIT (OUTPATIENT)
Dept: PRIMARY CARE CLINIC | Facility: CLINIC | Age: 65
End: 2020-12-23
Payer: MEDICARE

## 2020-12-23 VITALS
WEIGHT: 214.06 LBS | TEMPERATURE: 98 F | RESPIRATION RATE: 18 BRPM | HEIGHT: 68 IN | DIASTOLIC BLOOD PRESSURE: 80 MMHG | OXYGEN SATURATION: 98 % | BODY MASS INDEX: 32.44 KG/M2 | SYSTOLIC BLOOD PRESSURE: 138 MMHG | HEART RATE: 59 BPM

## 2020-12-23 DIAGNOSIS — Z95.828 PORT-A-CATH IN PLACE: ICD-10-CM

## 2020-12-23 DIAGNOSIS — D69.6 THROMBOCYTOPENIA: ICD-10-CM

## 2020-12-23 DIAGNOSIS — E29.1 HYPOTESTOSTERONEMIA IN MALE: ICD-10-CM

## 2020-12-23 DIAGNOSIS — N48.6 PEYRONIE'S DISEASE: ICD-10-CM

## 2020-12-23 DIAGNOSIS — B16.9 ACUTE HEPATITIS B: ICD-10-CM

## 2020-12-23 DIAGNOSIS — N63.10 BREAST MASS, RIGHT: ICD-10-CM

## 2020-12-23 DIAGNOSIS — M75.52 BILATERAL SHOULDER BURSITIS: ICD-10-CM

## 2020-12-23 DIAGNOSIS — N50.819 PAIN IN TESTICLE, UNSPECIFIED LATERALITY: ICD-10-CM

## 2020-12-23 DIAGNOSIS — M75.51 BILATERAL SHOULDER BURSITIS: ICD-10-CM

## 2020-12-23 PROBLEM — Z86.711 HISTORY OF PULMONARY EMBOLUS (PE): Status: ACTIVE | Noted: 2019-04-15

## 2020-12-23 PROCEDURE — 3008F BODY MASS INDEX DOCD: CPT | Mod: HCNC,CPTII,S$GLB, | Performed by: INTERNAL MEDICINE

## 2020-12-23 PROCEDURE — 99214 PR OFFICE/OUTPT VISIT, EST, LEVL IV, 30-39 MIN: ICD-10-PCS | Mod: HCNC,S$GLB,, | Performed by: INTERNAL MEDICINE

## 2020-12-23 PROCEDURE — 3008F PR BODY MASS INDEX (BMI) DOCUMENTED: ICD-10-PCS | Mod: HCNC,CPTII,S$GLB, | Performed by: INTERNAL MEDICINE

## 2020-12-23 PROCEDURE — 1126F PR PAIN SEVERITY QUANTIFIED, NO PAIN PRESENT: ICD-10-PCS | Mod: HCNC,S$GLB,, | Performed by: INTERNAL MEDICINE

## 2020-12-23 PROCEDURE — 3288F PR FALLS RISK ASSESSMENT DOCUMENTED: ICD-10-PCS | Mod: HCNC,CPTII,S$GLB, | Performed by: INTERNAL MEDICINE

## 2020-12-23 PROCEDURE — 1157F PR ADVANCE CARE PLAN OR EQUIV PRESENT IN MEDICAL RECORD: ICD-10-PCS | Mod: HCNC,S$GLB,, | Performed by: INTERNAL MEDICINE

## 2020-12-23 PROCEDURE — 1101F PT FALLS ASSESS-DOCD LE1/YR: CPT | Mod: HCNC,CPTII,S$GLB, | Performed by: INTERNAL MEDICINE

## 2020-12-23 PROCEDURE — 1126F AMNT PAIN NOTED NONE PRSNT: CPT | Mod: HCNC,S$GLB,, | Performed by: INTERNAL MEDICINE

## 2020-12-23 PROCEDURE — 1157F ADVNC CARE PLAN IN RCRD: CPT | Mod: HCNC,S$GLB,, | Performed by: INTERNAL MEDICINE

## 2020-12-23 PROCEDURE — 99214 OFFICE O/P EST MOD 30 MIN: CPT | Mod: HCNC,S$GLB,, | Performed by: INTERNAL MEDICINE

## 2020-12-23 PROCEDURE — 3288F FALL RISK ASSESSMENT DOCD: CPT | Mod: HCNC,CPTII,S$GLB, | Performed by: INTERNAL MEDICINE

## 2020-12-23 PROCEDURE — 1101F PR PT FALLS ASSESS DOC 0-1 FALLS W/OUT INJ PAST YR: ICD-10-PCS | Mod: HCNC,CPTII,S$GLB, | Performed by: INTERNAL MEDICINE

## 2020-12-23 RX ORDER — HYDROCODONE BITARTRATE AND ACETAMINOPHEN 7.5; 325 MG/1; MG/1
1 TABLET ORAL EVERY 8 HOURS PRN
COMMUNITY
Start: 2020-12-08 | End: 2024-02-29

## 2020-12-23 NOTE — ASSESSMENT & PLAN NOTE
Significant curve per pt.  No ED.  Pt has an appt with jag at [facility].  discussed with pt not to have an extra tests unless discussed with me since they cannot see Epic notes and I cannot see theirs.  · To call me with plan.

## 2020-12-23 NOTE — ASSESSMENT & PLAN NOTE
Patient is status post 3 surgeries.  No pain currently but occasional flares noted in the past.    · Will follow

## 2020-12-23 NOTE — ASSESSMENT & PLAN NOTE
U/S with gynecomastia-benign noted.  Pt states that he received a certified letter stating that he needed f/u.  radiology notes only state clinical coordination.  labs had already been done with onc.  Unsure what other w/u indicated.    · Message sent to radiology about what further w/u they wanted of pt.

## 2020-12-23 NOTE — PROGRESS NOTES
This note has been generated using voice-recognition software. There may be typographical errors that have been missed during proof-reading.  Primary Care Provider Appointment    Subjective:      Patient ID: Jeff Hope is a 65 y.o. male here for follow up.     Chief Complaint: Follow-up (2 mth)    HPI:  This is a pleasant 63-year-old male with bronchiectasis, hyperlipidemia, history of pulmonary embolism on chronic anticoagulation with a follicular non-Hodgkin's lymphoma, acute/reactivated hepatitis B here for f/u.  Pt last seen 10/19.  Patient has appointment today with Ortho.  11/10/2020 patient seen by Oncology.  Labs ordered for gynecomastia seen on breast mammogram/ultrasound.  11/11/2020 patient seen by hepatology.  Patient hepatitis B is in remission.  Antiviral will be continued.  Q.3 months labs.  11/25/2020 patient seen by Oncology and labs for gynecomastia reviewed.  The testosterone noted.  Patient noted to have right upper quadrant pain.  Abdominal ultrasound ordered which showed no concerning findings.  MRI of shoulder done.  He has an ortho appt today.  Continued thumb pain/OA.    Pt has an appt with Dr Alvarez at [facility] for the peyroinis disease.    He was sent a certified letter concerning f/u for his MMG.  I have sent a message as to why he needs f/u.        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    Pulmonary hypertension    Myalgia due to statin    Vitamin D deficiency    Port-A-Cath in place    Abnormal MMSE    Allergic rhinitis    Adrenal hypertrophy    Bilateral shoulder bursitis    Acute  hepatitis B    Trigger finger of left thumb    Bilateral shoulder pain    Chronic thumb pain, bilateral    Breast mass, right    Peyronie's disease    Hypotestosteronemia in male        Past Surgical History:   Procedure Laterality Date    GROIN EXPLORATION  2 pre 2005 and 1 post 2005    total of 3 procedures    LAPAROSCOPIC CHOLECYSTECTOMY  05/2018    SHOULDER SURGERY Left        Past Medical History:   Diagnosis Date    Personal history of colonic polyps 09/08/2015    per [facility] report - repeat 9/2020       Social History     Socioeconomic History    Marital status:      Spouse name: Not on file    Number of children: 2    Years of education: Not on file    Highest education level: Not on file   Occupational History    Occupation: disabled [employer]    Social Needs    Financial resource strain: Somewhat hard    Food insecurity     Worry: Often true     Inability: Sometimes true    Transportation needs     Medical: No     Non-medical: No   Tobacco Use    Smoking status: Never Smoker    Smokeless tobacco: Never Used   Substance and Sexual Activity    Alcohol use: No     Frequency: Never    Drug use: No    Sexual activity: Yes     Partners: Female   Lifestyle    Physical activity     Days per week: 0 days     Minutes per session: 0 min    Stress: Rather much   Relationships    Social connections     Talks on phone: Twice a week     Gets together: Twice a week     Attends Rastafari service: Never     Active member of club or organization: No     Attends meetings of clubs or organizations: Never     Relationship status:    Other Topics Concern    Not on file   Social History Narrative    Lives with wife.         Review of Systems    PHQ9 12/10/2019   Total Score 3        Checklist of Daily Activities:        Objective:   /80 (BP Location: Left arm, Patient Position: Sitting, BP Method: Large (Manual))   Pulse (!) 59   Temp 98.1 °F (36.7 °C) (Oral)   Resp 18   Ht 5'  "8" (1.727 m)   Wt 97.1 kg (214 lb 1.1 oz)   SpO2 98%   BMI 32.55 kg/m²     Physical Exam  Constitutional:       General: He is not in acute distress.     Appearance: Normal appearance. He is not ill-appearing, toxic-appearing or diaphoretic.   Cardiovascular:      Rate and Rhythm: Normal rate and regular rhythm.      Heart sounds: Normal heart sounds.   Pulmonary:      Effort: Pulmonary effort is normal.      Breath sounds: Normal breath sounds.   Abdominal:      General: Abdomen is flat. Bowel sounds are normal.      Palpations: Abdomen is soft.      Tenderness: There is no abdominal tenderness. There is no guarding or rebound.   Musculoskeletal:      Right lower leg: No edema.      Left lower leg: No edema.   Neurological:      Mental Status: He is alert.             Lab Results   Component Value Date    WBC 5.38 11/04/2020    HGB 14.2 11/04/2020    HCT 42.5 11/04/2020     (L) 11/04/2020    CHOL 205 (H) 01/27/2020    TRIG 106 01/27/2020    HDL 65 01/27/2020    ALT 18 11/04/2020    AST 24 11/04/2020     11/04/2020    K 3.6 11/04/2020     11/04/2020    CREATININE 1.1 11/04/2020    BUN 12 11/04/2020    CO2 28 11/04/2020    TSH 0.972 01/27/2020    INR 1.1 05/18/2020       Current Outpatient Medications on File Prior to Visit   Medication Sig Dispense Refill    amitriptyline (ELAVIL) 25 MG tablet TK 1 T PO QD HS      celecoxib (CELEBREX) 200 MG capsule TK ONE C PO BID      HYDROcodone-acetaminophen (NORCO) 7.5-325 mg per tablet Take 1 tablet by mouth every 8 (eight) hours as needed.      hydrOXYzine pamoate (VISTARIL) 50 MG Cap Take 50 mg by mouth.      pregabalin (LYRICA) 150 MG capsule Take 150 mg by mouth once daily.      tenofovir alafenamide (VEMLIDY) 25 mg Tab Take 1 tablet (25 mg total) by mouth once daily. 30 tablet 11    traMADoL (ULTRAM) 50 mg tablet Take 1 tablet (50 mg total) by mouth every 6 (six) hours. 60 tablet 0     No current facility-administered medications on file prior " to visit.          Assessment:   65 y.o. male with multiple co-morbid illnesses here for continued follow up of medical problems.      Plan:     Problem List Items Addressed This Visit        Cardiac/Vascular    Port-A-Cath in place     Recently flushed with onc            Renal/    Testicular pain     Patient is status post 3 surgeries.  No pain currently but occasional flares noted in the past.    · Will follow         Breast mass, right     U/S with gynecomastia-benign noted.  Pt states that he received a certified letter stating that he needed f/u.  radiology notes only state clinical coordination.  labs had already been done with onc.  Unsure what other w/u indicated.    · Message sent to radiology about what further w/u they wanted of pt.           Peyronie's disease     Significant curve per pt.  No ED.  Pt has an appt with jag at [facility].  discussed with pt not to have an extra tests unless discussed with me since they cannot see Epic notes and I cannot see theirs.  · To call me with plan.              Hematology    Thrombocytopenia     Mildly decreased platelets noted on most recent labs.  No bleeding bruising noted  · Will follow.            Endocrine    Hypotestosteronemia in male     On labs from onc.    · F/u urology            GI    Acute hepatitis B     Labs CBC, CMP, HBV DNA quant every 3 months, next in December 2020.   -  Continue Vemlidy daily (script runs out early Feb 2021).  Per hepatology  · If labs not checked by hepatology, will check in clinic.            Orthopedic    Bilateral shoulder bursitis     Followed by ortho.  R >L pain.  Pt completed PT.  Stopped TENS with worry about safety.   MRI results needed.  Pt has an ortho appt today  · To discuss any surg with onc/hepatology/PCP.                 Health Maintenance       Date Due Completion Date    Shingles Vaccine (1 of 2) 11/30/2005 ---    Colorectal Cancer Screening 09/08/2020 9/8/2015    Lipid Panel 01/27/2025 1/27/2020     Pneumococcal Vaccine (65+ High/Highest Risk) (2 of 2 - PPSV23) 08/18/2025 8/18/2020    TETANUS VACCINE 06/15/2030 6/15/2020        Medications Reconciliation:   I have not reconciled the patient's home medications with the patient/family. I have updated all changes.  Refer to After-Visit Medication List.      Medication List          Accurate as of December 23, 2020 11:00 AM. If you have any questions, ask your nurse or doctor.            CONTINUE taking these medications    amitriptyline 25 MG tablet  Commonly known as: ELAVIL     celecoxib 200 MG capsule  Commonly known as: CeleBREX     HYDROcodone-acetaminophen 7.5-325 mg per tablet  Commonly known as: NORCO     hydrOXYzine pamoate 50 MG Cap  Commonly known as: VISTARIL     pregabalin 150 MG capsule  Commonly known as: LYRICA     traMADoL 50 mg tablet  Commonly known as: ULTRAM  Take 1 tablet (50 mg total) by mouth every 6 (six) hours.     VEMLIDY 25 mg Tab  Generic drug: tenofovir alafenamide  Take 1 tablet (25 mg total) by mouth once daily.             Next/future visit:  He was sent a certified letter concerning f/u for his MMG.  I have sent a message as to why he needs f/u.  Onc, shoulder pain,     Follow up in about 2 months (around 2/23/2021). Total face-to-face time was 30 min, >50% of this was spent on counseling and coordination of care. The following issues were discussed: gynecomastia, lymphoma, shoulder pain.       Roly Flannery MD  Internal Medicine  [facility]  [phone]

## 2020-12-23 NOTE — ASSESSMENT & PLAN NOTE
Labs CBC, CMP, HBV DNA quant every 3 months, next in December 2020.   -  Continue Vemlidy daily (script runs out early Feb 2021).  Per hepatology  · If labs not checked by hepatology, will check in clinic.

## 2020-12-23 NOTE — ASSESSMENT & PLAN NOTE
Followed by ortho.  R >L pain.  Pt completed PT.  Stopped TENS with worry about safety.   MRI results needed.  Pt has an ortho appt today  · To discuss any surg with onc/hepatology/PCP.

## 2020-12-29 DIAGNOSIS — R74.8 ELEVATED LIVER ENZYMES: ICD-10-CM

## 2020-12-29 DIAGNOSIS — R76.8 HEPATITIS B SURFACE ANTIGEN POSITIVE: ICD-10-CM

## 2020-12-31 ENCOUNTER — PATIENT MESSAGE (OUTPATIENT)
Dept: PHARMACY | Facility: CLINIC | Age: 65
End: 2020-12-31

## 2020-12-31 ENCOUNTER — SPECIALTY PHARMACY (OUTPATIENT)
Dept: PHARMACY | Facility: CLINIC | Age: 65
End: 2020-12-31

## 2020-12-31 DIAGNOSIS — R76.8 HEPATITIS B SURFACE ANTIGEN POSITIVE: ICD-10-CM

## 2020-12-31 DIAGNOSIS — R74.8 ELEVATED LIVER ENZYMES: Primary | ICD-10-CM

## 2020-12-31 NOTE — TELEPHONE ENCOUNTER
Specialty Pharmacy - Refill Coordination    Specialty Medication Orders Linked to Encounter      Most Recent Value   Medication #1  tenofovir alafenamide (VEMLIDY) 25 mg Tab (Order#[id number], Rx#[id number])        Patient returned call for Vemlidy refill    Refill Questions - Documented Responses      Most Recent Value   Relationship to patient of person spoken to?  Self   HIPAA/medical authority confirmed?  Yes   Any changes in contact preferences or allowed representatives?  No   Has the patient had any insurance changes?  No   Has the patient had any changes to specialty medication, dose, or instructions?  No   Has the patient started taking any new medications, herbals, or supplements?  No   Has the patient been diagnosed with any new medical conditions?  No   Does the patient have any new allergies to medications or foods?  No   Does the patient have any concerns about side effects?  No   Can the patient store medication/sharps container properly (at the correct temperature, away from children/pets, etc.)?  Yes   Can the patient call emergency services (911) in the event of an emergency?  Yes   Does the patient have any concerns or questions about taking or administering this medication as prescribed?  No   How many doses did the patient miss in the past 4 weeks or since the last fill?  0   How many doses does the patient have on hand?  5   How many days does the patient report on hand quantity will last?  5   Does the number of doses/days supply remaining match pharmacy expected amounts?  Yes   Does the patient feel that this medication is effective?  Yes   During the past 4 weeks, has patient missed any activities due to condition or medication?  No   During the past 4 weeks, did patient have any of the following urgent care visits?  None   How will the patient receive the medication?  Pickup   When does the patient need to receive the medication?  01/05/21   Shipping Address  -- [N/A - Patient will   medication.]   Address in [facility] confirmed and updated if neccessary?  -- [N/A - Patient will  medication.]   Expected Copay ($)  0   Is the patient able to afford the medication copay?  Yes   Payment Method  zero copay   Days supply of Refill  30   Would patient like to speak to a pharmacist?  Yes   Do you want to trigger an intervention?  No   Do you want to trigger an additional referral task?  No   Refill activity completed?  Yes   Refill activity plan  Refill scheduled   Shipment/Pickup Date:  12/31/20        Current Outpatient Medications   Medication Sig    celecoxib (CELEBREX) 200 MG capsule TK ONE C PO BID    HYDROcodone-acetaminophen (NORCO) 7.5-325 mg per tablet Take 1 tablet by mouth every 8 (eight) hours as needed.    hydrOXYzine pamoate (VISTARIL) 50 MG Cap Take 50 mg by mouth.    pregabalin (LYRICA) 150 MG capsule Take 150 mg by mouth once daily.    tenofovir alafenamide (VEMLIDY) 25 mg Tab Take 1 tablet (25 mg total) by mouth once daily.   Last reviewed on 12/31/2020  1:15 PM by Adonis Cisneros PharmD    Review of patient's allergies indicates:   Allergen Reactions    Rosuvastatin      myalgias    Last reviewed on  12/31/2020 1:14 PM by Adonis Cisneros    Patient would like to pick his medication from [facility] today, 12/31. Informed patient that we close at 5 PM today to ensure he allows himself enough time to come to the pharmacy.    Patient has no questions or concerns at this time. He is aware to contact [facility] if he needs anything.    Tasks added this encounter   1/28/2021 - Refill Call (Auto Added)  1/1/2021 - Pickup Reminder   Tasks due within next 3 months   1/27/2021 - Clinical - Follow Up Assesement (180 day)     Adonis Cisneros PharmD  [facility]  [address] [place] [id number]  Phone: [phone]  Fax: [phone]

## 2021-01-11 ENCOUNTER — CLINICAL SUPPORT (OUTPATIENT)
Dept: URGENT CARE | Facility: CLINIC | Age: 66
End: 2021-01-11
Payer: MEDICARE

## 2021-01-11 DIAGNOSIS — Z11.59 ENCOUNTER FOR SCREENING FOR OTHER VIRAL DISEASES: Primary | ICD-10-CM

## 2021-01-11 LAB
CTP QC/QA: YES
SARS-COV-2 RDRP RESP QL NAA+PROBE: NEGATIVE

## 2021-01-11 PROCEDURE — U0002 COVID-19 LAB TEST NON-CDC: HCPCS | Mod: QW,S$GLB,, | Performed by: FAMILY MEDICINE

## 2021-01-11 PROCEDURE — U0002: ICD-10-PCS | Mod: QW,S$GLB,, | Performed by: FAMILY MEDICINE

## 2021-02-01 ENCOUNTER — PATIENT MESSAGE (OUTPATIENT)
Dept: PHARMACY | Facility: CLINIC | Age: 66
End: 2021-02-01

## 2021-02-06 ENCOUNTER — SPECIALTY PHARMACY (OUTPATIENT)
Dept: PHARMACY | Facility: CLINIC | Age: 66
End: 2021-02-06

## 2021-02-18 DIAGNOSIS — R74.8 ELEVATED LIVER ENZYMES: ICD-10-CM

## 2021-02-18 DIAGNOSIS — R76.8 HEPATITIS B SURFACE ANTIGEN POSITIVE: ICD-10-CM

## 2021-02-25 ENCOUNTER — OFFICE VISIT (OUTPATIENT)
Dept: PRIMARY CARE CLINIC | Facility: CLINIC | Age: 66
End: 2021-02-25
Payer: MEDICARE

## 2021-02-25 ENCOUNTER — LAB VISIT (OUTPATIENT)
Dept: LAB | Facility: HOSPITAL | Age: 66
End: 2021-02-25
Attending: INTERNAL MEDICINE
Payer: MEDICARE

## 2021-02-25 VITALS
OXYGEN SATURATION: 100 % | HEART RATE: 66 BPM | SYSTOLIC BLOOD PRESSURE: 130 MMHG | BODY MASS INDEX: 32.38 KG/M2 | DIASTOLIC BLOOD PRESSURE: 80 MMHG | WEIGHT: 212.94 LBS

## 2021-02-25 DIAGNOSIS — N63.10 BREAST MASS, RIGHT: ICD-10-CM

## 2021-02-25 DIAGNOSIS — I70.0 AORTIC ATHEROSCLEROSIS: ICD-10-CM

## 2021-02-25 DIAGNOSIS — D69.6 THROMBOCYTOPENIA: ICD-10-CM

## 2021-02-25 DIAGNOSIS — E27.8 ADRENAL HYPERTROPHY: ICD-10-CM

## 2021-02-25 DIAGNOSIS — J47.9 BRONCHIECTASIS WITHOUT COMPLICATION: ICD-10-CM

## 2021-02-25 DIAGNOSIS — E78.2 MIXED HYPERLIPIDEMIA: Primary | ICD-10-CM

## 2021-02-25 DIAGNOSIS — N48.6 PEYRONIE'S DISEASE: ICD-10-CM

## 2021-02-25 DIAGNOSIS — F33.41 RECURRENT MAJOR DEPRESSIVE DISORDER, IN PARTIAL REMISSION: ICD-10-CM

## 2021-02-25 DIAGNOSIS — Z00.00 HEALTHCARE MAINTENANCE: ICD-10-CM

## 2021-02-25 DIAGNOSIS — E78.2 MIXED HYPERLIPIDEMIA: ICD-10-CM

## 2021-02-25 DIAGNOSIS — N50.819 PAIN IN TESTICLE, UNSPECIFIED LATERALITY: ICD-10-CM

## 2021-02-25 DIAGNOSIS — I27.20 PULMONARY HYPERTENSION: ICD-10-CM

## 2021-02-25 DIAGNOSIS — B18.1 CHRONIC VIRAL HEPATITIS B: ICD-10-CM

## 2021-02-25 DIAGNOSIS — C79.52 MALIGNANT NEOPLASM METASTATIC TO BONE MARROW: ICD-10-CM

## 2021-02-25 DIAGNOSIS — Z12.11 COLON CANCER SCREENING: ICD-10-CM

## 2021-02-25 PROBLEM — Z79.01 CHRONIC ANTICOAGULATION: Status: RESOLVED | Noted: 2019-09-30 | Resolved: 2021-02-25

## 2021-02-25 LAB
BASOPHILS # BLD AUTO: 0.02 K/UL (ref 0–0.2)
BASOPHILS NFR BLD: 0.4 % (ref 0–1.9)
BILIRUB UR QL STRIP: NEGATIVE
CLARITY UR REFRACT.AUTO: CLEAR
COLOR UR AUTO: NORMAL
DIFFERENTIAL METHOD: NORMAL
EOSINOPHIL # BLD AUTO: 0 K/UL (ref 0–0.5)
EOSINOPHIL NFR BLD: 0.4 % (ref 0–8)
ERYTHROCYTE [DISTWIDTH] IN BLOOD BY AUTOMATED COUNT: 13.3 % (ref 11.5–14.5)
GLUCOSE UR QL STRIP: NEGATIVE
HCT VFR BLD AUTO: 46.9 % (ref 40–54)
HGB BLD-MCNC: 15.4 G/DL (ref 14–18)
HGB UR QL STRIP: NEGATIVE
IMM GRANULOCYTES # BLD AUTO: 0.01 K/UL (ref 0–0.04)
IMM GRANULOCYTES NFR BLD AUTO: 0.2 % (ref 0–0.5)
KETONES UR QL STRIP: NEGATIVE
LEUKOCYTE ESTERASE UR QL STRIP: NEGATIVE
LYMPHOCYTES # BLD AUTO: 1.8 K/UL (ref 1–4.8)
LYMPHOCYTES NFR BLD: 35.8 % (ref 18–48)
MCH RBC QN AUTO: 29.9 PG (ref 27–31)
MCHC RBC AUTO-ENTMCNC: 32.8 G/DL (ref 32–36)
MCV RBC AUTO: 91 FL (ref 82–98)
MONOCYTES # BLD AUTO: 0.4 K/UL (ref 0.3–1)
MONOCYTES NFR BLD: 7.4 % (ref 4–15)
NEUTROPHILS # BLD AUTO: 2.8 K/UL (ref 1.8–7.7)
NEUTROPHILS NFR BLD: 55.8 % (ref 38–73)
NITRITE UR QL STRIP: NEGATIVE
NRBC BLD-RTO: 0 /100 WBC
PH UR STRIP: 5 [PH] (ref 5–8)
PLATELET # BLD AUTO: 150 K/UL (ref 150–350)
PMV BLD AUTO: 10.6 FL (ref 9.2–12.9)
PROT UR QL STRIP: NEGATIVE
RBC # BLD AUTO: 5.15 M/UL (ref 4.6–6.2)
SP GR UR STRIP: 1 (ref 1–1.03)
URN SPEC COLLECT METH UR: NORMAL
WBC # BLD AUTO: 4.97 K/UL (ref 3.9–12.7)

## 2021-02-25 PROCEDURE — 1157F PR ADVANCE CARE PLAN OR EQUIV PRESENT IN MEDICAL RECORD: ICD-10-PCS | Mod: S$GLB,,, | Performed by: INTERNAL MEDICINE

## 2021-02-25 PROCEDURE — 80061 LIPID PANEL: CPT

## 2021-02-25 PROCEDURE — 3008F PR BODY MASS INDEX (BMI) DOCUMENTED: ICD-10-PCS | Mod: CPTII,S$GLB,, | Performed by: INTERNAL MEDICINE

## 2021-02-25 PROCEDURE — 1125F PR PAIN SEVERITY QUANTIFIED, PAIN PRESENT: ICD-10-PCS | Mod: S$GLB,,, | Performed by: INTERNAL MEDICINE

## 2021-02-25 PROCEDURE — 99214 OFFICE O/P EST MOD 30 MIN: CPT | Mod: S$GLB,,, | Performed by: INTERNAL MEDICINE

## 2021-02-25 PROCEDURE — 3288F PR FALLS RISK ASSESSMENT DOCUMENTED: ICD-10-PCS | Mod: CPTII,S$GLB,, | Performed by: INTERNAL MEDICINE

## 2021-02-25 PROCEDURE — 80053 COMPREHEN METABOLIC PANEL: CPT

## 2021-02-25 PROCEDURE — 99214 PR OFFICE/OUTPT VISIT, EST, LEVL IV, 30-39 MIN: ICD-10-PCS | Mod: S$GLB,,, | Performed by: INTERNAL MEDICINE

## 2021-02-25 PROCEDURE — 1101F PT FALLS ASSESS-DOCD LE1/YR: CPT | Mod: CPTII,S$GLB,, | Performed by: INTERNAL MEDICINE

## 2021-02-25 PROCEDURE — 36415 COLL VENOUS BLD VENIPUNCTURE: CPT

## 2021-02-25 PROCEDURE — 99499 RISK ADDL DX/OHS AUDIT: ICD-10-PCS | Mod: S$GLB,,, | Performed by: INTERNAL MEDICINE

## 2021-02-25 PROCEDURE — 3008F BODY MASS INDEX DOCD: CPT | Mod: CPTII,S$GLB,, | Performed by: INTERNAL MEDICINE

## 2021-02-25 PROCEDURE — 1157F ADVNC CARE PLAN IN RCRD: CPT | Mod: S$GLB,,, | Performed by: INTERNAL MEDICINE

## 2021-02-25 PROCEDURE — 1101F PR PT FALLS ASSESS DOC 0-1 FALLS W/OUT INJ PAST YR: ICD-10-PCS | Mod: CPTII,S$GLB,, | Performed by: INTERNAL MEDICINE

## 2021-02-25 PROCEDURE — 81003 URINALYSIS AUTO W/O SCOPE: CPT

## 2021-02-25 PROCEDURE — 1125F AMNT PAIN NOTED PAIN PRSNT: CPT | Mod: S$GLB,,, | Performed by: INTERNAL MEDICINE

## 2021-02-25 PROCEDURE — 85025 COMPLETE CBC W/AUTO DIFF WBC: CPT

## 2021-02-25 PROCEDURE — 99499 UNLISTED E&M SERVICE: CPT | Mod: S$GLB,,, | Performed by: INTERNAL MEDICINE

## 2021-02-25 PROCEDURE — 3288F FALL RISK ASSESSMENT DOCD: CPT | Mod: CPTII,S$GLB,, | Performed by: INTERNAL MEDICINE

## 2021-02-25 PROCEDURE — 84443 ASSAY THYROID STIM HORMONE: CPT

## 2021-02-26 LAB
ALBUMIN SERPL BCP-MCNC: 4.4 G/DL (ref 3.5–5.2)
ALP SERPL-CCNC: 76 U/L (ref 55–135)
ALT SERPL W/O P-5'-P-CCNC: 13 U/L (ref 10–44)
ANION GAP SERPL CALC-SCNC: 10 MMOL/L (ref 8–16)
AST SERPL-CCNC: 19 U/L (ref 10–40)
BILIRUB SERPL-MCNC: 0.6 MG/DL (ref 0.1–1)
BUN SERPL-MCNC: 9 MG/DL (ref 8–23)
CALCIUM SERPL-MCNC: 9.5 MG/DL (ref 8.7–10.5)
CHLORIDE SERPL-SCNC: 101 MMOL/L (ref 95–110)
CHOLEST SERPL-MCNC: 244 MG/DL (ref 120–199)
CHOLEST/HDLC SERPL: 3.4 {RATIO} (ref 2–5)
CO2 SERPL-SCNC: 29 MMOL/L (ref 23–29)
CREAT SERPL-MCNC: 1 MG/DL (ref 0.5–1.4)
EST. GFR  (AFRICAN AMERICAN): >60 ML/MIN/1.73 M^2
EST. GFR  (NON AFRICAN AMERICAN): >60 ML/MIN/1.73 M^2
GLUCOSE SERPL-MCNC: 91 MG/DL (ref 70–110)
HDLC SERPL-MCNC: 72 MG/DL (ref 40–75)
HDLC SERPL: 29.5 % (ref 20–50)
LDLC SERPL CALC-MCNC: 152.2 MG/DL (ref 63–159)
NONHDLC SERPL-MCNC: 172 MG/DL
POTASSIUM SERPL-SCNC: 4.2 MMOL/L (ref 3.5–5.1)
PROT SERPL-MCNC: 7.1 G/DL (ref 6–8.4)
SODIUM SERPL-SCNC: 140 MMOL/L (ref 136–145)
TRIGL SERPL-MCNC: 99 MG/DL (ref 30–150)
TSH SERPL DL<=0.005 MIU/L-ACNC: 0.76 UIU/ML (ref 0.4–4)

## 2021-03-11 DIAGNOSIS — R74.8 ELEVATED LIVER ENZYMES: ICD-10-CM

## 2021-03-11 DIAGNOSIS — R76.8 HEPATITIS B SURFACE ANTIGEN POSITIVE: ICD-10-CM

## 2021-03-11 RX ORDER — TENOFOVIR ALAFENAMIDE 25 MG/1
25 TABLET ORAL DAILY
Qty: 30 TABLET | Refills: 11 | Status: CANCELLED | OUTPATIENT
Start: 2021-03-11

## 2021-03-12 ENCOUNTER — SPECIALTY PHARMACY (OUTPATIENT)
Dept: PHARMACY | Facility: CLINIC | Age: 66
End: 2021-03-12

## 2021-03-12 RX ORDER — TENOFOVIR ALAFENAMIDE 25 MG/1
25 TABLET ORAL DAILY
Qty: 30 TABLET | Refills: 11 | Status: SHIPPED | OUTPATIENT
Start: 2021-03-12 | End: 2021-12-06

## 2021-04-07 ENCOUNTER — TELEPHONE (OUTPATIENT)
Dept: INTERNAL MEDICINE | Facility: CLINIC | Age: 66
End: 2021-04-07

## 2021-04-09 ENCOUNTER — PATIENT MESSAGE (OUTPATIENT)
Dept: PHARMACY | Facility: CLINIC | Age: 66
End: 2021-04-09

## 2021-04-12 ENCOUNTER — SPECIALTY PHARMACY (OUTPATIENT)
Dept: PHARMACY | Facility: CLINIC | Age: 66
End: 2021-04-12

## 2021-04-28 ENCOUNTER — PATIENT MESSAGE (OUTPATIENT)
Dept: RESEARCH | Facility: HOSPITAL | Age: 66
End: 2021-04-28

## 2021-05-05 ENCOUNTER — SPECIALTY PHARMACY (OUTPATIENT)
Dept: PHARMACY | Facility: CLINIC | Age: 66
End: 2021-05-05

## 2021-05-13 ENCOUNTER — HOSPITAL ENCOUNTER (OUTPATIENT)
Dept: RADIOLOGY | Facility: HOSPITAL | Age: 66
Discharge: HOME | End: 2021-05-13
Attending: INTERNAL MEDICINE
Payer: MEDICARE

## 2021-05-13 DIAGNOSIS — B16.9 ACUTE HEPATITIS B: ICD-10-CM

## 2021-05-13 PROCEDURE — 76700 US EXAM ABDOM COMPLETE: CPT | Mod: 26,,, | Performed by: RADIOLOGY

## 2021-05-13 PROCEDURE — 76700 US ABDOMEN COMPLETE: ICD-10-PCS | Mod: 26,,, | Performed by: RADIOLOGY

## 2021-05-13 PROCEDURE — 76700 US EXAM ABDOM COMPLETE: CPT | Mod: TC

## 2021-05-14 ENCOUNTER — TELEPHONE (OUTPATIENT)
Dept: HEPATOLOGY | Facility: CLINIC | Age: 66
End: 2021-05-14

## 2021-05-14 ENCOUNTER — TELEPHONE (OUTPATIENT)
Dept: PRIMARY CARE CLINIC | Facility: CLINIC | Age: 66
End: 2021-05-14

## 2021-05-19 ENCOUNTER — PATIENT OUTREACH (OUTPATIENT)
Dept: ADMINISTRATIVE | Facility: OTHER | Age: 66
End: 2021-05-19

## 2021-05-20 ENCOUNTER — TELEPHONE (OUTPATIENT)
Dept: HEPATOLOGY | Facility: CLINIC | Age: 66
End: 2021-05-20

## 2021-05-20 ENCOUNTER — OFFICE VISIT (OUTPATIENT)
Dept: HEPATOLOGY | Facility: CLINIC | Age: 66
End: 2021-05-20
Payer: MEDICARE

## 2021-05-20 VITALS
DIASTOLIC BLOOD PRESSURE: 80 MMHG | HEART RATE: 69 BPM | OXYGEN SATURATION: 94 % | TEMPERATURE: 98 F | WEIGHT: 202.81 LBS | HEIGHT: 68 IN | SYSTOLIC BLOOD PRESSURE: 129 MMHG | BODY MASS INDEX: 30.74 KG/M2

## 2021-05-20 DIAGNOSIS — B16.9 ACUTE VIRAL HEPATITIS B WITHOUT COMA AND WITHOUT DELTA AGENT: Primary | ICD-10-CM

## 2021-05-20 PROCEDURE — 99214 OFFICE O/P EST MOD 30 MIN: CPT | Mod: S$GLB,,, | Performed by: INTERNAL MEDICINE

## 2021-05-20 PROCEDURE — 3008F BODY MASS INDEX DOCD: CPT | Mod: CPTII,S$GLB,, | Performed by: INTERNAL MEDICINE

## 2021-05-20 PROCEDURE — 1126F AMNT PAIN NOTED NONE PRSNT: CPT | Mod: S$GLB,,, | Performed by: INTERNAL MEDICINE

## 2021-05-20 PROCEDURE — 3008F PR BODY MASS INDEX (BMI) DOCUMENTED: ICD-10-PCS | Mod: CPTII,S$GLB,, | Performed by: INTERNAL MEDICINE

## 2021-05-20 PROCEDURE — 1157F ADVNC CARE PLAN IN RCRD: CPT | Mod: S$GLB,,, | Performed by: INTERNAL MEDICINE

## 2021-05-20 PROCEDURE — 3288F PR FALLS RISK ASSESSMENT DOCUMENTED: ICD-10-PCS | Mod: CPTII,S$GLB,, | Performed by: INTERNAL MEDICINE

## 2021-05-20 PROCEDURE — 1101F PT FALLS ASSESS-DOCD LE1/YR: CPT | Mod: CPTII,S$GLB,, | Performed by: INTERNAL MEDICINE

## 2021-05-20 PROCEDURE — 99999 PR PBB SHADOW E&M-EST. PATIENT-LVL III: CPT | Mod: PBBFAC,,, | Performed by: INTERNAL MEDICINE

## 2021-05-20 PROCEDURE — 3288F FALL RISK ASSESSMENT DOCD: CPT | Mod: CPTII,S$GLB,, | Performed by: INTERNAL MEDICINE

## 2021-05-20 PROCEDURE — 1157F PR ADVANCE CARE PLAN OR EQUIV PRESENT IN MEDICAL RECORD: ICD-10-PCS | Mod: S$GLB,,, | Performed by: INTERNAL MEDICINE

## 2021-05-20 PROCEDURE — 99499 RISK ADDL DX/OHS AUDIT: ICD-10-PCS | Mod: HCNC,S$GLB,, | Performed by: INTERNAL MEDICINE

## 2021-05-20 PROCEDURE — 99214 PR OFFICE/OUTPT VISIT, EST, LEVL IV, 30-39 MIN: ICD-10-PCS | Mod: S$GLB,,, | Performed by: INTERNAL MEDICINE

## 2021-05-20 PROCEDURE — 1101F PR PT FALLS ASSESS DOC 0-1 FALLS W/OUT INJ PAST YR: ICD-10-PCS | Mod: CPTII,S$GLB,, | Performed by: INTERNAL MEDICINE

## 2021-05-20 PROCEDURE — 1126F PR PAIN SEVERITY QUANTIFIED, NO PAIN PRESENT: ICD-10-PCS | Mod: S$GLB,,, | Performed by: INTERNAL MEDICINE

## 2021-05-20 PROCEDURE — 99999 PR PBB SHADOW E&M-EST. PATIENT-LVL III: ICD-10-PCS | Mod: PBBFAC,,, | Performed by: INTERNAL MEDICINE

## 2021-05-20 PROCEDURE — 99499 UNLISTED E&M SERVICE: CPT | Mod: HCNC,S$GLB,, | Performed by: INTERNAL MEDICINE

## 2021-05-24 ENCOUNTER — PATIENT MESSAGE (OUTPATIENT)
Dept: PHARMACY | Facility: CLINIC | Age: 66
End: 2021-05-24

## 2021-05-24 ENCOUNTER — OFFICE VISIT (OUTPATIENT)
Dept: PRIMARY CARE CLINIC | Facility: CLINIC | Age: 66
End: 2021-05-24
Payer: MEDICARE

## 2021-05-24 VITALS
DIASTOLIC BLOOD PRESSURE: 66 MMHG | WEIGHT: 203.38 LBS | BODY MASS INDEX: 30.82 KG/M2 | SYSTOLIC BLOOD PRESSURE: 102 MMHG | HEART RATE: 62 BPM | HEIGHT: 68 IN | OXYGEN SATURATION: 97 % | RESPIRATION RATE: 20 BRPM

## 2021-05-24 DIAGNOSIS — L30.9 DERMATITIS: ICD-10-CM

## 2021-05-24 DIAGNOSIS — Z00.00 HEALTHCARE MAINTENANCE: ICD-10-CM

## 2021-05-24 DIAGNOSIS — E78.2 MIXED HYPERLIPIDEMIA: ICD-10-CM

## 2021-05-24 DIAGNOSIS — B18.1 CHRONIC VIRAL HEPATITIS B: ICD-10-CM

## 2021-05-24 DIAGNOSIS — M79.10 MYALGIA DUE TO STATIN: ICD-10-CM

## 2021-05-24 DIAGNOSIS — T46.6X5A MYALGIA DUE TO STATIN: ICD-10-CM

## 2021-05-24 DIAGNOSIS — N48.6 PEYRONIE'S DISEASE: ICD-10-CM

## 2021-05-24 PROCEDURE — 3288F FALL RISK ASSESSMENT DOCD: CPT | Mod: CPTII,S$GLB,, | Performed by: INTERNAL MEDICINE

## 2021-05-24 PROCEDURE — 1125F AMNT PAIN NOTED PAIN PRSNT: CPT | Mod: S$GLB,,, | Performed by: INTERNAL MEDICINE

## 2021-05-24 PROCEDURE — 99215 PR OFFICE/OUTPT VISIT, EST, LEVL V, 40-54 MIN: ICD-10-PCS | Mod: S$GLB,,, | Performed by: INTERNAL MEDICINE

## 2021-05-24 PROCEDURE — 1157F ADVNC CARE PLAN IN RCRD: CPT | Mod: S$GLB,,, | Performed by: INTERNAL MEDICINE

## 2021-05-24 PROCEDURE — 1125F PR PAIN SEVERITY QUANTIFIED, PAIN PRESENT: ICD-10-PCS | Mod: S$GLB,,, | Performed by: INTERNAL MEDICINE

## 2021-05-24 PROCEDURE — 3008F PR BODY MASS INDEX (BMI) DOCUMENTED: ICD-10-PCS | Mod: CPTII,S$GLB,, | Performed by: INTERNAL MEDICINE

## 2021-05-24 PROCEDURE — 1157F PR ADVANCE CARE PLAN OR EQUIV PRESENT IN MEDICAL RECORD: ICD-10-PCS | Mod: S$GLB,,, | Performed by: INTERNAL MEDICINE

## 2021-05-24 PROCEDURE — 1101F PR PT FALLS ASSESS DOC 0-1 FALLS W/OUT INJ PAST YR: ICD-10-PCS | Mod: CPTII,S$GLB,, | Performed by: INTERNAL MEDICINE

## 2021-05-24 PROCEDURE — 1101F PT FALLS ASSESS-DOCD LE1/YR: CPT | Mod: CPTII,S$GLB,, | Performed by: INTERNAL MEDICINE

## 2021-05-24 PROCEDURE — 3008F BODY MASS INDEX DOCD: CPT | Mod: CPTII,S$GLB,, | Performed by: INTERNAL MEDICINE

## 2021-05-24 PROCEDURE — 3288F PR FALLS RISK ASSESSMENT DOCUMENTED: ICD-10-PCS | Mod: CPTII,S$GLB,, | Performed by: INTERNAL MEDICINE

## 2021-05-24 PROCEDURE — 99215 OFFICE O/P EST HI 40 MIN: CPT | Mod: S$GLB,,, | Performed by: INTERNAL MEDICINE

## 2021-05-25 ENCOUNTER — TELEPHONE (OUTPATIENT)
Dept: HEPATOLOGY | Facility: CLINIC | Age: 66
End: 2021-05-25

## 2021-05-31 PROBLEM — Z00.00 HEALTHCARE MAINTENANCE: Status: RESOLVED | Noted: 2019-11-19 | Resolved: 2021-05-31

## 2021-06-07 ENCOUNTER — SPECIALTY PHARMACY (OUTPATIENT)
Dept: PHARMACY | Facility: CLINIC | Age: 66
End: 2021-06-07

## 2021-06-07 ENCOUNTER — PATIENT MESSAGE (OUTPATIENT)
Dept: PHARMACY | Facility: CLINIC | Age: 66
End: 2021-06-07

## 2021-06-09 ENCOUNTER — PATIENT OUTREACH (OUTPATIENT)
Dept: ADMINISTRATIVE | Facility: HOSPITAL | Age: 66
End: 2021-06-09

## 2021-06-28 ENCOUNTER — LAB VISIT (OUTPATIENT)
Dept: LAB | Facility: HOSPITAL | Age: 66
End: 2021-06-28
Attending: INTERNAL MEDICINE
Payer: MEDICARE

## 2021-06-28 DIAGNOSIS — B16.9 ACUTE HEPATITIS B: ICD-10-CM

## 2021-06-28 LAB
ALBUMIN SERPL BCP-MCNC: 3.8 G/DL (ref 3.5–5.2)
ALP SERPL-CCNC: 68 U/L (ref 55–135)
ALT SERPL W/O P-5'-P-CCNC: 12 U/L (ref 10–44)
ANION GAP SERPL CALC-SCNC: 5 MMOL/L (ref 8–16)
AST SERPL-CCNC: 18 U/L (ref 10–40)
BASOPHILS # BLD AUTO: 0.02 K/UL (ref 0–0.2)
BASOPHILS NFR BLD: 0.4 % (ref 0–1.9)
BILIRUB SERPL-MCNC: 0.5 MG/DL (ref 0.1–1)
BUN SERPL-MCNC: 12 MG/DL (ref 8–23)
CALCIUM SERPL-MCNC: 9.2 MG/DL (ref 8.7–10.5)
CHLORIDE SERPL-SCNC: 103 MMOL/L (ref 95–110)
CO2 SERPL-SCNC: 32 MMOL/L (ref 23–29)
CREAT SERPL-MCNC: 0.9 MG/DL (ref 0.5–1.4)
DIFFERENTIAL METHOD: ABNORMAL
EOSINOPHIL # BLD AUTO: 0 K/UL (ref 0–0.5)
EOSINOPHIL NFR BLD: 0.6 % (ref 0–8)
ERYTHROCYTE [DISTWIDTH] IN BLOOD BY AUTOMATED COUNT: 13.2 % (ref 11.5–14.5)
EST. GFR  (AFRICAN AMERICAN): >60 ML/MIN/1.73 M^2
EST. GFR  (NON AFRICAN AMERICAN): >60 ML/MIN/1.73 M^2
GLUCOSE SERPL-MCNC: 86 MG/DL (ref 70–110)
HCT VFR BLD AUTO: 39.7 % (ref 40–54)
HGB BLD-MCNC: 13.1 G/DL (ref 14–18)
IMM GRANULOCYTES # BLD AUTO: 0.01 K/UL (ref 0–0.04)
IMM GRANULOCYTES NFR BLD AUTO: 0.2 % (ref 0–0.5)
INR PPP: 0.9 (ref 0.8–1.2)
LYMPHOCYTES # BLD AUTO: 2.4 K/UL (ref 1–4.8)
LYMPHOCYTES NFR BLD: 47 % (ref 18–48)
MCH RBC QN AUTO: 29 PG (ref 27–31)
MCHC RBC AUTO-ENTMCNC: 33 G/DL (ref 32–36)
MCV RBC AUTO: 88 FL (ref 82–98)
MONOCYTES # BLD AUTO: 0.4 K/UL (ref 0.3–1)
MONOCYTES NFR BLD: 8.1 % (ref 4–15)
NEUTROPHILS # BLD AUTO: 2.3 K/UL (ref 1.8–7.7)
NEUTROPHILS NFR BLD: 43.7 % (ref 38–73)
NRBC BLD-RTO: 0 /100 WBC
PLATELET # BLD AUTO: 150 K/UL (ref 150–450)
PMV BLD AUTO: 10.6 FL (ref 9.2–12.9)
POTASSIUM SERPL-SCNC: 3.8 MMOL/L (ref 3.5–5.1)
PROT SERPL-MCNC: 6.5 G/DL (ref 6–8.4)
PROTHROMBIN TIME: 10.3 SEC (ref 9–12.5)
RBC # BLD AUTO: 4.51 M/UL (ref 4.6–6.2)
SODIUM SERPL-SCNC: 140 MMOL/L (ref 136–145)
WBC # BLD AUTO: 5.17 K/UL (ref 3.9–12.7)

## 2021-06-28 PROCEDURE — 85025 COMPLETE CBC W/AUTO DIFF WBC: CPT | Performed by: INTERNAL MEDICINE

## 2021-06-28 PROCEDURE — 36415 COLL VENOUS BLD VENIPUNCTURE: CPT | Performed by: INTERNAL MEDICINE

## 2021-06-28 PROCEDURE — 80053 COMPREHEN METABOLIC PANEL: CPT | Performed by: INTERNAL MEDICINE

## 2021-06-28 PROCEDURE — 85610 PROTHROMBIN TIME: CPT | Performed by: INTERNAL MEDICINE

## 2021-06-29 ENCOUNTER — TELEPHONE (OUTPATIENT)
Dept: HEPATOLOGY | Facility: CLINIC | Age: 66
End: 2021-06-29

## 2021-06-29 ENCOUNTER — TELEPHONE (OUTPATIENT)
Dept: PRIMARY CARE CLINIC | Facility: CLINIC | Age: 66
End: 2021-06-29

## 2021-07-06 ENCOUNTER — PES CALL (OUTPATIENT)
Dept: ADMINISTRATIVE | Facility: CLINIC | Age: 66
End: 2021-07-06

## 2021-07-07 ENCOUNTER — SPECIALTY PHARMACY (OUTPATIENT)
Dept: PHARMACY | Facility: CLINIC | Age: 66
End: 2021-07-07

## 2021-07-09 ENCOUNTER — SPECIALTY PHARMACY (OUTPATIENT)
Dept: PHARMACY | Facility: CLINIC | Age: 66
End: 2021-07-09

## 2021-07-12 ENCOUNTER — OFFICE VISIT (OUTPATIENT)
Dept: URGENT CARE | Facility: CLINIC | Age: 66
End: 2021-07-12
Payer: MEDICARE

## 2021-07-12 VITALS
OXYGEN SATURATION: 98 % | RESPIRATION RATE: 16 BRPM | HEART RATE: 93 BPM | BODY MASS INDEX: 30.77 KG/M2 | DIASTOLIC BLOOD PRESSURE: 83 MMHG | SYSTOLIC BLOOD PRESSURE: 127 MMHG | WEIGHT: 203 LBS | TEMPERATURE: 98 F | HEIGHT: 68 IN

## 2021-07-12 DIAGNOSIS — M54.9 DORSALGIA, UNSPECIFIED: Primary | ICD-10-CM

## 2021-07-12 DIAGNOSIS — M54.50 ACUTE LEFT-SIDED LOW BACK PAIN WITHOUT SCIATICA: ICD-10-CM

## 2021-07-12 PROCEDURE — 72100 XR LUMBAR SPINE 2 OR 3 VIEWS: ICD-10-PCS | Mod: S$GLB,,, | Performed by: RADIOLOGY

## 2021-07-12 PROCEDURE — 96372 PR INJECTION,THERAP/PROPH/DIAG2ST, IM OR SUBCUT: ICD-10-PCS | Mod: S$GLB,,, | Performed by: PHYSICIAN ASSISTANT

## 2021-07-12 PROCEDURE — 1157F ADVNC CARE PLAN IN RCRD: CPT | Mod: S$GLB,,, | Performed by: PHYSICIAN ASSISTANT

## 2021-07-12 PROCEDURE — 3008F BODY MASS INDEX DOCD: CPT | Mod: CPTII,S$GLB,, | Performed by: PHYSICIAN ASSISTANT

## 2021-07-12 PROCEDURE — 3008F PR BODY MASS INDEX (BMI) DOCUMENTED: ICD-10-PCS | Mod: CPTII,S$GLB,, | Performed by: PHYSICIAN ASSISTANT

## 2021-07-12 PROCEDURE — 72100 X-RAY EXAM L-S SPINE 2/3 VWS: CPT | Mod: S$GLB,,, | Performed by: RADIOLOGY

## 2021-07-12 PROCEDURE — 96372 THER/PROPH/DIAG INJ SC/IM: CPT | Mod: S$GLB,,, | Performed by: PHYSICIAN ASSISTANT

## 2021-07-12 PROCEDURE — 1157F PR ADVANCE CARE PLAN OR EQUIV PRESENT IN MEDICAL RECORD: ICD-10-PCS | Mod: S$GLB,,, | Performed by: PHYSICIAN ASSISTANT

## 2021-07-12 PROCEDURE — 99213 PR OFFICE/OUTPT VISIT, EST, LEVL III, 20-29 MIN: ICD-10-PCS | Mod: 25,S$GLB,, | Performed by: PHYSICIAN ASSISTANT

## 2021-07-12 PROCEDURE — 99213 OFFICE O/P EST LOW 20 MIN: CPT | Mod: 25,S$GLB,, | Performed by: PHYSICIAN ASSISTANT

## 2021-07-12 RX ORDER — IBUPROFEN 800 MG/1
800 TABLET ORAL 3 TIMES DAILY
Qty: 30 TABLET | Refills: 0 | Status: SHIPPED | OUTPATIENT
Start: 2021-07-12 | End: 2021-08-23

## 2021-07-12 RX ORDER — DEXAMETHASONE SODIUM PHOSPHATE 100 MG/10ML
5 INJECTION INTRAMUSCULAR; INTRAVENOUS
Status: COMPLETED | OUTPATIENT
Start: 2021-07-12 | End: 2021-07-12

## 2021-07-12 RX ORDER — IBUPROFEN 600 MG/1
TABLET ORAL
COMMUNITY
Start: 2021-06-25 | End: 2021-08-23

## 2021-07-12 RX ORDER — AMITRIPTYLINE HYDROCHLORIDE 25 MG/1
TABLET, FILM COATED ORAL
COMMUNITY
Start: 2021-06-25 | End: 2021-12-06

## 2021-07-12 RX ORDER — CYCLOBENZAPRINE HCL 10 MG
10 TABLET ORAL 3 TIMES DAILY PRN
Qty: 30 TABLET | Refills: 0 | Status: SHIPPED | OUTPATIENT
Start: 2021-07-12 | End: 2021-07-22

## 2021-07-12 RX ADMIN — DEXAMETHASONE SODIUM PHOSPHATE 5 MG: 100 INJECTION INTRAMUSCULAR; INTRAVENOUS at 03:07

## 2021-08-05 ENCOUNTER — PATIENT MESSAGE (OUTPATIENT)
Dept: PHARMACY | Facility: CLINIC | Age: 66
End: 2021-08-05

## 2021-08-05 ENCOUNTER — SPECIALTY PHARMACY (OUTPATIENT)
Dept: PHARMACY | Facility: CLINIC | Age: 66
End: 2021-08-05

## 2021-08-09 ENCOUNTER — PATIENT OUTREACH (OUTPATIENT)
Dept: ADMINISTRATIVE | Facility: HOSPITAL | Age: 66
End: 2021-08-09

## 2021-08-23 ENCOUNTER — OFFICE VISIT (OUTPATIENT)
Dept: PRIMARY CARE CLINIC | Facility: CLINIC | Age: 66
End: 2021-08-23
Payer: MEDICARE

## 2021-08-23 VITALS
SYSTOLIC BLOOD PRESSURE: 126 MMHG | DIASTOLIC BLOOD PRESSURE: 84 MMHG | TEMPERATURE: 99 F | OXYGEN SATURATION: 98 % | HEIGHT: 68 IN | HEART RATE: 97 BPM | WEIGHT: 202.69 LBS | BODY MASS INDEX: 30.72 KG/M2

## 2021-08-23 DIAGNOSIS — J30.2 SEASONAL ALLERGIC RHINITIS, UNSPECIFIED TRIGGER: ICD-10-CM

## 2021-08-23 DIAGNOSIS — C82.90 FOLLICULAR NON-HODGKIN'S LYMPHOMA: ICD-10-CM

## 2021-08-23 DIAGNOSIS — M54.17 LUMBOSACRAL RADICULOPATHY: Primary | ICD-10-CM

## 2021-08-23 DIAGNOSIS — Z00.00 HEALTHCARE MAINTENANCE: ICD-10-CM

## 2021-08-23 PROBLEM — L30.9 DERMATITIS: Status: RESOLVED | Noted: 2020-03-02 | Resolved: 2021-08-23

## 2021-08-23 PROCEDURE — 1125F PR PAIN SEVERITY QUANTIFIED, PAIN PRESENT: ICD-10-PCS | Mod: CPTII,S$GLB,, | Performed by: INTERNAL MEDICINE

## 2021-08-23 PROCEDURE — 1101F PT FALLS ASSESS-DOCD LE1/YR: CPT | Mod: CPTII,S$GLB,, | Performed by: INTERNAL MEDICINE

## 2021-08-23 PROCEDURE — 1160F PR REVIEW ALL MEDS BY PRESCRIBER/CLIN PHARMACIST DOCUMENTED: ICD-10-PCS | Mod: CPTII,S$GLB,, | Performed by: INTERNAL MEDICINE

## 2021-08-23 PROCEDURE — 1159F MED LIST DOCD IN RCRD: CPT | Mod: CPTII,S$GLB,, | Performed by: INTERNAL MEDICINE

## 2021-08-23 PROCEDURE — 1101F PR PT FALLS ASSESS DOC 0-1 FALLS W/OUT INJ PAST YR: ICD-10-PCS | Mod: CPTII,S$GLB,, | Performed by: INTERNAL MEDICINE

## 2021-08-23 PROCEDURE — 1157F ADVNC CARE PLAN IN RCRD: CPT | Mod: CPTII,S$GLB,, | Performed by: INTERNAL MEDICINE

## 2021-08-23 PROCEDURE — 99215 OFFICE O/P EST HI 40 MIN: CPT | Mod: S$GLB,,, | Performed by: INTERNAL MEDICINE

## 2021-08-23 PROCEDURE — 3074F SYST BP LT 130 MM HG: CPT | Mod: CPTII,S$GLB,, | Performed by: INTERNAL MEDICINE

## 2021-08-23 PROCEDURE — 1125F AMNT PAIN NOTED PAIN PRSNT: CPT | Mod: CPTII,S$GLB,, | Performed by: INTERNAL MEDICINE

## 2021-08-23 PROCEDURE — 3008F PR BODY MASS INDEX (BMI) DOCUMENTED: ICD-10-PCS | Mod: CPTII,S$GLB,, | Performed by: INTERNAL MEDICINE

## 2021-08-23 PROCEDURE — 3288F PR FALLS RISK ASSESSMENT DOCUMENTED: ICD-10-PCS | Mod: CPTII,S$GLB,, | Performed by: INTERNAL MEDICINE

## 2021-08-23 PROCEDURE — 1160F RVW MEDS BY RX/DR IN RCRD: CPT | Mod: CPTII,S$GLB,, | Performed by: INTERNAL MEDICINE

## 2021-08-23 PROCEDURE — 99215 PR OFFICE/OUTPT VISIT, EST, LEVL V, 40-54 MIN: ICD-10-PCS | Mod: S$GLB,,, | Performed by: INTERNAL MEDICINE

## 2021-08-23 PROCEDURE — 1159F PR MEDICATION LIST DOCUMENTED IN MEDICAL RECORD: ICD-10-PCS | Mod: CPTII,S$GLB,, | Performed by: INTERNAL MEDICINE

## 2021-08-23 PROCEDURE — 3008F BODY MASS INDEX DOCD: CPT | Mod: CPTII,S$GLB,, | Performed by: INTERNAL MEDICINE

## 2021-08-23 PROCEDURE — 3288F FALL RISK ASSESSMENT DOCD: CPT | Mod: CPTII,S$GLB,, | Performed by: INTERNAL MEDICINE

## 2021-08-23 PROCEDURE — 3074F PR MOST RECENT SYSTOLIC BLOOD PRESSURE < 130 MM HG: ICD-10-PCS | Mod: CPTII,S$GLB,, | Performed by: INTERNAL MEDICINE

## 2021-08-23 PROCEDURE — 1157F PR ADVANCE CARE PLAN OR EQUIV PRESENT IN MEDICAL RECORD: ICD-10-PCS | Mod: CPTII,S$GLB,, | Performed by: INTERNAL MEDICINE

## 2021-08-23 PROCEDURE — 3079F PR MOST RECENT DIASTOLIC BLOOD PRESSURE 80-89 MM HG: ICD-10-PCS | Mod: CPTII,S$GLB,, | Performed by: INTERNAL MEDICINE

## 2021-08-23 PROCEDURE — 3079F DIAST BP 80-89 MM HG: CPT | Mod: CPTII,S$GLB,, | Performed by: INTERNAL MEDICINE

## 2021-08-23 RX ORDER — FLUTICASONE PROPIONATE 50 MCG
2 SPRAY, SUSPENSION (ML) NASAL DAILY
Qty: 16 G | Refills: 11 | Status: SHIPPED | OUTPATIENT
Start: 2021-08-23 | End: 2022-09-06

## 2021-08-25 ENCOUNTER — PES CALL (OUTPATIENT)
Dept: ADMINISTRATIVE | Facility: CLINIC | Age: 66
End: 2021-08-25

## 2021-09-02 ENCOUNTER — PATIENT OUTREACH (OUTPATIENT)
Dept: ADMINISTRATIVE | Facility: OTHER | Age: 66
End: 2021-09-02

## 2021-09-10 ENCOUNTER — PATIENT MESSAGE (OUTPATIENT)
Dept: PHARMACY | Facility: CLINIC | Age: 66
End: 2021-09-10

## 2021-09-14 NOTE — TELEPHONE ENCOUNTER
----- Message from Christopher Rajput sent at 1/14/2019 10:04 AM CST -----  Contact: Patient @ [phone]  Patient returning a missed call from cece Veliz return call   
Advised to bring his imaging on disk to the appt  
No

## 2021-09-15 ENCOUNTER — TELEPHONE (OUTPATIENT)
Dept: PRIMARY CARE CLINIC | Facility: CLINIC | Age: 66
End: 2021-09-15

## 2021-09-22 ENCOUNTER — SPECIALTY PHARMACY (OUTPATIENT)
Dept: PHARMACY | Facility: CLINIC | Age: 66
End: 2021-09-22

## 2021-10-20 ENCOUNTER — PATIENT MESSAGE (OUTPATIENT)
Dept: PHARMACY | Facility: CLINIC | Age: 66
End: 2021-10-20
Payer: MEDICARE

## 2021-10-25 ENCOUNTER — SPECIALTY PHARMACY (OUTPATIENT)
Dept: PHARMACY | Facility: CLINIC | Age: 66
End: 2021-10-25
Payer: MEDICARE

## 2021-11-06 ENCOUNTER — PATIENT OUTREACH (OUTPATIENT)
Dept: ADMINISTRATIVE | Facility: OTHER | Age: 66
End: 2021-11-06
Payer: MEDICARE

## 2021-11-08 ENCOUNTER — LAB VISIT (OUTPATIENT)
Dept: LAB | Facility: HOSPITAL | Age: 66
End: 2021-11-08
Attending: INTERNAL MEDICINE
Payer: MEDICARE

## 2021-11-08 ENCOUNTER — OFFICE VISIT (OUTPATIENT)
Dept: HEPATOLOGY | Facility: CLINIC | Age: 66
End: 2021-11-08
Payer: MEDICARE

## 2021-11-08 ENCOUNTER — TELEPHONE (OUTPATIENT)
Dept: PRIMARY CARE CLINIC | Facility: CLINIC | Age: 66
End: 2021-11-08
Payer: MEDICARE

## 2021-11-08 VITALS
BODY MASS INDEX: 30.78 KG/M2 | HEIGHT: 68 IN | RESPIRATION RATE: 18 BRPM | TEMPERATURE: 98 F | DIASTOLIC BLOOD PRESSURE: 84 MMHG | WEIGHT: 203.06 LBS | HEART RATE: 66 BPM | OXYGEN SATURATION: 95 % | SYSTOLIC BLOOD PRESSURE: 158 MMHG

## 2021-11-08 DIAGNOSIS — D64.9 ANEMIA, UNSPECIFIED TYPE: Primary | ICD-10-CM

## 2021-11-08 DIAGNOSIS — D64.9 ANEMIA, UNSPECIFIED TYPE: ICD-10-CM

## 2021-11-08 DIAGNOSIS — B16.9 ACUTE HEPATITIS B: ICD-10-CM

## 2021-11-08 LAB
ALBUMIN SERPL BCP-MCNC: 4.2 G/DL (ref 3.5–5.2)
ALP SERPL-CCNC: 73 U/L (ref 55–135)
ALT SERPL W/O P-5'-P-CCNC: 13 U/L (ref 10–44)
ANION GAP SERPL CALC-SCNC: 6 MMOL/L (ref 8–16)
AST SERPL-CCNC: 17 U/L (ref 10–40)
BASOPHILS # BLD AUTO: 0.02 K/UL (ref 0–0.2)
BASOPHILS NFR BLD: 0.3 % (ref 0–1.9)
BILIRUB SERPL-MCNC: 0.6 MG/DL (ref 0.1–1)
BUN SERPL-MCNC: 11 MG/DL (ref 8–23)
CALCIUM SERPL-MCNC: 10.2 MG/DL (ref 8.7–10.5)
CHLORIDE SERPL-SCNC: 100 MMOL/L (ref 95–110)
CO2 SERPL-SCNC: 32 MMOL/L (ref 23–29)
CREAT SERPL-MCNC: 0.9 MG/DL (ref 0.5–1.4)
DIFFERENTIAL METHOD: NORMAL
EOSINOPHIL # BLD AUTO: 0 K/UL (ref 0–0.5)
EOSINOPHIL NFR BLD: 0.2 % (ref 0–8)
ERYTHROCYTE [DISTWIDTH] IN BLOOD BY AUTOMATED COUNT: 12.9 % (ref 11.5–14.5)
EST. GFR  (AFRICAN AMERICAN): >60 ML/MIN/1.73 M^2
EST. GFR  (NON AFRICAN AMERICAN): >60 ML/MIN/1.73 M^2
FERRITIN SERPL-MCNC: 122 NG/ML (ref 20–300)
GLUCOSE SERPL-MCNC: 89 MG/DL (ref 70–110)
HBV SURFACE AB SER-ACNC: POSITIVE M[IU]/ML
HBV SURFACE AG SERPL QL IA: NEGATIVE
HCT VFR BLD AUTO: 45.3 % (ref 40–54)
HGB BLD-MCNC: 14.9 G/DL (ref 14–18)
IMM GRANULOCYTES # BLD AUTO: 0.02 K/UL (ref 0–0.04)
IMM GRANULOCYTES NFR BLD AUTO: 0.3 % (ref 0–0.5)
INR PPP: 0.9 (ref 0.8–1.2)
IRON SERPL-MCNC: 95 UG/DL (ref 45–160)
LYMPHOCYTES # BLD AUTO: 2.6 K/UL (ref 1–4.8)
LYMPHOCYTES NFR BLD: 40.1 % (ref 18–48)
MCH RBC QN AUTO: 30 PG (ref 27–31)
MCHC RBC AUTO-ENTMCNC: 32.9 G/DL (ref 32–36)
MCV RBC AUTO: 91 FL (ref 82–98)
MONOCYTES # BLD AUTO: 0.4 K/UL (ref 0.3–1)
MONOCYTES NFR BLD: 6.4 % (ref 4–15)
NEUTROPHILS # BLD AUTO: 3.4 K/UL (ref 1.8–7.7)
NEUTROPHILS NFR BLD: 52.7 % (ref 38–73)
NRBC BLD-RTO: 0 /100 WBC
PLATELET # BLD AUTO: 151 K/UL (ref 150–450)
PMV BLD AUTO: 10.2 FL (ref 9.2–12.9)
POTASSIUM SERPL-SCNC: 4.2 MMOL/L (ref 3.5–5.1)
PROT SERPL-MCNC: 7.3 G/DL (ref 6–8.4)
PROTHROMBIN TIME: 10.2 SEC (ref 9–12.5)
RBC # BLD AUTO: 4.97 M/UL (ref 4.6–6.2)
SATURATED IRON: 27 % (ref 20–50)
SODIUM SERPL-SCNC: 138 MMOL/L (ref 136–145)
TOTAL IRON BINDING CAPACITY: 358 UG/DL (ref 250–450)
TRANSFERRIN SERPL-MCNC: 242 MG/DL (ref 200–375)
WBC # BLD AUTO: 6.39 K/UL (ref 3.9–12.7)

## 2021-11-08 PROCEDURE — 3077F PR MOST RECENT SYSTOLIC BLOOD PRESSURE >= 140 MM HG: ICD-10-PCS | Mod: HCNC,CPTII,S$GLB, | Performed by: INTERNAL MEDICINE

## 2021-11-08 PROCEDURE — 3077F SYST BP >= 140 MM HG: CPT | Mod: HCNC,CPTII,S$GLB, | Performed by: INTERNAL MEDICINE

## 2021-11-08 PROCEDURE — 36415 COLL VENOUS BLD VENIPUNCTURE: CPT | Mod: HCNC | Performed by: INTERNAL MEDICINE

## 2021-11-08 PROCEDURE — 99214 OFFICE O/P EST MOD 30 MIN: CPT | Mod: HCNC,S$GLB,, | Performed by: INTERNAL MEDICINE

## 2021-11-08 PROCEDURE — 86706 HEP B SURFACE ANTIBODY: CPT | Mod: HCNC | Performed by: INTERNAL MEDICINE

## 2021-11-08 PROCEDURE — 1159F PR MEDICATION LIST DOCUMENTED IN MEDICAL RECORD: ICD-10-PCS | Mod: HCNC,CPTII,S$GLB, | Performed by: INTERNAL MEDICINE

## 2021-11-08 PROCEDURE — 87517 HEPATITIS B DNA QUANT: CPT | Mod: HCNC | Performed by: INTERNAL MEDICINE

## 2021-11-08 PROCEDURE — 85025 COMPLETE CBC W/AUTO DIFF WBC: CPT | Mod: HCNC | Performed by: INTERNAL MEDICINE

## 2021-11-08 PROCEDURE — 99999 PR PBB SHADOW E&M-EST. PATIENT-LVL III: ICD-10-PCS | Mod: PBBFAC,HCNC,, | Performed by: INTERNAL MEDICINE

## 2021-11-08 PROCEDURE — 3079F DIAST BP 80-89 MM HG: CPT | Mod: HCNC,CPTII,S$GLB, | Performed by: INTERNAL MEDICINE

## 2021-11-08 PROCEDURE — 3008F PR BODY MASS INDEX (BMI) DOCUMENTED: ICD-10-PCS | Mod: HCNC,CPTII,S$GLB, | Performed by: INTERNAL MEDICINE

## 2021-11-08 PROCEDURE — 99214 PR OFFICE/OUTPT VISIT, EST, LEVL IV, 30-39 MIN: ICD-10-PCS | Mod: HCNC,S$GLB,, | Performed by: INTERNAL MEDICINE

## 2021-11-08 PROCEDURE — 1126F AMNT PAIN NOTED NONE PRSNT: CPT | Mod: HCNC,CPTII,S$GLB, | Performed by: INTERNAL MEDICINE

## 2021-11-08 PROCEDURE — 1157F ADVNC CARE PLAN IN RCRD: CPT | Mod: HCNC,CPTII,S$GLB, | Performed by: INTERNAL MEDICINE

## 2021-11-08 PROCEDURE — 80053 COMPREHEN METABOLIC PANEL: CPT | Mod: HCNC | Performed by: INTERNAL MEDICINE

## 2021-11-08 PROCEDURE — 85610 PROTHROMBIN TIME: CPT | Mod: HCNC | Performed by: INTERNAL MEDICINE

## 2021-11-08 PROCEDURE — 1159F MED LIST DOCD IN RCRD: CPT | Mod: HCNC,CPTII,S$GLB, | Performed by: INTERNAL MEDICINE

## 2021-11-08 PROCEDURE — 3288F PR FALLS RISK ASSESSMENT DOCUMENTED: ICD-10-PCS | Mod: HCNC,CPTII,S$GLB, | Performed by: INTERNAL MEDICINE

## 2021-11-08 PROCEDURE — 3288F FALL RISK ASSESSMENT DOCD: CPT | Mod: HCNC,CPTII,S$GLB, | Performed by: INTERNAL MEDICINE

## 2021-11-08 PROCEDURE — 1157F PR ADVANCE CARE PLAN OR EQUIV PRESENT IN MEDICAL RECORD: ICD-10-PCS | Mod: HCNC,CPTII,S$GLB, | Performed by: INTERNAL MEDICINE

## 2021-11-08 PROCEDURE — 99499 RISK ADDL DX/OHS AUDIT: ICD-10-PCS | Mod: HCNC,S$GLB,, | Performed by: INTERNAL MEDICINE

## 2021-11-08 PROCEDURE — 1101F PR PT FALLS ASSESS DOC 0-1 FALLS W/OUT INJ PAST YR: ICD-10-PCS | Mod: HCNC,CPTII,S$GLB, | Performed by: INTERNAL MEDICINE

## 2021-11-08 PROCEDURE — 87340 HEPATITIS B SURFACE AG IA: CPT | Mod: HCNC | Performed by: INTERNAL MEDICINE

## 2021-11-08 PROCEDURE — 3079F PR MOST RECENT DIASTOLIC BLOOD PRESSURE 80-89 MM HG: ICD-10-PCS | Mod: HCNC,CPTII,S$GLB, | Performed by: INTERNAL MEDICINE

## 2021-11-08 PROCEDURE — 1126F PR PAIN SEVERITY QUANTIFIED, NO PAIN PRESENT: ICD-10-PCS | Mod: HCNC,CPTII,S$GLB, | Performed by: INTERNAL MEDICINE

## 2021-11-08 PROCEDURE — 1101F PT FALLS ASSESS-DOCD LE1/YR: CPT | Mod: HCNC,CPTII,S$GLB, | Performed by: INTERNAL MEDICINE

## 2021-11-08 PROCEDURE — 3008F BODY MASS INDEX DOCD: CPT | Mod: HCNC,CPTII,S$GLB, | Performed by: INTERNAL MEDICINE

## 2021-11-08 PROCEDURE — 82728 ASSAY OF FERRITIN: CPT | Mod: HCNC | Performed by: INTERNAL MEDICINE

## 2021-11-08 PROCEDURE — 99999 PR PBB SHADOW E&M-EST. PATIENT-LVL III: CPT | Mod: PBBFAC,HCNC,, | Performed by: INTERNAL MEDICINE

## 2021-11-08 PROCEDURE — 99499 UNLISTED E&M SERVICE: CPT | Mod: HCNC,S$GLB,, | Performed by: INTERNAL MEDICINE

## 2021-11-08 PROCEDURE — 84466 ASSAY OF TRANSFERRIN: CPT | Mod: HCNC | Performed by: INTERNAL MEDICINE

## 2021-11-09 ENCOUNTER — PATIENT MESSAGE (OUTPATIENT)
Dept: HEPATOLOGY | Facility: CLINIC | Age: 66
End: 2021-11-09
Payer: MEDICARE

## 2021-11-11 ENCOUNTER — TELEPHONE (OUTPATIENT)
Dept: PRIMARY CARE CLINIC | Facility: CLINIC | Age: 66
End: 2021-11-11

## 2021-11-11 NOTE — TELEPHONE ENCOUNTER
----- Message from Roly Flannery MD sent at 11/11/2021  7:54 AM CST -----  Please call patient with results.  No indication that anemia associated deficiency.

## 2021-11-17 ENCOUNTER — SPECIALTY PHARMACY (OUTPATIENT)
Dept: PHARMACY | Facility: CLINIC | Age: 66
End: 2021-11-17
Payer: MEDICARE

## 2021-11-18 ENCOUNTER — TELEPHONE (OUTPATIENT)
Dept: HEPATOLOGY | Facility: CLINIC | Age: 66
End: 2021-11-18
Payer: MEDICARE

## 2021-11-18 ENCOUNTER — SPECIALTY PHARMACY (OUTPATIENT)
Dept: PHARMACY | Facility: CLINIC | Age: 66
End: 2021-11-18
Payer: MEDICARE

## 2021-11-22 PROBLEM — Z00.00 HEALTHCARE MAINTENANCE: Status: RESOLVED | Noted: 2019-11-19 | Resolved: 2021-11-22

## 2021-11-29 ENCOUNTER — HOSPITAL ENCOUNTER (OUTPATIENT)
Facility: HOSPITAL | Age: 66
Discharge: HOME | End: 2021-11-29
Attending: INTERNAL MEDICINE | Admitting: INTERNAL MEDICINE
Payer: MEDICARE

## 2021-11-29 ENCOUNTER — TELEPHONE (OUTPATIENT)
Dept: GASTROENTEROLOGY | Facility: CLINIC | Age: 66
End: 2021-11-29
Payer: MEDICARE

## 2021-11-29 ENCOUNTER — ANESTHESIA (OUTPATIENT)
Dept: ENDOSCOPY | Facility: HOSPITAL | Age: 66
End: 2021-11-29
Payer: MEDICARE

## 2021-11-29 ENCOUNTER — ANESTHESIA EVENT (OUTPATIENT)
Dept: ENDOSCOPY | Facility: HOSPITAL | Age: 66
End: 2021-11-29
Payer: MEDICARE

## 2021-11-29 VITALS
HEART RATE: 57 BPM | DIASTOLIC BLOOD PRESSURE: 82 MMHG | BODY MASS INDEX: 30.01 KG/M2 | WEIGHT: 198 LBS | HEIGHT: 68 IN | RESPIRATION RATE: 20 BRPM | OXYGEN SATURATION: 96 % | SYSTOLIC BLOOD PRESSURE: 133 MMHG | TEMPERATURE: 98 F

## 2021-11-29 DIAGNOSIS — D64.9 ANEMIA: ICD-10-CM

## 2021-11-29 DIAGNOSIS — K21.9 GASTROESOPHAGEAL REFLUX DISEASE WITHOUT ESOPHAGITIS: ICD-10-CM

## 2021-11-29 DIAGNOSIS — D64.9 ANEMIA, UNSPECIFIED TYPE: Primary | ICD-10-CM

## 2021-11-29 PROCEDURE — 37000009 HC ANESTHESIA EA ADD 15 MINS: Mod: HCNC | Performed by: INTERNAL MEDICINE

## 2021-11-29 PROCEDURE — 63600175 PHARM REV CODE 636 W HCPCS: Mod: HCNC | Performed by: NURSE ANESTHETIST, CERTIFIED REGISTERED

## 2021-11-29 PROCEDURE — 27201012 HC FORCEPS, HOT/COLD, DISP: Mod: HCNC | Performed by: INTERNAL MEDICINE

## 2021-11-29 PROCEDURE — 43239 EGD BIOPSY SINGLE/MULTIPLE: CPT | Mod: HCNC,GC,, | Performed by: INTERNAL MEDICINE

## 2021-11-29 PROCEDURE — E9220 PRA ENDO ANESTHESIA: ICD-10-PCS | Mod: HCNC,,, | Performed by: NURSE ANESTHETIST, CERTIFIED REGISTERED

## 2021-11-29 PROCEDURE — 88305 TISSUE EXAM BY PATHOLOGIST: CPT | Mod: 26,HCNC,, | Performed by: PATHOLOGY

## 2021-11-29 PROCEDURE — 43239 EGD BIOPSY SINGLE/MULTIPLE: CPT | Mod: HCNC | Performed by: INTERNAL MEDICINE

## 2021-11-29 PROCEDURE — 88305 TISSUE EXAM BY PATHOLOGIST: ICD-10-PCS | Mod: 26,HCNC,, | Performed by: PATHOLOGY

## 2021-11-29 PROCEDURE — 43239 PR EGD, FLEX, W/BIOPSY, SGL/MULTI: ICD-10-PCS | Mod: HCNC,GC,, | Performed by: INTERNAL MEDICINE

## 2021-11-29 PROCEDURE — E9220 PRA ENDO ANESTHESIA: HCPCS | Mod: HCNC,,, | Performed by: NURSE ANESTHETIST, CERTIFIED REGISTERED

## 2021-11-29 PROCEDURE — 25000003 PHARM REV CODE 250: Mod: HCNC | Performed by: NURSE ANESTHETIST, CERTIFIED REGISTERED

## 2021-11-29 PROCEDURE — 88305 TISSUE EXAM BY PATHOLOGIST: CPT | Mod: HCNC | Performed by: PATHOLOGY

## 2021-11-29 PROCEDURE — 37000008 HC ANESTHESIA 1ST 15 MINUTES: Mod: HCNC | Performed by: INTERNAL MEDICINE

## 2021-11-29 PROCEDURE — 25000003 PHARM REV CODE 250: Mod: HCNC | Performed by: INTERNAL MEDICINE

## 2021-11-29 RX ORDER — PROPOFOL 10 MG/ML
VIAL (ML) INTRAVENOUS CONTINUOUS PRN
Status: DISCONTINUED | OUTPATIENT
Start: 2021-11-29 | End: 2021-11-29

## 2021-11-29 RX ORDER — LIDOCAINE HYDROCHLORIDE 20 MG/ML
INJECTION INTRAVENOUS
Status: DISCONTINUED | OUTPATIENT
Start: 2021-11-29 | End: 2021-11-29

## 2021-11-29 RX ORDER — PROPOFOL 10 MG/ML
VIAL (ML) INTRAVENOUS
Status: DISCONTINUED | OUTPATIENT
Start: 2021-11-29 | End: 2021-11-29

## 2021-11-29 RX ORDER — SODIUM CHLORIDE 9 MG/ML
INJECTION, SOLUTION INTRAVENOUS CONTINUOUS
Status: DISCONTINUED | OUTPATIENT
Start: 2021-11-29 | End: 2021-11-29 | Stop reason: HOSPADM

## 2021-11-29 RX ORDER — PANTOPRAZOLE SODIUM 40 MG/1
40 TABLET, DELAYED RELEASE ORAL DAILY
Qty: 30 TABLET | Refills: 11 | Status: SHIPPED | OUTPATIENT
Start: 2021-11-29 | End: 2021-12-03

## 2021-11-29 RX ADMIN — PROPOFOL 90 MG: 10 INJECTION, EMULSION INTRAVENOUS at 11:11

## 2021-11-29 RX ADMIN — LIDOCAINE HYDROCHLORIDE 100 MG: 20 INJECTION, SOLUTION INTRAVENOUS at 11:11

## 2021-11-29 RX ADMIN — SODIUM CHLORIDE: 0.9 INJECTION, SOLUTION INTRAVENOUS at 10:11

## 2021-11-29 RX ADMIN — Medication 175 MCG/KG/MIN: at 11:11

## 2021-12-02 LAB
FINAL PATHOLOGIC DIAGNOSIS: NORMAL
GROSS: NORMAL
Lab: NORMAL

## 2021-12-03 ENCOUNTER — PATIENT MESSAGE (OUTPATIENT)
Dept: GASTROENTEROLOGY | Facility: CLINIC | Age: 66
End: 2021-12-03
Payer: MEDICARE

## 2021-12-03 ENCOUNTER — TELEPHONE (OUTPATIENT)
Dept: GASTROENTEROLOGY | Facility: CLINIC | Age: 66
End: 2021-12-03
Payer: MEDICARE

## 2021-12-03 DIAGNOSIS — A04.8 H. PYLORI INFECTION: Primary | ICD-10-CM

## 2021-12-03 RX ORDER — PANTOPRAZOLE SODIUM 40 MG/1
40 TABLET, DELAYED RELEASE ORAL 2 TIMES DAILY
Qty: 20 TABLET | Refills: 0 | Status: SHIPPED | OUTPATIENT
Start: 2021-12-03 | End: 2021-12-13

## 2021-12-03 RX ORDER — METRONIDAZOLE 500 MG/1
500 TABLET ORAL EVERY 6 HOURS
Qty: 40 TABLET | Refills: 0 | Status: SHIPPED | OUTPATIENT
Start: 2021-12-03 | End: 2021-12-13

## 2021-12-03 RX ORDER — TETRACYCLINE HYDROCHLORIDE 500 MG/1
500 CAPSULE ORAL EVERY 6 HOURS
Qty: 40 CAPSULE | Refills: 0 | Status: SHIPPED | OUTPATIENT
Start: 2021-12-03 | End: 2021-12-13

## 2021-12-06 ENCOUNTER — OFFICE VISIT (OUTPATIENT)
Dept: PRIMARY CARE CLINIC | Facility: CLINIC | Age: 66
End: 2021-12-06
Payer: MEDICARE

## 2021-12-06 ENCOUNTER — IMMUNIZATION (OUTPATIENT)
Dept: INTERNAL MEDICINE | Facility: CLINIC | Age: 66
End: 2021-12-06
Payer: MEDICARE

## 2021-12-06 VITALS
WEIGHT: 203.63 LBS | BODY MASS INDEX: 30.86 KG/M2 | DIASTOLIC BLOOD PRESSURE: 78 MMHG | HEART RATE: 67 BPM | OXYGEN SATURATION: 98 % | SYSTOLIC BLOOD PRESSURE: 124 MMHG | HEIGHT: 68 IN | TEMPERATURE: 99 F

## 2021-12-06 DIAGNOSIS — N48.6 PEYRONIE'S DISEASE: ICD-10-CM

## 2021-12-06 DIAGNOSIS — Z00.00 HEALTHCARE MAINTENANCE: ICD-10-CM

## 2021-12-06 DIAGNOSIS — L60.0 INGROWING NAIL: ICD-10-CM

## 2021-12-06 DIAGNOSIS — M54.50 CHRONIC LEFT-SIDED LOW BACK PAIN WITHOUT SCIATICA: ICD-10-CM

## 2021-12-06 DIAGNOSIS — E78.2 MIXED HYPERLIPIDEMIA: ICD-10-CM

## 2021-12-06 DIAGNOSIS — D50.0 IRON DEFICIENCY ANEMIA DUE TO CHRONIC BLOOD LOSS: ICD-10-CM

## 2021-12-06 DIAGNOSIS — G89.29 CHRONIC LEFT-SIDED LOW BACK PAIN WITHOUT SCIATICA: ICD-10-CM

## 2021-12-06 DIAGNOSIS — C82.90 FOLLICULAR NON-HODGKIN'S LYMPHOMA: ICD-10-CM

## 2021-12-06 DIAGNOSIS — B18.1 CHRONIC VIRAL HEPATITIS B: ICD-10-CM

## 2021-12-06 PROBLEM — D50.9 IRON DEFICIENCY ANEMIA: Status: ACTIVE | Noted: 2021-12-06

## 2021-12-06 PROCEDURE — 99215 OFFICE O/P EST HI 40 MIN: CPT | Mod: HCNC,S$GLB,, | Performed by: INTERNAL MEDICINE

## 2021-12-06 PROCEDURE — 90694 VACC AIIV4 NO PRSRV 0.5ML IM: CPT | Mod: HCNC,S$GLB,, | Performed by: INTERNAL MEDICINE

## 2021-12-06 PROCEDURE — G0008 ADMIN INFLUENZA VIRUS VAC: HCPCS | Mod: HCNC,S$GLB,, | Performed by: INTERNAL MEDICINE

## 2021-12-06 PROCEDURE — 1157F ADVNC CARE PLAN IN RCRD: CPT | Mod: HCNC,CPTII,S$GLB, | Performed by: INTERNAL MEDICINE

## 2021-12-06 PROCEDURE — 99215 PR OFFICE/OUTPT VISIT, EST, LEVL V, 40-54 MIN: ICD-10-PCS | Mod: HCNC,S$GLB,, | Performed by: INTERNAL MEDICINE

## 2021-12-06 PROCEDURE — 90694 FLU VACCINE - QUADRIVALENT - ADJUVANTED: ICD-10-PCS | Mod: HCNC,S$GLB,, | Performed by: INTERNAL MEDICINE

## 2021-12-06 PROCEDURE — G0008 FLU VACCINE - QUADRIVALENT - ADJUVANTED: ICD-10-PCS | Mod: HCNC,S$GLB,, | Performed by: INTERNAL MEDICINE

## 2021-12-06 PROCEDURE — 1157F PR ADVANCE CARE PLAN OR EQUIV PRESENT IN MEDICAL RECORD: ICD-10-PCS | Mod: HCNC,CPTII,S$GLB, | Performed by: INTERNAL MEDICINE

## 2021-12-10 ENCOUNTER — OFFICE VISIT (OUTPATIENT)
Dept: PODIATRY | Facility: CLINIC | Age: 66
End: 2021-12-10
Payer: MEDICARE

## 2021-12-10 VITALS
DIASTOLIC BLOOD PRESSURE: 86 MMHG | BODY MASS INDEX: 30.97 KG/M2 | SYSTOLIC BLOOD PRESSURE: 136 MMHG | HEART RATE: 64 BPM | WEIGHT: 203.69 LBS

## 2021-12-10 DIAGNOSIS — L60.0 INGROWING NAIL: ICD-10-CM

## 2021-12-10 PROCEDURE — 99203 PR OFFICE/OUTPT VISIT, NEW, LEVL III, 30-44 MIN: ICD-10-PCS | Mod: HCNC,S$GLB,, | Performed by: PODIATRIST

## 2021-12-10 PROCEDURE — 1125F PR PAIN SEVERITY QUANTIFIED, PAIN PRESENT: ICD-10-PCS | Mod: HCNC,CPTII,S$GLB, | Performed by: PODIATRIST

## 2021-12-10 PROCEDURE — 3079F PR MOST RECENT DIASTOLIC BLOOD PRESSURE 80-89 MM HG: ICD-10-PCS | Mod: HCNC,CPTII,S$GLB, | Performed by: PODIATRIST

## 2021-12-10 PROCEDURE — 1157F PR ADVANCE CARE PLAN OR EQUIV PRESENT IN MEDICAL RECORD: ICD-10-PCS | Mod: HCNC,CPTII,S$GLB, | Performed by: PODIATRIST

## 2021-12-10 PROCEDURE — 99999 PR PBB SHADOW E&M-EST. PATIENT-LVL III: CPT | Mod: PBBFAC,HCNC,, | Performed by: PODIATRIST

## 2021-12-10 PROCEDURE — 3008F PR BODY MASS INDEX (BMI) DOCUMENTED: ICD-10-PCS | Mod: HCNC,CPTII,S$GLB, | Performed by: PODIATRIST

## 2021-12-10 PROCEDURE — 99999 PR PBB SHADOW E&M-EST. PATIENT-LVL III: ICD-10-PCS | Mod: PBBFAC,HCNC,, | Performed by: PODIATRIST

## 2021-12-10 PROCEDURE — 1157F ADVNC CARE PLAN IN RCRD: CPT | Mod: HCNC,CPTII,S$GLB, | Performed by: PODIATRIST

## 2021-12-10 PROCEDURE — 1159F PR MEDICATION LIST DOCUMENTED IN MEDICAL RECORD: ICD-10-PCS | Mod: HCNC,CPTII,S$GLB, | Performed by: PODIATRIST

## 2021-12-10 PROCEDURE — 3075F PR MOST RECENT SYSTOLIC BLOOD PRESS GE 130-139MM HG: ICD-10-PCS | Mod: HCNC,CPTII,S$GLB, | Performed by: PODIATRIST

## 2021-12-10 PROCEDURE — 1125F AMNT PAIN NOTED PAIN PRSNT: CPT | Mod: HCNC,CPTII,S$GLB, | Performed by: PODIATRIST

## 2021-12-10 PROCEDURE — 99203 OFFICE O/P NEW LOW 30 MIN: CPT | Mod: HCNC,S$GLB,, | Performed by: PODIATRIST

## 2021-12-10 PROCEDURE — 3008F BODY MASS INDEX DOCD: CPT | Mod: HCNC,CPTII,S$GLB, | Performed by: PODIATRIST

## 2021-12-10 PROCEDURE — 3079F DIAST BP 80-89 MM HG: CPT | Mod: HCNC,CPTII,S$GLB, | Performed by: PODIATRIST

## 2021-12-10 PROCEDURE — 3075F SYST BP GE 130 - 139MM HG: CPT | Mod: HCNC,CPTII,S$GLB, | Performed by: PODIATRIST

## 2021-12-10 PROCEDURE — 1159F MED LIST DOCD IN RCRD: CPT | Mod: HCNC,CPTII,S$GLB, | Performed by: PODIATRIST

## 2021-12-20 ENCOUNTER — LAB VISIT (OUTPATIENT)
Dept: LAB | Facility: HOSPITAL | Age: 66
End: 2021-12-20
Attending: INTERNAL MEDICINE
Payer: MEDICARE

## 2021-12-20 DIAGNOSIS — B16.9 ACUTE VIRAL HEPATITIS B WITHOUT COMA AND WITHOUT DELTA AGENT: ICD-10-CM

## 2021-12-20 LAB
ALBUMIN SERPL BCP-MCNC: 3.8 G/DL (ref 3.5–5.2)
ALP SERPL-CCNC: 58 U/L (ref 55–135)
ALT SERPL W/O P-5'-P-CCNC: 17 U/L (ref 10–44)
AST SERPL-CCNC: 19 U/L (ref 10–40)
BILIRUB DIRECT SERPL-MCNC: 0.1 MG/DL (ref 0.1–0.3)
BILIRUB SERPL-MCNC: 0.4 MG/DL (ref 0.1–1)
PROT SERPL-MCNC: 6.7 G/DL (ref 6–8.4)

## 2021-12-20 PROCEDURE — 36415 COLL VENOUS BLD VENIPUNCTURE: CPT | Mod: HCNC | Performed by: INTERNAL MEDICINE

## 2021-12-20 PROCEDURE — 87517 HEPATITIS B DNA QUANT: CPT | Mod: HCNC | Performed by: INTERNAL MEDICINE

## 2021-12-20 PROCEDURE — 80076 HEPATIC FUNCTION PANEL: CPT | Mod: HCNC | Performed by: INTERNAL MEDICINE

## 2021-12-21 ENCOUNTER — TELEPHONE (OUTPATIENT)
Dept: HEPATOLOGY | Facility: CLINIC | Age: 66
End: 2021-12-21
Payer: MEDICARE

## 2021-12-22 ENCOUNTER — TELEPHONE (OUTPATIENT)
Dept: HEPATOLOGY | Facility: CLINIC | Age: 66
End: 2021-12-22
Payer: MEDICARE

## 2021-12-22 DIAGNOSIS — B16.9 ACUTE VIRAL HEPATITIS B WITHOUT COMA AND WITHOUT DELTA AGENT: Primary | ICD-10-CM

## 2021-12-23 ENCOUNTER — TELEPHONE (OUTPATIENT)
Dept: HEPATOLOGY | Facility: CLINIC | Age: 66
End: 2021-12-23
Payer: MEDICARE

## 2021-12-27 ENCOUNTER — TELEPHONE (OUTPATIENT)
Dept: HEPATOLOGY | Facility: CLINIC | Age: 66
End: 2021-12-27
Payer: MEDICARE

## 2021-12-27 ENCOUNTER — LAB VISIT (OUTPATIENT)
Dept: LAB | Facility: HOSPITAL | Age: 66
End: 2021-12-27
Attending: INTERNAL MEDICINE
Payer: MEDICARE

## 2021-12-27 DIAGNOSIS — B19.10 HEPATITIS B INFECTION WITHOUT DELTA AGENT WITHOUT HEPATIC COMA, UNSPECIFIED CHRONICITY: ICD-10-CM

## 2021-12-27 DIAGNOSIS — B16.9 ACUTE HEPATITIS B: ICD-10-CM

## 2021-12-27 LAB
ALBUMIN SERPL BCP-MCNC: 3.9 G/DL (ref 3.5–5.2)
ALP SERPL-CCNC: 62 U/L (ref 55–135)
ALT SERPL W/O P-5'-P-CCNC: 17 U/L (ref 10–44)
ANION GAP SERPL CALC-SCNC: 7 MMOL/L (ref 8–16)
AST SERPL-CCNC: 19 U/L (ref 10–40)
BILIRUB SERPL-MCNC: 0.5 MG/DL (ref 0.1–1)
BUN SERPL-MCNC: 11 MG/DL (ref 8–23)
CALCIUM SERPL-MCNC: 9.7 MG/DL (ref 8.7–10.5)
CHLORIDE SERPL-SCNC: 104 MMOL/L (ref 95–110)
CO2 SERPL-SCNC: 29 MMOL/L (ref 23–29)
CREAT SERPL-MCNC: 0.9 MG/DL (ref 0.5–1.4)
EST. GFR  (AFRICAN AMERICAN): >60 ML/MIN/1.73 M^2
EST. GFR  (NON AFRICAN AMERICAN): >60 ML/MIN/1.73 M^2
GLUCOSE SERPL-MCNC: 93 MG/DL (ref 70–110)
POTASSIUM SERPL-SCNC: 4.3 MMOL/L (ref 3.5–5.1)
PROT SERPL-MCNC: 7.1 G/DL (ref 6–8.4)
SODIUM SERPL-SCNC: 140 MMOL/L (ref 136–145)

## 2021-12-27 PROCEDURE — 80053 COMPREHEN METABOLIC PANEL: CPT | Mod: HCNC | Performed by: INTERNAL MEDICINE

## 2021-12-27 PROCEDURE — 87517 HEPATITIS B DNA QUANT: CPT | Mod: HCNC | Performed by: INTERNAL MEDICINE

## 2022-01-05 ENCOUNTER — TELEPHONE (OUTPATIENT)
Dept: PRIMARY CARE CLINIC | Facility: CLINIC | Age: 67
End: 2022-01-05
Payer: MEDICARE

## 2022-01-05 NOTE — TELEPHONE ENCOUNTER
Spoke with patient, he states he feels ok. He really did not get the symptoms even though he tested positive. He feels like he does not need the monoclonal antibodies or the monitoring for himself or his wife. He reports his wife is feeling ok also. He stated he received a call from the health dept to check on him and his wife.

## 2022-01-06 ENCOUNTER — PATIENT MESSAGE (OUTPATIENT)
Dept: HEPATOLOGY | Facility: CLINIC | Age: 67
End: 2022-01-06
Payer: MEDICARE

## 2022-01-06 ENCOUNTER — TELEPHONE (OUTPATIENT)
Dept: PRIMARY CARE CLINIC | Facility: CLINIC | Age: 67
End: 2022-01-06
Payer: MEDICARE

## 2022-01-06 NOTE — TELEPHONE ENCOUNTER
----- Message from Roly Flannery MD sent at 1/5/2022  4:34 PM CST -----  Please call patient with results.  Normal renal and liver function.

## 2022-01-10 NOTE — PROGRESS NOTES
Subjective:      Patient ID: Jeff Hope is a 66 y.o. male.    Chief Complaint: Foot Pain (Big toe left foot )    Jeff is a 66 y.o. male who presents to the clinic complaining of painful ingrown toenail on the left foot.    Review of Systems   Constitutional: Negative for chills, fever and malaise/fatigue.   HENT: Negative for hearing loss.    Cardiovascular: Negative for claudication.   Respiratory: Negative for shortness of breath.    Skin: Positive for nail changes. Negative for flushing and rash.   Musculoskeletal: Negative for joint pain and myalgias.   Gastrointestinal: Negative for nausea and vomiting.   Neurological: Negative for loss of balance, numbness, paresthesias and sensory change.   Psychiatric/Behavioral: Negative for altered mental status.           Objective:      Physical Exam  Vitals reviewed.   Cardiovascular:      Pulses:           Dorsalis pedis pulses are 2+ on the right side and 2+ on the left side.        Posterior tibial pulses are 2+ on the right side and 2+ on the left side.      Comments: No edema noted b/L  Musculoskeletal:      Comments:        Feet:      Right foot:      Protective Sensation: 5 sites tested. 5 sites sensed.      Left foot:      Protective Sensation: 5 sites tested. 5 sites sensed.   Skin:     Comments: Normal skin tugor noted.   No open lesion noted b/L  Skin temp is warm to warm from proximal to distal b/L.  Webspaces clean, dry, and intact  Left hallux, lateral border ingrown and painful  No paronychia noted   Neurological:      Mental Status: He is alert.      Deep Tendon Reflexes: Strength normal.      Comments: Gross sensation intact b/L   Psychiatric:         Mood and Affect: Mood and affect normal.               Assessment:       Encounter Diagnosis   Name Primary?    Ingrowing nail - Left Foot          Plan:       Jeff was seen today for foot pain.    Diagnoses and all orders for this visit:    Ingrowing nail - Left Foot  -     Ambulatory  referral/consult to Podiatry      I counseled the patient on his conditions, their implications and medical management.        Utilizing sterile toenail clippers I aggressively debrided the offending nail border approximately 3 mm from its edge and carried the nail plate incision down at an angle in order to wedge out the offending cryptotic portion of the nail plate. The offending border was then removed in toto. The area was cleansed with alcohol. Patient tolerated the procedure well and related significant relief.

## 2022-01-14 ENCOUNTER — TELEPHONE (OUTPATIENT)
Dept: ENDOSCOPY | Facility: HOSPITAL | Age: 67
End: 2022-01-14
Payer: MEDICARE

## 2022-01-28 ENCOUNTER — OFFICE VISIT (OUTPATIENT)
Dept: GASTROENTEROLOGY | Facility: CLINIC | Age: 67
End: 2022-01-28
Payer: MEDICARE

## 2022-01-28 VITALS
DIASTOLIC BLOOD PRESSURE: 79 MMHG | SYSTOLIC BLOOD PRESSURE: 142 MMHG | HEIGHT: 68 IN | HEART RATE: 68 BPM | BODY MASS INDEX: 31.48 KG/M2 | WEIGHT: 207.69 LBS

## 2022-01-28 DIAGNOSIS — K21.9 GASTROESOPHAGEAL REFLUX DISEASE, UNSPECIFIED WHETHER ESOPHAGITIS PRESENT: Primary | ICD-10-CM

## 2022-01-28 PROCEDURE — 1159F PR MEDICATION LIST DOCUMENTED IN MEDICAL RECORD: ICD-10-PCS | Mod: HCNC,CPTII,S$GLB, | Performed by: INTERNAL MEDICINE

## 2022-01-28 PROCEDURE — 3288F PR FALLS RISK ASSESSMENT DOCUMENTED: ICD-10-PCS | Mod: HCNC,CPTII,S$GLB, | Performed by: INTERNAL MEDICINE

## 2022-01-28 PROCEDURE — 3077F PR MOST RECENT SYSTOLIC BLOOD PRESSURE >= 140 MM HG: ICD-10-PCS | Mod: HCNC,CPTII,S$GLB, | Performed by: INTERNAL MEDICINE

## 2022-01-28 PROCEDURE — 1157F ADVNC CARE PLAN IN RCRD: CPT | Mod: HCNC,CPTII,S$GLB, | Performed by: INTERNAL MEDICINE

## 2022-01-28 PROCEDURE — 99999 PR PBB SHADOW E&M-EST. PATIENT-LVL III: ICD-10-PCS | Mod: PBBFAC,HCNC,, | Performed by: INTERNAL MEDICINE

## 2022-01-28 PROCEDURE — 1101F PR PT FALLS ASSESS DOC 0-1 FALLS W/OUT INJ PAST YR: ICD-10-PCS | Mod: HCNC,CPTII,S$GLB, | Performed by: INTERNAL MEDICINE

## 2022-01-28 PROCEDURE — 1159F MED LIST DOCD IN RCRD: CPT | Mod: HCNC,CPTII,S$GLB, | Performed by: INTERNAL MEDICINE

## 2022-01-28 PROCEDURE — 3077F SYST BP >= 140 MM HG: CPT | Mod: HCNC,CPTII,S$GLB, | Performed by: INTERNAL MEDICINE

## 2022-01-28 PROCEDURE — 3008F PR BODY MASS INDEX (BMI) DOCUMENTED: ICD-10-PCS | Mod: HCNC,CPTII,S$GLB, | Performed by: INTERNAL MEDICINE

## 2022-01-28 PROCEDURE — 1101F PT FALLS ASSESS-DOCD LE1/YR: CPT | Mod: HCNC,CPTII,S$GLB, | Performed by: INTERNAL MEDICINE

## 2022-01-28 PROCEDURE — 99203 OFFICE O/P NEW LOW 30 MIN: CPT | Mod: HCNC,S$GLB,, | Performed by: INTERNAL MEDICINE

## 2022-01-28 PROCEDURE — 1126F AMNT PAIN NOTED NONE PRSNT: CPT | Mod: HCNC,CPTII,S$GLB, | Performed by: INTERNAL MEDICINE

## 2022-01-28 PROCEDURE — 1157F PR ADVANCE CARE PLAN OR EQUIV PRESENT IN MEDICAL RECORD: ICD-10-PCS | Mod: HCNC,CPTII,S$GLB, | Performed by: INTERNAL MEDICINE

## 2022-01-28 PROCEDURE — 1126F PR PAIN SEVERITY QUANTIFIED, NO PAIN PRESENT: ICD-10-PCS | Mod: HCNC,CPTII,S$GLB, | Performed by: INTERNAL MEDICINE

## 2022-01-28 PROCEDURE — 3288F FALL RISK ASSESSMENT DOCD: CPT | Mod: HCNC,CPTII,S$GLB, | Performed by: INTERNAL MEDICINE

## 2022-01-28 PROCEDURE — 99999 PR PBB SHADOW E&M-EST. PATIENT-LVL III: CPT | Mod: PBBFAC,HCNC,, | Performed by: INTERNAL MEDICINE

## 2022-01-28 PROCEDURE — 3078F PR MOST RECENT DIASTOLIC BLOOD PRESSURE < 80 MM HG: ICD-10-PCS | Mod: HCNC,CPTII,S$GLB, | Performed by: INTERNAL MEDICINE

## 2022-01-28 PROCEDURE — 3078F DIAST BP <80 MM HG: CPT | Mod: HCNC,CPTII,S$GLB, | Performed by: INTERNAL MEDICINE

## 2022-01-28 PROCEDURE — 99203 PR OFFICE/OUTPT VISIT, NEW, LEVL III, 30-44 MIN: ICD-10-PCS | Mod: HCNC,S$GLB,, | Performed by: INTERNAL MEDICINE

## 2022-01-28 PROCEDURE — 3008F BODY MASS INDEX DOCD: CPT | Mod: HCNC,CPTII,S$GLB, | Performed by: INTERNAL MEDICINE

## 2022-01-28 RX ORDER — PANTOPRAZOLE SODIUM 40 MG/1
40 TABLET, DELAYED RELEASE ORAL 2 TIMES DAILY
Qty: 60 TABLET | Refills: 1 | Status: SHIPPED | OUTPATIENT
Start: 2022-01-28 | End: 2022-01-28

## 2022-01-28 NOTE — PROGRESS NOTES
"  [facility]    CC: heartburn    HPI 66 y.o. male who presents for follow up of chronic, intermittent, stable heartburn not completely controlled with Protonix daily with associated belching.  The patient had an EGD 11/29/21 that showed a normal esophagus, friable gastric mucosa and a normal duodenum.  Gastric biopsies were positive for H.  Pylori and he was treated with quadruple therapy for 14 days in December.    Past Medical History  Past Medical History:   Diagnosis Date    Non Hodgkin's lymphoma     Personal history of colonic polyps 09/08/2015    per [facility] report - repeat 9/2020         Review of Systems  General ROS: negative for chills, fever or weight loss  Cardiovascular ROS: no chest pain or dyspnea on exertion  Gastrointestinal ROS: no nausea, change in bowel habits, or black/ bloody stools    Physical Examination  BP (!) 142/79   Pulse 68   Ht 5' 8" (1.727 m)   Wt 94.2 kg (207 lb 10.8 oz)   BMI 31.58 kg/m²   General appearance: alert, cooperative, no distress  HENT: Normocephalic, atraumatic, neck symmetrical, no nasal discharge   Lungs: clear to auscultation bilaterally, no dullness to percussion bilaterally  Heart: regular rate and rhythm without rub; no displacement of the PMI   Abdomen: soft, non-tender; bowel sounds normoactive; no organomegaly  Extremities: extremities symmetric; no clubbing, cyanosis, or edema  Neurologic: Alert and oriented X 3, normal strength, normal coordination and gait    Labs:  Lab Results   Component Value Date    WBC 6.39 11/08/2021    HGB 14.9 11/08/2021    HCT 45.3 11/08/2021    MCV 91 11/08/2021     11/08/2021         CMP  Sodium   Date Value Ref Range Status   12/27/2021 140 136 - 145 mmol/L Final     Potassium   Date Value Ref Range Status   12/27/2021 4.3 3.5 - 5.1 mmol/L Final     Chloride   Date Value Ref Range Status   12/27/2021 104 95 - 110 mmol/L Final     CO2   Date Value Ref Range Status   12/27/2021 29 23 - 29 mmol/L Final "     Glucose   Date Value Ref Range Status   12/27/2021 93 70 - 110 mg/dL Final     BUN   Date Value Ref Range Status   12/27/2021 11 8 - 23 mg/dL Final     Creatinine   Date Value Ref Range Status   12/27/2021 0.9 0.5 - 1.4 mg/dL Final     Calcium   Date Value Ref Range Status   12/27/2021 9.7 8.7 - 10.5 mg/dL Final     Total Protein   Date Value Ref Range Status   12/27/2021 7.1 6.0 - 8.4 g/dL Final     Albumin   Date Value Ref Range Status   12/27/2021 3.9 3.5 - 5.2 g/dL Final     Total Bilirubin   Date Value Ref Range Status   12/27/2021 0.5 0.1 - 1.0 mg/dL Final     Comment:     For infants and newborns, interpretation of results should be based  on gestational age, weight and in agreement with clinical  observations.    Premature Infant recommended reference ranges:  Up to 24 hours.............<8.0 mg/dL  Up to 48 hours............<12.0 mg/dL  3-5 days..................<15.0 mg/dL  6-29 days.................<15.0 mg/dL       Alkaline Phosphatase   Date Value Ref Range Status   12/27/2021 62 55 - 135 U/L Final     AST   Date Value Ref Range Status   12/27/2021 19 10 - 40 U/L Final     ALT   Date Value Ref Range Status   12/27/2021 17 10 - 44 U/L Final     Anion Gap   Date Value Ref Range Status   12/27/2021 7 (L) 8 - 16 mmol/L Final     eGFR if    Date Value Ref Range Status   12/27/2021 >60.0 >60 mL/min/1.73 m^2 Final     eGFR if non    Date Value Ref Range Status   12/27/2021 >60.0 >60 mL/min/1.73 m^2 Final     Comment:     Calculation used to obtain the estimated glomerular filtration  rate (eGFR) is the CKD-EPI equation.          Assessment:   1. Gastroesophageal reflux disease, unspecified whether esophagitis present    2.      H.  Pylori s/p treatment with quadruple therapy.    Plan:  -Hold Protonix for 14 days then submit stool specimen to confirm eradication of H.  Pylori.  -Then the patient will start Protonix 40mg BID x 8 weeks and evaluate for improvement in his GERD  symptoms.      Kristine Palmer MD

## 2022-02-04 ENCOUNTER — HOSPITAL ENCOUNTER (EMERGENCY)
Facility: HOSPITAL | Age: 67
Discharge: HOME | End: 2022-02-04
Attending: EMERGENCY MEDICINE
Payer: MEDICARE

## 2022-02-04 VITALS
TEMPERATURE: 99 F | SYSTOLIC BLOOD PRESSURE: 139 MMHG | OXYGEN SATURATION: 96 % | HEART RATE: 84 BPM | WEIGHT: 205 LBS | HEIGHT: 68 IN | DIASTOLIC BLOOD PRESSURE: 80 MMHG | BODY MASS INDEX: 31.07 KG/M2 | RESPIRATION RATE: 16 BRPM

## 2022-02-04 DIAGNOSIS — M79.18 GLUTEAL PAIN: ICD-10-CM

## 2022-02-04 DIAGNOSIS — M54.50 LEFT-SIDED LOW BACK PAIN WITHOUT SCIATICA, UNSPECIFIED CHRONICITY: Primary | ICD-10-CM

## 2022-02-04 PROCEDURE — 99284 PR EMERGENCY DEPT VISIT,LEVEL IV: ICD-10-PCS | Mod: HCNC,,, | Performed by: EMERGENCY MEDICINE

## 2022-02-04 PROCEDURE — 99284 EMERGENCY DEPT VISIT MOD MDM: CPT | Mod: 25,HCNC

## 2022-02-04 PROCEDURE — 63600175 PHARM REV CODE 636 W HCPCS: Mod: HCNC | Performed by: PHYSICIAN ASSISTANT

## 2022-02-04 PROCEDURE — 99284 EMERGENCY DEPT VISIT MOD MDM: CPT | Mod: HCNC,,, | Performed by: EMERGENCY MEDICINE

## 2022-02-04 PROCEDURE — 96372 THER/PROPH/DIAG INJ SC/IM: CPT | Mod: 59,HCNC

## 2022-02-04 RX ORDER — LIDOCAINE 50 MG/G
1 PATCH TOPICAL DAILY
Qty: 7 PATCH | Refills: 0 | Status: SHIPPED | OUTPATIENT
Start: 2022-02-04 | End: 2023-07-18

## 2022-02-04 RX ORDER — METHOCARBAMOL 750 MG/1
750 TABLET, FILM COATED ORAL 3 TIMES DAILY PRN
Qty: 21 TABLET | Refills: 0 | Status: SHIPPED | OUTPATIENT
Start: 2022-02-04 | End: 2022-02-14

## 2022-02-04 RX ORDER — DEXAMETHASONE SODIUM PHOSPHATE 4 MG/ML
8 INJECTION, SOLUTION INTRA-ARTICULAR; INTRALESIONAL; INTRAMUSCULAR; INTRAVENOUS; SOFT TISSUE
Status: COMPLETED | OUTPATIENT
Start: 2022-02-04 | End: 2022-02-04

## 2022-02-04 RX ADMIN — DEXAMETHASONE SODIUM PHOSPHATE 8 MG: 4 INJECTION INTRA-ARTICULAR; INTRALESIONAL; INTRAMUSCULAR; INTRAVENOUS; SOFT TISSUE at 10:02

## 2022-02-04 NOTE — DISCHARGE INSTRUCTIONS
-take meds as prescribed, Add 1000mg tylenol every 8 hours if needed for increased pain control.  -follow up with [facility] (they will call to schedule)  -return to ER for weakness or numbness of left leg, or sudden urinary incontinence (voiding on self) or retention( cannot urinate)

## 2022-02-04 NOTE — ED NOTES
Patient identifiers verified and correct for Jeff Hope  LOC: The patient is awake, alert and aware of environment with an appropriate affect, the patient is oriented x 3 and speaking appropriately.   APPEARANCE: Patient appears comfortable and in no acute distress, patient is clean and well groomed.  SKIN: The skin is warm and dry, color consistent with ethnicity, patient has normal skin turgor and moist mucus membranes, skin intact, no breakdown or bruising noted.   MUSCULOSKELETAL: Patient moving all extremities spontaneously, no swelling noted. Pt reports back pain x6 month.   RESPIRATORY: Airway is open and patent, respirations are spontaneous, patient has a normal effort and rate, no accessory muscle use noted, pt placed on continuous pulse ox with O2 sats noted at 97% on room air.  CARDIAC: Pt placed on cardiac monitor. Patient has a normal rate and regular rhythm, no edema noted, capillary refill < 3 seconds.   GASTRO: Soft and non tender to palpation, no distention noted, normoactive bowel sounds present in all four quadrants. Pt states bowel movements have been regular.  : Pt denies any pain or frequency with urination.  NEURO: Pt opens eyes spontaneously, behavior appropriate to situation, follows commands, facial expression symmetrical, bilateral hand grasp equal and even, purposeful motor response noted, normal sensation in all extremities when touched with a finger.

## 2022-02-04 NOTE — ED PROVIDER NOTES
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 Patient reports recurrence of left sided low back pain, atraumatic. History of similar pain that has waxed and waned over the past year. Remote referral to back and spine clinic, though patient did not attend as his symptoms improved.   Pain does not radiate. Not associated with fever/chills, night sweats, weight loss. Denies leg weakness, numbness, saddle anesthesia, urinary incontinence/retention. No IVDU.    The patients available PMH, PSH, Social History, medications, allergies, and triage vital signs were reviewed just prior to their medical evaluation.  A ten point review of systems was completed and is negative except as documented above.  Patient denies any other acute medical complaint.    Please be advised this text was dictated with [employer] software and may contain errors due to translation.           Review of patient's allergies indicates:   Allergen Reactions    Rosuvastatin      myalgias     Past Medical History:   Diagnosis Date    Non Hodgkin's lymphoma     Personal history of colonic polyps 09/08/2015    per [facility] report - repeat 9/2020     Past Surgical History:   Procedure Laterality Date    ESOPHAGOGASTRODUODENOSCOPY N/A 11/29/2021    Procedure: ESOPHAGOGASTRODUODENOSCOPY (EGD);  Surgeon: Kristine Palmer MD;  Location: [address]);  Service: Endoscopy;  Laterality: N/A;  fully vacc-inst mail-tb-left chest port    GROIN EXPLORATION  2 pre 2005 and 1 post 2005    total of 3 procedures    LAPAROSCOPIC CHOLECYSTECTOMY  05/2018    SHOULDER SURGERY Left      Family History   Problem Relation Age of Onset    Breast cancer Mother     Emphysema Father      Stroke Sister     Diabetes Brother     Ulcers Brother     No Known Problems Daughter     No Known Problems Daughter      Social History     Tobacco Use    Smoking status: Never Smoker    Smokeless tobacco: Never Used   Substance Use Topics    Alcohol use: No    Drug use: No     Review of Systems   Constitutional: Negative for chills and fever.   HENT: Negative for sore throat.    Respiratory: Negative for shortness of breath.    Cardiovascular: Negative for chest pain.   Gastrointestinal: Negative for nausea and vomiting.   Genitourinary: Negative for decreased urine volume, difficulty urinating, dysuria and flank pain.   Musculoskeletal: Positive for back pain. Negative for gait problem.   Skin: Negative for rash.   Neurological: Negative for weakness and numbness.   Hematological: Does not bruise/bleed easily.   Psychiatric/Behavioral: Negative for confusion.       Physical Exam     Initial Vitals   BP Pulse Resp Temp SpO2   02/04/22 0929 02/04/22 0928 02/04/22 0928 02/04/22 0928 02/04/22 0928   139/80 84 16 99 °F (37.2 °C) 96 %      MAP       --                Physical Exam    Nursing note and vitals reviewed.  Constitutional: He appears well-developed and well-nourished. He is not diaphoretic. No distress (well appearing).   HENT:   Head: Normocephalic and atraumatic.   Right Ear: External ear normal.   Left Ear: External ear normal.   Mouth/Throat: Oropharynx is clear and moist.   Eyes: Conjunctivae and EOM are normal. Pupils are equal, round, and reactive to light. No scleral icterus.   Cardiovascular: Normal rate, regular rhythm and intact distal pulses.   Pulmonary/Chest: Breath sounds normal. No respiratory distress. He has no wheezes. He has no rhonchi. He has no rales.   Abdominal: Abdomen is soft. Bowel sounds are normal. There is no abdominal tenderness. There is no rebound and no guarding.   Musculoskeletal:         General: No tenderness (no reproducible tenderness to bony prominences nor  musculature. no CVA/flank TTp) or edema. Normal range of motion.      Comments: + increased pain with flexion/extension felt L paralumbar     Neurological: He is alert and oriented to person, place, and time. He has normal strength. No cranial nerve deficit or sensory deficit.   Strength 5/5 BLE. - SLR   Skin: Skin is warm and dry. Capillary refill takes less than 2 seconds.   Psychiatric: He has a normal mood and affect. His behavior is normal. Judgment and thought content normal.         ED Course   Procedures  Labs Reviewed - No data to display       Imaging Results    None          Medications   dexamethasone injection 8 mg (8 mg Intramuscular Given 2/4/22 1017)     Medical Decision Making:   History:   Old Medical Records: I decided to obtain old medical records.  Old Records Summarized: records from previous admission(s) and records from clinic visits.  Initial Assessment:   Patient presents for recurrence of atraumatic left low back pain without radicular symptoms, no red flags. Neuro intact, VSS, afebrile  Differential Diagnosis:   DDx includes lumbago, myofascial pain, facet pain. Physical exam and history taking lower clinical suspicion for radiculopathy, Cauda equina, discitis, epidural abscess/hematoma.   ED Management:  No emergent imaging indicated at this time based on current exam. Improvement with prior steroid shot per patient, will administer at this time. Course of robaxin, topical anesthetic. F/u to back and spine clinic as he would likely benefit from PT. Patient agreed to plan of care and voiced understanding. Discharged in stable condition with strict ED return precautions.    Gabriela Nino PA-C  02/05/2022                          Clinical Impression:   Final diagnoses:  [M54.50] Left-sided low back pain without sciatica, unspecified chronicity (Primary)  [M79.18] Gluteal pain          ED Disposition Condition    Discharge Stable        ED Prescriptions     Medication Sig Dispense Start  Date End Date Auth. Provider    methocarbamoL (ROBAXIN) 750 MG Tab Take 1 tablet (750 mg total) by mouth 3 (three) times daily as needed. 21 tablet 2/4/2022 2/14/2022 Gabriela Nino PA-C    LIDOcaine (LIDODERM) 5 % Place 1 patch onto the skin once daily. Remove & Discard patch within 12 hours or as directed by MD 7 patch 2/4/2022  Gabriela Nino PA-C        Follow-up Information    None          Gabriela Nino PA-C  02/05/22 0735

## 2022-02-06 ENCOUNTER — NURSE TRIAGE (OUTPATIENT)
Dept: ADMINISTRATIVE | Facility: CLINIC | Age: 67
End: 2022-02-06
Payer: MEDICARE

## 2022-02-06 NOTE — TELEPHONE ENCOUNTER
Patient was seen in the ER on 2/4/22 for back pain. Patient reports that the lidocaine prescription he was given is not covered by his insurance. Will contact the prescribing provider.     Spoke with ESTELLE Wilson. She advised the patient to purchase lidocaine patch 4% OTC or check [email or web address]. Patient VU.    Reason for Disposition   [1] Caller has URGENT medicine question about med that PCP or specialist prescribed AND [2] triager unable to answer question    Protocols used: MEDICATION QUESTION CALL-A-AH

## 2022-02-07 ENCOUNTER — OFFICE VISIT (OUTPATIENT)
Dept: SPINE | Facility: CLINIC | Age: 67
End: 2022-02-07
Payer: MEDICARE

## 2022-02-07 VITALS
BODY MASS INDEX: 31.07 KG/M2 | HEART RATE: 64 BPM | HEIGHT: 68 IN | DIASTOLIC BLOOD PRESSURE: 76 MMHG | WEIGHT: 205 LBS | SYSTOLIC BLOOD PRESSURE: 131 MMHG

## 2022-02-07 DIAGNOSIS — G89.29 CHRONIC LEFT-SIDED LOW BACK PAIN WITHOUT SCIATICA: ICD-10-CM

## 2022-02-07 DIAGNOSIS — M47.816 SPONDYLOSIS OF LUMBAR REGION WITHOUT MYELOPATHY OR RADICULOPATHY: Primary | ICD-10-CM

## 2022-02-07 DIAGNOSIS — M79.18 MYOFASCIAL PAIN: ICD-10-CM

## 2022-02-07 DIAGNOSIS — M54.50 CHRONIC LEFT-SIDED LOW BACK PAIN WITHOUT SCIATICA: ICD-10-CM

## 2022-02-07 PROCEDURE — 1160F PR REVIEW ALL MEDS BY PRESCRIBER/CLIN PHARMACIST DOCUMENTED: ICD-10-PCS | Mod: HCNC,CPTII,S$GLB, | Performed by: NURSE PRACTITIONER

## 2022-02-07 PROCEDURE — 1101F PT FALLS ASSESS-DOCD LE1/YR: CPT | Mod: HCNC,CPTII,S$GLB, | Performed by: NURSE PRACTITIONER

## 2022-02-07 PROCEDURE — 3288F PR FALLS RISK ASSESSMENT DOCUMENTED: ICD-10-PCS | Mod: HCNC,CPTII,S$GLB, | Performed by: NURSE PRACTITIONER

## 2022-02-07 PROCEDURE — 1159F PR MEDICATION LIST DOCUMENTED IN MEDICAL RECORD: ICD-10-PCS | Mod: HCNC,CPTII,S$GLB, | Performed by: NURSE PRACTITIONER

## 2022-02-07 PROCEDURE — 99999 PR PBB SHADOW E&M-EST. PATIENT-LVL IV: CPT | Mod: PBBFAC,HCNC,, | Performed by: NURSE PRACTITIONER

## 2022-02-07 PROCEDURE — 3288F FALL RISK ASSESSMENT DOCD: CPT | Mod: HCNC,CPTII,S$GLB, | Performed by: NURSE PRACTITIONER

## 2022-02-07 PROCEDURE — 1157F PR ADVANCE CARE PLAN OR EQUIV PRESENT IN MEDICAL RECORD: ICD-10-PCS | Mod: HCNC,CPTII,S$GLB, | Performed by: NURSE PRACTITIONER

## 2022-02-07 PROCEDURE — 99999 PR PBB SHADOW E&M-EST. PATIENT-LVL IV: ICD-10-PCS | Mod: PBBFAC,HCNC,, | Performed by: NURSE PRACTITIONER

## 2022-02-07 PROCEDURE — 3008F PR BODY MASS INDEX (BMI) DOCUMENTED: ICD-10-PCS | Mod: HCNC,CPTII,S$GLB, | Performed by: NURSE PRACTITIONER

## 2022-02-07 PROCEDURE — 3008F BODY MASS INDEX DOCD: CPT | Mod: HCNC,CPTII,S$GLB, | Performed by: NURSE PRACTITIONER

## 2022-02-07 PROCEDURE — 1159F MED LIST DOCD IN RCRD: CPT | Mod: HCNC,CPTII,S$GLB, | Performed by: NURSE PRACTITIONER

## 2022-02-07 PROCEDURE — 3078F DIAST BP <80 MM HG: CPT | Mod: HCNC,CPTII,S$GLB, | Performed by: NURSE PRACTITIONER

## 2022-02-07 PROCEDURE — 3078F PR MOST RECENT DIASTOLIC BLOOD PRESSURE < 80 MM HG: ICD-10-PCS | Mod: HCNC,CPTII,S$GLB, | Performed by: NURSE PRACTITIONER

## 2022-02-07 PROCEDURE — 3075F SYST BP GE 130 - 139MM HG: CPT | Mod: HCNC,CPTII,S$GLB, | Performed by: NURSE PRACTITIONER

## 2022-02-07 PROCEDURE — 1125F PR PAIN SEVERITY QUANTIFIED, PAIN PRESENT: ICD-10-PCS | Mod: HCNC,CPTII,S$GLB, | Performed by: NURSE PRACTITIONER

## 2022-02-07 PROCEDURE — 1101F PR PT FALLS ASSESS DOC 0-1 FALLS W/OUT INJ PAST YR: ICD-10-PCS | Mod: HCNC,CPTII,S$GLB, | Performed by: NURSE PRACTITIONER

## 2022-02-07 PROCEDURE — 3075F PR MOST RECENT SYSTOLIC BLOOD PRESS GE 130-139MM HG: ICD-10-PCS | Mod: HCNC,CPTII,S$GLB, | Performed by: NURSE PRACTITIONER

## 2022-02-07 PROCEDURE — 1157F ADVNC CARE PLAN IN RCRD: CPT | Mod: HCNC,CPTII,S$GLB, | Performed by: NURSE PRACTITIONER

## 2022-02-07 PROCEDURE — 1160F RVW MEDS BY RX/DR IN RCRD: CPT | Mod: HCNC,CPTII,S$GLB, | Performed by: NURSE PRACTITIONER

## 2022-02-07 PROCEDURE — 99204 OFFICE O/P NEW MOD 45 MIN: CPT | Mod: HCNC,S$GLB,, | Performed by: NURSE PRACTITIONER

## 2022-02-07 PROCEDURE — 1125F AMNT PAIN NOTED PAIN PRSNT: CPT | Mod: HCNC,CPTII,S$GLB, | Performed by: NURSE PRACTITIONER

## 2022-02-07 PROCEDURE — 99204 PR OFFICE/OUTPT VISIT, NEW, LEVL IV, 45-59 MIN: ICD-10-PCS | Mod: HCNC,S$GLB,, | Performed by: NURSE PRACTITIONER

## 2022-02-07 NOTE — PROGRESS NOTES
Subjective:      Patient ID: Jeff Hope is a 66 y.o. male.    Chief Complaint: Low-back Pain    HPI Mr. 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 Patient reports recurrence of left sided low back pain, atraumatic. History of similar pain that has waxed and waned over the past year. Pain does not radiate. Pain worsens with prolonged walking and improves nothing. He went to the ED on 2/4/2022 and was given a steroid shot and prescribed robaxin, which has helped some. He reports that he may have done PT for his back years ago, but cannot be certain. No injection or spine surgery. Not associated with fever/chills, night sweats, weight loss. Denies leg weakness, numbness/parasthesia, saddle anesthesia, urinary/bowel incontinence. Pain now 6/10.     Lumbar xray 7/2021    FINDINGS:  Lumbar spine two views: Alignment is normal.  There is mild DJD.  No fracture dislocation bone destruction seen.  No trauma seen.     Impression:     No acute process seen    Past Medical History:   Diagnosis Date    Non Hodgkin's lymphoma     Personal history of colonic polyps 09/08/2015    per [facility] report - repeat 9/2020       Past Surgical History:   Procedure Laterality Date    ESOPHAGOGASTRODUODENOSCOPY N/A 11/29/2021    Procedure: ESOPHAGOGASTRODUODENOSCOPY (EGD);  Surgeon: Kristine Palmer MD;  Location: [address]);  Service: Endoscopy;  Laterality: N/A;  fully vacc-inst mail-tb-left chest port    GROIN EXPLORATION  2 pre 2005 and 1 post 2005    total of 3 procedures    LAPAROSCOPIC CHOLECYSTECTOMY  05/2018    SHOULDER SURGERY Left        Family History   Problem Relation Age of Onset    Breast cancer Mother     Emphysema Father     Stroke Sister     Diabetes Brother     Ulcers Brother     No Known Problems Daughter     No Known  Problems Daughter        Social History     Socioeconomic History    Marital status:     Number of children: 2   Occupational History    Occupation: disabled [employer]    Tobacco Use    Smoking status: Never Smoker    Smokeless tobacco: Never Used   Substance and Sexual Activity    Alcohol use: No    Drug use: No    Sexual activity: Yes     Partners: Female   Social History Narrative    Lives with wife.         Current Outpatient Medications   Medication Sig Dispense Refill    celecoxib (CELEBREX) 200 MG capsule TK ONE C PO BID      fluticasone propionate (FLONASE) 50 mcg/actuation nasal spray 2 sprays (100 mcg total) by Each Nostril route once daily. 16 g 11    HYDROcodone-acetaminophen (NORCO) 7.5-325 mg per tablet Take 1 tablet by mouth every 8 (eight) hours as needed.      hydrOXYzine pamoate (VISTARIL) 50 MG Cap Take 50 mg by mouth.      LIDOcaine (LIDODERM) 5 % Place 1 patch onto the skin once daily. Remove & Discard patch within 12 hours or as directed by MD Barriga patch 0    methocarbamoL (ROBAXIN) 750 MG Tab Take 1 tablet (750 mg total) by mouth 3 (three) times daily as needed. 21 tablet 0    pantoprazole (PROTONIX) 40 MG tablet TAKE 1 TABLET(40 MG) BY MOUTH TWICE DAILY 180 tablet 3    pregabalin (LYRICA) 150 MG capsule Take 150 mg by mouth once daily.       No current facility-administered medications for this visit.       Review of patient's allergies indicates:   Allergen Reactions    Rosuvastatin      myalgias         Review of Systems   Constitutional: Positive for weight gain. Negative for chills and fever.   Cardiovascular: Negative for chest pain.   Respiratory: Negative for shortness of breath.    Musculoskeletal: Positive for back pain (  left). Negative for falls.   Gastrointestinal: Positive for nausea. Negative for abdominal pain, bowel incontinence and vomiting.   Genitourinary: Negative for bladder incontinence.   Neurological: Negative for focal weakness, numbness,  paresthesias, sensory change and weakness.         Objective:        General: Jeff is well-developed, well-nourished, appears stated age, in no acute distress, alert and oriented to time, place and person.     General    Vitals reviewed.  Constitutional: He is oriented to person, place, and time. He appears well-developed and well-nourished.   HENT:   Head: Atraumatic.   Nose: Nose normal.   Eyes: Conjunctivae are normal.   Cardiovascular: Normal rate.    Pulmonary/Chest: Effort normal.   Abdominal: He exhibits no distension.   Neurological: He is alert and oriented to person, place, and time.   Psychiatric: He has a normal mood and affect. His behavior is normal. Judgment and thought content normal.     General Musculoskeletal Exam   Gait: normal     Back (L-Spine & T-Spine) / Neck (C-Spine) Exam     Tenderness Left paramedian tenderness of the Lower L-Spine.     Back (L-Spine & T-Spine) Range of Motion   Extension: 30 (with pain)   Flexion: 90   Lateral bend right: 20   Lateral bend left: 20     Spinal Sensation   Right Side Sensation  L-Spine Level: normal  S-Spine Level: normal  Left Side Sensation  L-Spine Level: normal  S-Spine Level: normal    Other He has no scoliosis .  Spinal Kyphosis:  Absent    Comments:  Minimal pain with facet loading B/L  (-) SLR B/L  Pain with extension      Muscle Strength   Right Upper Extremity   Biceps: 5/5   Deltoid:  5/5  Triceps:  5/5  Left Upper Extremity  Biceps: 5/5   Deltoid:  5/5  Triceps:  5/5  Right Lower Extremity   Hip Flexion: 5/5   Quadriceps:  5/5   Ankle Dorsiflexion:  5/5   Anterior tibial:  5/5   EHL:  5/5  Left Lower Extremity   Hip Flexion: 5/5   Quadriceps:  5/5   Ankle Dorsiflexion:  5/5   Anterior tibial:  5/5   EHL:  5/5    Reflexes     Left Side  Biceps:  2+  Brachioradialis:  2+  Achilles:  2+  Ankle Clonus:  absent  Quadriceps:  2+    Right Side   Biceps:  2+  Brachioradialis:  2+  Achilles:  2+  Ankle Clonus:  absent  Quadriceps:  2+    Vascular Exam      Right Pulses        Carotid:                  2+    Left Pulses        Carotid:                  2+              Assessment:       1. Spondylosis of lumbar region without myelopathy or radiculopathy    2. Chronic left-sided low back pain without sciatica    3. Myofascial pain           Plan:          1. Prior imaging and records were reviewed today. Lumbar xray from 2021 showed mild DJD.   2. We discussed back pain and the nature of back pain.  We discussed that it is not one thing that causes the pain but an accumulation of multiple things that we do.  Hx Non Hodgkins lymphoma in 2019, currently in remission, follows oncology. Left sided LBP without radiation, appears to be facet mediated. Started 1-2 years ago and come and goes.   3. We discussed posture sitting and the importance of trying to sit better.    4. We discussed the benefits of therapy and exercise and continuing to move.  5. Referral for physical therapy for evaluation and treatment of low back pain.   6. Consider lumbar MRI if no improvement with PT.   7. RTC for followup in 8 weeks or sooner if needed.     More than 50% of the total time  of 45 minutes was spent face to face in counseling on diagnosis and treatment options. I also counseled patient  on common and most usual side effect of prescribed medications.  I reviewed Primary care , and other specialty's notes to better coordinate patient's care. All questions were answered, and patient voiced understanding.     Follow-up: Follow up in about 8 weeks (around 4/4/2022). If there are any questions prior to this, the patient was instructed to contact the office.

## 2022-02-08 ENCOUNTER — LAB VISIT (OUTPATIENT)
Dept: LAB | Facility: HOSPITAL | Age: 67
End: 2022-02-08
Attending: INTERNAL MEDICINE
Payer: MEDICARE

## 2022-02-08 DIAGNOSIS — A04.8 H. PYLORI INFECTION: ICD-10-CM

## 2022-02-08 PROCEDURE — 87338 HPYLORI STOOL AG IA: CPT | Mod: HCNC | Performed by: INTERNAL MEDICINE

## 2022-02-17 LAB
H PYLORI AG STL QL IA: NORMAL
SPECIMEN SOURCE: NORMAL

## 2022-02-18 ENCOUNTER — LAB VISIT (OUTPATIENT)
Dept: LAB | Facility: HOSPITAL | Age: 67
End: 2022-02-18
Attending: INTERNAL MEDICINE
Payer: MEDICARE

## 2022-02-18 DIAGNOSIS — B16.9 ACUTE VIRAL HEPATITIS B WITHOUT COMA AND WITHOUT DELTA AGENT: ICD-10-CM

## 2022-02-18 LAB
ALBUMIN SERPL BCP-MCNC: 4 G/DL (ref 3.5–5.2)
ALP SERPL-CCNC: 62 U/L (ref 55–135)
ALT SERPL W/O P-5'-P-CCNC: 12 U/L (ref 10–44)
AST SERPL-CCNC: 17 U/L (ref 10–40)
BILIRUB DIRECT SERPL-MCNC: 0.1 MG/DL (ref 0.1–0.3)
BILIRUB SERPL-MCNC: 0.3 MG/DL (ref 0.1–1)
PROT SERPL-MCNC: 7 G/DL (ref 6–8.4)

## 2022-02-18 PROCEDURE — 87517 HEPATITIS B DNA QUANT: CPT | Mod: HCNC | Performed by: INTERNAL MEDICINE

## 2022-02-18 PROCEDURE — 80076 HEPATIC FUNCTION PANEL: CPT | Mod: HCNC | Performed by: INTERNAL MEDICINE

## 2022-02-23 ENCOUNTER — TELEPHONE (OUTPATIENT)
Dept: HEPATOLOGY | Facility: CLINIC | Age: 67
End: 2022-02-23
Payer: MEDICARE

## 2022-02-23 NOTE — TELEPHONE ENCOUNTER
----- Message from Alla Haney MD sent at 2/22/2022  5:42 PM CST -----  Please inform patient results are OK.

## 2022-02-23 NOTE — TELEPHONE ENCOUNTER
----- Message from Alla Haney MD sent at 2/22/2022  5:42 PM CST -----  Results of hep B tests are ok. Pl inform patient.

## 2022-03-07 PROBLEM — Z00.00 HEALTHCARE MAINTENANCE: Status: RESOLVED | Noted: 2019-11-19 | Resolved: 2022-03-07

## 2022-03-08 ENCOUNTER — OFFICE VISIT (OUTPATIENT)
Dept: PRIMARY CARE CLINIC | Facility: CLINIC | Age: 67
End: 2022-03-08
Payer: MEDICARE

## 2022-03-08 ENCOUNTER — LAB VISIT (OUTPATIENT)
Dept: LAB | Facility: HOSPITAL | Age: 67
End: 2022-03-08
Attending: INTERNAL MEDICINE
Payer: MEDICARE

## 2022-03-08 VITALS
TEMPERATURE: 98 F | HEART RATE: 77 BPM | WEIGHT: 204.56 LBS | DIASTOLIC BLOOD PRESSURE: 84 MMHG | BODY MASS INDEX: 31 KG/M2 | SYSTOLIC BLOOD PRESSURE: 130 MMHG | HEIGHT: 68 IN | OXYGEN SATURATION: 99 %

## 2022-03-08 DIAGNOSIS — F33.41 RECURRENT MAJOR DEPRESSIVE DISORDER, IN PARTIAL REMISSION: ICD-10-CM

## 2022-03-08 DIAGNOSIS — E78.2 MIXED HYPERLIPIDEMIA: ICD-10-CM

## 2022-03-08 DIAGNOSIS — I70.0 AORTIC ATHEROSCLEROSIS: ICD-10-CM

## 2022-03-08 DIAGNOSIS — N48.6 PEYRONIE'S DISEASE: Primary | ICD-10-CM

## 2022-03-08 DIAGNOSIS — C82.90 FOLLICULAR NON-HODGKIN'S LYMPHOMA: ICD-10-CM

## 2022-03-08 DIAGNOSIS — C79.52 MALIGNANT NEOPLASM METASTATIC TO BONE MARROW: ICD-10-CM

## 2022-03-08 DIAGNOSIS — R29.818 NEUROGENIC CLAUDICATION: ICD-10-CM

## 2022-03-08 DIAGNOSIS — I27.20 PULMONARY HYPERTENSION: ICD-10-CM

## 2022-03-08 DIAGNOSIS — K21.9 GASTROESOPHAGEAL REFLUX DISEASE WITHOUT ESOPHAGITIS: ICD-10-CM

## 2022-03-08 DIAGNOSIS — J47.9 BRONCHIECTASIS WITHOUT COMPLICATION: ICD-10-CM

## 2022-03-08 PROBLEM — L60.0 INGROWING NAIL: Status: RESOLVED | Noted: 2021-12-06 | Resolved: 2022-03-08

## 2022-03-08 PROBLEM — D69.6 THROMBOCYTOPENIA: Status: RESOLVED | Noted: 2019-11-19 | Resolved: 2022-03-08

## 2022-03-08 LAB
CHOLEST SERPL-MCNC: 228 MG/DL (ref 120–199)
CHOLEST/HDLC SERPL: 3.7 {RATIO} (ref 2–5)
HDLC SERPL-MCNC: 62 MG/DL (ref 40–75)
HDLC SERPL: 27.2 % (ref 20–50)
LDLC SERPL CALC-MCNC: 136.6 MG/DL (ref 63–159)
NONHDLC SERPL-MCNC: 166 MG/DL
TRIGL SERPL-MCNC: 147 MG/DL (ref 30–150)
TSH SERPL DL<=0.005 MIU/L-ACNC: 0.58 UIU/ML (ref 0.4–4)

## 2022-03-08 PROCEDURE — 84443 ASSAY THYROID STIM HORMONE: CPT | Mod: HCNC | Performed by: INTERNAL MEDICINE

## 2022-03-08 PROCEDURE — 1125F PR PAIN SEVERITY QUANTIFIED, PAIN PRESENT: ICD-10-PCS | Mod: HCNC,CPTII,S$GLB, | Performed by: INTERNAL MEDICINE

## 2022-03-08 PROCEDURE — 1159F MED LIST DOCD IN RCRD: CPT | Mod: HCNC,CPTII,S$GLB, | Performed by: INTERNAL MEDICINE

## 2022-03-08 PROCEDURE — 1160F RVW MEDS BY RX/DR IN RCRD: CPT | Mod: HCNC,CPTII,S$GLB, | Performed by: INTERNAL MEDICINE

## 2022-03-08 PROCEDURE — 1159F PR MEDICATION LIST DOCUMENTED IN MEDICAL RECORD: ICD-10-PCS | Mod: HCNC,CPTII,S$GLB, | Performed by: INTERNAL MEDICINE

## 2022-03-08 PROCEDURE — 80061 LIPID PANEL: CPT | Mod: HCNC | Performed by: INTERNAL MEDICINE

## 2022-03-08 PROCEDURE — 1125F AMNT PAIN NOTED PAIN PRSNT: CPT | Mod: HCNC,CPTII,S$GLB, | Performed by: INTERNAL MEDICINE

## 2022-03-08 PROCEDURE — 3288F FALL RISK ASSESSMENT DOCD: CPT | Mod: HCNC,CPTII,S$GLB, | Performed by: INTERNAL MEDICINE

## 2022-03-08 PROCEDURE — 3075F SYST BP GE 130 - 139MM HG: CPT | Mod: HCNC,CPTII,S$GLB, | Performed by: INTERNAL MEDICINE

## 2022-03-08 PROCEDURE — 1101F PT FALLS ASSESS-DOCD LE1/YR: CPT | Mod: HCNC,CPTII,S$GLB, | Performed by: INTERNAL MEDICINE

## 2022-03-08 PROCEDURE — 1157F PR ADVANCE CARE PLAN OR EQUIV PRESENT IN MEDICAL RECORD: ICD-10-PCS | Mod: HCNC,CPTII,S$GLB, | Performed by: INTERNAL MEDICINE

## 2022-03-08 PROCEDURE — 99215 OFFICE O/P EST HI 40 MIN: CPT | Mod: HCNC,S$GLB,, | Performed by: INTERNAL MEDICINE

## 2022-03-08 PROCEDURE — 99499 RISK ADDL DX/OHS AUDIT: ICD-10-PCS | Mod: HCNC,S$GLB,, | Performed by: INTERNAL MEDICINE

## 2022-03-08 PROCEDURE — 1101F PR PT FALLS ASSESS DOC 0-1 FALLS W/OUT INJ PAST YR: ICD-10-PCS | Mod: HCNC,CPTII,S$GLB, | Performed by: INTERNAL MEDICINE

## 2022-03-08 PROCEDURE — 3008F PR BODY MASS INDEX (BMI) DOCUMENTED: ICD-10-PCS | Mod: HCNC,CPTII,S$GLB, | Performed by: INTERNAL MEDICINE

## 2022-03-08 PROCEDURE — 3288F PR FALLS RISK ASSESSMENT DOCUMENTED: ICD-10-PCS | Mod: HCNC,CPTII,S$GLB, | Performed by: INTERNAL MEDICINE

## 2022-03-08 PROCEDURE — 3079F PR MOST RECENT DIASTOLIC BLOOD PRESSURE 80-89 MM HG: ICD-10-PCS | Mod: HCNC,CPTII,S$GLB, | Performed by: INTERNAL MEDICINE

## 2022-03-08 PROCEDURE — 1160F PR REVIEW ALL MEDS BY PRESCRIBER/CLIN PHARMACIST DOCUMENTED: ICD-10-PCS | Mod: HCNC,CPTII,S$GLB, | Performed by: INTERNAL MEDICINE

## 2022-03-08 PROCEDURE — 99499 UNLISTED E&M SERVICE: CPT | Mod: HCNC,S$GLB,, | Performed by: INTERNAL MEDICINE

## 2022-03-08 PROCEDURE — 1157F ADVNC CARE PLAN IN RCRD: CPT | Mod: HCNC,CPTII,S$GLB, | Performed by: INTERNAL MEDICINE

## 2022-03-08 PROCEDURE — 3008F BODY MASS INDEX DOCD: CPT | Mod: HCNC,CPTII,S$GLB, | Performed by: INTERNAL MEDICINE

## 2022-03-08 PROCEDURE — 3079F DIAST BP 80-89 MM HG: CPT | Mod: HCNC,CPTII,S$GLB, | Performed by: INTERNAL MEDICINE

## 2022-03-08 PROCEDURE — 3075F PR MOST RECENT SYSTOLIC BLOOD PRESS GE 130-139MM HG: ICD-10-PCS | Mod: HCNC,CPTII,S$GLB, | Performed by: INTERNAL MEDICINE

## 2022-03-08 PROCEDURE — 99215 PR OFFICE/OUTPT VISIT, EST, LEVL V, 40-54 MIN: ICD-10-PCS | Mod: HCNC,S$GLB,, | Performed by: INTERNAL MEDICINE

## 2022-03-08 PROCEDURE — 36415 COLL VENOUS BLD VENIPUNCTURE: CPT | Mod: HCNC | Performed by: INTERNAL MEDICINE

## 2022-03-08 NOTE — PROGRESS NOTES
This note has been generated using voice-recognition software. There may be typographical errors that have been missed during proof-reading.  Primary Care Provider Appointment    Subjective:      Patient ID: Jeff Hope is a 66 y.o. male here for follow up.     Chief Complaint: Follow-up    HPI:  This is a pleasant 63-year-old male with bronchiectasis, hyperlipidemia, history of pulmonary embolism on chronic anticoagulation with a follicular non-Hodgkin's lymphoma, acute/reactivated hepatitis B here for f/u.  Pt last seen 12/6.    12/10 pt seen by podiatry with ingrowing nail  1/28 pt seen by GI and with continued gerd, pt to hold PPI and check stool for eradication of h pylori and then trial of  PPI BID.    2/4 pt seen in the ED for sciatic pain.  Pt sent to back and spine.  Given steroin injection and robaxin  2/7 pt seen by back and spine  Sent to PT.      He has still not seen the PT.  Not schedule until late this month.  Pain with ambulation and radiation from the left lower back radiating into the buttock.  When supine the same happens but also pain in the testicle.      Pt notes an improvement in GERD but has not been taking BID PPI as directed by GI.  Instructed again to take for 8 weeks.      Pt saw oncology at [facility] and told everything is doing well.           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    Pulmonary hypertension    Myalgia due to statin    Vitamin D deficiency    Port-A-Cath in place    Abnormal MMSE    Allergic rhinitis    Adrenal hypertrophy    Bilateral shoulder bursitis    Chronic viral hepatitis B    Trigger finger of left thumb     "Bilateral shoulder pain    Chronic thumb pain, bilateral    Breast mass, right    Peyronie's disease    Hypotestosteronemia in male    Neurogenic claudication    Iron deficiency anemia    Gastroesophageal reflux disease without esophagitis        Past Surgical History:   Procedure Laterality Date    ESOPHAGOGASTRODUODENOSCOPY N/A 11/29/2021    Procedure: ESOPHAGOGASTRODUODENOSCOPY (EGD);  Surgeon: Kristine Palmer MD;  Location: [facility] ([address] [place] [address]);  Service: Endoscopy;  Laterality: N/A;  fully vacc-inst mail-tb-left chest port    GROIN EXPLORATION  2 pre 2005 and 1 post 2005    total of 3 procedures    LAPAROSCOPIC CHOLECYSTECTOMY  05/2018    SHOULDER SURGERY Left        Past Medical History:   Diagnosis Date    Non Hodgkin's lymphoma     Personal history of colonic polyps 09/08/2015    per [facility] report - repeat 9/2020       Social History     Socioeconomic History    Marital status:     Number of children: 2   Occupational History    Occupation: disabled [employer]    Tobacco Use    Smoking status: Never Smoker    Smokeless tobacco: Never Used   Substance and Sexual Activity    Alcohol use: No    Drug use: No    Sexual activity: Yes     Partners: Female   Social History Narrative    Lives with wife.         Review of Systems    PHQ9 12/10/2019   Total Score 3        Checklist of Daily Activities:        Objective:   /84 (BP Location: Right arm, Patient Position: Sitting, BP Method: Medium (Manual))   Pulse 77   Temp 98.4 °F (36.9 °C) (Oral)   Ht 5' 8" (1.727 m)   Wt 92.8 kg (204 lb 9.4 oz)   SpO2 99%   BMI 31.11 kg/m²     Physical Exam  Constitutional:       General: He is not in acute distress.     Appearance: Normal appearance. He is not ill-appearing, toxic-appearing or diaphoretic.   HENT:      Head: Normocephalic and atraumatic.   Cardiovascular:      Rate and Rhythm: Normal rate and regular rhythm.      Heart sounds: Normal heart sounds.   Pulmonary:      Effort: " Pulmonary effort is normal.      Breath sounds: Normal breath sounds.   Abdominal:      Palpations: Abdomen is soft.      Tenderness: There is no abdominal tenderness. There is no guarding or rebound.   Musculoskeletal:      Right lower leg: No edema.      Left lower leg: No edema.   Neurological:      Mental Status: He is alert.             Lab Results   Component Value Date    WBC 6.39 11/08/2021    HGB 14.9 11/08/2021    HCT 45.3 11/08/2021     11/08/2021    CHOL 228 (H) 03/08/2022    TRIG 147 03/08/2022    HDL 62 03/08/2022    ALT 12 02/18/2022    AST 17 02/18/2022     12/27/2021    K 4.3 12/27/2021     12/27/2021    CREATININE 0.9 12/27/2021    BUN 11 12/27/2021    CO2 29 12/27/2021    TSH 0.585 03/08/2022    INR 0.9 11/08/2021       Current Outpatient Medications on File Prior to Visit   Medication Sig Dispense Refill    celecoxib (CELEBREX) 200 MG capsule TK ONE C PO BID      fluticasone propionate (FLONASE) 50 mcg/actuation nasal spray 2 sprays (100 mcg total) by Each Nostril route once daily. 16 g 11    HYDROcodone-acetaminophen (NORCO) 7.5-325 mg per tablet Take 1 tablet by mouth every 8 (eight) hours as needed.      hydrOXYzine pamoate (VISTARIL) 50 MG Cap Take 50 mg by mouth.      LIDOcaine (LIDODERM) 5 % Place 1 patch onto the skin once daily. Remove & Discard patch within 12 hours or as directed by MD 7 patch 0    pantoprazole (PROTONIX) 40 MG tablet TAKE 1 TABLET(40 MG) BY MOUTH TWICE DAILY 180 tablet 3    pregabalin (LYRICA) 150 MG capsule Take 150 mg by mouth once daily.       No current facility-administered medications on file prior to visit.         Assessment:   66 y.o. male with multiple co-morbid illnesses here for continued follow up of medical problems.      Plan:     Problem List Items Addressed This Visit        Psychiatric    Recurrent major depressive disorder, in partial remission     Patient with depression symptoms in the past.  Currently appears to be  "stable.  · Will follow for any return symptoms.  No medications currently.           Relevant Orders    TSH (Completed)       Pulmonary    Bronchiectasis     Noted on CT of chest in [place] 11/2018:  "Minimal bibasilar bronchiectasis "  · Asymptomatic, will follow             Pulmonary hypertension     Noted on ECHO at [facility]. 12/2013.  · Asymptomatic, will follow.              Cardiac/Vascular    Mixed hyperlipidemia     No medications currently due to statin intolerance and hepatitis-B history.   no longer on hepatitis-B treatments.  Normal LFTs.  · Labs today for evaluation, message sent to hepatology to confirm there are no contraindications to starting statin this high-risk patient.           Relevant Orders    Lipid Panel (Completed)    Aortic atherosclerosis     On Ct 11/29/2018 in [facility]: "Scattered calcified atheromatous disease of the aortic arch"   · Asymptomatic, will follow              Renal/    Peyronie's disease - Primary     Patient was unable to afford medication.  He wished to pursue treatment again after the 1st of the year.  Discussed again.   ·  Patient wishes to establish care with urology here.           Relevant Orders    Ambulatory referral/consult to Urology       Oncology    Follicular non-Hodgkin's lymphoma     Patient recently seen this past month by Oncology.  [facility] on imaging.  No concerning findings on labs.  Notes reviewed in Care everywhere.  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 Recent CT with onc with [facility] for oncology  ·  Pt will continue f/u with onc               GI    Gastroesophageal reflux disease without esophagitis     Patient with recent treatment of H pylori.  Noted on EGD.  Continued symptoms and patient sent for stool H pylori by GI.  Noted to be negative.  Patient also instructed to increase PPI to b.i.d. for 8 weeks to see if " this will improved symptoms.  On review of medications today, patient had not increased PPI.  Continued GERD type symptoms.  · Patient to increase PPI to b.i.d. for 8 weeks              Orthopedic    Neurogenic claudication     Patient with recent ED visit.  Had offered Spine Services in the past.  Patient refused.  Now followed and sent to physical therapy.  Patient is currently awaiting an opening physical therapy.  · Pathology patient pain discussed.  Will attempt to find earlier physical therapy appointment in the region.                 Health Maintenance       Date Due Completion Date    Colorectal Cancer Screening 05/18/2024 5/18/2021    Override on 5/18/2021: Done (repeat 3 year)    Pneumococcal Vaccines (Age 65+) (3 of 4 - PPSV23) 08/18/2025 8/18/2020    Lipid Panel 02/25/2026 2/25/2021    TETANUS VACCINE 06/15/2030 6/15/2020        Medications Reconciliation:   I have not reconciled the patient's home medications with the patient/family. I have updated all changes.  Refer to After-Visit Medication List.      Medication List          Accurate as of March 8, 2022  9:40 PM. If you have any questions, ask your nurse or doctor.            CONTINUE taking these medications    celecoxib 200 MG capsule  Commonly known as: CeleBREX     fluticasone propionate 50 mcg/actuation nasal spray  Commonly known as: FLONASE  2 sprays (100 mcg total) by Each Nostril route once daily.     HYDROcodone-acetaminophen 7.5-325 mg per tablet  Commonly known as: NORCO     hydrOXYzine pamoate 50 MG Cap  Commonly known as: VISTARIL     LIDOcaine 5 %  Commonly known as: LIDODERM  Place 1 patch onto the skin once daily. Remove & Discard patch within 12 hours or as directed by MD     pantoprazole 40 MG tablet  Commonly known as: PROTONIX  TAKE 1 TABLET(40 MG) BY MOUTH TWICE DAILY     pregabalin 150 MG capsule  Commonly known as: LYRICA                 Follow up in about 3 months (around 6/8/2022). Total clinical care time was 40 min. The  following issues were discussed:  GERD symptoms and treatment of H pylori, message itching sent a hepatology to start statin, need for yearly labs, sciatic back pain and need for physical therapy, review of appointment in Care everywhere with hepatology     Roly Flannery MD  Internal Medicine  [facility]  [phone]

## 2022-03-09 ENCOUNTER — TELEPHONE (OUTPATIENT)
Dept: PRIMARY CARE CLINIC | Facility: CLINIC | Age: 67
End: 2022-03-09
Payer: MEDICARE

## 2022-03-09 NOTE — ASSESSMENT & PLAN NOTE
No medications currently due to statin intolerance and hepatitis-B history.   no longer on hepatitis-B treatments.  Normal LFTs.  · Labs today for evaluation, message sent to hepatology to confirm there are no contraindications to starting statin this high-risk patient.

## 2022-03-09 NOTE — ASSESSMENT & PLAN NOTE
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 Recent CT with onc with [facility] for oncology  ·  Pt will continue f/u with onc

## 2022-03-09 NOTE — TELEPHONE ENCOUNTER
----- Message from Roly Flannery MD sent at 3/8/2022  7:03 PM CST -----  Please call patient with results.  Normal thyroid function.LDL increased.  Will consider a cholesterol medication after discussing with hepatology.

## 2022-03-09 NOTE — ASSESSMENT & PLAN NOTE
Patient recently seen this past month by Oncology.  [facility] on imaging.  No concerning findings on labs.  Notes reviewed in Care everywhere.  · Patient to continue hepatology follow-up patient to call with any issue

## 2022-03-09 NOTE — ASSESSMENT & PLAN NOTE
Patient with depression symptoms in the past.  Currently appears to be stable.  · Will follow for any return symptoms.  No medications currently.

## 2022-03-09 NOTE — ASSESSMENT & PLAN NOTE
Patient with recent treatment of H pylori.  Noted on EGD.  Continued symptoms and patient sent for stool H pylori by GI.  Noted to be negative.  Patient also instructed to increase PPI to b.i.d. for 8 weeks to see if this will improved symptoms.  On review of medications today, patient had not increased PPI.  Continued GERD type symptoms.  · Patient to increase PPI to b.i.d. for 8 weeks

## 2022-03-09 NOTE — ASSESSMENT & PLAN NOTE
Patient with recent ED visit.  Had offered Spine Services in the past.  Patient refused.  Now followed and sent to physical therapy.  Patient is currently awaiting an opening physical therapy.  · Pathology patient pain discussed.  Will attempt to find earlier physical therapy appointment in the region.

## 2022-03-09 NOTE — ASSESSMENT & PLAN NOTE
Patient was unable to afford medication.  He wished to pursue treatment again after the 1st of the year.  Discussed again.   ·  Patient wishes to establish care with urology here.

## 2022-03-11 ENCOUNTER — CLINICAL SUPPORT (OUTPATIENT)
Dept: REHABILITATION | Facility: HOSPITAL | Age: 67
End: 2022-03-11
Attending: NURSE PRACTITIONER
Payer: MEDICARE

## 2022-03-11 DIAGNOSIS — M54.50 CHRONIC LEFT-SIDED LOW BACK PAIN WITHOUT SCIATICA: ICD-10-CM

## 2022-03-11 DIAGNOSIS — G89.29 CHRONIC LEFT-SIDED LOW BACK PAIN WITHOUT SCIATICA: ICD-10-CM

## 2022-03-11 DIAGNOSIS — M47.816 SPONDYLOSIS OF LUMBAR REGION WITHOUT MYELOPATHY OR RADICULOPATHY: ICD-10-CM

## 2022-03-11 DIAGNOSIS — M79.18 MYOFASCIAL PAIN: ICD-10-CM

## 2022-03-11 PROCEDURE — 97161 PT EVAL LOW COMPLEX 20 MIN: CPT | Mod: HCNC,PO

## 2022-03-11 PROCEDURE — 97110 THERAPEUTIC EXERCISES: CPT | Mod: HCNC,PO

## 2022-03-11 NOTE — PLAN OF CARE
[facility]   Physical Therapy Initial Evaluation     Date: 3/11/2022   Name: Jeff Hope  [facility] Number: [id number]    Therapy Diagnosis:   Encounter Diagnoses   Name Primary?    Chronic left-sided low back pain without sciatica     Spondylosis of lumbar region without myelopathy or radiculopathy     Myofascial pain      Physician: Ana Dominguez, NP    Physician Orders: PT Eval and Treat   Medical Diagnosis from Referral: M54.50,G89.29 (ICD-10-CM) - Chronic left-sided low back pain without sciatica M47.816 (ICD-10-CM) - Spondylosis of lumbar region without myelopathy or radiculopathy M79.18 (ICD-10-CM) - Myofascial pain   Evaluation Date: 3/11/2022  Authorization Period Expiration: 8/11/2022  Plan of Care Expiration: 5/11/2022  Progress Note Due: 4/11/2022  Visit # / Visits authorized: 1/ pending   FOTO: 1/3    Precautions: Fall and cancer     Time In: 0745 am  Time Out: 0825 am  Total Appointment Time (timed & untimed codes): 40 minutes      SUBJECTIVE     Date of onset: no injury, over the past few months pain has increased    History of current condition - Jeff reports: over the past few months he started to have more pain walking and riding his bike, he is not able to walk as far and when he lies on his back he has challenges with pain levels. He has back pain and radiating pain into the hip and groin area. He does not report and N&T down the leg or shooting pain down the leg. He has left knee pain that he reports is separate from the back and hip pain. He has the most challenges with bending over to get something off the ground and sitting long periods of time. Also has shoulder issues (possibly a WC issue)    Falls: none    Imaging, Xrays taken    Prior Therapy: yes for this condition previosuly  Social History: 4 steps to get into home   Occupation: retired  Prior Level of Function: able to walk and ride his bike for > 2 miles without problems  Current Level of  Function: difficulty sitting and standing long period of time, pain with ADLs    Pain:  Current 5/10, worst 7/10, best 2/10   Location: left back  and hip and groin    Description: Aching, Dull and Throbbing  Aggravating Factors: Sitting, Standing, Bending and Walking  Easing Factors: pain medication and hot bath    Patients goals: be able to stand and walk long periods of time     Medical History:   Past Medical History:   Diagnosis Date    Non Hodgkin's lymphoma     Personal history of colonic polyps 09/08/2015    per [facility] report - repeat 9/2020       Surgical History:   Jeff Hope  has a past surgical history that includes Shoulder surgery (Left); Groin exploration (2 pre 2005 and 1 post 2005); Laparoscopic cholecystectomy (05/2018); and Esophagogastroduodenoscopy (N/A, 11/29/2021).    Medications:   Jeff has a current medication list which includes the following prescription(s): celecoxib, fluticasone propionate, hydrocodone-acetaminophen, hydroxyzine pamoate, lidocaine, pantoprazole, and pregabalin.    Allergies:   Review of patient's allergies indicates:   Allergen Reactions    Rosuvastatin      myalgias          OBJECTIVE     Posture Alignment: slouched posture with bilateral rounded shoulders    LUMBAR SPINE AROM:   Flexion: 90%   Extension: 25% with pain   Left Sidebend: 35% with discomfort   Right Sidebend: 50% with R side pain   Left Rotation: 50%   Right Rotation: 50% with right side pain     SEGMENTAL MOBILITY: Mod restriction in UPA over R & L sides throughout lumbar spine    LOWER EXTREMITY STRENGTH:   Left Right   Knee Flex 4/5 4+/5   Knee Ext 4/5 4+/5     Hip Flexor 4-/5 4+/5   Hip IR 4-/5 4/5   Hip ER 4-/5 4/5       Flexibility: bilateral HS tightness, Left side glute medius and quadratus lumborum stiffness and restricted mobility, mod left side paraspinals tightness    Special Tests:   Left Right   Slump NEG NEG   SLR NEG NEG   BKFO tightness tightness     GAIT: Jeff ambulates with no  assistive device with min trunk and hip rotation throughout gait, increased weight bearing onto the right side with shortened step length on the left side.       Pt/family was provided educational information, including: role of PT, goals for PT, scheduling - pt verbalized understanding. Discussed insurance limitations with pt.         Limitation/Restriction for FOTO  Survey    Therapist reviewed FOTO scores for Jeff Hope on 3/11/2022.   FOTO documents entered into [employer] - see Media section.    Limitation Score: 55%         TREATMENT     Total Treatment time (time-based codes) separate from Evaluation: 10 minutes      Jeff received the treatments listed below:      therapeutic exercises to develop strength, endurance, ROM and flexibility for 10 minutes including:  LTR, PPT with Tra, [facility]        PATIENT EDUCATION AND HOME EXERCISES     Education provided:   - home exercise program instructed  -importance of exercises and consistency with appointments discussed  -etiology of condition discussed    Written Home Exercises Provided: yes. Exercises were reviewed and Jeff was able to demonstrate them prior to the end of the session.  Jeff demonstrated fair  understanding of the education provided. See EMR under Patient Instructions for exercises provided during therapy sessions.    ASSESSMENT     Jeff is a 66 y.o. male referred to outpatient Physical Therapy with a medical diagnosis of M54.50,G89.29 (ICD-10-CM) - Chronic left-sided low back pain without sciatica M47.816 (ICD-10-CM) - Spondylosis of lumbar region without myelopathy or radiculopathy M79.18 (ICD-10-CM) - Myofascial pain . Patient presents with L>R side lumbar spine muscle tightness and facet stiffness from T10-L4 with restriction present throughout the posterior hip complex. He had challenges with pelvic tilting due to this intrinsic tightness into the spine and will benefit from manual therapy and supervised exercises to correct these  deficits. He did not experience any sciatic type of pain this session but we will monitor due to stiffness throughout the hip complex.     Patient prognosis is Good.   Patientt will benefit from skilled outpatient Physical Therapy to address the deficits stated above and in the chart below, provide patient /family education, and to maximize patientt's level of independence.     Plan of care discussed with patient: Yes  Patient's spiritual, cultural and educational needs considered and patient is agreeable to the plan of care and goals as stated below:     Anticipated Barriers for therapy: chronicity of condition    Medical Necessity is demonstrated by the following  History  Co-morbidities and personal factors that may impact the plan of care Co-morbidities:   diabetes, difficulty sleeping, history of cancer and HTN    Personal Factors:   no deficits     low   Examination  Body Structures and Functions, activity limitations and participation restrictions that may impact the plan of care Body Regions:   back  lower extremities    Body Systems:    gross symmetry  ROM  strength  balance  gait  transfers    Participation Restrictions:   Pain limiting - shoulder pain limiting UE use    Activity limitations:   Learning and applying knowledge  no deficits    General Tasks and Commands  no deficits    Communication  no deficits    Mobility  lifting and carrying objects  walking  moving around using equipment (WC)    Self care  dressing    Domestic Life  no deficits    Interactions/Relationships  no deficits    Life Areas  no deficits    Community and Social Life  no deficits         low   Clinical Presentation stable and uncomplicated low   Decision Making/ Complexity Score: low     Goals:  Short Term Goals: 4 weeks   1. Patient will be compliant with home exercise program at all times  2. Patient will show 25% reduction in tone in glute muscles and lumbar spine musculautre  3. Patient will be able to stand and walk for >  30 minutes before onset of pain    Long Term Goals: 8 weeks   1. Patient will be able to perform > 30 minutes of yardwork before onset of pain  2. Patient will be able to ride his bike for > 60 minutes before onset if pain  3. Patient will be able to squat and lift > 30# from the ground without pain increasing  4. 50% FOTO score improvement    PLAN   Plan of care Certification: 3/11/2022 to 8/11/2022.    Outpatient Physical Therapy 2 times weekly for 8 weeks to include the following interventions: Gait Training, Manual Therapy, Neuromuscular Re-ed, Patient Education, Therapeutic Activities and Therapeutic Exercise.     Gela Vera, PT      I CERTIFY THE NEED FOR THESE SERVICES FURNISHED UNDER THIS PLAN OF TREATMENT AND WHILE UNDER MY CARE   Physician's comments:     Physician's Signature: ___________________________________________________

## 2022-03-16 ENCOUNTER — LAB VISIT (OUTPATIENT)
Dept: LAB | Facility: HOSPITAL | Age: 67
End: 2022-03-16
Attending: UROLOGY
Payer: MEDICARE

## 2022-03-16 ENCOUNTER — OFFICE VISIT (OUTPATIENT)
Dept: UROLOGY | Facility: CLINIC | Age: 67
End: 2022-03-16
Payer: MEDICARE

## 2022-03-16 VITALS
WEIGHT: 203.69 LBS | SYSTOLIC BLOOD PRESSURE: 120 MMHG | HEART RATE: 64 BPM | BODY MASS INDEX: 30.87 KG/M2 | HEIGHT: 68 IN | DIASTOLIC BLOOD PRESSURE: 77 MMHG

## 2022-03-16 DIAGNOSIS — N40.1 BPH WITH URINARY OBSTRUCTION: ICD-10-CM

## 2022-03-16 DIAGNOSIS — N13.8 BPH WITH URINARY OBSTRUCTION: ICD-10-CM

## 2022-03-16 DIAGNOSIS — N48.6 PEYRONIE'S DISEASE: Primary | ICD-10-CM

## 2022-03-16 PROBLEM — Z86.711 HISTORY OF PULMONARY EMBOLUS (PE): Status: RESOLVED | Noted: 2019-04-15 | Resolved: 2022-03-16

## 2022-03-16 PROBLEM — Z95.828 PORT-A-CATH IN PLACE: Status: RESOLVED | Noted: 2019-12-10 | Resolved: 2022-03-16

## 2022-03-16 PROBLEM — Z90.49 S/P LAPAROSCOPIC CHOLECYSTECTOMY: Status: RESOLVED | Noted: 2019-05-27 | Resolved: 2022-03-16

## 2022-03-16 LAB — COMPLEXED PSA SERPL-MCNC: 0.73 NG/ML (ref 0–4)

## 2022-03-16 PROCEDURE — 3078F PR MOST RECENT DIASTOLIC BLOOD PRESSURE < 80 MM HG: ICD-10-PCS | Mod: HCNC,CPTII,S$GLB, | Performed by: UROLOGY

## 2022-03-16 PROCEDURE — 1157F ADVNC CARE PLAN IN RCRD: CPT | Mod: HCNC,CPTII,S$GLB, | Performed by: UROLOGY

## 2022-03-16 PROCEDURE — 3008F BODY MASS INDEX DOCD: CPT | Mod: HCNC,CPTII,S$GLB, | Performed by: UROLOGY

## 2022-03-16 PROCEDURE — 3078F DIAST BP <80 MM HG: CPT | Mod: HCNC,CPTII,S$GLB, | Performed by: UROLOGY

## 2022-03-16 PROCEDURE — 1126F PR PAIN SEVERITY QUANTIFIED, NO PAIN PRESENT: ICD-10-PCS | Mod: HCNC,CPTII,S$GLB, | Performed by: UROLOGY

## 2022-03-16 PROCEDURE — 84153 ASSAY OF PSA TOTAL: CPT | Mod: HCNC | Performed by: UROLOGY

## 2022-03-16 PROCEDURE — 1101F PR PT FALLS ASSESS DOC 0-1 FALLS W/OUT INJ PAST YR: ICD-10-PCS | Mod: HCNC,CPTII,S$GLB, | Performed by: UROLOGY

## 2022-03-16 PROCEDURE — 3074F PR MOST RECENT SYSTOLIC BLOOD PRESSURE < 130 MM HG: ICD-10-PCS | Mod: HCNC,CPTII,S$GLB, | Performed by: UROLOGY

## 2022-03-16 PROCEDURE — 3008F PR BODY MASS INDEX (BMI) DOCUMENTED: ICD-10-PCS | Mod: HCNC,CPTII,S$GLB, | Performed by: UROLOGY

## 2022-03-16 PROCEDURE — 3074F SYST BP LT 130 MM HG: CPT | Mod: HCNC,CPTII,S$GLB, | Performed by: UROLOGY

## 2022-03-16 PROCEDURE — 99204 PR OFFICE/OUTPT VISIT, NEW, LEVL IV, 45-59 MIN: ICD-10-PCS | Mod: HCNC,S$GLB,, | Performed by: UROLOGY

## 2022-03-16 PROCEDURE — 3288F FALL RISK ASSESSMENT DOCD: CPT | Mod: HCNC,CPTII,S$GLB, | Performed by: UROLOGY

## 2022-03-16 PROCEDURE — 1101F PT FALLS ASSESS-DOCD LE1/YR: CPT | Mod: HCNC,CPTII,S$GLB, | Performed by: UROLOGY

## 2022-03-16 PROCEDURE — 1126F AMNT PAIN NOTED NONE PRSNT: CPT | Mod: HCNC,CPTII,S$GLB, | Performed by: UROLOGY

## 2022-03-16 PROCEDURE — 99999 PR PBB SHADOW E&M-EST. PATIENT-LVL III: CPT | Mod: PBBFAC,HCNC,, | Performed by: UROLOGY

## 2022-03-16 PROCEDURE — 1160F RVW MEDS BY RX/DR IN RCRD: CPT | Mod: HCNC,CPTII,S$GLB, | Performed by: UROLOGY

## 2022-03-16 PROCEDURE — 1159F PR MEDICATION LIST DOCUMENTED IN MEDICAL RECORD: ICD-10-PCS | Mod: HCNC,CPTII,S$GLB, | Performed by: UROLOGY

## 2022-03-16 PROCEDURE — 99204 OFFICE O/P NEW MOD 45 MIN: CPT | Mod: HCNC,S$GLB,, | Performed by: UROLOGY

## 2022-03-16 PROCEDURE — 1159F MED LIST DOCD IN RCRD: CPT | Mod: HCNC,CPTII,S$GLB, | Performed by: UROLOGY

## 2022-03-16 PROCEDURE — 1157F PR ADVANCE CARE PLAN OR EQUIV PRESENT IN MEDICAL RECORD: ICD-10-PCS | Mod: HCNC,CPTII,S$GLB, | Performed by: UROLOGY

## 2022-03-16 PROCEDURE — 1160F PR REVIEW ALL MEDS BY PRESCRIBER/CLIN PHARMACIST DOCUMENTED: ICD-10-PCS | Mod: HCNC,CPTII,S$GLB, | Performed by: UROLOGY

## 2022-03-16 PROCEDURE — 3288F PR FALLS RISK ASSESSMENT DOCUMENTED: ICD-10-PCS | Mod: HCNC,CPTII,S$GLB, | Performed by: UROLOGY

## 2022-03-16 PROCEDURE — 99999 PR PBB SHADOW E&M-EST. PATIENT-LVL III: ICD-10-PCS | Mod: PBBFAC,HCNC,, | Performed by: UROLOGY

## 2022-03-16 PROCEDURE — 36415 COLL VENOUS BLD VENIPUNCTURE: CPT | Mod: HCNC | Performed by: UROLOGY

## 2022-03-16 RX ORDER — AMITRIPTYLINE HYDROCHLORIDE 25 MG/1
25 TABLET, FILM COATED ORAL NIGHTLY
COMMUNITY
Start: 2022-03-03

## 2022-03-16 NOTE — PROGRESS NOTES
CHIEF COMPLAINT:    Mr. Hope is a 66 y.o. male presenting for a consultation at the request of Dr. Flannery. Patient presents with Peyronie's disease.    PRESENTING ILLNESS:    Jeff Hope is a 66 y.o. male with multiple medical problems including non hodgkin's lymphoma and a history of PE on chronic anticoagulation who c/o peyronie's disease.      It's been present for > 1 year.  Has ~ 50 degrees of curve dorsally.  He cannot penetrate due to the curve.    He was seen by Dr. Francis for the same condition, but he states he didn't go back because he couldn't afford treatment.    He has LUTS.  + decreased FOS.  Is pleased with how he voids.    He denies ED.    REVIEW OF SYSTEMS:    Jeff Hope denies headache, blurred vision, fever, nausea, vomiting, chills, abdominal pain, chest pain, sore throat, bleeding per rectum, cough, SOB, recent loss of consciousness, recent mental status changes, seizures, dizziness, or upper or lower extremity weakness.    JAMES  1. 3  2. 0  3. 2  4. 3  5. 2      PATIENT HISTORY:    Past Medical History:   Diagnosis Date    History of pulmonary embolus (PE) 4/15/2019    Pt with PE s/p hospitalization.  He was placed on xarelto with high risk with cancer.  He has been out of the medication for the past month. Restart Xarelto 15 mg b.i.d. for 21 days, then 20 mg q.day until cancer no longer active.    Non Hodgkin's lymphoma     Personal history of colonic polyps 09/08/2015    per [facility] report - repeat 9/2020    Peyronie disease     S/P laparoscopic cholecystectomy 5/27/2019       Past Surgical History:   Procedure Laterality Date    ESOPHAGOGASTRODUODENOSCOPY N/A 11/29/2021    Procedure: ESOPHAGOGASTRODUODENOSCOPY (EGD);  Surgeon: Kristine Palmer MD;  Location: [facility] ([address] [place] [address]);  Service: Endoscopy;  Laterality: N/A;  fully vacc-inst mail-tb-left chest port    GROIN EXPLORATION  2 pre 2005 and 1 post 2005    total of 3 procedures    LAPAROSCOPIC CHOLECYSTECTOMY  05/2018     SHOULDER SURGERY Left        Family History   Problem Relation Age of Onset    Breast cancer Mother     Emphysema Father     Stroke Sister     Diabetes Brother     Ulcers Brother     No Known Problems Daughter     No Known Problems Daughter        Social History     Socioeconomic History    Marital status:     Number of children: 2   Occupational History    Occupation: disabled [employer]    Tobacco Use    Smoking status: Never Smoker    Smokeless tobacco: Never Used   Substance and Sexual Activity    Alcohol use: No    Drug use: No    Sexual activity: Yes     Partners: Female   Social History Narrative    Lives with wife.         Allergies:  Rosuvastatin    Medications:    Current Outpatient Medications:     amitriptyline (ELAVIL) 25 MG tablet, Take 25 mg by mouth nightly., Disp: , Rfl:     celecoxib (CELEBREX) 200 MG capsule, TK ONE C PO BID, Disp: , Rfl:     fluticasone propionate (FLONASE) 50 mcg/actuation nasal spray, 2 sprays (100 mcg total) by Each Nostril route once daily., Disp: 16 g, Rfl: 11    HYDROcodone-acetaminophen (NORCO) 7.5-325 mg per tablet, Take 1 tablet by mouth every 8 (eight) hours as needed., Disp: , Rfl:     hydrOXYzine pamoate (VISTARIL) 50 MG Cap, Take 50 mg by mouth., Disp: , Rfl:     LIDOcaine (LIDODERM) 5 %, Place 1 patch onto the skin once daily. Remove & Discard patch within 12 hours or as directed by MD, Disp: 7 patch, Rfl: 0    pantoprazole (PROTONIX) 40 MG tablet, TAKE 1 TABLET(40 MG) BY MOUTH TWICE DAILY, Disp: 180 tablet, Rfl: 3    pregabalin (LYRICA) 150 MG capsule, Take 150 mg by mouth once daily., Disp: , Rfl:     PHYSICAL EXAMINATION:    The patient generally appears in good health, is appropriately interactive, and is in no apparent distress.     Eyes: anicteric sclerae, moist conjunctivae; no lid-lag; PERRLA     HENT: Atraumatic; oropharynx clear with moist mucous membranes and no mucosal ulcerations;normal hard and soft palate.  No  evidence of lymphadenopathy.    Neck: Trachea midline.  No thyromegaly.    Skin: No lesions.    Mental: Cooperative with normal affect.  Is oriented to time, place, and person.    Neuro: Grossly intact.    Chest: Normal inspiratory effort.   No accessory muscles.  No audible wheezes.  Respirations symmetric on inspiration and expiration.    Heart: Regular rhythm.      Abdomen:  Soft, non-tender. No masses or organomegaly. Bladder is not palpable. No evidence of flank discomfort. No evidence of inguinal hernia.    Genitourinary: The penis is not circumcised with a plaque c/w peyronie's on the shaft. The urethral meatus is normal. The testes, epididymides, and cord structures are normal in size and contour bilaterally. The scrotum is normal in size and contour.    Normal anal sphincter tone. No rectal mass.    The prostate is 30 g. Normal landmarks. Lateral sulci. Median furrow intact.  No nodularity or induration. Seminal vesicles are normal.    Extremities: No clubbing, cyanosis, or edema      LABS:      No results found for: PSA, PSADIAG, PSATOTAL, PSAFREE, PSAFREEPCT    IMPRESSION:    Encounter Diagnoses   Name Primary?    Peyronie's disease          PLAN:    1. Will draw a PSA as he's past due for this.  Discussed that PCa screening should be done by his PCP as part of his physical.  2. Discussed the pathophysiology of peyronie's.  Recommend a doppler u/s.  I  have explained the risk, benefits, and alternatives of the procedure in detail. The patient voices understanding and all questions have been answered.   3. Due to the cost of treatment, he does not want to proceed as his insurance has not changed from when he saw Dr. Francis, and it was too expensive then.  4. RTC prn.  5. Will observe his LUTS as they don't bother him.     Copy to:

## 2022-03-16 NOTE — LETTER
March 16, 2022        Roly Flannery MD  [address]  [facility] [address]             [facility]  [address]  [facility] [address]  Phone: [phone]   Patient: Jeff Hope   MR Number: [id number]   YOB: 1955   Date of Visit: 3/16/2022       Dear Dr. Flannery:    Thank you for referring Jeff Hope to me for evaluation. Attached you will find relevant portions of my assessment and plan of care.    If you have questions, please do not hesitate to call me. I look forward to following Jeff Hope along with you.    Sincerely,      Aiden Ha MD            CC  No Recipients    Enclosure

## 2022-03-17 ENCOUNTER — PATIENT MESSAGE (OUTPATIENT)
Dept: HEPATOLOGY | Facility: CLINIC | Age: 67
End: 2022-03-17
Payer: MEDICARE

## 2022-03-29 ENCOUNTER — CLINICAL SUPPORT (OUTPATIENT)
Dept: REHABILITATION | Facility: HOSPITAL | Age: 67
End: 2022-03-29
Payer: MEDICARE

## 2022-03-29 DIAGNOSIS — M54.50 CHRONIC LEFT-SIDED LOW BACK PAIN WITHOUT SCIATICA: Primary | ICD-10-CM

## 2022-03-29 DIAGNOSIS — G89.29 CHRONIC LEFT-SIDED LOW BACK PAIN WITHOUT SCIATICA: Primary | ICD-10-CM

## 2022-03-29 PROCEDURE — 97110 THERAPEUTIC EXERCISES: CPT | Mod: PO,CQ

## 2022-03-29 NOTE — PROGRESS NOTES
"[facility]   Physical Therapy Treatment Note     Name: Jeff Hope  Clinic Number: [id number]    Therapy Diagnosis:   Encounter Diagnosis   Name Primary?    Chronic left-sided low back pain without sciatica Yes     Physician: Ana Dominguez NP    Visit Date: 3/29/2022    Physician: Ana Dominguez NP     Physician Orders: PT Eval and Treat   Medical Diagnosis from Referral: M54.50,G89.29 (ICD-10-CM) - Chronic left-sided low back pain without sciatica M47.816 (ICD-10-CM) - Spondylosis of lumbar region without myelopathy or radiculopathy M79.18 (ICD-10-CM) - Myofascial pain   Evaluation Date: 3/11/2022  Authorization Period Expiration: 8/11/2022  Plan of Care Expiration: 5/11/2022  Progress Note Due: 4/11/2022  Visit # / Visits authorized: 1/ pending   FOTO: 1/3     Precautions: Fall and cancer     PTA Visit #: 1/5     Time In: 0745  Time Out: 0830  Total Billable Time: 45 minutes    SUBJECTIVE     Pt reports: having pain in his L side low back.   He was compliant with home exercise program.  Response to previous treatment: Initial eval.   Functional change: none noted     Pain: 5/10  Location: left side low back       OBJECTIVE     Objective Measures updated at progress report unless specified.     Treatment     Jeff received the treatments listed below:      therapeutic exercises to develop strength, endurance, ROM, flexibility, posture and core stabilization for 45 minutes including:    LTR's x 30  Supine bridge's 2 x 10   Ab iso's with pilate's ring x 20 3"   Supine bent knee fall out's 2 x 10 YTB B   Supine DKTC x 15 5"   Sidelying clamshells 2 x 10 YTB   Standing HSS 3 x 30" B   Recumbent bike 6'           Patient Education and Home Exercises     Home Exercises Provided and Patient Education Provided     Education provided:     Written Home Exercises Provided: Patient instructed to cont prior HEP. Exercises were reviewed and Jeff was able to demonstrate them prior " to the end of the session.  Jeff demonstrated good  understanding of the education provided. See EMR under Patient Instructions for exercises provided during therapy sessions    ASSESSMENT     Pt with 5/10 pain this morning prior to beginning Pt treatment.  Pt performed the above exercises with good tolerance and no reports of increased pain.  Pt required verbal cueing on proper breathing technique when performing core strengthening exercises to promote desired core activation to maximize exercise benefit.     Jeff Is progressing well towards his goals.   Pt prognosis is Good.     Pt will continue to benefit from skilled outpatient physical therapy to address the deficits listed in the problem list box on initial evaluation, provide pt/family education and to maximize pt's level of independence in the home and community environment.     Pt's spiritual, cultural and educational needs considered and pt agreeable to plan of care and goals.     Anticipated barriers to physical therapy: chronicity of condition    Goals:     Short Term Goals: 4 weeks   1. Patient will be compliant with home exercise program at all times  2. Patient will show 25% reduction in tone in glute muscles and lumbar spine musculautre  3. Patient will be able to stand and walk for > 30 minutes before onset of pain     Long Term Goals: 8 weeks   1. Patient will be able to perform > 30 minutes of yardwork before onset of pain  2. Patient will be able to ride his bike for > 60 minutes before onset if pain  3. Patient will be able to squat and lift > 30# from the ground without pain increasing  4. 50% FOTO score improvement    PLAN     Cont. PT POC.     Jaziel Paez, MEG

## 2022-04-01 ENCOUNTER — CLINICAL SUPPORT (OUTPATIENT)
Dept: REHABILITATION | Facility: HOSPITAL | Age: 67
End: 2022-04-01
Payer: MEDICARE

## 2022-04-01 DIAGNOSIS — M54.50 CHRONIC LEFT-SIDED LOW BACK PAIN WITHOUT SCIATICA: Primary | ICD-10-CM

## 2022-04-01 DIAGNOSIS — G89.29 CHRONIC LEFT-SIDED LOW BACK PAIN WITHOUT SCIATICA: Primary | ICD-10-CM

## 2022-04-01 PROCEDURE — 97110 THERAPEUTIC EXERCISES: CPT | Mod: PO,CQ

## 2022-04-01 PROCEDURE — 97140 MANUAL THERAPY 1/> REGIONS: CPT | Mod: PO,CQ

## 2022-04-01 NOTE — PROGRESS NOTES
"[facility]   Physical Therapy Treatment Note     Name: Jeff Hope  Clinic Number: [id number]    Therapy Diagnosis:   Encounter Diagnosis   Name Primary?    Chronic left-sided low back pain without sciatica Yes     Physician: Ana Dominguez NP    Visit Date: 4/1/2022    Physician: Ana Dominguez NP     Physician Orders: PT Eval and Treat   Medical Diagnosis from Referral: M54.50,G89.29 (ICD-10-CM) - Chronic left-sided low back pain without sciatica M47.816 (ICD-10-CM) - Spondylosis of lumbar region without myelopathy or radiculopathy M79.18 (ICD-10-CM) - Myofascial pain   Evaluation Date: 3/11/2022  Authorization Period Expiration: 8/11/2022  Plan of Care Expiration: 5/11/2022  Progress Note Due: 4/11/2022  Visit # / Visits authorized: 1/ pending   FOTO: 1/3     Precautions: Fall and cancer     PTA Visit #: 2/5     Time In: 0720  Time Out: 0805  Total Billable Time: 45 minutes    SUBJECTIVE     Pt reports: he mostly has lower back pain when he lays flat on his back.   He was compliant with home exercise program.  Response to previous treatment: mild soreness   Functional change: none noted    Pain: 7/10  Location: left side low back       OBJECTIVE     Objective Measures updated at progress report unless specified.     Treatment     Jeff received the treatments listed below:      therapeutic exercises to develop strength, endurance, ROM, flexibility, posture and core stabilization for 40 minutes including:    LTR's x 30   Supine bridge's 2 x 10   Ab iso's with pilate's ring x 20 3"   Supine bent knee fall out's 2 x 10 YTB B   Supine DKTC x 15 5"   Sidelying clamshells 2 x 10 YTB   Standing HSS 3 x 30" B   Recumbent bike 6'     Manual therapy:  10'    L LE long axis distraction   STM with hypervolt to L side low back       Patient Education and Home Exercises     Home Exercises Provided and Patient Education Provided     Education provided:     Written Home Exercises " Provided: Patient instructed to cont prior HEP. Exercises were reviewed and Jeff was able to demonstrate them prior to the end of the session.  Jeff demonstrated good  understanding of the education provided. See EMR under Patient Instructions for exercises provided during therapy sessions    ASSESSMENT     Pt with 7/10 back pain this morning prior to beginning PT treatment.  Manual therapy interventions were incorporated into pt's treatment session this morning to try and reduce pt's symptoms.  Pt with notes of decreased stiffness post treatment session.     Jeff Is progressing well towards his goals.   Pt prognosis is Good.     Pt will continue to benefit from skilled outpatient physical therapy to address the deficits listed in the problem list box on initial evaluation, provide pt/family education and to maximize pt's level of independence in the home and community environment.     Pt's spiritual, cultural and educational needs considered and pt agreeable to plan of care and goals.     Anticipated barriers to physical therapy: chronicity of condition    Goals:     Short Term Goals: 4 weeks   1. Patient will be compliant with home exercise program at all times  2. Patient will show 25% reduction in tone in glute muscles and lumbar spine musculautre  3. Patient will be able to stand and walk for > 30 minutes before onset of pain     Long Term Goals: 8 weeks   1. Patient will be able to perform > 30 minutes of yardwork before onset of pain  2. Patient will be able to ride his bike for > 60 minutes before onset if pain  3. Patient will be able to squat and lift > 30# from the ground without pain increasing  4. 50% FOTO score improvement    PLAN     Cont. PT POC.     Jaziel Paez, PTA

## 2022-04-04 ENCOUNTER — OFFICE VISIT (OUTPATIENT)
Dept: SPINE | Facility: CLINIC | Age: 67
End: 2022-04-04
Payer: MEDICARE

## 2022-04-04 VITALS
HEIGHT: 68 IN | DIASTOLIC BLOOD PRESSURE: 72 MMHG | WEIGHT: 203.69 LBS | HEART RATE: 66 BPM | SYSTOLIC BLOOD PRESSURE: 111 MMHG | BODY MASS INDEX: 30.87 KG/M2

## 2022-04-04 DIAGNOSIS — M47.816 SPONDYLOSIS OF LUMBAR REGION WITHOUT MYELOPATHY OR RADICULOPATHY: ICD-10-CM

## 2022-04-04 DIAGNOSIS — G89.29 CHRONIC LEFT-SIDED LOW BACK PAIN WITHOUT SCIATICA: Primary | ICD-10-CM

## 2022-04-04 DIAGNOSIS — M54.50 CHRONIC LEFT-SIDED LOW BACK PAIN WITHOUT SCIATICA: Primary | ICD-10-CM

## 2022-04-04 DIAGNOSIS — M79.18 MYOFASCIAL PAIN: ICD-10-CM

## 2022-04-04 PROCEDURE — 1157F PR ADVANCE CARE PLAN OR EQUIV PRESENT IN MEDICAL RECORD: ICD-10-PCS | Mod: CPTII,S$GLB,, | Performed by: NURSE PRACTITIONER

## 2022-04-04 PROCEDURE — 99214 PR OFFICE/OUTPT VISIT, EST, LEVL IV, 30-39 MIN: ICD-10-PCS | Mod: S$GLB,,, | Performed by: NURSE PRACTITIONER

## 2022-04-04 PROCEDURE — 3074F SYST BP LT 130 MM HG: CPT | Mod: CPTII,S$GLB,, | Performed by: NURSE PRACTITIONER

## 2022-04-04 PROCEDURE — 1159F PR MEDICATION LIST DOCUMENTED IN MEDICAL RECORD: ICD-10-PCS | Mod: CPTII,S$GLB,, | Performed by: NURSE PRACTITIONER

## 2022-04-04 PROCEDURE — 1157F ADVNC CARE PLAN IN RCRD: CPT | Mod: CPTII,S$GLB,, | Performed by: NURSE PRACTITIONER

## 2022-04-04 PROCEDURE — 99999 PR PBB SHADOW E&M-EST. PATIENT-LVL III: CPT | Mod: PBBFAC,,, | Performed by: NURSE PRACTITIONER

## 2022-04-04 PROCEDURE — 1125F PR PAIN SEVERITY QUANTIFIED, PAIN PRESENT: ICD-10-PCS | Mod: CPTII,S$GLB,, | Performed by: NURSE PRACTITIONER

## 2022-04-04 PROCEDURE — 3008F PR BODY MASS INDEX (BMI) DOCUMENTED: ICD-10-PCS | Mod: CPTII,S$GLB,, | Performed by: NURSE PRACTITIONER

## 2022-04-04 PROCEDURE — 3288F FALL RISK ASSESSMENT DOCD: CPT | Mod: CPTII,S$GLB,, | Performed by: NURSE PRACTITIONER

## 2022-04-04 PROCEDURE — 3074F PR MOST RECENT SYSTOLIC BLOOD PRESSURE < 130 MM HG: ICD-10-PCS | Mod: CPTII,S$GLB,, | Performed by: NURSE PRACTITIONER

## 2022-04-04 PROCEDURE — 3288F PR FALLS RISK ASSESSMENT DOCUMENTED: ICD-10-PCS | Mod: CPTII,S$GLB,, | Performed by: NURSE PRACTITIONER

## 2022-04-04 PROCEDURE — 1159F MED LIST DOCD IN RCRD: CPT | Mod: CPTII,S$GLB,, | Performed by: NURSE PRACTITIONER

## 2022-04-04 PROCEDURE — 3078F PR MOST RECENT DIASTOLIC BLOOD PRESSURE < 80 MM HG: ICD-10-PCS | Mod: CPTII,S$GLB,, | Performed by: NURSE PRACTITIONER

## 2022-04-04 PROCEDURE — 1101F PR PT FALLS ASSESS DOC 0-1 FALLS W/OUT INJ PAST YR: ICD-10-PCS | Mod: CPTII,S$GLB,, | Performed by: NURSE PRACTITIONER

## 2022-04-04 PROCEDURE — 99999 PR PBB SHADOW E&M-EST. PATIENT-LVL III: ICD-10-PCS | Mod: PBBFAC,,, | Performed by: NURSE PRACTITIONER

## 2022-04-04 PROCEDURE — 3078F DIAST BP <80 MM HG: CPT | Mod: CPTII,S$GLB,, | Performed by: NURSE PRACTITIONER

## 2022-04-04 PROCEDURE — 99214 OFFICE O/P EST MOD 30 MIN: CPT | Mod: S$GLB,,, | Performed by: NURSE PRACTITIONER

## 2022-04-04 PROCEDURE — 1101F PT FALLS ASSESS-DOCD LE1/YR: CPT | Mod: CPTII,S$GLB,, | Performed by: NURSE PRACTITIONER

## 2022-04-04 PROCEDURE — 1160F RVW MEDS BY RX/DR IN RCRD: CPT | Mod: CPTII,S$GLB,, | Performed by: NURSE PRACTITIONER

## 2022-04-04 PROCEDURE — 1160F PR REVIEW ALL MEDS BY PRESCRIBER/CLIN PHARMACIST DOCUMENTED: ICD-10-PCS | Mod: CPTII,S$GLB,, | Performed by: NURSE PRACTITIONER

## 2022-04-04 PROCEDURE — 1125F AMNT PAIN NOTED PAIN PRSNT: CPT | Mod: CPTII,S$GLB,, | Performed by: NURSE PRACTITIONER

## 2022-04-04 PROCEDURE — 3008F BODY MASS INDEX DOCD: CPT | Mod: CPTII,S$GLB,, | Performed by: NURSE PRACTITIONER

## 2022-04-04 NOTE — PROGRESS NOTES
Subjective:      Patient ID: Jeff Hope is a 66 y.o. male.    Chief Complaint: Low-back Pain    Interval History 4/4/2022:   Mr. Hope presents for followup of back pain. He has completed 2 sessions of PT with no noticeable improvement in the back pain. Pain is on the left low back and does not radiate, no leg pain or numbness/tingling. Worsens with walking and improves with nothing. No injections or spine surgery. Denies leg weakness, numbness/parasthesia, saddle anesthesia, urinary/bowel incontinence. Pain now 5/10.     HPI: Mr. 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 Patient reports recurrence of left sided low back pain, atraumatic. History of similar pain that has waxed and waned over the past year. Pain does not radiate. Pain worsens with prolonged walking and improves nothing. He went to the ED on 2/4/2022 and was given a steroid shot and prescribed robaxin, which has helped some. He reports that he may have done PT for his back years ago, but cannot be certain. No injection or spine surgery. Not associated with fever/chills, night sweats, weight loss. Denies leg weakness, numbness/parasthesia, saddle anesthesia, urinary/bowel incontinence. Pain now 6/10.     Lumbar xray 7/2021    FINDINGS:  Lumbar spine two views: Alignment is normal.  There is mild DJD.  No fracture dislocation bone destruction seen.  No trauma seen.     Impression:     No acute process seen    Past Medical History:   Diagnosis Date    History of pulmonary embolus (PE) 4/15/2019    Pt with PE s/p hospitalization.  He was placed on xarelto with high risk with cancer.  He has been out of the medication for the past month. Restart Xarelto 15 mg b.i.d. for 21 days, then 20 mg q.day until cancer no longer active.    Non Hodgkin's lymphoma      Personal history of colonic polyps 09/08/2015    per [facility] report - repeat 9/2020    Peyronie disease     S/P laparoscopic cholecystectomy 5/27/2019       Past Surgical History:   Procedure Laterality Date    ESOPHAGOGASTRODUODENOSCOPY N/A 11/29/2021    Procedure: ESOPHAGOGASTRODUODENOSCOPY (EGD);  Surgeon: Kristine Palmer MD;  Location: [address]);  Service: Endoscopy;  Laterality: N/A;  fully vacc-inst mail-tb-left chest port    GROIN EXPLORATION  2 pre 2005 and 1 post 2005    total of 3 procedures    LAPAROSCOPIC CHOLECYSTECTOMY  05/2018    SHOULDER SURGERY Left        Family History   Problem Relation Age of Onset    Breast cancer Mother     Emphysema Father     Stroke Sister     Diabetes Brother     Ulcers Brother     No Known Problems Daughter     No Known Problems Daughter        Social History     Socioeconomic History    Marital status:     Number of children: 2   Occupational History    Occupation: disabled [employer]    Tobacco Use    Smoking status: Never Smoker    Smokeless tobacco: Never Used   Substance and Sexual Activity    Alcohol use: No    Drug use: No    Sexual activity: Yes     Partners: Female   Social History Narrative    Lives with wife.         Current Outpatient Medications   Medication Sig Dispense Refill    amitriptyline (ELAVIL) 25 MG tablet Take 25 mg by mouth nightly.      celecoxib (CELEBREX) 200 MG capsule TK ONE C PO BID      fluticasone propionate (FLONASE) 50 mcg/actuation nasal spray 2 sprays (100 mcg total) by Each Nostril route once daily. 16 g 11    HYDROcodone-acetaminophen (NORCO) 7.5-325 mg per tablet Take 1 tablet by mouth every 8 (eight) hours as needed.      hydrOXYzine pamoate (VISTARIL) 50 MG Cap Take 50 mg by mouth.      LIDOcaine (LIDODERM) 5 % Place 1 patch onto the skin once daily. Remove & Discard patch within 12 hours or as directed by MD 7 patch 0    pantoprazole (PROTONIX) 40 MG tablet TAKE 1 TABLET(40 MG) BY  MOUTH TWICE DAILY 180 tablet 3    pregabalin (LYRICA) 150 MG capsule Take 150 mg by mouth once daily.       No current facility-administered medications for this visit.       Review of patient's allergies indicates:   Allergen Reactions    Rosuvastatin      myalgias         Review of Systems   Constitutional: Negative for chills and fever.   Cardiovascular: Negative for chest pain.   Respiratory: Negative for shortness of breath.    Musculoskeletal: Positive for back pain (  left). Negative for falls.   Gastrointestinal: Negative for abdominal pain, bowel incontinence, nausea and vomiting.   Genitourinary: Negative for bladder incontinence.   Neurological: Negative for focal weakness, numbness, paresthesias, sensory change and weakness.         Objective:        General: Jeff is well-developed, well-nourished, appears stated age, in no acute distress, alert and oriented to time, place and person.     General    Vitals reviewed.  Constitutional: He is oriented to person, place, and time. He appears well-developed and well-nourished.   HENT:   Head: Atraumatic.   Nose: Nose normal.   Eyes: Conjunctivae are normal.   Cardiovascular: Normal rate.    Pulmonary/Chest: Effort normal.   Abdominal: He exhibits no distension.   Neurological: He is alert and oriented to person, place, and time.   Psychiatric: He has a normal mood and affect. His behavior is normal. Judgment and thought content normal.     General Musculoskeletal Exam   Gait: normal     Back (L-Spine & T-Spine) / Neck (C-Spine) Exam     Tenderness Left paramedian tenderness of the Lower L-Spine.     Back (L-Spine & T-Spine) Range of Motion   Extension: 30 (with pain)   Flexion: 90   Lateral bend right: 20   Lateral bend left: 20     Spinal Sensation   Right Side Sensation  L-Spine Level: normal  S-Spine Level: normal  Left Side Sensation  L-Spine Level: normal  S-Spine Level: normal    Other He has no scoliosis .  Spinal Kyphosis:  Absent    Comments:   Minimal pain with facet loading B/L  (-) SLR B/L  Pain with extension      Muscle Strength   Right Upper Extremity   Biceps: 5/5   Deltoid:  5/5  Triceps:  5/5  Left Upper Extremity  Biceps: 5/5   Deltoid:  5/5  Triceps:  5/5  Right Lower Extremity   Hip Flexion: 5/5   Quadriceps:  5/5   Ankle Dorsiflexion:  5/5   Anterior tibial:  5/5   EHL:  5/5  Left Lower Extremity   Hip Flexion: 5/5   Quadriceps:  5/5   Ankle Dorsiflexion:  5/5   Anterior tibial:  5/5   EHL:  5/5    Reflexes     Left Side  Biceps:  2+  Brachioradialis:  2+  Achilles:  2+  Ankle Clonus:  absent  Quadriceps:  2+    Right Side   Biceps:  2+  Brachioradialis:  2+  Achilles:  2+  Ankle Clonus:  absent  Quadriceps:  2+    Vascular Exam     Right Pulses        Carotid:                  2+    Left Pulses        Carotid:                  2+              Assessment:       1. Chronic left-sided low back pain without sciatica    2. Spondylosis of lumbar region without myelopathy or radiculopathy    3. Myofascial pain           Plan:          1. Prior imaging and records were reviewed today. Lumbar xray from 2021 showed mild DJD.   2. Hx Non Hodgkins lymphoma in 2019, currently in remission, follows oncology.  3. Continues with left sided low back pain without radiation. Denies leg pain, numbness/tingling.   4. Started PT and has completed 2 sessions. Scheduled through early May.  5. Advised patient that 2 sessions of PT may not show the benefit yet and encouraged him to continue. He verbalized understanding.   6. We discussed posture sitting and the importance of trying to sit better.    7. We discussed the benefits of therapy and exercise and continuing to move.  8. Consider lumbar MRI if no improvement with PT.   9. RTC for followup in 6 weeks or sooner if needed.      Follow-up: Follow up in about 6 weeks (around 5/16/2022). If there are any questions prior to this, the patient was instructed to contact the office.

## 2022-04-05 ENCOUNTER — CLINICAL SUPPORT (OUTPATIENT)
Dept: REHABILITATION | Facility: HOSPITAL | Age: 67
End: 2022-04-05
Payer: MEDICARE

## 2022-04-05 DIAGNOSIS — M53.86 DECREASED RANGE OF MOTION OF LUMBAR SPINE: ICD-10-CM

## 2022-04-05 PROCEDURE — 97110 THERAPEUTIC EXERCISES: CPT | Mod: PO

## 2022-04-05 PROCEDURE — 97140 MANUAL THERAPY 1/> REGIONS: CPT | Mod: PO

## 2022-04-05 NOTE — PROGRESS NOTES
"[facility]   Physical Therapy Treatment Note     Name: Jeff Hope  Clinic Number: [id number]    Therapy Diagnosis:   Encounter Diagnosis   Name Primary?    Decreased range of motion of lumbar spine      Physician: Ana Dominguez NP    Visit Date: 4/5/2022    Physician: Ana Dominguez NP     Physician Orders: PT Eval and Treat   Medical Diagnosis from Referral: M54.50,G89.29 (ICD-10-CM) - Chronic left-sided low back pain without sciatica M47.816 (ICD-10-CM) - Spondylosis of lumbar region without myelopathy or radiculopathy M79.18 (ICD-10-CM) - Myofascial pain   Evaluation Date: 3/11/2022  Authorization Period Expiration: 12/31/2022  Plan of Care Expiration: 5/11/2022  Progress Note Due: 4/11/2022  Visit # / Visits authorized: 3 / 20  FOTO: 1/3     Precautions: Fall and cancer     PTA Visit #: 2/5     Time In: 255pm  Time Out: 340pm  Total Billable Time: 45 minutes    SUBJECTIVE     Pt reports: that he remains with the pain in his L low back and buttocks that tends to get worse the more he stands and walks.   He was compliant with home exercise program.  Response to previous treatment: mild soreness   Functional change: none noted    Pain: 5/10  Location: left side low back       OBJECTIVE     Objective Measures updated at progress report unless specified.     Treatment     Jeff received the treatments listed below:      therapeutic exercises to develop strength, endurance, ROM, flexibility, posture and core stabilization for 35 minutes including:    Standing, leaning on mat, hip extension, 2 x 10 B  Open books, 15x B  Supine bridge's 2 x 10   Sidelying clamshells 2 x 10 YTB   Supine bent knee fall out's 2 x 10 YTB B   Paloff press with BTB, 15x each direction  DL shuttle leg press, 50# x 3 minutes    Not today:  Ab iso's with pilate's ring x 20 3"   Supine DKTC x 15 5"   LTR's x 30   Recumbent bike 6'   Standing HSS 3 x 30" B     Manual therapy:  10'    B hip long axis " distraction mobs, grade IV  Prone anterior hips mobs bilaterally, grade III    Not today:  STM with hypervolt to L side low back       Patient Education and Home Exercises     Home Exercises Provided and Patient Education Provided     Education provided:     Written Home Exercises Provided: Patient instructed to cont prior HEP. Exercises were reviewed and Jeff was able to demonstrate them prior to the end of the session.  Jeff demonstrated good  understanding of the education provided. See EMR under Patient Instructions for exercises provided during therapy sessions    ASSESSMENT     Pt remains with L sided low back and buttock pain. He is stiff in his hips bilaterally and throughout his thoracic spine, so interventions were target at these areas today. He reported some of his L sided low back pain today with his standing exercises so they were adjusted to add some lumbar flexion and his symptoms were improved.     Jeff Is progressing well towards his goals.   Pt prognosis is Good.     Pt will continue to benefit from skilled outpatient physical therapy to address the deficits listed in the problem list box on initial evaluation, provide pt/family education and to maximize pt's level of independence in the home and community environment.     Pt's spiritual, cultural and educational needs considered and pt agreeable to plan of care and goals.     Anticipated barriers to physical therapy: chronicity of condition    Goals:     Short Term Goals: 4 weeks   1. Patient will be compliant with home exercise program at all times  2. Patient will show 25% reduction in tone in glute muscles and lumbar spine musculautre  3. Patient will be able to stand and walk for > 30 minutes before onset of pain     Long Term Goals: 8 weeks   1. Patient will be able to perform > 30 minutes of yardwork before onset of pain  2. Patient will be able to ride his bike for > 60 minutes before onset if pain  3. Patient will be able to squat  and lift > 30# from the ground without pain increasing  4. 50% FOTO score improvement    PLAN     Cont. PT POC.     JOHAN MEYER, PT

## 2022-04-07 ENCOUNTER — CLINICAL SUPPORT (OUTPATIENT)
Dept: REHABILITATION | Facility: HOSPITAL | Age: 67
End: 2022-04-07
Payer: MEDICARE

## 2022-04-07 DIAGNOSIS — M53.86 DECREASED RANGE OF MOTION OF LUMBAR SPINE: Primary | ICD-10-CM

## 2022-04-07 PROCEDURE — 97140 MANUAL THERAPY 1/> REGIONS: CPT | Mod: PO,CQ

## 2022-04-07 PROCEDURE — 97110 THERAPEUTIC EXERCISES: CPT | Mod: PO,CQ

## 2022-04-07 NOTE — PROGRESS NOTES
"[facility]   Physical Therapy Treatment Note     Name: Jeff Hope  Clinic Number: [id number]    Therapy Diagnosis:   Encounter Diagnosis   Name Primary?    Decreased range of motion of lumbar spine Yes     Physician: Ana Dominguez NP    Visit Date: 4/7/2022    Physician: Ana Dominguez NP     Physician Orders: PT Eval and Treat   Medical Diagnosis from Referral: M54.50,G89.29 (ICD-10-CM) - Chronic left-sided low back pain without sciatica M47.816 (ICD-10-CM) - Spondylosis of lumbar region without myelopathy or radiculopathy M79.18 (ICD-10-CM) - Myofascial pain   Evaluation Date: 3/11/2022  Authorization Period Expiration: 12/31/2022  Plan of Care Expiration: 5/11/2022  Progress Note Due: 4/11/2022  Visit # / Visits authorized: 4 / 20  FOTO: 1/3     Precautions: Fall and cancer     PTA Visit #: 1/5     Time In: 7:30 am  Time Out: 825 am  Total Billable Time: 55 minutes    SUBJECTIVE     Pt reports: that he was sore after last visit but his symptoms still remain the same.   He was compliant with home exercise program.  Response to previous treatment: soreness  Functional change: none noted     Pain: 6/10  Location: left side low back       OBJECTIVE     Objective Measures updated at progress report unless specified.     Treatment     Jeff received the treatments listed below:      therapeutic exercises to develop strength, endurance, ROM, flexibility, posture and core stabilization for 38 minutes including:    Standing, leaning on mat, hip extension, 2 x 10 B  Open books, 15x B  Supine bridge's 2 x 10   Sidelying clamshells 2 x 10 YTB   Supine bent knee fall out's 2 x 10 YTB B   Paloff press with BTB, 15x each direction  DL shuttle leg press, 50# x 3 minutes    Not today:  Ab iso's with pilate's ring x 20 3"   Supine DKTC x 15 5"   LTR's x 30   Recumbent bike 6'   Standing HSS 3 x 30" B     Manual therapy:  15'    B hip long axis distraction mobs, grade IV  Prone " anterior hips mobs bilaterally, grade III    Not today:  STM with hypervolt to L side low back       Patient Education and Home Exercises     Home Exercises Provided and Patient Education Provided     Education provided:     Written Home Exercises Provided: Patient instructed to cont prior HEP. Exercises were reviewed and Jeff was able to demonstrate them prior to the end of the session.  Jeff demonstrated good  understanding of the education provided. See EMR under Patient Instructions for exercises provided during therapy sessions    ASSESSMENT     Pt performed the above exercises with no reports of increased pain but did note experiencing muscle fatigue towards the end of the treatment session.  Pt required some cueing on proper exercises form when performing side lying clamshells to facilitate appropriate glute medius activation to improve his lateral hip strength.      Jeff Is progressing well towards his goals.   Pt prognosis is Good.     Pt will continue to benefit from skilled outpatient physical therapy to address the deficits listed in the problem list box on initial evaluation, provide pt/family education and to maximize pt's level of independence in the home and community environment.     Pt's spiritual, cultural and educational needs considered and pt agreeable to plan of care and goals.     Anticipated barriers to physical therapy: chronicity of condition    Goals:     Short Term Goals: 4 weeks   1. Patient will be compliant with home exercise program at all times  2. Patient will show 25% reduction in tone in glute muscles and lumbar spine musculautre  3. Patient will be able to stand and walk for > 30 minutes before onset of pain     Long Term Goals: 8 weeks   1. Patient will be able to perform > 30 minutes of yardwork before onset of pain  2. Patient will be able to ride his bike for > 60 minutes before onset if pain  3. Patient will be able to squat and lift > 30# from the ground without pain  increasing  4. 50% FOTO score improvement    PLAN     Cont. PT POC.     Jaziel Paez, PTA

## 2022-04-13 NOTE — PROGRESS NOTES
"[facility]   Physical Therapy Treatment Note     Name: Jeff Hope  Clinic Number: [id number]    Therapy Diagnosis:   Encounter Diagnosis   Name Primary?    Decreased range of motion of lumbar spine Yes     Physician: Ana Dominguez NP    Visit Date: 4/14/2022    Physician: Ana Dominguez NP     Physician Orders: PT Eval and Treat   Medical Diagnosis from Referral: M54.50,G89.29 (ICD-10-CM) - Chronic left-sided low back pain without sciatica M47.816 (ICD-10-CM) - Spondylosis of lumbar region without myelopathy or radiculopathy M79.18 (ICD-10-CM) - Myofascial pain   Evaluation Date: 3/11/2022  Authorization Period Expiration: 12/31/2022  Plan of Care Expiration: 5/11/2022  Progress Note Due: 4/11/2022  Visit # / Visits authorized: 5 / 20  FOTO: 1/3     Precautions: Fall and cancer     PTA Visit #: 1/5     Time In: 801am  Time Out: 845am  Total Billable Time: 40 minutes    SUBJECTIVE     Pt reports: that he had a few days last week where he felt very good. He then for a walk for about a mile which then caused the L side of his low back become painful. He also noticed this pain while sitting in the chair before his treatment session.   He was compliant with home exercise program.  Response to previous treatment: soreness  Functional change: none noted     Pain: 6/10  Location: left side low back       OBJECTIVE     Objective Measures updated at progress report unless specified.     Treatment     Jeff received the treatments listed below:      therapeutic exercises to develop strength, endurance, ROM, flexibility, posture and core stabilization for 30 minutes including:    Standing, leaning on mat, hip extension, 2 x 10 B  Open books, 15x B  Supine bridge's 2 x 10   Sidelying clamshells 2 x 10 GTB   Supine bent knee fall out's 2 x 10 YTB B   Paloff press with BTB, 15x each direction  DL shuttle leg press, 50# x 3 minutes    Not today:  Ab iso's with pilate's ring x 20 3" " "  Supine DKTC x 15 5"   LTR's x 30   Recumbent bike 6'   Standing HSS 3 x 30" B     Manual therapy:  10'    B hip long axis distraction mobs, grade IV  Prone anterior hips mobs bilaterally, grade III  Side lying L L4-L5 gapping mobs, grade III    Not today:  STM with hypervolt to L side low back       Patient Education and Home Exercises     Home Exercises Provided and Patient Education Provided     Education provided:     Written Home Exercises Provided: Patient instructed to cont prior HEP. Exercises were reviewed and Jeff was able to demonstrate them prior to the end of the session.  Jeff demonstrated good  understanding of the education provided. See EMR under Patient Instructions for exercises provided during therapy sessions    ASSESSMENT     Pt presents with some increased reports of low back pain today. Increased time was spent on hip and lumbar manual therapy which improved his symptoms. Exercises today focused on thoracic and hip mobility as well as hip strengthening with good tolerance. He was educated that despite his pain he shoulder remain active at home.     Jeff Is progressing well towards his goals.   Pt prognosis is Good.     Pt will continue to benefit from skilled outpatient physical therapy to address the deficits listed in the problem list box on initial evaluation, provide pt/family education and to maximize pt's level of independence in the home and community environment.     Pt's spiritual, cultural and educational needs considered and pt agreeable to plan of care and goals.     Anticipated barriers to physical therapy: chronicity of condition    Goals:     Short Term Goals: 4 weeks   1. Patient will be compliant with home exercise program at all times  2. Patient will show 25% reduction in tone in glute muscles and lumbar spine musculautre  3. Patient will be able to stand and walk for > 30 minutes before onset of pain     Long Term Goals: 8 weeks   1. Patient will be able to perform " > 30 minutes of yardwork before onset of pain  2. Patient will be able to ride his bike for > 60 minutes before onset if pain  3. Patient will be able to squat and lift > 30# from the ground without pain increasing  4. 50% FOTO score improvement    PLAN     Cont. PT POC.     JOHAN MEYER, PT

## 2022-04-14 ENCOUNTER — CLINICAL SUPPORT (OUTPATIENT)
Dept: REHABILITATION | Facility: HOSPITAL | Age: 67
End: 2022-04-14
Payer: MEDICARE

## 2022-04-14 DIAGNOSIS — M53.86 DECREASED RANGE OF MOTION OF LUMBAR SPINE: Primary | ICD-10-CM

## 2022-04-14 PROCEDURE — 97110 THERAPEUTIC EXERCISES: CPT | Mod: PO

## 2022-04-14 PROCEDURE — 97140 MANUAL THERAPY 1/> REGIONS: CPT | Mod: PO

## 2022-04-19 ENCOUNTER — CLINICAL SUPPORT (OUTPATIENT)
Dept: REHABILITATION | Facility: HOSPITAL | Age: 67
End: 2022-04-19
Payer: MEDICARE

## 2022-04-19 DIAGNOSIS — M53.86 DECREASED RANGE OF MOTION OF LUMBAR SPINE: Primary | ICD-10-CM

## 2022-04-19 PROCEDURE — 97140 MANUAL THERAPY 1/> REGIONS: CPT | Mod: PO

## 2022-04-19 PROCEDURE — 97110 THERAPEUTIC EXERCISES: CPT | Mod: PO

## 2022-04-19 NOTE — PROGRESS NOTES
[facility]   Physical Therapy Treatment Note     Name: Jeff Hope  [facility] Number: [id number]    Therapy Diagnosis:   Encounter Diagnosis   Name Primary?    Decreased range of motion of lumbar spine Yes     Physician: Ana Dominguez NP    Visit Date: 4/19/2022    Physician: Ana Dominguez NP     Physician Orders: PT Eval and Treat   Medical Diagnosis from Referral: M54.50,G89.29 (ICD-10-CM) - Chronic left-sided low back pain without sciatica M47.816 (ICD-10-CM) - Spondylosis of lumbar region without myelopathy or radiculopathy M79.18 (ICD-10-CM) - Myofascial pain   Evaluation Date: 3/11/2022  Authorization Period Expiration: 12/31/2022  Plan of Care Expiration: 7/11/2022  Progress Note Due: 5/11/2022  Visit # / Visits authorized: 5 / 20  FOTO: 1/3     Precautions: Fall and cancer     PTA Visit #: 0/5     Time In: 830am  Time Out: 915 am  Total Billable Time: 40 minutes    SUBJECTIVE     Pt reports: doing a little better overall, getting exercises easier and he is not feeling as much restriction but still has the deep hip pain with walking. He tries to avoid bending over to put more stress onto the hip and back. Able to sleep better through the night with less pain    He was compliant with home exercise program.  Response to previous treatment: soreness  Functional change: none noted     Pain: 6/10  Location: left side low back       OBJECTIVE     LUMBAR SPINE AROM:   Flexion: 90%   Extension: 50% with pain   Left Sidebend: 50%   Right Sidebend: 75% with R side pain   Left Rotation: 50%   Right Rotation: 50% with right side pain      SEGMENTAL MOBILITY: Mod restriction in UPA over R & L sides throughout lumbar spine     LOWER EXTREMITY STRENGTH:    Left Right   Knee Flex 4/5 4+/5   Knee Ext 4/5 4+/5      Hip Flexor 4/5 4+/5   Hip IR 4/5 4/5   Hip ER 4/5 4/5         Treatment     Jeff received the treatments listed below:      therapeutic exercises to develop strength,  "endurance, ROM, flexibility, posture and core stabilization for 30 minutes including:    Standing, leaning on mat, hip extension, 2 x 10 B  Open books, 15x B  Supine bridge's 2 x 10   Sidelying clamshells 2 x 10 GTB   Supine bent knee fall out's 2 x 10 YTB B   Paloff press with BTB, 15x each direction  DL  leg press, 60# x 30      Ab iso's with pilate's ring x 20 3"   Supine DKTC x 15 5"   LTR's x 30   Recumbent bike 6'   Standing HSS 3 x 30" B     Manual therapy:  15 minutes  Objective measures taken  B hip long axis distraction mobs, grade IV  Prone anterior hips mobs bilaterally, grade III  UPA in prone L>R side T10-L4 gd 3  Side lying L L4-L5 gapping mobs, grade III      STM with hypervolt to L side low back       Patient Education and Home Exercises     Home Exercises Provided and Patient Education Provided     Education provided:     Written Home Exercises Provided: Patient instructed to cont prior HEP. Exercises were reviewed and Jeff was able to demonstrate them prior to the end of the session.  Jeff demonstrated good  understanding of the education provided. See EMR under Patient Instructions for exercises provided during therapy sessions    ASSESSMENT     Pt presents with some increased reports of right side back and left side hip pain today but relieved with MFR to the hip and lumbar spine. He was able to perform all exercises with good form and reported appropriate muscle fatigue after 30 repetitions with a good decrease in overall muscle tightness and soreness at this time.     Jeff Is progressing well towards his goals.   Pt prognosis is Good.     Pt will continue to benefit from skilled outpatient physical therapy to address the deficits listed in the problem list box on initial evaluation, provide pt/family education and to maximize pt's level of independence in the home and community environment.     Pt's spiritual, cultural and educational needs considered and pt agreeable to plan of care and " goals.     Anticipated barriers to physical therapy: chronicity of condition    Goals:     Short Term Goals: 4 weeks   1. Patient will be compliant with home exercise program at all times - met  2. Patient will show 25% reduction in tone in glute muscles and lumbar spine musculature - in progress  3. Patient will be able to stand and walk for > 30 minutes before onset of pain - in progress     Long Term Goals: 8 weeks   1. Patient will be able to perform > 30 minutes of yardwork before onset of pain - in progress  2. Patient will be able to ride his bike for > 60 minutes before onset if pain - in progress  3. Patient will be able to squat and lift > 30# from the ground without pain increasing - in progress  4. 50% FOTO score improvement    PLAN     Cont. PT POC.     Gela Vera, PT

## 2022-04-22 ENCOUNTER — CLINICAL SUPPORT (OUTPATIENT)
Dept: REHABILITATION | Facility: HOSPITAL | Age: 67
End: 2022-04-22
Payer: MEDICARE

## 2022-04-22 DIAGNOSIS — M53.86 DECREASED RANGE OF MOTION OF LUMBAR SPINE: Primary | ICD-10-CM

## 2022-04-22 PROCEDURE — 97110 THERAPEUTIC EXERCISES: CPT | Mod: PO

## 2022-04-22 PROCEDURE — 97140 MANUAL THERAPY 1/> REGIONS: CPT | Mod: PO

## 2022-04-22 NOTE — PROGRESS NOTES
"[facility]   Physical Therapy Treatment Note     Name: Jeff Hope  Clinic Number: [id number]    Therapy Diagnosis:   Encounter Diagnosis   Name Primary?    Decreased range of motion of lumbar spine Yes     Physician: Ana Dominguez NP    Visit Date: 4/22/2022    Physician: Ana Dominguez NP     Physician Orders: PT Eval and Treat   Medical Diagnosis from Referral: M54.50,G89.29 (ICD-10-CM) - Chronic left-sided low back pain without sciatica M47.816 (ICD-10-CM) - Spondylosis of lumbar region without myelopathy or radiculopathy M79.18 (ICD-10-CM) - Myofascial pain   Evaluation Date: 3/11/2022  Authorization Period Expiration: 12/31/2022  Plan of Care Expiration: 7/11/2022  Progress Note Due: 5/11/2022  Visit # / Visits authorized: 7 / 20  FOTO: 1/3     Precautions: Fall and cancer     PTA Visit #: 0/5     Time In: 7:30 am  Time Out: 8:08 am  Total Billable Time: 39 minutes    SUBJECTIVE     Pt reports: he went for a walk yesterday which increased the pain as usual. He is still having some pain today.     He was compliant with home exercise program.  Response to previous treatment: soreness  Functional change: none noted     Pain: 6/10  Location: left side low back       OBJECTIVE     Objective measures taken at progress report unless specified otherwise.    Treatment     Jeff received the treatments listed below:      therapeutic exercises to develop strength, endurance, ROM, flexibility, posture and core stabilization for 30 minutes including:    Recumbent bike 5 mins   Standing, leaning on mat, hip extension, 2 x 10 B  Open books, 15x B  Supine bridge's 2 x 10   Sidelying clamshells 2 x 10 GTB   Supine bent knee fall out's 2 x 10 YTB B   Piriformis stretch 3x30" L  Prone press ups on elbows for 3 minutes  Paloff press with BTB, 15x each direction  DL  leg press, 60# x 30      Ab iso's with pilate's ring x 20 3"   Supine DKTC x 15 5"   LTR's x 30   Recumbent bike 6' " "  Standing HSS 3 x 30" B     Manual therapy:  9 minutes  Objective measures taken  B hip long axis distraction mobs, grade IV  Prone anterior hips mobs bilaterally, grade III  UPA in prone L>R side T10-L4 gd 3  Side lying L L4-L5 gapping mobs, grade III  STM with hypervolt to L side low back and glute      Patient Education and Home Exercises     Home Exercises Provided and Patient Education Provided     Education provided:     Written Home Exercises Provided: Patient instructed to cont prior HEP. Exercises were reviewed and Jeff was able to demonstrate them prior to the end of the session.  Jeff demonstrated good  understanding of the education provided. See EMR under Patient Instructions for exercises provided during therapy sessions    ASSESSMENT     Pt with increased pain in L low back after walking yesterday which is usually an aggravating factor. He tolerates exercises well but does have some burning pain with piriformis stretch today. He responds very well to prone press ups. Continue to progress as tolerated.     Jeff Is progressing well towards his goals.   Pt prognosis is Good.     Pt will continue to benefit from skilled outpatient physical therapy to address the deficits listed in the problem list box on initial evaluation, provide pt/family education and to maximize pt's level of independence in the home and community environment.     Pt's spiritual, cultural and educational needs considered and pt agreeable to plan of care and goals.     Anticipated barriers to physical therapy: chronicity of condition    Goals:     Short Term Goals: 4 weeks   1. Patient will be compliant with home exercise program at all times - met  2. Patient will show 25% reduction in tone in glute muscles and lumbar spine musculature - in progress  3. Patient will be able to stand and walk for > 30 minutes before onset of pain - in progress     Long Term Goals: 8 weeks   1. Patient will be able to perform > 30 minutes of " yardwork before onset of pain - in progress  2. Patient will be able to ride his bike for > 60 minutes before onset if pain - in progress  3. Patient will be able to squat and lift > 30# from the ground without pain increasing - in progress  4. 50% FOTO score improvement    PLAN     Cont. PT POC.     Purvi Lyle, PT

## 2022-04-26 ENCOUNTER — CLINICAL SUPPORT (OUTPATIENT)
Dept: REHABILITATION | Facility: HOSPITAL | Age: 67
End: 2022-04-26
Payer: MEDICARE

## 2022-04-26 DIAGNOSIS — M53.86 DECREASED RANGE OF MOTION OF LUMBAR SPINE: Primary | ICD-10-CM

## 2022-04-26 PROCEDURE — 97110 THERAPEUTIC EXERCISES: CPT | Mod: PO

## 2022-04-26 PROCEDURE — 97140 MANUAL THERAPY 1/> REGIONS: CPT | Mod: PO

## 2022-04-26 NOTE — PROGRESS NOTES
"[facility]   Physical Therapy Treatment Note     Name: Jeff Hope  Clinic Number: [id number]    Therapy Diagnosis:   Encounter Diagnosis   Name Primary?    Decreased range of motion of lumbar spine Yes     Physician: Ana Dominguez NP    Visit Date: 4/26/2022    Physician: Ana Dominguez NP     Physician Orders: PT Eval and Treat   Medical Diagnosis from Referral: M54.50,G89.29 (ICD-10-CM) - Chronic left-sided low back pain without sciatica M47.816 (ICD-10-CM) - Spondylosis of lumbar region without myelopathy or radiculopathy M79.18 (ICD-10-CM) - Myofascial pain   Evaluation Date: 3/11/2022  Authorization Period Expiration: 12/31/2022  Plan of Care Expiration: 7/11/2022  Progress Note Due: 5/11/2022  Visit # / Visits authorized: 8 / 20  FOTO: 1/3     Precautions: Fall and cancer     PTA Visit #: 0/5     Time In: 7:45 am  Time Out: 8:25 am  Total Billable Time: 40 minutes    SUBJECTIVE     Pt reports: doing ok today, hot showers continue to decrease the pain and reduce tightness in the mornings, feels like the pain will randomly come and go     He was compliant with home exercise program.  Response to previous treatment: soreness  Functional change: none noted     Pain: 5/10  Location: left side low back       OBJECTIVE     Objective measures taken at progress report unless specified otherwise.    Treatment     Jeff received the treatments listed below:      therapeutic exercises to develop strength, endurance, ROM, flexibility, posture and core stabilization for 30 minutes including:    Recumbent bike 5 mins   Standing, leaning on mat, hip extension, 3 x 10 B  Open books, 15x B  Supine bridge's 2 x 10   Sidelying clamshells 2 x 10 GTB   Supine bent knee fall out's 3 x 10 GTB B   Piriformis stretch 3x30" L  Prone on elbows for 2 minutes  Paloff press with BTB, 15x each direction  DL  leg press, 60# x 30    Ab iso's with pilate's ring x 20 3"   Supine DKTC x 15 5"   LTR's " "x 30   Recumbent bike 6'   Standing HSS 3 x 30" B     Manual therapy:  10 minutes  Objective measures taken  B hip long axis distraction mobs, grade IV  Prone anterior hips mobs bilaterally, grade III  UPA in prone L>R side T10-L4 gd 3  Side lying L L4-L5 gapping mobs, grade III  STM with hypervolt to L side low back and glute      Patient Education and Home Exercises     Home Exercises Provided and Patient Education Provided     Education provided:     Written Home Exercises Provided: Patient instructed to cont prior HEP. Exercises were reviewed and Jeff was able to demonstrate them prior to the end of the session.  Jeff demonstrated good  understanding of the education provided. See EMR under Patient Instructions for exercises provided during therapy sessions    ASSESSMENT     Pt presents today with L>R side lumbar spine stiffness and increased hamstring tightness and soreness felt today. He is progressing well with his core strengthening and stability but continues to have fatigue and compensations by the end of 30 repetitions    Jeff Is progressing well towards his goals.   Pt prognosis is Good.     Pt will continue to benefit from skilled outpatient physical therapy to address the deficits listed in the problem list box on initial evaluation, provide pt/family education and to maximize pt's level of independence in the home and community environment.     Pt's spiritual, cultural and educational needs considered and pt agreeable to plan of care and goals.     Anticipated barriers to physical therapy: chronicity of condition    Goals:     Short Term Goals: 4 weeks   1. Patient will be compliant with home exercise program at all times - met  2. Patient will show 25% reduction in tone in glute muscles and lumbar spine musculature - in progress  3. Patient will be able to stand and walk for > 30 minutes before onset of pain - in progress     Long Term Goals: 8 weeks   1. Patient will be able to perform > 30 " minutes of yardwork before onset of pain - in progress  2. Patient will be able to ride his bike for > 60 minutes before onset if pain - in progress  3. Patient will be able to squat and lift > 30# from the ground without pain increasing - in progress  4. 50% FOTO score improvement    PLAN     Cont. PT POC.     Gela Vera, PT

## 2022-04-29 ENCOUNTER — CLINICAL SUPPORT (OUTPATIENT)
Dept: REHABILITATION | Facility: HOSPITAL | Age: 67
End: 2022-04-29
Payer: MEDICARE

## 2022-04-29 DIAGNOSIS — M53.86 DECREASED RANGE OF MOTION OF LUMBAR SPINE: Primary | ICD-10-CM

## 2022-04-29 PROCEDURE — 97140 MANUAL THERAPY 1/> REGIONS: CPT | Mod: PO

## 2022-04-29 PROCEDURE — 97110 THERAPEUTIC EXERCISES: CPT | Mod: PO

## 2022-04-29 NOTE — PROGRESS NOTES
"[facility]   Physical Therapy Treatment Note     Name: Jeff Hope  Clinic Number: [id number]    Therapy Diagnosis:   Encounter Diagnosis   Name Primary?    Decreased range of motion of lumbar spine Yes     Physician: Ana Dominguez NP    Visit Date: 4/29/2022    Physician: Ana Dominguez NP     Physician Orders: PT Eval and Treat   Medical Diagnosis from Referral: M54.50,G89.29 (ICD-10-CM) - Chronic left-sided low back pain without sciatica M47.816 (ICD-10-CM) - Spondylosis of lumbar region without myelopathy or radiculopathy M79.18 (ICD-10-CM) - Myofascial pain   Evaluation Date: 3/11/2022  Authorization Period Expiration: 12/31/2022  Plan of Care Expiration: 7/11/2022  Progress Note Due: 5/11/2022  Visit # / Visits authorized: 9 / 20  FOTO: 1/3     Precautions: Fall and cancer     PTA Visit #: 0/5     Time In: 830 am  Time Out: 915 am  Total Billable Time: 40 minutes    SUBJECTIVE     Pt reports: "same ole" this morning, feeling about the same today, warm shower continues to help in the mornings    He was compliant with home exercise program.  Response to previous treatment: soreness  Functional change: none noted     Pain: 5/10  Location: left side low back       OBJECTIVE     Objective measures taken at progress report unless specified otherwise.    Treatment     Jeff received the treatments listed below:      therapeutic exercises to develop strength, endurance, ROM, flexibility, posture and core stabilization for 30 minutes including:    Recumbent bike 5 mins   Standing, leaning on mat, hip extension, 3 x 10 B  Open books, 15x B  Supine bridge's 2 x 10 GT  Sidelying clamshells 2 x 10 GTB   Supine bent knee fall out's 3 x 10 GTB B   Piriformis stretch 3x30" L  Prone on elbows for 2 minutes  Paloff press with BTB, 15x each direction  Shoulder ext GTB with Tra - 3 X 10 standing  DL  leg press, 60# x 30    Ab iso's with pilate's ring x 20 3"   Supine DKTC x 15 5" " "  LTR's x 30   Recumbent bike 6'   Standing HSS 3 x 30" B     Manual therapy:  10 minutes  Objective measures taken  B hip long axis distraction mobs, grade IV  Prone anterior hips mobs bilaterally, grade III  UPA in prone L>R side T10-L4 gd 3  Side lying L L4-L5 gapping mobs, grade III  STM with hypervolt to L side low back and glute      Patient Education and Home Exercises     Home Exercises Provided and Patient Education Provided     Education provided:     Written Home Exercises Provided: Patient instructed to cont prior HEP. Exercises were reviewed and Jeff was able to demonstrate them prior to the end of the session.  Jeff demonstrated good  understanding of the education provided. See EMR under Patient Instructions for exercises provided during therapy sessions    ASSESSMENT     Pt did well with new standing activity and is showing good carryover. He continues to report left side back pain that refers to the right side at times but is showing improvement in core engagement throughout all activity. He is still exhibiting antalgic gait at times but improving well    Jeff Is progressing well towards his goals.   Pt prognosis is Good.     Pt will continue to benefit from skilled outpatient physical therapy to address the deficits listed in the problem list box on initial evaluation, provide pt/family education and to maximize pt's level of independence in the home and community environment.     Pt's spiritual, cultural and educational needs considered and pt agreeable to plan of care and goals.     Anticipated barriers to physical therapy: chronicity of condition    Goals:     Short Term Goals: 4 weeks   1. Patient will be compliant with home exercise program at all times - met  2. Patient will show 25% reduction in tone in glute muscles and lumbar spine musculature - in progress  3. Patient will be able to stand and walk for > 30 minutes before onset of pain - in progress     Long Term Goals: 8 weeks   1. " Patient will be able to perform > 30 minutes of yardwork before onset of pain - in progress  2. Patient will be able to ride his bike for > 60 minutes before onset if pain - in progress  3. Patient will be able to squat and lift > 30# from the ground without pain increasing - in progress  4. 50% FOTO score improvement    PLAN     Cont. PT POC.     Gela Vera, PT

## 2022-05-02 ENCOUNTER — CLINICAL SUPPORT (OUTPATIENT)
Dept: REHABILITATION | Facility: HOSPITAL | Age: 67
End: 2022-05-02
Payer: MEDICARE

## 2022-05-02 DIAGNOSIS — M53.86 DECREASED RANGE OF MOTION OF LUMBAR SPINE: Primary | ICD-10-CM

## 2022-05-02 PROCEDURE — 97110 THERAPEUTIC EXERCISES: CPT | Mod: PO,CQ

## 2022-05-02 PROCEDURE — 97140 MANUAL THERAPY 1/> REGIONS: CPT | Mod: PO,CQ

## 2022-05-02 NOTE — PROGRESS NOTES
"[facility]   Physical Therapy Treatment Note     Name: Jeff Hope  Clinic Number: [id number]    Therapy Diagnosis:   Encounter Diagnosis   Name Primary?    Decreased range of motion of lumbar spine Yes     Physician: Ana Dominguez NP    Visit Date: 5/2/2022    Physician: Ana Dominguez NP     Physician Orders: PT Eval and Treat   Medical Diagnosis from Referral: M54.50,G89.29 (ICD-10-CM) - Chronic left-sided low back pain without sciatica M47.816 (ICD-10-CM) - Spondylosis of lumbar region without myelopathy or radiculopathy M79.18 (ICD-10-CM) - Myofascial pain   Evaluation Date: 3/11/2022  Authorization Period Expiration: 12/31/2022  Plan of Care Expiration: 7/11/2022  Progress Note Due: 5/11/2022  Visit # / Visits authorized: 10 / 20  FOTO: 1/3     Precautions: Fall and cancer     PTA Visit #: 1/5     Time In: 0700 am  Time Out: 745 am  Total Billable Time: 45 minutes    SUBJECTIVE     Pt reports: He had some relief after last visit but started having pain again Saturday.  Pt also reports this morning his L side lower back and hip are bothering him.     He was compliant with home exercise program.  Response to previous treatment: Mild decrease in pain.   Functional change: Ongoing    Pain: /10  Location: left side low back       OBJECTIVE     Objective measures taken at progress report unless specified otherwise.    Treatment     Jeff received the treatments listed below:      therapeutic exercises to develop strength, endurance, ROM, flexibility, posture and core stabilization for 37 minutes including:    Recumbent bike 6 mins   Standing, leaning on mat, hip extension, 3 x 10 B  Open books, 15x B  Supine bridge's 3 x 10 GT  Sidelying clamshells 2 x 10 GTB   Supine bent knee fall out's 3 x 10 GTB B   Piriformis stretch 3x30" L  Prone on elbows for 2 minutes  Paloff press with BTB, 20x each direction  Shoulder ext GTB with Tra - 3 X 10 standing  DL  leg press, 60# x " "30    Ab iso's with pilate's ring x 20 3"   Supine DKTC x 15 5"   LTR's x 30   Recumbent bike 6'   Standing HSS 3 x 30" B     Manual therapy:  8 minutes  Objective measures taken  B hip long axis distraction mobs, grade IV  Prone anterior hips mobs bilaterally, grade III  UPA in prone L>R side T10-L4 gd 3  Side lying L L4-L5 gapping mobs, grade III  STM with hypervolt to L side low back and glute      Patient Education and Home Exercises     Home Exercises Provided and Patient Education Provided     Education provided:     Written Home Exercises Provided: Patient instructed to cont prior HEP. Exercises were reviewed and Jeff was able to demonstrate them prior to the end of the session.  Jeff demonstrated good  understanding of the education provided. See EMR under Patient Instructions for exercises provided during therapy sessions    ASSESSMENT     Pt with reports of left sided lower back pain this morning prior to beginning PT treatment.  Pt however was able to perform the above exercises as well as increase his repetitions on some of his therapeutic exercises without reports of increased pain.  Pt with positive response to manual therapy interventions.  Will continue to monitor and progress pt within his tolerance.     Jeff Is progressing well towards his goals.   Pt prognosis is Good.     Pt will continue to benefit from skilled outpatient physical therapy to address the deficits listed in the problem list box on initial evaluation, provide pt/family education and to maximize pt's level of independence in the home and community environment.     Pt's spiritual, cultural and educational needs considered and pt agreeable to plan of care and goals.     Anticipated barriers to physical therapy: chronicity of condition    Goals:     Short Term Goals: 4 weeks   1. Patient will be compliant with home exercise program at all times - met  2. Patient will show 25% reduction in tone in glute muscles and lumbar spine " musculature - in progress  3. Patient will be able to stand and walk for > 30 minutes before onset of pain - in progress     Long Term Goals: 8 weeks   1. Patient will be able to perform > 30 minutes of yardwork before onset of pain - in progress  2. Patient will be able to ride his bike for > 60 minutes before onset if pain - in progress  3. Patient will be able to squat and lift > 30# from the ground without pain increasing - in progress  4. 50% FOTO score improvement    PLAN     Cont. PT POC.     Jaziel Paez, PTA

## 2022-05-06 ENCOUNTER — CLINICAL SUPPORT (OUTPATIENT)
Dept: REHABILITATION | Facility: HOSPITAL | Age: 67
End: 2022-05-06
Payer: MEDICARE

## 2022-05-06 DIAGNOSIS — M53.86 DECREASED RANGE OF MOTION OF LUMBAR SPINE: Primary | ICD-10-CM

## 2022-05-06 PROCEDURE — 97110 THERAPEUTIC EXERCISES: CPT | Mod: PO

## 2022-05-06 PROCEDURE — 97140 MANUAL THERAPY 1/> REGIONS: CPT | Mod: PO

## 2022-05-06 NOTE — PROGRESS NOTES
"[facility]   Physical Therapy Treatment Note     Name: Jeff Hope  Clinic Number: [id number]    Therapy Diagnosis:   Encounter Diagnosis   Name Primary?    Decreased range of motion of lumbar spine Yes     Physician: Ana Dominguez NP    Visit Date: 5/6/2022    Physician: Ana Dominguez NP     Physician Orders: PT Eval and Treat   Medical Diagnosis from Referral: M54.50,G89.29 (ICD-10-CM) - Chronic left-sided low back pain without sciatica M47.816 (ICD-10-CM) - Spondylosis of lumbar region without myelopathy or radiculopathy M79.18 (ICD-10-CM) - Myofascial pain   Evaluation Date: 3/11/2022  Authorization Period Expiration: 12/31/2022  Plan of Care Expiration: 7/11/2022  Progress Note Due: 5/11/2022  Visit # / Visits authorized: 11 / 20  FOTO: 1/3     Precautions: Fall and cancer     PTA Visit #: 0/5     Time In: 0700 am  Time Out: 745 am  Total Billable Time: 45 minutes    SUBJECTIVE     Pt reports: was sore after last session but the past 2 days have been good days, did get to move around as much this morning but still is not feeling as bad    He was compliant with home exercise program.  Response to previous treatment: Mild decrease in pain.   Functional change: Ongoing    Pain: /10  Location: left side low back       OBJECTIVE     Objective measures taken at progress report unless specified otherwise.    Treatment     Jeff received the treatments listed below:      therapeutic exercises to develop strength, endurance, ROM, flexibility, posture and core stabilization for 35 minutes including:    Recumbent bike 5 mins   Standing, leaning on mat, hip extension, 3 x 10 B  Open books, 15x B  Supine bridge's 3 x 10 GT  Sidelying clamshells 2 x 10 GTB   Supine bent knee fall out's 3 x 10 GTB B   Piriformis stretch 3x30" L  Prone on elbows for 2 minutes  Paloff press with BTB, 20x each direction  Shoulder ext GTB with Tra - 3 X 10 standing  DL  leg press, 60# x 30    Ab " "iso's with pilate's ring x 20 3"   Supine DKTC x 15 5"   LTR's x 30   Recumbent bike 6'   Standing HSS 3 x 30" B     Manual therapy:  10 minutes  Objective measures taken  B hip long axis distraction mobs, grade IV  Prone anterior hips mobs bilaterally, grade III  UPA in prone L>R side T10-L4 gd 3  STM to glutes and paraspinals.QL  Side lying L L4-L5 gapping mobs, grade III  STM with hypervolt to L side low back and glute      Patient Education and Home Exercises     Home Exercises Provided and Patient Education Provided     Education provided:     Written Home Exercises Provided: Patient instructed to cont prior HEP. Exercises were reviewed and Jeff was able to demonstrate them prior to the end of the session.  Jeff demonstrated good  understanding of the education provided. See EMR under Patient Instructions for exercises provided during therapy sessions    ASSESSMENT     Patient reported only muscle soreness and fatigue after session. Tightness in glutes is reducing well and showing normal muscle tightness after exercise. Continues to have compensations with standing activity and exercise but showing better carryover and improvement in symptom relief for multiple days after sessions. Cont to work on dynamic core strengthening and standing stabilization.     Jeff Is progressing well towards his goals.   Pt prognosis is Good.     Pt will continue to benefit from skilled outpatient physical therapy to address the deficits listed in the problem list box on initial evaluation, provide pt/family education and to maximize pt's level of independence in the home and community environment.     Pt's spiritual, cultural and educational needs considered and pt agreeable to plan of care and goals.     Anticipated barriers to physical therapy: chronicity of condition    Goals:     Short Term Goals: 4 weeks   1. Patient will be compliant with home exercise program at all times - met  2. Patient will show 25% reduction in " tone in glute muscles and lumbar spine musculature - in progress  3. Patient will be able to stand and walk for > 30 minutes before onset of pain - in progress     Long Term Goals: 8 weeks   1. Patient will be able to perform > 30 minutes of yardwork before onset of pain - in progress  2. Patient will be able to ride his bike for > 60 minutes before onset if pain - in progress  3. Patient will be able to squat and lift > 30# from the ground without pain increasing - in progress  4. 50% FOTO score improvement    PLAN     Cont. PT POC.     Gela Vera, PT

## 2022-05-09 ENCOUNTER — LAB VISIT (OUTPATIENT)
Dept: LAB | Facility: HOSPITAL | Age: 67
End: 2022-05-09
Attending: INTERNAL MEDICINE
Payer: MEDICARE

## 2022-05-09 DIAGNOSIS — B16.9 ACUTE HEPATITIS B: ICD-10-CM

## 2022-05-09 DIAGNOSIS — D64.9 ANEMIA, UNSPECIFIED TYPE: ICD-10-CM

## 2022-05-09 LAB
ALBUMIN SERPL BCP-MCNC: 4.1 G/DL (ref 3.5–5.2)
ALP SERPL-CCNC: 62 U/L (ref 55–135)
ALT SERPL W/O P-5'-P-CCNC: 12 U/L (ref 10–44)
ANION GAP SERPL CALC-SCNC: 7 MMOL/L (ref 8–16)
AST SERPL-CCNC: 19 U/L (ref 10–40)
BASOPHILS # BLD AUTO: 0.03 K/UL (ref 0–0.2)
BASOPHILS NFR BLD: 0.5 % (ref 0–1.9)
BILIRUB SERPL-MCNC: 0.5 MG/DL (ref 0.1–1)
BUN SERPL-MCNC: 13 MG/DL (ref 8–23)
CALCIUM SERPL-MCNC: 9.8 MG/DL (ref 8.7–10.5)
CHLORIDE SERPL-SCNC: 104 MMOL/L (ref 95–110)
CO2 SERPL-SCNC: 30 MMOL/L (ref 23–29)
CREAT SERPL-MCNC: 1 MG/DL (ref 0.5–1.4)
DIFFERENTIAL METHOD: ABNORMAL
EOSINOPHIL # BLD AUTO: 0 K/UL (ref 0–0.5)
EOSINOPHIL NFR BLD: 0.5 % (ref 0–8)
ERYTHROCYTE [DISTWIDTH] IN BLOOD BY AUTOMATED COUNT: 12.6 % (ref 11.5–14.5)
EST. GFR  (AFRICAN AMERICAN): >60 ML/MIN/1.73 M^2
EST. GFR  (NON AFRICAN AMERICAN): >60 ML/MIN/1.73 M^2
GLUCOSE SERPL-MCNC: 95 MG/DL (ref 70–110)
HCT VFR BLD AUTO: 44.4 % (ref 40–54)
HGB BLD-MCNC: 14.7 G/DL (ref 14–18)
IMM GRANULOCYTES # BLD AUTO: 0.01 K/UL (ref 0–0.04)
IMM GRANULOCYTES NFR BLD AUTO: 0.2 % (ref 0–0.5)
INR PPP: 1 (ref 0.8–1.2)
LYMPHOCYTES # BLD AUTO: 2.8 K/UL (ref 1–4.8)
LYMPHOCYTES NFR BLD: 49.8 % (ref 18–48)
MCH RBC QN AUTO: 29.2 PG (ref 27–31)
MCHC RBC AUTO-ENTMCNC: 33.1 G/DL (ref 32–36)
MCV RBC AUTO: 88 FL (ref 82–98)
MONOCYTES # BLD AUTO: 0.4 K/UL (ref 0.3–1)
MONOCYTES NFR BLD: 7.4 % (ref 4–15)
NEUTROPHILS # BLD AUTO: 2.4 K/UL (ref 1.8–7.7)
NEUTROPHILS NFR BLD: 41.6 % (ref 38–73)
NRBC BLD-RTO: 0 /100 WBC
PLATELET # BLD AUTO: 157 K/UL (ref 150–450)
PMV BLD AUTO: 10.2 FL (ref 9.2–12.9)
POTASSIUM SERPL-SCNC: 4.1 MMOL/L (ref 3.5–5.1)
PROT SERPL-MCNC: 7.3 G/DL (ref 6–8.4)
PROTHROMBIN TIME: 10.3 SEC (ref 9–12.5)
RBC # BLD AUTO: 5.03 M/UL (ref 4.6–6.2)
SODIUM SERPL-SCNC: 141 MMOL/L (ref 136–145)
WBC # BLD AUTO: 5.68 K/UL (ref 3.9–12.7)

## 2022-05-09 PROCEDURE — 36415 COLL VENOUS BLD VENIPUNCTURE: CPT | Performed by: INTERNAL MEDICINE

## 2022-05-09 PROCEDURE — 85025 COMPLETE CBC W/AUTO DIFF WBC: CPT | Performed by: INTERNAL MEDICINE

## 2022-05-09 PROCEDURE — 80053 COMPREHEN METABOLIC PANEL: CPT | Performed by: INTERNAL MEDICINE

## 2022-05-09 PROCEDURE — 87340 HEPATITIS B SURFACE AG IA: CPT | Performed by: INTERNAL MEDICINE

## 2022-05-09 PROCEDURE — 86706 HEP B SURFACE ANTIBODY: CPT | Performed by: INTERNAL MEDICINE

## 2022-05-09 PROCEDURE — 87517 HEPATITIS B DNA QUANT: CPT | Performed by: INTERNAL MEDICINE

## 2022-05-09 PROCEDURE — 85610 PROTHROMBIN TIME: CPT | Performed by: INTERNAL MEDICINE

## 2022-05-10 ENCOUNTER — PATIENT MESSAGE (OUTPATIENT)
Dept: HEPATOLOGY | Facility: CLINIC | Age: 67
End: 2022-05-10
Payer: MEDICARE

## 2022-05-14 ENCOUNTER — TELEPHONE (OUTPATIENT)
Dept: HEPATOLOGY | Facility: CLINIC | Age: 67
End: 2022-05-14
Payer: MEDICARE

## 2022-05-14 NOTE — TELEPHONE ENCOUNTER
----- Message from Alla Haney MD sent at 5/13/2022  1:50 PM CDT -----  Please inform patient results are OK.

## 2022-05-16 ENCOUNTER — OFFICE VISIT (OUTPATIENT)
Dept: SPINE | Facility: CLINIC | Age: 67
End: 2022-05-16
Payer: MEDICARE

## 2022-05-16 VITALS
SYSTOLIC BLOOD PRESSURE: 114 MMHG | HEART RATE: 65 BPM | BODY MASS INDEX: 30.94 KG/M2 | WEIGHT: 204.13 LBS | DIASTOLIC BLOOD PRESSURE: 71 MMHG | HEIGHT: 68 IN

## 2022-05-16 DIAGNOSIS — M47.816 SPONDYLOSIS OF LUMBAR REGION WITHOUT MYELOPATHY OR RADICULOPATHY: ICD-10-CM

## 2022-05-16 DIAGNOSIS — M46.1 SACROILIITIS: ICD-10-CM

## 2022-05-16 DIAGNOSIS — M54.50 CHRONIC LEFT-SIDED LOW BACK PAIN WITHOUT SCIATICA: Primary | ICD-10-CM

## 2022-05-16 DIAGNOSIS — G89.29 CHRONIC LEFT-SIDED LOW BACK PAIN WITHOUT SCIATICA: Primary | ICD-10-CM

## 2022-05-16 PROCEDURE — 3074F PR MOST RECENT SYSTOLIC BLOOD PRESSURE < 130 MM HG: ICD-10-PCS | Mod: CPTII,S$GLB,, | Performed by: NURSE PRACTITIONER

## 2022-05-16 PROCEDURE — 1125F AMNT PAIN NOTED PAIN PRSNT: CPT | Mod: CPTII,S$GLB,, | Performed by: NURSE PRACTITIONER

## 2022-05-16 PROCEDURE — 99499 UNLISTED E&M SERVICE: CPT | Mod: HCNC,S$GLB,, | Performed by: NURSE PRACTITIONER

## 2022-05-16 PROCEDURE — 1101F PT FALLS ASSESS-DOCD LE1/YR: CPT | Mod: CPTII,S$GLB,, | Performed by: NURSE PRACTITIONER

## 2022-05-16 PROCEDURE — 3008F PR BODY MASS INDEX (BMI) DOCUMENTED: ICD-10-PCS | Mod: CPTII,S$GLB,, | Performed by: NURSE PRACTITIONER

## 2022-05-16 PROCEDURE — 1160F RVW MEDS BY RX/DR IN RCRD: CPT | Mod: CPTII,S$GLB,, | Performed by: NURSE PRACTITIONER

## 2022-05-16 PROCEDURE — 3008F BODY MASS INDEX DOCD: CPT | Mod: CPTII,S$GLB,, | Performed by: NURSE PRACTITIONER

## 2022-05-16 PROCEDURE — 99214 PR OFFICE/OUTPT VISIT, EST, LEVL IV, 30-39 MIN: ICD-10-PCS | Mod: S$GLB,,, | Performed by: NURSE PRACTITIONER

## 2022-05-16 PROCEDURE — 1157F ADVNC CARE PLAN IN RCRD: CPT | Mod: CPTII,S$GLB,, | Performed by: NURSE PRACTITIONER

## 2022-05-16 PROCEDURE — 99499 RISK ADDL DX/OHS AUDIT: ICD-10-PCS | Mod: HCNC,S$GLB,, | Performed by: NURSE PRACTITIONER

## 2022-05-16 PROCEDURE — 3078F PR MOST RECENT DIASTOLIC BLOOD PRESSURE < 80 MM HG: ICD-10-PCS | Mod: CPTII,S$GLB,, | Performed by: NURSE PRACTITIONER

## 2022-05-16 PROCEDURE — 1125F PR PAIN SEVERITY QUANTIFIED, PAIN PRESENT: ICD-10-PCS | Mod: CPTII,S$GLB,, | Performed by: NURSE PRACTITIONER

## 2022-05-16 PROCEDURE — 99214 OFFICE O/P EST MOD 30 MIN: CPT | Mod: S$GLB,,, | Performed by: NURSE PRACTITIONER

## 2022-05-16 PROCEDURE — 99999 PR PBB SHADOW E&M-EST. PATIENT-LVL III: ICD-10-PCS | Mod: PBBFAC,,, | Performed by: NURSE PRACTITIONER

## 2022-05-16 PROCEDURE — 3288F PR FALLS RISK ASSESSMENT DOCUMENTED: ICD-10-PCS | Mod: CPTII,S$GLB,, | Performed by: NURSE PRACTITIONER

## 2022-05-16 PROCEDURE — 1160F PR REVIEW ALL MEDS BY PRESCRIBER/CLIN PHARMACIST DOCUMENTED: ICD-10-PCS | Mod: CPTII,S$GLB,, | Performed by: NURSE PRACTITIONER

## 2022-05-16 PROCEDURE — 1159F MED LIST DOCD IN RCRD: CPT | Mod: CPTII,S$GLB,, | Performed by: NURSE PRACTITIONER

## 2022-05-16 PROCEDURE — 1157F PR ADVANCE CARE PLAN OR EQUIV PRESENT IN MEDICAL RECORD: ICD-10-PCS | Mod: CPTII,S$GLB,, | Performed by: NURSE PRACTITIONER

## 2022-05-16 PROCEDURE — 1159F PR MEDICATION LIST DOCUMENTED IN MEDICAL RECORD: ICD-10-PCS | Mod: CPTII,S$GLB,, | Performed by: NURSE PRACTITIONER

## 2022-05-16 PROCEDURE — 3074F SYST BP LT 130 MM HG: CPT | Mod: CPTII,S$GLB,, | Performed by: NURSE PRACTITIONER

## 2022-05-16 PROCEDURE — 3078F DIAST BP <80 MM HG: CPT | Mod: CPTII,S$GLB,, | Performed by: NURSE PRACTITIONER

## 2022-05-16 PROCEDURE — 3288F FALL RISK ASSESSMENT DOCD: CPT | Mod: CPTII,S$GLB,, | Performed by: NURSE PRACTITIONER

## 2022-05-16 PROCEDURE — 99999 PR PBB SHADOW E&M-EST. PATIENT-LVL III: CPT | Mod: PBBFAC,,, | Performed by: NURSE PRACTITIONER

## 2022-05-16 PROCEDURE — 1101F PR PT FALLS ASSESS DOC 0-1 FALLS W/OUT INJ PAST YR: ICD-10-PCS | Mod: CPTII,S$GLB,, | Performed by: NURSE PRACTITIONER

## 2022-05-16 NOTE — PROGRESS NOTES
Subjective:      Patient ID: Jeff Hope is a 66 y.o. male.    Chief Complaint: Low-back Pain    Interval History 5/16/2022:   Mr. Hope presents today for followup of back pain. He reports improvement in his back pain since he has done more PT sessions. He is trying to increase his activity level and work up to his activity level pre-pandemic. The pain is in the left low back and does not radiate. Denies leg weakness, numbness/parasthesia, saddle anesthesia, urinary/bowel incontinence. Pain now 4/10.     Interval History 4/4/2022:   Mr. Hope presents for followup of back pain. He has completed 2 sessions of PT with no noticeable improvement in the back pain. Pain is on the left low back and does not radiate, no leg pain or numbness/tingling. Worsens with walking and improves with nothing. No injections or spine surgery. Denies leg weakness, numbness/parasthesia, saddle anesthesia, urinary/bowel incontinence. Pain now 5/10.     HPI: Mr. 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 Patient reports recurrence of left sided low back pain, atraumatic. History of similar pain that has waxed and waned over the past year. Pain does not radiate. Pain worsens with prolonged walking and improves nothing. He went to the ED on 2/4/2022 and was given a steroid shot and prescribed robaxin, which has helped some. He reports that he may have done PT for his back years ago, but cannot be certain. No injection or spine surgery. Not associated with fever/chills, night sweats, weight loss. Denies leg weakness, numbness/parasthesia, saddle anesthesia, urinary/bowel incontinence. Pain now 6/10.     Lumbar xray 7/2021    FINDINGS:  Lumbar spine two views: Alignment is normal.  There is mild DJD.  No fracture dislocation bone destruction seen.  No  trauma seen.     Impression:     No acute process seen    Past Medical History:   Diagnosis Date    History of pulmonary embolus (PE) 4/15/2019    Pt with PE s/p hospitalization.  He was placed on xarelto with high risk with cancer.  He has been out of the medication for the past month. Restart Xarelto 15 mg b.i.d. for 21 days, then 20 mg q.day until cancer no longer active.    Non Hodgkin's lymphoma     Personal history of colonic polyps 09/08/2015    per [facility] report - repeat 9/2020    Peyronie disease     S/P laparoscopic cholecystectomy 5/27/2019       Past Surgical History:   Procedure Laterality Date    ESOPHAGOGASTRODUODENOSCOPY N/A 11/29/2021    Procedure: ESOPHAGOGASTRODUODENOSCOPY (EGD);  Surgeon: Kristine Palmer MD;  Location: [facility] ([address] [place] [address]);  Service: Endoscopy;  Laterality: N/A;  fully vacc-inst mail-tb-left chest port    GROIN EXPLORATION  2 pre 2005 and 1 post 2005    total of 3 procedures    LAPAROSCOPIC CHOLECYSTECTOMY  05/2018    SHOULDER SURGERY Left        Family History   Problem Relation Age of Onset    Breast cancer Mother     Emphysema Father     Stroke Sister     Diabetes Brother     Ulcers Brother     No Known Problems Daughter     No Known Problems Daughter        Social History     Socioeconomic History    Marital status:     Number of children: 2   Occupational History    Occupation: disabled [employer]    Tobacco Use    Smoking status: Never Smoker    Smokeless tobacco: Never Used   Substance and Sexual Activity    Alcohol use: No    Drug use: No    Sexual activity: Yes     Partners: Female   Social History Narrative    Lives with wife.         Current Outpatient Medications   Medication Sig Dispense Refill    amitriptyline (ELAVIL) 25 MG tablet Take 25 mg by mouth nightly.      celecoxib (CELEBREX) 200 MG capsule TK ONE C PO BID      fluticasone propionate (FLONASE) 50 mcg/actuation nasal spray 2 sprays (100 mcg total) by Each Nostril route  once daily. 16 g 11    HYDROcodone-acetaminophen (NORCO) 7.5-325 mg per tablet Take 1 tablet by mouth every 8 (eight) hours as needed.      hydrOXYzine pamoate (VISTARIL) 50 MG Cap Take 50 mg by mouth.      LIDOcaine (LIDODERM) 5 % Place 1 patch onto the skin once daily. Remove & Discard patch within 12 hours or as directed by MD Barriga patch 0    pantoprazole (PROTONIX) 40 MG tablet TAKE 1 TABLET(40 MG) BY MOUTH TWICE DAILY 180 tablet 3    pregabalin (LYRICA) 150 MG capsule Take 150 mg by mouth once daily.       No current facility-administered medications for this visit.       Review of patient's allergies indicates:   Allergen Reactions    Rosuvastatin      myalgias         Review of Systems   Constitutional: Negative for chills and fever.   Cardiovascular: Negative for chest pain.   Respiratory: Negative for shortness of breath.    Musculoskeletal: Positive for back pain (  left). Negative for falls.   Gastrointestinal: Negative for abdominal pain, bowel incontinence, nausea and vomiting.   Genitourinary: Negative for bladder incontinence.   Neurological: Negative for focal weakness, numbness, paresthesias, sensory change and weakness.         Objective:        General: Jeff is well-developed, well-nourished, appears stated age, in no acute distress, alert and oriented to time, place and person.     General    Vitals reviewed.  Constitutional: He is oriented to person, place, and time. He appears well-developed and well-nourished.   HENT:   Head: Atraumatic.   Nose: Nose normal.   Eyes: Conjunctivae are normal.   Cardiovascular: Normal rate.    Pulmonary/Chest: Effort normal.   Abdominal: He exhibits no distension.   Neurological: He is alert and oriented to person, place, and time.   Psychiatric: He has a normal mood and affect. His behavior is normal. Judgment and thought content normal.     General Musculoskeletal Exam   Gait: normal     Left Hip Exam     Tenderness   The patient tender to palpation of the  SI joint.    Tests   Pain w/ forced internal rotation (CRESENCIO): present (  back pain)      Back (L-Spine & T-Spine) / Neck (C-Spine) Exam     Tenderness Left paramedian tenderness of the Lower L-Spine.     Back (L-Spine & T-Spine) Range of Motion   Extension: 30   Flexion: 90   Lateral bend right: 20   Lateral bend left: 20     Spinal Sensation   Right Side Sensation  L-Spine Level: normal  S-Spine Level: normal  Left Side Sensation  L-Spine Level: normal  S-Spine Level: normal    Other He has no scoliosis .  Spinal Kyphosis:  Absent    Comments:  Minimal pain with facet loading B/L  (-) SLR B/L  Pain with extension      Muscle Strength   Right Upper Extremity   Biceps: 5/5   Deltoid:  5/5  Triceps:  5/5  Left Upper Extremity  Biceps: 5/5   Deltoid:  5/5  Triceps:  5/5  Right Lower Extremity   Hip Flexion: 5/5   Quadriceps:  5/5   Ankle Dorsiflexion:  5/5   Anterior tibial:  5/5   EHL:  5/5  Left Lower Extremity   Hip Flexion: 5/5   Quadriceps:  5/5   Ankle Dorsiflexion:  5/5   Anterior tibial:  5/5   EHL:  5/5    Reflexes     Left Side  Biceps:  2+  Brachioradialis:  2+  Achilles:  2+  Ankle Clonus:  absent  Quadriceps:  2+    Right Side   Biceps:  2+  Brachioradialis:  2+  Achilles:  2+  Ankle Clonus:  absent  Quadriceps:  2+    Vascular Exam     Right Pulses        Carotid:                  2+    Left Pulses        Carotid:                  2+              Assessment:       1. Chronic left-sided low back pain without sciatica    2. Spondylosis of lumbar region without myelopathy or radiculopathy    3. Sacroiliitis           Plan:          1. Prior imaging and records were reviewed today. Lumbar xray from 2021 showed mild DJD.   2. Hx Non Hodgkins lymphoma in 2019, currently in remission, follows oncology.  3. Continues with left sided low back pain without radiation. Denies leg pain, numbness/tingling.   4. Currently in PT with improvement in his back pain.  5. We discussed posture sitting and the importance of  trying to sit better.    6. We discussed the benefits of therapy and exercise and continuing to move.  7. Consider lumbar MRI and possible injections with PM if no further improvement with PT.   8. RTC for followup in 3 months or sooner if needed.       Follow-up: Follow up in about 3 months (around 8/16/2022). If there are any questions prior to this, the patient was instructed to contact the office.

## 2022-05-18 LAB
HBV SURFACE AB SER-ACNC: POSITIVE M[IU]/ML
HBV SURFACE AG SERPL QL IA: NEGATIVE

## 2022-05-20 ENCOUNTER — PATIENT MESSAGE (OUTPATIENT)
Dept: HEPATOLOGY | Facility: CLINIC | Age: 67
End: 2022-05-20
Payer: MEDICARE

## 2022-05-25 NOTE — PROGRESS NOTES
"   [facility] Follow-up Note 7/20/2020    Reason for Visit:  There were no encounter diagnoses.    PCP: Roly Flannery       HPI:  This is a 66 y.o. male here for evaluation of: transaminase elevation.    Acute hep B has responded with Surface antigen seroconversion to Hep B surface antibody positive at least last 3 measurments. So, insurance wants to stop the Vemlidy.  That I Ok as long as he is not going to get Rituxan.  If he should get Rituxan anytime in the future, he should be started on Vemlidy at least a week before starting rituxan.      Labs every 6 months, starting today 11/8/2021:   CBC, CMP, PT INR, HBV DNA quant, HBsAg, HBsAb     63 y/o male with H/o follicular non-Hodgkin's lymphoma, malignant mets to bone, anti-neoplastic chemotherapy R-CHOP in Jan 2020, with pancytopenia, Pulm HTN, PE, mixed hyperlipidemia, was found to have elevated transaminases on 1/27/20 and again the next day.  AST in 500-700 range and ALT in 4253-6306 range.  Alk phos and T bili normal.  On Vemlidy since 2/6/2020, enzymes have normalized AST 24, ALT 18, HBV DNA has declined to <10, not detected (from 1.1 million IU/mL).  Hep B sAg has seroconverted to Hep B sAb positive on 9/25/2020 (approx. 9 weeks ago).  Thus, he has achieved "remission" what we used to refer to as "cure".  His latest blood tests at [facility] (ordered from [facility] (in Care Everywhere) on 11/4/20:  CBC ok, CMP also Ok, In this patient's case, he will remain on Vemlidy for now, due to possibility he may need chemotherapy again.    -  Today, patient states: he feels fine,     History given by patient's wife:  Patient was diagnosed with NHL in Nov-Dec 2018 with diffuse abd lymph node enlargement, stage 4 with mets in bone marrow.  R-CHOP was started in Jan 2020 under the care of Dr. Dominik Farooq at [facility].  After the first treatment, patient had acute upper abd pain, so they held off treatment with R-CHOP.  Evaluation showed abd pain was due to " "gallbladder, so he had a lap cholecystectomy (at [facility]) .  After surgery, he had PEs (from "upper part of his body", not lower extremities).  All of this was treated, then R-CHOP was resumed around June/July 2019.  He received 6-7 cycles, every 21 days.  Last treatment was completed just before Christmas 2019.  Then, he was told he would return in 3 months (March 2020) for any future treatments.      He did have an evaluation with a PET-CT on 12/30/19 which showed near complete resolution of the lymphadenopathy, however, 2 small 6 mm subpleural nodules on the left lower lobe of the lung and 2 small lymph nodes in the left anterior mesentery were seen on this PET-CT.  Labs that day 12/30/19 showed AST was already elevated to 55 and ALT to 88.  Then there were no labs until Jan 27/2020, when AT, ALT were elevated as above.      HBsAg neg, HBeAg and HBcAb IgM were neg on 3/15/19 (prior to starting R-CHOP).     Now HBsAg +, HBeAg +, HBV DNA 1.13 million IU/mL. Vemlidy started on 2/12/20.     Patient has been on Vemlidy 25 mg daily and with this treatment, his transaminases have normalized and his HBV DNA is now <10, detetcted.  This is down from 1.13 million IU/ ml.      Thus, patient has responded well to Vemlidy and will need to remain on it, as long he gets chemotherapy until loss of hep B surface antigen, which could be several months.  If we stop it prematurely, I am afraid he may reactivate as he did being treated with Rituxan.            Current meds:   Current Outpatient Medications   Medication Sig    amitriptyline (ELAVIL) 25 MG tablet Take 25 mg by mouth nightly.    celecoxib (CELEBREX) 200 MG capsule TK ONE C PO BID    fluticasone propionate (FLONASE) 50 mcg/actuation nasal spray 2 sprays (100 mcg total) by Each Nostril route once daily.    HYDROcodone-acetaminophen (NORCO) 7.5-325 mg per tablet Take 1 tablet by mouth every 8 (eight) hours as needed.    hydrOXYzine pamoate (VISTARIL) 50 MG Cap Take " "50 mg by mouth.    LIDOcaine (LIDODERM) 5 % Place 1 patch onto the skin once daily. Remove & Discard patch within 12 hours or as directed by MD    pantoprazole (PROTONIX) 40 MG tablet TAKE 1 TABLET(40 MG) BY MOUTH TWICE DAILY    pregabalin (LYRICA) 150 MG capsule Take 150 mg by mouth once daily.     No current facility-administered medications for this visit.     Patient's symptoms are revolving around a "bad shoulder".      Elevated liver enzymes: Yes  Abnormal imaging: Abd masses from NHL   Cirrhosis: No  Hepatitis C: No  Hepatitis B: No  Fatty liver: No  Encephalopathy: No  Post-hospital discharge: No  Symptoms: right shoulder pain    Primary hepatic manifestations:  Fatigue:Yes  Edema:Yes  Ascites:Yes  Encephalopathy:Yes  Abdominal pain:Yes  GI bleeds: Yes  Pruritus:Yes  Weight Changes:Yes  Changes in Bowel habits: Yes  Muscle cramps:No    Risk factors for liver disease:  No jaundice  No transfusions  No IVDU  Did not snort cocaine or similar agents  Did not live with anyone with hepatitis B or C  Sexual partner not tested  No hepatotoxic medications  No exposure to industrial toxins  Alcohol: None      ROS:  Constitutional: No fevers, chills, weight changes, fatigue  ENT: No allergies, nosebleeds,   CV: No chest pain  Pulm: No cough, shortness of breath  Ophtho: No vision changes  GI/Liver: see HPI  Derm: No rash, itching  Heme: No swollen glands, bruising  MSK: No joint pains, joint swelling  : No dysuria, hematuria, decrease in urine output  Endo: No hot or cold intolerance  Neuro: No confusion, disorientation, difficulty with sleep, memory, concentration, syncope, seizure  Psych: No anxiety, depression    Medical History:  has a past medical history of History of pulmonary embolus (PE) (4/15/2019), Non Hodgkin's lymphoma, Personal history of colonic polyps (09/08/2015), Peyronie disease, and S/P laparoscopic cholecystectomy (5/27/2019).    Surgical History:  has a past surgical history that includes " Shoulder surgery (Left); Groin exploration (2 pre 2005 and 1 post 2005); Laparoscopic cholecystectomy (05/2018); and Esophagogastroduodenoscopy (N/A, 11/29/2021).    Family History: family history includes Breast cancer in his mother; Diabetes in his brother; Emphysema in his father; No Known Problems in his daughter and daughter; Stroke in his sister; Ulcers in his brother..     Social History:  reports that he has never smoked. He has never used smokeless tobacco. He reports that he does not drink alcohol and does not use drugs.    Review of patient's allergies indicates:   Allergen Reactions    Rosuvastatin      myalgias       Current Outpatient Rx   Medication Sig Dispense Refill    amitriptyline (ELAVIL) 25 MG tablet Take 25 mg by mouth nightly.      celecoxib (CELEBREX) 200 MG capsule TK ONE C PO BID      fluticasone propionate (FLONASE) 50 mcg/actuation nasal spray 2 sprays (100 mcg total) by Each Nostril route once daily. 16 g 11    HYDROcodone-acetaminophen (NORCO) 7.5-325 mg per tablet Take 1 tablet by mouth every 8 (eight) hours as needed.      hydrOXYzine pamoate (VISTARIL) 50 MG Cap Take 50 mg by mouth.      LIDOcaine (LIDODERM) 5 % Place 1 patch onto the skin once daily. Remove & Discard patch within 12 hours or as directed by MD 7 patch 0    pantoprazole (PROTONIX) 40 MG tablet TAKE 1 TABLET(40 MG) BY MOUTH TWICE DAILY 180 tablet 3    pregabalin (LYRICA) 150 MG capsule Take 150 mg by mouth once daily.         Objective Findings:    Vital Signs:  /67 (BP Location: Right arm, Patient Position: Sitting, BP Method: Medium (Automatic))   Pulse 63   Temp 98.3 °F (36.8 °C) (Oral)   Wt 90.9 kg (200 lb 6.4 oz)   SpO2 (!) 93% Comment: RA  BMI 30.47 kg/m²   Body mass index is 30.47 kg/m².    Physical Exam:  General Appearance: Well appearing in no acute distress  Head:   Normocephalic, without obvious abnormality  Eyes:    No scleral icterus, EOMI  ENT: Neck supple, Lips, mucosa, and tongue  "normal; teeth and gums normal  Lungs: CTA bilaterally in anterior and posterior fields, no wheezes, no crackles.  Heart:  Regular rate and rhythm, S1, S2 normal, no murmurs heard  Abdomen: Soft, non tender, non distended with positive bowel sounds in all four quadrants. No hepatosplenomegaly, ascites, or mass  Extremities: 2+ pulses, no clubbing, cyanosis or edema  Skin: No rash  Neurologic: CN II-XII intact, alert, oriented x 3. No asterixis      Labs:  Lab Results   Component Value Date    WBC 5.68 05/09/2022    HGB 14.7 05/09/2022    HCT 44.4 05/09/2022     05/09/2022    CHOL 228 (H) 03/08/2022    TRIG 147 03/08/2022    HDL 62 03/08/2022    INR 1.0 05/09/2022    CREATININE 1.0 05/09/2022    BUN 13 05/09/2022    BILITOT 0.5 05/09/2022    ALT 12 05/09/2022    AST 19 05/09/2022    ALKPHOS 62 05/09/2022     05/09/2022    K 4.1 05/09/2022     05/09/2022    CO2 30 (H) 05/09/2022    TSH 0.585 03/08/2022    AFP 1.4 05/13/2021       Imaging:       Endoscopy:      Assessment:  No diagnosis found.   Acute Hepatitis (or acute exacerbation of chronic) "reactivation" after Rituxan:    Pt with NHL, HLD, PE, Pulm HTN, s/p lap [facility], s/p R-CHOP 6-7 cycles.   Here fr elevated transaminases:  elevated transaminases first noted on 12/30/19 when R-CHOP therapy was completed, then no labs until on 1/27/20 (and again the next day with a slight downward trend).  AST in 500 range and ALT in 1100 range.  Alk phos and T bili normal.  All labs have normalized since being on Vemlidy.      -  Acute hep B has responded with Surface antigen seroconversion to Hep B surface antibody positive at least last 3 measurments. So, insurance wants to stop the Vemlidy.  That I Ok as long as he is not going to get Rituxan.  If he should get Rituxan anytime in the future, he should be started on Vemlidy at least a week before starting rituxan.      Labs every 6 months, starting today 11/8/2021:   CBC, CMP, PT INR, HBV DNA quant, HBsAg, " HBsAb     Response of NHL to R-CHOP:   PET-CT 12/30/19: showed near complete resolution of the mesenteric lymph nodes, but interval development of 2 small 6 mm subpleural nodules on the left lower lung. And 2 left mesenteric lymph nodes.   Patient now sees Dr. José Antonio Del Cid, Oncologist at [facility] [place], (phone- [phone]).  Patient has been told:  If he should get Rituxan anytime in the future, he should be started on Vemlidy at least a week before starting rituxan.          US abd 1/28/20:  1.2 cm hypoechoic structure in the region of the janine hepatis.  This finding is indeterminate and may represent prominent periportal lymph node or cystic duct remnant in this patient that is status-post cholecystectomy.  CT abdomen without and with contrast could be done if clinically warranted.    Serologies have been ordered by Dr. Roly Flannery.  Will check results this week.     Anemia: resolved.   EGD 11/29/21 for eval of anemia:   - Normal esophagus.                          - Z-line, 37 cm from the incisors.                          - Friable gastric mucosa. Biopsied. - Neg for malignancy.                         - Normal examined duodenum.       Recommendations:  -  EGD schedule for eval of anemia.   -  Labs every 6 months, starting around 11/1/22: CBC, CMP, HBV DNA quant, HBsAg, HBsAb.     -  Vemlidy stopped (Needs to be restarted if possibility he needs chemotherapy).   -  Oncologist retired, He now sees Dr. José Antonio Del Cid, Oncologist at [facility] [place], (phone- [phone]) for lymphoma. If he should get Rituxan anytime in the future, he should be started on Vemlidy at least a week before starting rituxan.  -  Avoid alcohol, smoking, sedatives and meds with codeine.  -  Avoid high intake of Tylenol (more than 4 extra-strength pills in one day)  -  Call us if any bleeding, fevers, confusion, disorientation occur  -  Return in 6 months, video/audio visit will be OK.       Follow up in about 5 months (around 11/1/2022).      Order  summary:  No orders of the defined types were placed in this encounter.      Thank you so much for allowing me to participate in the care of Jeff Haney MD

## 2022-05-26 ENCOUNTER — TELEPHONE (OUTPATIENT)
Dept: HEPATOLOGY | Facility: CLINIC | Age: 67
End: 2022-05-26

## 2022-05-26 ENCOUNTER — OFFICE VISIT (OUTPATIENT)
Dept: HEPATOLOGY | Facility: CLINIC | Age: 67
End: 2022-05-26
Payer: MEDICARE

## 2022-05-26 VITALS
OXYGEN SATURATION: 93 % | WEIGHT: 200.38 LBS | TEMPERATURE: 98 F | SYSTOLIC BLOOD PRESSURE: 111 MMHG | HEART RATE: 63 BPM | DIASTOLIC BLOOD PRESSURE: 67 MMHG | BODY MASS INDEX: 30.47 KG/M2

## 2022-05-26 DIAGNOSIS — B18.1 CHRONIC VIRAL HEPATITIS B: Primary | ICD-10-CM

## 2022-05-26 PROCEDURE — 1157F PR ADVANCE CARE PLAN OR EQUIV PRESENT IN MEDICAL RECORD: ICD-10-PCS | Mod: CPTII,S$GLB,, | Performed by: INTERNAL MEDICINE

## 2022-05-26 PROCEDURE — 1160F PR REVIEW ALL MEDS BY PRESCRIBER/CLIN PHARMACIST DOCUMENTED: ICD-10-PCS | Mod: CPTII,S$GLB,, | Performed by: INTERNAL MEDICINE

## 2022-05-26 PROCEDURE — 1160F RVW MEDS BY RX/DR IN RCRD: CPT | Mod: CPTII,S$GLB,, | Performed by: INTERNAL MEDICINE

## 2022-05-26 PROCEDURE — 1126F AMNT PAIN NOTED NONE PRSNT: CPT | Mod: CPTII,S$GLB,, | Performed by: INTERNAL MEDICINE

## 2022-05-26 PROCEDURE — 1157F ADVNC CARE PLAN IN RCRD: CPT | Mod: CPTII,S$GLB,, | Performed by: INTERNAL MEDICINE

## 2022-05-26 PROCEDURE — 99999 PR PBB SHADOW E&M-EST. PATIENT-LVL III: ICD-10-PCS | Mod: PBBFAC,,, | Performed by: INTERNAL MEDICINE

## 2022-05-26 PROCEDURE — 3008F BODY MASS INDEX DOCD: CPT | Mod: CPTII,S$GLB,, | Performed by: INTERNAL MEDICINE

## 2022-05-26 PROCEDURE — 1159F PR MEDICATION LIST DOCUMENTED IN MEDICAL RECORD: ICD-10-PCS | Mod: CPTII,S$GLB,, | Performed by: INTERNAL MEDICINE

## 2022-05-26 PROCEDURE — 99499 RISK ADDL DX/OHS AUDIT: ICD-10-PCS | Mod: HCNC,S$GLB,, | Performed by: INTERNAL MEDICINE

## 2022-05-26 PROCEDURE — 3008F PR BODY MASS INDEX (BMI) DOCUMENTED: ICD-10-PCS | Mod: CPTII,S$GLB,, | Performed by: INTERNAL MEDICINE

## 2022-05-26 PROCEDURE — 99213 OFFICE O/P EST LOW 20 MIN: CPT | Mod: S$GLB,,, | Performed by: INTERNAL MEDICINE

## 2022-05-26 PROCEDURE — 1101F PT FALLS ASSESS-DOCD LE1/YR: CPT | Mod: CPTII,S$GLB,, | Performed by: INTERNAL MEDICINE

## 2022-05-26 PROCEDURE — 3074F SYST BP LT 130 MM HG: CPT | Mod: CPTII,S$GLB,, | Performed by: INTERNAL MEDICINE

## 2022-05-26 PROCEDURE — 99999 PR PBB SHADOW E&M-EST. PATIENT-LVL III: CPT | Mod: PBBFAC,,, | Performed by: INTERNAL MEDICINE

## 2022-05-26 PROCEDURE — 99213 PR OFFICE/OUTPT VISIT, EST, LEVL III, 20-29 MIN: ICD-10-PCS | Mod: S$GLB,,, | Performed by: INTERNAL MEDICINE

## 2022-05-26 PROCEDURE — 3078F DIAST BP <80 MM HG: CPT | Mod: CPTII,S$GLB,, | Performed by: INTERNAL MEDICINE

## 2022-05-26 PROCEDURE — 3078F PR MOST RECENT DIASTOLIC BLOOD PRESSURE < 80 MM HG: ICD-10-PCS | Mod: CPTII,S$GLB,, | Performed by: INTERNAL MEDICINE

## 2022-05-26 PROCEDURE — 3288F PR FALLS RISK ASSESSMENT DOCUMENTED: ICD-10-PCS | Mod: CPTII,S$GLB,, | Performed by: INTERNAL MEDICINE

## 2022-05-26 PROCEDURE — 1126F PR PAIN SEVERITY QUANTIFIED, NO PAIN PRESENT: ICD-10-PCS | Mod: CPTII,S$GLB,, | Performed by: INTERNAL MEDICINE

## 2022-05-26 PROCEDURE — 1101F PR PT FALLS ASSESS DOC 0-1 FALLS W/OUT INJ PAST YR: ICD-10-PCS | Mod: CPTII,S$GLB,, | Performed by: INTERNAL MEDICINE

## 2022-05-26 PROCEDURE — 3288F FALL RISK ASSESSMENT DOCD: CPT | Mod: CPTII,S$GLB,, | Performed by: INTERNAL MEDICINE

## 2022-05-26 PROCEDURE — 3074F PR MOST RECENT SYSTOLIC BLOOD PRESSURE < 130 MM HG: ICD-10-PCS | Mod: CPTII,S$GLB,, | Performed by: INTERNAL MEDICINE

## 2022-05-26 PROCEDURE — 1159F MED LIST DOCD IN RCRD: CPT | Mod: CPTII,S$GLB,, | Performed by: INTERNAL MEDICINE

## 2022-05-26 PROCEDURE — 99499 UNLISTED E&M SERVICE: CPT | Mod: HCNC,S$GLB,, | Performed by: INTERNAL MEDICINE

## 2022-05-26 NOTE — Clinical Note
Recommendations: -  EGD schedule for eval of anemia.  -  Labs every 6 months, starting around 11/1/22: CBC, CMP, HBV DNA quant, HBsAg, HBsAb.    -  Vemlidy stopped (Needs to be restarted if possibility he needs chemotherapy).  -  Oncologist retired, He now sees Dr. José Antonio Del Cid, Oncologist at [facility] [place], (phone- [phone]) for lymphoma. If he should get Rituxan anytime in the future, he should be started on Vemlidy at least a week before starting rituxan. -  Avoid alcohol, smoking, sedatives and meds with codeine. -  Avoid high intake of Tylenol (more than 4 extra-strength pills in one day) -  Call us if any bleeding, fevers, confusion, disorientation occur -  Return in 6 months, video/audio visit will be OK.

## 2022-05-26 NOTE — TELEPHONE ENCOUNTER
----- Message from Alla Haney MD sent at 5/26/2022  9:41 AM CDT -----  Recommendations:  -  EGD schedule for eval of anemia.   -  Labs every 6 months, starting around 11/1/22: CBC, CMP, HBV DNA quant, HBsAg, HBsAb.     -  Vemlidy stopped (Needs to be restarted if possibility he needs chemotherapy).   -  Oncologist retired, He now [place]. José Antonio Del Cid, [place] [facility] [place]phone- [phone]) for lymphoma. If he should get Rituxan anytime in the future, he should be started on Vemlidy at least a week before starting rituxan.  -  Avoid alcohol, smoking, sedatives and meds with codeine.  -  Avoid high intake of Tylenol (more than 4 extra-strength pills in one day)  -  Call us if any bleeding, fevers, confusion, disorientation occur  -  Return in 6 months, video/audio visit will be OK.

## 2022-06-09 ENCOUNTER — OFFICE VISIT (OUTPATIENT)
Dept: PRIMARY CARE CLINIC | Facility: CLINIC | Age: 67
End: 2022-06-09
Payer: MEDICARE

## 2022-06-09 ENCOUNTER — TELEPHONE (OUTPATIENT)
Dept: PRIMARY CARE CLINIC | Facility: CLINIC | Age: 67
End: 2022-06-09

## 2022-06-09 ENCOUNTER — PATIENT MESSAGE (OUTPATIENT)
Dept: PHARMACY | Facility: CLINIC | Age: 67
End: 2022-06-09
Payer: MEDICARE

## 2022-06-09 ENCOUNTER — IMMUNIZATION (OUTPATIENT)
Dept: PHARMACY | Facility: CLINIC | Age: 67
End: 2022-06-09
Payer: MEDICARE

## 2022-06-09 ENCOUNTER — TELEPHONE (OUTPATIENT)
Dept: PHARMACY | Facility: CLINIC | Age: 67
End: 2022-06-09
Payer: MEDICARE

## 2022-06-09 VITALS
BODY MASS INDEX: 29.75 KG/M2 | HEIGHT: 68 IN | TEMPERATURE: 98 F | WEIGHT: 196.31 LBS | SYSTOLIC BLOOD PRESSURE: 110 MMHG | OXYGEN SATURATION: 98 % | HEART RATE: 74 BPM | DIASTOLIC BLOOD PRESSURE: 78 MMHG

## 2022-06-09 DIAGNOSIS — K21.9 GASTROESOPHAGEAL REFLUX DISEASE, UNSPECIFIED WHETHER ESOPHAGITIS PRESENT: ICD-10-CM

## 2022-06-09 DIAGNOSIS — Z00.00 HEALTHCARE MAINTENANCE: ICD-10-CM

## 2022-06-09 DIAGNOSIS — N48.6 PEYRONIE'S DISEASE: Primary | ICD-10-CM

## 2022-06-09 DIAGNOSIS — E27.8 ADRENAL HYPERTROPHY: ICD-10-CM

## 2022-06-09 DIAGNOSIS — R29.818 NEUROGENIC CLAUDICATION: ICD-10-CM

## 2022-06-09 DIAGNOSIS — Z23 NEED FOR VACCINATION: Primary | ICD-10-CM

## 2022-06-09 DIAGNOSIS — K21.9 GASTROESOPHAGEAL REFLUX DISEASE WITHOUT ESOPHAGITIS: ICD-10-CM

## 2022-06-09 PROCEDURE — 1157F ADVNC CARE PLAN IN RCRD: CPT | Mod: CPTII,S$GLB,, | Performed by: INTERNAL MEDICINE

## 2022-06-09 PROCEDURE — 1125F AMNT PAIN NOTED PAIN PRSNT: CPT | Mod: CPTII,S$GLB,, | Performed by: INTERNAL MEDICINE

## 2022-06-09 PROCEDURE — 99499 RISK ADDL DX/OHS AUDIT: ICD-10-PCS | Mod: S$GLB,,, | Performed by: INTERNAL MEDICINE

## 2022-06-09 PROCEDURE — 1159F MED LIST DOCD IN RCRD: CPT | Mod: CPTII,S$GLB,, | Performed by: INTERNAL MEDICINE

## 2022-06-09 PROCEDURE — 1159F PR MEDICATION LIST DOCUMENTED IN MEDICAL RECORD: ICD-10-PCS | Mod: CPTII,S$GLB,, | Performed by: INTERNAL MEDICINE

## 2022-06-09 PROCEDURE — 3288F PR FALLS RISK ASSESSMENT DOCUMENTED: ICD-10-PCS | Mod: CPTII,S$GLB,, | Performed by: INTERNAL MEDICINE

## 2022-06-09 PROCEDURE — 3008F BODY MASS INDEX DOCD: CPT | Mod: CPTII,S$GLB,, | Performed by: INTERNAL MEDICINE

## 2022-06-09 PROCEDURE — 1101F PR PT FALLS ASSESS DOC 0-1 FALLS W/OUT INJ PAST YR: ICD-10-PCS | Mod: CPTII,S$GLB,, | Performed by: INTERNAL MEDICINE

## 2022-06-09 PROCEDURE — 99214 PR OFFICE/OUTPT VISIT, EST, LEVL IV, 30-39 MIN: ICD-10-PCS | Mod: S$GLB,,, | Performed by: INTERNAL MEDICINE

## 2022-06-09 PROCEDURE — 3288F FALL RISK ASSESSMENT DOCD: CPT | Mod: CPTII,S$GLB,, | Performed by: INTERNAL MEDICINE

## 2022-06-09 PROCEDURE — 1160F RVW MEDS BY RX/DR IN RCRD: CPT | Mod: CPTII,S$GLB,, | Performed by: INTERNAL MEDICINE

## 2022-06-09 PROCEDURE — 99499 UNLISTED E&M SERVICE: CPT | Mod: S$GLB,,, | Performed by: INTERNAL MEDICINE

## 2022-06-09 PROCEDURE — 3078F PR MOST RECENT DIASTOLIC BLOOD PRESSURE < 80 MM HG: ICD-10-PCS | Mod: CPTII,S$GLB,, | Performed by: INTERNAL MEDICINE

## 2022-06-09 PROCEDURE — 3008F PR BODY MASS INDEX (BMI) DOCUMENTED: ICD-10-PCS | Mod: CPTII,S$GLB,, | Performed by: INTERNAL MEDICINE

## 2022-06-09 PROCEDURE — 3074F SYST BP LT 130 MM HG: CPT | Mod: CPTII,S$GLB,, | Performed by: INTERNAL MEDICINE

## 2022-06-09 PROCEDURE — 3078F DIAST BP <80 MM HG: CPT | Mod: CPTII,S$GLB,, | Performed by: INTERNAL MEDICINE

## 2022-06-09 PROCEDURE — 1160F PR REVIEW ALL MEDS BY PRESCRIBER/CLIN PHARMACIST DOCUMENTED: ICD-10-PCS | Mod: CPTII,S$GLB,, | Performed by: INTERNAL MEDICINE

## 2022-06-09 PROCEDURE — 1101F PT FALLS ASSESS-DOCD LE1/YR: CPT | Mod: CPTII,S$GLB,, | Performed by: INTERNAL MEDICINE

## 2022-06-09 PROCEDURE — 99214 OFFICE O/P EST MOD 30 MIN: CPT | Mod: S$GLB,,, | Performed by: INTERNAL MEDICINE

## 2022-06-09 PROCEDURE — 3074F PR MOST RECENT SYSTOLIC BLOOD PRESSURE < 130 MM HG: ICD-10-PCS | Mod: CPTII,S$GLB,, | Performed by: INTERNAL MEDICINE

## 2022-06-09 PROCEDURE — 1157F PR ADVANCE CARE PLAN OR EQUIV PRESENT IN MEDICAL RECORD: ICD-10-PCS | Mod: CPTII,S$GLB,, | Performed by: INTERNAL MEDICINE

## 2022-06-09 PROCEDURE — 1125F PR PAIN SEVERITY QUANTIFIED, PAIN PRESENT: ICD-10-PCS | Mod: CPTII,S$GLB,, | Performed by: INTERNAL MEDICINE

## 2022-06-09 RX ORDER — PANTOPRAZOLE SODIUM 40 MG/1
40 TABLET, DELAYED RELEASE ORAL DAILY
Qty: 90 TABLET | Refills: 0
Start: 2022-06-09 | End: 2023-07-18

## 2022-06-09 NOTE — ASSESSMENT & PLAN NOTE
Patient treated for H pylori.  Patient was to increased BP I to b.i.d. for 8 weeks.  Patient is completed 8 weeks of PPI.  Occasional breakthrough symptoms.  Overall much better.  · Back to once daily PPI, patient to call with any worsening.  Will evaluate again at next visit.

## 2022-06-09 NOTE — ASSESSMENT & PLAN NOTE
"Noted on PET 12/30/2019.  "  Stable nodular hypertrophy of the left adrenal gland"  · No concerning findings on most recent CT, will follow  "

## 2022-06-09 NOTE — ASSESSMENT & PLAN NOTE
Continued pain.  Buttock pain has improved significantly with physical therapy but patient continues to have ongoing neurogenic claudication.  Followed by [facility].  · Patient to follow-up with [facility] and August.  Likely MRI will be done at that time.  Patient to continue exercising.

## 2022-06-09 NOTE — TELEPHONE ENCOUNTER
- I left a voicemail message and sent a letter to patients [employer] portal offering assistance with Pharmacy Patient Assistance

## 2022-06-09 NOTE — ASSESSMENT & PLAN NOTE
Patient unable to afford injection with urology.  Establish care here but told them he could not afford medications.  I explained that he should have asked them if there is a patient assistance program or at least discussed how much medication would cost.  · Patient information sent to the patient assistance program.

## 2022-06-09 NOTE — PROGRESS NOTES
.  This note has been generated using voice-recognition software. There may be typographical errors that have been missed during proof-reading.     Primary Care Provider Appointment    Subjective:      Patient ID: Jeff Hope is a 66 y.o. male here for follow up.     Chief Complaint: Follow-up    HPI:  This is a pleasant 63-year-old male with Peyronie's disease, bronchiectasis, hyperlipidemia, history of pulmonary embolism on chronic anticoagulation with a follicular non-Hodgkin's lymphoma, acute/reactivated hepatitis B here for f/u.  Pt last seen 03/08/2022 03/16/2022 patient seen by Urology for peyronie's  04/04/2022 patient seen by [facility].  Has only just started physical therapy for physical therapy encouraged follow-up 6 weeks.  516/2022 patient seen by PMR for low back pain.  Improved back pain with physical therapy.  05/26/2022 patient seen by hepatology for chronic hepatitis-B.  EGD to be scheduled for evaluation of anemia.  Labs every 6 months.  Vemlidy he restarted if possibility he needs chemotherapy.  Follow-up 6 months.    Pt cannot afford xiaflex.    Pt completed PPI BID of 8 weeks.  Better.  occasional break thru.    Pt has completed PT.  Continued increased pain with ongoing ambulation.  PT has helped but still with issues.    onc is upcoming.        Patient Active Problem List   Diagnosis    Bronchiectasis    Follicular non-Hodgkin's lymphoma    Mixed hyperlipidemia    Recurrent major depressive disorder, in partial remission    Aortic atherosclerosis    Chronic low back pain    Testicular pain    Healthcare maintenance    Malignant neoplasm metastatic to bone marrow    Pulmonary hypertension    Myalgia due to statin    Vitamin D deficiency    Abnormal MMSE    Allergic rhinitis    Adrenal hypertrophy    Bilateral shoulder bursitis    Chronic viral hepatitis B    Trigger finger of left thumb    Bilateral shoulder pain    Chronic thumb pain, bilateral    Breast mass,  "right    Peyronie's disease    Hypotestosteronemia in male    Neurogenic claudication    Iron deficiency anemia    Gastroesophageal reflux disease without esophagitis    Myofascial pain    Spondylosis of lumbar region without myelopathy or radiculopathy    Decreased range of motion of lumbar spine        Past Surgical History:   Procedure Laterality Date    ESOPHAGOGASTRODUODENOSCOPY N/A 11/29/2021    Procedure: ESOPHAGOGASTRODUODENOSCOPY (EGD);  Surgeon: Kristine Palmer MD;  Location: [facility] ([address] [place] [address]);  Service: Endoscopy;  Laterality: N/A;  fully vacc-inst mail-tb-left chest port    GROIN EXPLORATION  2 pre 2005 and 1 post 2005    total of 3 procedures    LAPAROSCOPIC CHOLECYSTECTOMY  05/2018    SHOULDER SURGERY Left        Past Medical History:   Diagnosis Date    History of pulmonary embolus (PE) 4/15/2019    Pt with PE s/p hospitalization.  He was placed on xarelto with high risk with cancer.  He has been out of the medication for the past month. Restart Xarelto 15 mg b.i.d. for 21 days, then 20 mg q.day until cancer no longer active.    Non Hodgkin's lymphoma     Personal history of colonic polyps 09/08/2015    per [facility] report - repeat 9/2020    Peyronie disease     S/P laparoscopic cholecystectomy 5/27/2019       Social History     Socioeconomic History    Marital status:     Number of children: 2   Occupational History    Occupation: disabled [employer]    Tobacco Use    Smoking status: Never Smoker    Smokeless tobacco: Never Used   Substance and Sexual Activity    Alcohol use: No    Drug use: No    Sexual activity: Yes     Partners: Female   Social History Narrative    Lives with wife.         Review of Systems    PHQ9 12/10/2019   Total Score 3        Checklist of Daily Activities:        Objective:   /78 (BP Location: Left arm, Patient Position: Sitting, BP Method: Medium (Manual))   Pulse 74   Temp 98.4 °F (36.9 °C) (Oral)   Ht 5' 8" (1.727 m)   Wt 89 " kg (196 lb 5.1 oz)   SpO2 98%   BMI 29.85 kg/m²     Physical Exam  Constitutional:       General: He is not in acute distress.     Appearance: Normal appearance. He is not ill-appearing, toxic-appearing or diaphoretic.   HENT:      Head: Normocephalic and atraumatic.   Cardiovascular:      Rate and Rhythm: Normal rate and regular rhythm.      Heart sounds: Normal heart sounds.   Pulmonary:      Effort: Pulmonary effort is normal.      Breath sounds: Normal breath sounds.   Abdominal:      Palpations: Abdomen is soft.      Tenderness: There is no abdominal tenderness. There is no guarding or rebound.   Musculoskeletal:      Right lower leg: No edema.      Left lower leg: No edema.   Lymphadenopathy:      Cervical: No cervical adenopathy.   Neurological:      Mental Status: He is alert.             Lab Results   Component Value Date    WBC 5.68 05/09/2022    HGB 14.7 05/09/2022    HCT 44.4 05/09/2022     05/09/2022    CHOL 228 (H) 03/08/2022    TRIG 147 03/08/2022    HDL 62 03/08/2022    ALT 12 05/09/2022    AST 19 05/09/2022     05/09/2022    K 4.1 05/09/2022     05/09/2022    CREATININE 1.0 05/09/2022    BUN 13 05/09/2022    CO2 30 (H) 05/09/2022    TSH 0.585 03/08/2022    INR 1.0 05/09/2022       Current Outpatient Medications on File Prior to Visit   Medication Sig Dispense Refill    amitriptyline (ELAVIL) 25 MG tablet Take 25 mg by mouth nightly.      celecoxib (CELEBREX) 200 MG capsule TK ONE C PO BID      fluticasone propionate (FLONASE) 50 mcg/actuation nasal spray 2 sprays (100 mcg total) by Each Nostril route once daily. 16 g 11    HYDROcodone-acetaminophen (NORCO) 7.5-325 mg per tablet Take 1 tablet by mouth every 8 (eight) hours as needed.      hydrOXYzine pamoate (VISTARIL) 50 MG Cap Take 50 mg by mouth.      LIDOcaine (LIDODERM) 5 % Place 1 patch onto the skin once daily. Remove & Discard patch within 12 hours or as directed by MD Barriga patch 0    pregabalin (LYRICA) 150 MG  "capsule Take 150 mg by mouth once daily.      [DISCONTINUED] pantoprazole (PROTONIX) 40 MG tablet TAKE 1 TABLET(40 MG) BY MOUTH TWICE DAILY 180 tablet 3     No current facility-administered medications on file prior to visit.         Assessment:   66 y.o. male with multiple co-morbid illnesses here for continued follow up of medical problems.      Plan:     Problem List Items Addressed This Visit        Neuro    Neurogenic claudication     Continued pain.  Buttock pain has improved significantly with physical therapy but patient continues to have ongoing neurogenic claudication.  Followed by [facility].  · Patient to follow-up with [facility] and August.  Likely MRI will be done at that time.  Patient to continue exercising.              Renal/    Peyronie's disease - Primary     Patient unable to afford injection with urology.  Establish care here but told them he could not afford medications.  I explained that he should have asked them if there is a patient assistance program or at least discussed how much medication would cost.  · Patient information sent to the patient assistance program.           Relevant Orders    Ambulatory referral/consult to Pharmacy Assistance       Endocrine    Adrenal hypertrophy     Noted on PET 12/30/2019.  "  Stable nodular hypertrophy of the left adrenal gland"  · No concerning findings on most recent CT, will follow              GI    Gastroesophageal reflux disease without esophagitis     Patient treated for H pylori.  Patient was to increased BP I to b.i.d. for 8 weeks.  Patient is completed 8 weeks of PPI.  Occasional breakthrough symptoms.  Overall much better.  · Back to once daily PPI, patient to call with any worsening.  Will evaluate again at next visit.              Other    Healthcare maintenance     · ACP reviewed 08/2021  ·  Yearly labs done 2/2022               Other Visit Diagnoses     Gastroesophageal reflux disease, unspecified whether esophagitis present "        Relevant Medications    pantoprazole (PROTONIX) 40 MG tablet          Health Maintenance       Date Due Completion Date    COVID-19 Vaccine (4 - Booster for Moderna series) 12/18/2021 8/18/2021    Colorectal Cancer Screening 05/18/2024 5/18/2021    Override on 5/18/2021: Done (repeat 3 year)    Pneumococcal Vaccines (Age 65+) (3 - PPSV23 or PCV20) 08/18/2025 8/18/2020    Lipid Panel 03/08/2027 3/8/2022    TETANUS VACCINE 06/15/2030 6/15/2020        Medications Reconciliation:   I have not reconciled the patient's home medications with the patient/family. I have updated all changes.  Refer to After-Visit Medication List.      Medication List          Accurate as of June 9, 2022 10:07 AM. If you have any questions, ask your nurse or doctor.            CHANGE how you take these medications    pantoprazole 40 MG tablet  Commonly known as: PROTONIX  Take 1 tablet (40 mg total) by mouth once daily.  What changed: when to take this  Changed by: Roly Flannery MD        CONTINUE taking these medications    amitriptyline 25 MG tablet  Commonly known as: ELAVIL     celecoxib 200 MG capsule  Commonly known as: CeleBREX     fluticasone propionate 50 mcg/actuation nasal spray  Commonly known as: FLONASE  2 sprays (100 mcg total) by Each Nostril route once daily.     HYDROcodone-acetaminophen 7.5-325 mg per tablet  Commonly known as: NORCO     hydrOXYzine pamoate 50 MG Cap  Commonly known as: VISTARIL     LIDOcaine 5 %  Commonly known as: LIDODERM  Place 1 patch onto the skin once daily. Remove & Discard patch within 12 hours or as directed by MD     pregabalin 150 MG capsule  Commonly known as: LYRICA           Where to Get Your Medications      Information about where to get these medications is not yet available    Ask your nurse or doctor about these medications  · pantoprazole 40 MG tablet              Follow up in about 3 months (around 9/9/2022). Total clinical care time was 30 min. The following issues were  discussed:  Need for COVID booster, GERD, physical therapy with back issues, medication review     Roly Flannery MD  Internal Medicine  [facility]  [phone]

## 2022-06-09 NOTE — TELEPHONE ENCOUNTER
What medication is patient being referred for medication assistance. Pt spoke to Kaylah Lance and did not see which medication he needs assistance with, please advise.

## 2022-08-11 ENCOUNTER — TELEPHONE (OUTPATIENT)
Dept: SPINE | Facility: CLINIC | Age: 67
End: 2022-08-11
Payer: MEDICARE

## 2022-08-11 NOTE — TELEPHONE ENCOUNTER
This message is for patient in regards to his or hers appointment 08/15/22 at 9:00 a with Ana Dominguez Np.    On the 4th floor [address] in the [facility] and spine center. We do ask that patients arrive 15 minutes before appointment time.  Patient may contact [phone] if there is any questions or concerns. Thank you for entrusting in [facility] with your care.

## 2022-08-15 ENCOUNTER — OFFICE VISIT (OUTPATIENT)
Dept: SPINE | Facility: CLINIC | Age: 67
End: 2022-08-15
Payer: MEDICARE

## 2022-08-15 VITALS
HEART RATE: 62 BPM | SYSTOLIC BLOOD PRESSURE: 114 MMHG | WEIGHT: 196.19 LBS | HEIGHT: 68 IN | TEMPERATURE: 98 F | DIASTOLIC BLOOD PRESSURE: 71 MMHG | BODY MASS INDEX: 29.73 KG/M2

## 2022-08-15 DIAGNOSIS — M54.50 CHRONIC LEFT-SIDED LOW BACK PAIN WITHOUT SCIATICA: Primary | ICD-10-CM

## 2022-08-15 DIAGNOSIS — M47.816 SPONDYLOSIS OF LUMBAR REGION WITHOUT MYELOPATHY OR RADICULOPATHY: ICD-10-CM

## 2022-08-15 DIAGNOSIS — M54.9 DORSALGIA, UNSPECIFIED: ICD-10-CM

## 2022-08-15 DIAGNOSIS — G89.29 CHRONIC LEFT-SIDED LOW BACK PAIN WITHOUT SCIATICA: Primary | ICD-10-CM

## 2022-08-15 PROCEDURE — 1101F PR PT FALLS ASSESS DOC 0-1 FALLS W/OUT INJ PAST YR: ICD-10-PCS | Mod: CPTII,S$GLB,, | Performed by: NURSE PRACTITIONER

## 2022-08-15 PROCEDURE — 3074F PR MOST RECENT SYSTOLIC BLOOD PRESSURE < 130 MM HG: ICD-10-PCS | Mod: CPTII,S$GLB,, | Performed by: NURSE PRACTITIONER

## 2022-08-15 PROCEDURE — 1160F PR REVIEW ALL MEDS BY PRESCRIBER/CLIN PHARMACIST DOCUMENTED: ICD-10-PCS | Mod: CPTII,S$GLB,, | Performed by: NURSE PRACTITIONER

## 2022-08-15 PROCEDURE — 3008F PR BODY MASS INDEX (BMI) DOCUMENTED: ICD-10-PCS | Mod: CPTII,S$GLB,, | Performed by: NURSE PRACTITIONER

## 2022-08-15 PROCEDURE — 1157F ADVNC CARE PLAN IN RCRD: CPT | Mod: CPTII,S$GLB,, | Performed by: NURSE PRACTITIONER

## 2022-08-15 PROCEDURE — 99999 PR PBB SHADOW E&M-EST. PATIENT-LVL III: ICD-10-PCS | Mod: PBBFAC,,, | Performed by: NURSE PRACTITIONER

## 2022-08-15 PROCEDURE — 1160F RVW MEDS BY RX/DR IN RCRD: CPT | Mod: CPTII,S$GLB,, | Performed by: NURSE PRACTITIONER

## 2022-08-15 PROCEDURE — 3288F PR FALLS RISK ASSESSMENT DOCUMENTED: ICD-10-PCS | Mod: CPTII,S$GLB,, | Performed by: NURSE PRACTITIONER

## 2022-08-15 PROCEDURE — 1157F PR ADVANCE CARE PLAN OR EQUIV PRESENT IN MEDICAL RECORD: ICD-10-PCS | Mod: CPTII,S$GLB,, | Performed by: NURSE PRACTITIONER

## 2022-08-15 PROCEDURE — 99999 PR PBB SHADOW E&M-EST. PATIENT-LVL III: CPT | Mod: PBBFAC,,, | Performed by: NURSE PRACTITIONER

## 2022-08-15 PROCEDURE — 1101F PT FALLS ASSESS-DOCD LE1/YR: CPT | Mod: CPTII,S$GLB,, | Performed by: NURSE PRACTITIONER

## 2022-08-15 PROCEDURE — 3008F BODY MASS INDEX DOCD: CPT | Mod: CPTII,S$GLB,, | Performed by: NURSE PRACTITIONER

## 2022-08-15 PROCEDURE — 1159F PR MEDICATION LIST DOCUMENTED IN MEDICAL RECORD: ICD-10-PCS | Mod: CPTII,S$GLB,, | Performed by: NURSE PRACTITIONER

## 2022-08-15 PROCEDURE — 3078F PR MOST RECENT DIASTOLIC BLOOD PRESSURE < 80 MM HG: ICD-10-PCS | Mod: CPTII,S$GLB,, | Performed by: NURSE PRACTITIONER

## 2022-08-15 PROCEDURE — 1125F AMNT PAIN NOTED PAIN PRSNT: CPT | Mod: CPTII,S$GLB,, | Performed by: NURSE PRACTITIONER

## 2022-08-15 PROCEDURE — 3074F SYST BP LT 130 MM HG: CPT | Mod: CPTII,S$GLB,, | Performed by: NURSE PRACTITIONER

## 2022-08-15 PROCEDURE — 1159F MED LIST DOCD IN RCRD: CPT | Mod: CPTII,S$GLB,, | Performed by: NURSE PRACTITIONER

## 2022-08-15 PROCEDURE — 3078F DIAST BP <80 MM HG: CPT | Mod: CPTII,S$GLB,, | Performed by: NURSE PRACTITIONER

## 2022-08-15 PROCEDURE — 1125F PR PAIN SEVERITY QUANTIFIED, PAIN PRESENT: ICD-10-PCS | Mod: CPTII,S$GLB,, | Performed by: NURSE PRACTITIONER

## 2022-08-15 PROCEDURE — 3288F FALL RISK ASSESSMENT DOCD: CPT | Mod: CPTII,S$GLB,, | Performed by: NURSE PRACTITIONER

## 2022-08-15 PROCEDURE — 99214 OFFICE O/P EST MOD 30 MIN: CPT | Mod: S$GLB,,, | Performed by: NURSE PRACTITIONER

## 2022-08-15 PROCEDURE — 99214 PR OFFICE/OUTPT VISIT, EST, LEVL IV, 30-39 MIN: ICD-10-PCS | Mod: S$GLB,,, | Performed by: NURSE PRACTITIONER

## 2022-08-30 ENCOUNTER — TELEPHONE (OUTPATIENT)
Dept: PAIN MEDICINE | Facility: CLINIC | Age: 67
End: 2022-08-30
Payer: MEDICARE

## 2022-08-30 NOTE — TELEPHONE ENCOUNTER
Staff called to r/s f/u for pt didn't have an image done. Staff was able to r/s appt. Pt verbalized understanding and confirmed. SG

## 2022-09-09 ENCOUNTER — HOSPITAL ENCOUNTER (OUTPATIENT)
Dept: RADIOLOGY | Facility: OTHER | Age: 67
Discharge: HOME | End: 2022-09-09
Attending: NURSE PRACTITIONER
Payer: MEDICARE

## 2022-09-09 DIAGNOSIS — M47.816 SPONDYLOSIS OF LUMBAR REGION WITHOUT MYELOPATHY OR RADICULOPATHY: ICD-10-CM

## 2022-09-09 DIAGNOSIS — G89.29 CHRONIC LEFT-SIDED LOW BACK PAIN WITHOUT SCIATICA: ICD-10-CM

## 2022-09-09 DIAGNOSIS — M54.9 DORSALGIA, UNSPECIFIED: ICD-10-CM

## 2022-09-09 DIAGNOSIS — M54.50 CHRONIC LEFT-SIDED LOW BACK PAIN WITHOUT SCIATICA: ICD-10-CM

## 2022-09-09 PROCEDURE — 72148 MRI LUMBAR SPINE WITHOUT CONTRAST: ICD-10-PCS | Mod: 26,,, | Performed by: RADIOLOGY

## 2022-09-09 PROCEDURE — 72148 MRI LUMBAR SPINE W/O DYE: CPT | Mod: 26,,, | Performed by: RADIOLOGY

## 2022-09-09 PROCEDURE — 72148 MRI LUMBAR SPINE W/O DYE: CPT | Mod: TC

## 2022-09-12 PROBLEM — Z00.00 HEALTHCARE MAINTENANCE: Status: RESOLVED | Noted: 2019-11-19 | Resolved: 2022-09-12

## 2022-09-16 ENCOUNTER — OFFICE VISIT (OUTPATIENT)
Dept: PAIN MEDICINE | Facility: CLINIC | Age: 67
End: 2022-09-16
Payer: MEDICARE

## 2022-09-16 ENCOUNTER — TELEPHONE (OUTPATIENT)
Dept: ADMINISTRATIVE | Facility: OTHER | Age: 67
End: 2022-09-16
Payer: MEDICARE

## 2022-09-16 VITALS
SYSTOLIC BLOOD PRESSURE: 108 MMHG | DIASTOLIC BLOOD PRESSURE: 73 MMHG | HEIGHT: 68 IN | BODY MASS INDEX: 29.64 KG/M2 | HEART RATE: 64 BPM | TEMPERATURE: 97 F | WEIGHT: 195.56 LBS

## 2022-09-16 DIAGNOSIS — M46.1 SACROILIITIS: Primary | ICD-10-CM

## 2022-09-16 DIAGNOSIS — M47.816 SPONDYLOSIS OF LUMBAR REGION WITHOUT MYELOPATHY OR RADICULOPATHY: ICD-10-CM

## 2022-09-16 PROCEDURE — 1101F PT FALLS ASSESS-DOCD LE1/YR: CPT | Mod: CPTII,S$GLB,, | Performed by: ANESTHESIOLOGY

## 2022-09-16 PROCEDURE — 99999 PR PBB SHADOW E&M-EST. PATIENT-LVL III: CPT | Mod: PBBFAC,,, | Performed by: ANESTHESIOLOGY

## 2022-09-16 PROCEDURE — 1159F PR MEDICATION LIST DOCUMENTED IN MEDICAL RECORD: ICD-10-PCS | Mod: CPTII,S$GLB,, | Performed by: ANESTHESIOLOGY

## 2022-09-16 PROCEDURE — 1157F ADVNC CARE PLAN IN RCRD: CPT | Mod: CPTII,S$GLB,, | Performed by: ANESTHESIOLOGY

## 2022-09-16 PROCEDURE — 3078F PR MOST RECENT DIASTOLIC BLOOD PRESSURE < 80 MM HG: ICD-10-PCS | Mod: CPTII,S$GLB,, | Performed by: ANESTHESIOLOGY

## 2022-09-16 PROCEDURE — 1125F PR PAIN SEVERITY QUANTIFIED, PAIN PRESENT: ICD-10-PCS | Mod: CPTII,S$GLB,, | Performed by: ANESTHESIOLOGY

## 2022-09-16 PROCEDURE — 1125F AMNT PAIN NOTED PAIN PRSNT: CPT | Mod: CPTII,S$GLB,, | Performed by: ANESTHESIOLOGY

## 2022-09-16 PROCEDURE — 1159F MED LIST DOCD IN RCRD: CPT | Mod: CPTII,S$GLB,, | Performed by: ANESTHESIOLOGY

## 2022-09-16 PROCEDURE — 1157F PR ADVANCE CARE PLAN OR EQUIV PRESENT IN MEDICAL RECORD: ICD-10-PCS | Mod: CPTII,S$GLB,, | Performed by: ANESTHESIOLOGY

## 2022-09-16 PROCEDURE — 99204 PR OFFICE/OUTPT VISIT, NEW, LEVL IV, 45-59 MIN: ICD-10-PCS | Mod: S$GLB,,, | Performed by: ANESTHESIOLOGY

## 2022-09-16 PROCEDURE — 1101F PR PT FALLS ASSESS DOC 0-1 FALLS W/OUT INJ PAST YR: ICD-10-PCS | Mod: CPTII,S$GLB,, | Performed by: ANESTHESIOLOGY

## 2022-09-16 PROCEDURE — 3078F DIAST BP <80 MM HG: CPT | Mod: CPTII,S$GLB,, | Performed by: ANESTHESIOLOGY

## 2022-09-16 PROCEDURE — 3288F FALL RISK ASSESSMENT DOCD: CPT | Mod: CPTII,S$GLB,, | Performed by: ANESTHESIOLOGY

## 2022-09-16 PROCEDURE — 3008F PR BODY MASS INDEX (BMI) DOCUMENTED: ICD-10-PCS | Mod: CPTII,S$GLB,, | Performed by: ANESTHESIOLOGY

## 2022-09-16 PROCEDURE — 3288F PR FALLS RISK ASSESSMENT DOCUMENTED: ICD-10-PCS | Mod: CPTII,S$GLB,, | Performed by: ANESTHESIOLOGY

## 2022-09-16 PROCEDURE — 3074F SYST BP LT 130 MM HG: CPT | Mod: CPTII,S$GLB,, | Performed by: ANESTHESIOLOGY

## 2022-09-16 PROCEDURE — 1160F RVW MEDS BY RX/DR IN RCRD: CPT | Mod: CPTII,S$GLB,, | Performed by: ANESTHESIOLOGY

## 2022-09-16 PROCEDURE — 1160F PR REVIEW ALL MEDS BY PRESCRIBER/CLIN PHARMACIST DOCUMENTED: ICD-10-PCS | Mod: CPTII,S$GLB,, | Performed by: ANESTHESIOLOGY

## 2022-09-16 PROCEDURE — 3074F PR MOST RECENT SYSTOLIC BLOOD PRESSURE < 130 MM HG: ICD-10-PCS | Mod: CPTII,S$GLB,, | Performed by: ANESTHESIOLOGY

## 2022-09-16 PROCEDURE — 99999 PR PBB SHADOW E&M-EST. PATIENT-LVL III: ICD-10-PCS | Mod: PBBFAC,,, | Performed by: ANESTHESIOLOGY

## 2022-09-16 PROCEDURE — 3008F BODY MASS INDEX DOCD: CPT | Mod: CPTII,S$GLB,, | Performed by: ANESTHESIOLOGY

## 2022-09-16 PROCEDURE — 99204 OFFICE O/P NEW MOD 45 MIN: CPT | Mod: S$GLB,,, | Performed by: ANESTHESIOLOGY

## 2022-09-16 NOTE — PROGRESS NOTES
PCP: Roly Flannery MD    REFERRING PHYSICIAN: Ana Dominguez NP    CHIEF COMPLAINT: left lower back pain    Original HISTORY OF PRESENT ILLNESS: Jeff Hope presents to the clinic for the evaluation of the above pain. The pain started a year ago.    Original Pain Description:  The pain is located in the superior left buttock and sometimes radiates to his midline lumbar back. The pain is described as aching and dull. Exacerbating factors: Sitting, Standing, Laying, Sleeping on that side, getting up in the morning, and Walking. Mitigating factors heat, ice, medications, physical therapy, and rest. Symptoms interfere with daily activity and sleeping. The patient feels like symptoms have been improving with physical therapy. Patient denies night fever/night sweats, urinary incontinence, bowel incontinence, significant weight loss, significant motor weakness, and loss of sensations.    Original PAIN SCORES:  Best: Pain is 6  Worst: Pain is 8  Usually: Pain is 6  Current: Pain is 5    INTERVAL HISTORY:     6 weeks of Conservative therapy (PT/Chiro/Home Exercises with Dates)  March 2022-May 2022  Still doing home exercises    Treatments / Medications: (Ice/Heat/NSAIDS/APAP/etc):  Tylenol (mild relief)  Ibuprofen (mild relief)  Celebrex (mild relief)  Norco (shoulder pain, helps with back pain)  Lyrica (shoulder pain, unsure if it helps with back pain)       Report:  Consistent with prescribed medications    Interventional Pain Procedures: (Previous injections)  None    Past Medical History:   Diagnosis Date    History of pulmonary embolus (PE) 4/15/2019    Pt with PE s/p hospitalization.  He was placed on xarelto with high risk with cancer.  He has been out of the medication for the past month. Restart Xarelto 15 mg b.i.d. for 21 days, then 20 mg q.day until cancer no longer active.    Non Hodgkin's lymphoma     Personal history of colonic polyps 09/08/2015    per [facility] report - repeat 9/2020    Tien  disease     S/P laparoscopic cholecystectomy 5/27/2019     Past Surgical History:   Procedure Laterality Date    ESOPHAGOGASTRODUODENOSCOPY N/A 11/29/2021    Procedure: ESOPHAGOGASTRODUODENOSCOPY (EGD);  Surgeon: Kristine Palmer MD;  Location: [address];  Service: Endoscopy;  Laterality: N/A;  fully vacc-inst mail-tb-left chest port    GROIN EXPLORATION  2 pre 2005 and 1 post 2005    total of 3 procedures    LAPAROSCOPIC CHOLECYSTECTOMY  05/2018    SHOULDER SURGERY Left      Social History     Socioeconomic History    Marital status:     Number of children: 2   Occupational History    Occupation: disabled [employer]    Tobacco Use    Smoking status: Never    Smokeless tobacco: Never   Substance and Sexual Activity    Alcohol use: No    Drug use: No    Sexual activity: Yes     Partners: Female   Social History Narrative    Lives with wife.       Family History   Problem Relation Age of Onset    Breast cancer Mother     Emphysema Father     Stroke Sister     Diabetes Brother     Ulcers Brother     No Known Problems Daughter     No Known Problems Daughter        Review of patient's allergies indicates:   Allergen Reactions    Rosuvastatin      myalgias       Current Outpatient Medications   Medication Sig    amitriptyline (ELAVIL) 25 MG tablet Take 25 mg by mouth nightly.    celecoxib (CELEBREX) 200 MG capsule TK ONE C PO BID    fluticasone propionate (FLONASE) 50 mcg/actuation nasal spray SHAKE LIQUID AND USE 2 SPRAYS(100 MCG) IN EACH NOSTRIL EVERY DAY    HYDROcodone-acetaminophen (NORCO) 7.5-325 mg per tablet Take 1 tablet by mouth every 8 (eight) hours as needed.    hydrOXYzine pamoate (VISTARIL) 50 MG Cap Take 50 mg by mouth.    LIDOcaine (LIDODERM) 5 % Place 1 patch onto the skin once daily. Remove & Discard patch within 12 hours or as directed by MD    pantoprazole (PROTONIX) 40 MG tablet Take 1 tablet (40 mg total) by mouth once daily.    pregabalin (LYRICA) 150 MG capsule Take 150 mg by  "mouth once daily.     No current facility-administered medications for this visit.       ROS:  GENERAL: No fever. No chills. No fatigue. Denies weight loss. Denies weight gain.  HEENT: Denies headaches. Denies vision change. Denies eye pain. Denies double vision. Denies ear pain.   CV: Denies chest pain.   PULM: Denies of shortness of breath.  GI: Denies constipation. No diarrhea. No abdominal pain. Denies nausea. Denies vomiting. No blood in stool.  HEME: Denies bleeding problems.  : Denies urgency. No painful urination. No blood in urine.  MS: Denies joint stiffness. Low back pain, L-sided. Shoulder pain. L testicular pain, chronic.  SKIN: Denies rash.   NEURO: Denies seizures. No weakness.  PSYCH:  Denies difficulty sleeping. No anxiety. Denies depression. No suicidal thoughts.       VITALS:   Vitals:    09/16/22 0804   BP: 108/73   Pulse: 64   Temp: 97.4 °F (36.3 °C)   Weight: 88.7 kg (195 lb 8.8 oz)   Height: 5' 8" (1.727 m)   PainSc:   5         PHYSICAL EXAM:   GENERAL: Well appearing, in no acute distress, alert and oriented x3.  PSYCH:  Mood and affect appropriate.  SKIN: Skin color, texture, turgor normal, no rashes or lesions.  HEENT:  Normocephalic, atraumatic. Cranial nerves grossly intact.  NECK: No pain with neck flexion, extension, or lateral flexion.   PULM: No evidence of respiratory difficulty, symmetric chest rise.  GI:  Non-distended  BACK: Normal range of motion. No pain to palpation over the spinous processes. No pain to palpation over facet joints. Mild pain with palpation of L SI joint, no GTB, ITB TTP BL. Straight leg raising in the supine position is negative to radicular pain.    EXTREMITIES: No deformities, edema, or skin discoloration.   MUSCULOSKELETAL: No L piriformis TTP. Shoulder, hip (log roll, compression), and knee provocative maneuvers are negative. Mild back pain with L hip flexion. Positive CRESENCIO. Positive Compression test. Bilateral upper and lower extremity strength is " normal and symmetric. No atrophy is noted.  NEURO: Sensation is equal and appropriate bilaterally. Bilateral upper and lower extremity coordination and muscle stretch reflexes are physiologic and symmetric. Plantar response are downgoing.   GAIT: normal.      LABS:  No pertinent    IMAGING:      MRI LUMBAR SPINE (9/9/2022)  FINDINGS:  Lumbar spine alignment is normal.  No spondylolisthesis.  No spondylolysis.  Vertebral body heights are well maintained without evidence for fracture.  No marrow signal abnormality to suggest an infiltrative process.     There is degenerative disc desiccation throughout the lumbar spine with mild associated height loss at L3-L4 and L4-L5.  Multifocal annular fissures most conspicuous at L1-L2 and L5-S1.  No endplate edema.     Distal spinal cord demonstrates normal contour and signal intensity.  Cauda equina appears normal without findings to suggest arachnoiditis.  Conus medullaris terminates at L1.     Right renal cortical cyst.  Remaining visualized abdominal organs demonstrate no significant abnormalities.  SI joints are symmetric.  Paraspinal musculature demonstrates normal bulk.     T12-L1: No spinal canal stenosis or neural foraminal narrowing.     L1-L2: Circumferential disc bulge causes mild mass effect on the ventral thecal sac and left lateral recess.  Bilateral facet arthropathy and bilateral ligamentum flavum buckling.  No spinal canal stenosis or neural foraminal narrowing.     L2-L3: Right foraminal/extraforaminal broad-based protrusion and bilateral ligamentum flavum buckling.  Findings contribute to mild right neural foraminal narrowing.  No spinal canal stenosis.     L3-L4: Circumferential disc bulge and bilateral ligamentum flavum buckling.  Findings contribute to mild-to-moderate left and mild right neural foraminal narrowing.  No spinal canal stenosis.     L4-L5: Circumferential disc bulge, bilateral facet arthropathy and bilateral ligamentum flavum buckling.   Findings contribute to mild left and mild-to-moderate right neural foraminal narrowing.     L5-S1: Posterior broad-based disc bulge and bilateral facet arthropathy.  No spinal canal stenosis or neural foraminal narrowing.     Impression:     1. Mild lumbar degenerative changes contributing to mild-to-moderate neural foraminal narrowing from L2-L3 through L4-L5.  No spinal canal stenosis.    ASSESSMENT: 66 y.o. year old male with pain, consistent with sacroiliitis and facet arthropathy.     DISCUSSION: Mr. Hope comes to us with left low back pain. He improved with PT and his exam shows only subtle findings. He indicates pain in the SIJ region. MRI shows mainly facet arthropathy but the pain is lower in the back.     PLAN:  Reviewed prior imaging (MRI lumbar spine) with patient  Discussed physical exam findings and likelihood that its his L SI joint causing the pain  Will schedule for L SIJ injection  Counseled patient on importance of activity modification, and continuing home exercises  Continue heat, Celebrex  RTC 2 weeks after procedure      I would like to thank Ana Dominguez, CATY for the opportunity to assist in the care of this patient. We had a very nice visit and I look forward to continuing their care. Please let me know if I can be of further assistance.     Luis Armando Dove MD  Department of Anesthesiology  [facility]  09/16/2022 8:55 AM

## 2022-09-16 NOTE — H&P (VIEW-ONLY)
PCP: Roly Flannery MD    REFERRING PHYSICIAN: Ana Dominguez NP    CHIEF COMPLAINT: left lower back pain    Original HISTORY OF PRESENT ILLNESS: Jeff Hope presents to the clinic for the evaluation of the above pain. The pain started a year ago.    Original Pain Description:  The pain is located in the superior left buttock and sometimes radiates to his midline lumbar back. The pain is described as aching and dull. Exacerbating factors: Sitting, Standing, Laying, Sleeping on that side, getting up in the morning, and Walking. Mitigating factors heat, ice, medications, physical therapy, and rest. Symptoms interfere with daily activity and sleeping. The patient feels like symptoms have been improving with physical therapy. Patient denies night fever/night sweats, urinary incontinence, bowel incontinence, significant weight loss, significant motor weakness, and loss of sensations.    Original PAIN SCORES:  Best: Pain is 6  Worst: Pain is 8  Usually: Pain is 6  Current: Pain is 5    INTERVAL HISTORY:     6 weeks of Conservative therapy (PT/Chiro/Home Exercises with Dates)  March 2022-May 2022  Still doing home exercises    Treatments / Medications: (Ice/Heat/NSAIDS/APAP/etc):  Tylenol (mild relief)  Ibuprofen (mild relief)  Celebrex (mild relief)  Norco (shoulder pain, helps with back pain)  Lyrica (shoulder pain, unsure if it helps with back pain)       Report:  Consistent with prescribed medications    Interventional Pain Procedures: (Previous injections)  None    Past Medical History:   Diagnosis Date    History of pulmonary embolus (PE) 4/15/2019    Pt with PE s/p hospitalization.  He was placed on xarelto with high risk with cancer.  He has been out of the medication for the past month. Restart Xarelto 15 mg b.i.d. for 21 days, then 20 mg q.day until cancer no longer active.    Non Hodgkin's lymphoma     Personal history of colonic polyps 09/08/2015    per [facility] report - repeat 9/2020    Tien  disease     S/P laparoscopic cholecystectomy 5/27/2019     Past Surgical History:   Procedure Laterality Date    ESOPHAGOGASTRODUODENOSCOPY N/A 11/29/2021    Procedure: ESOPHAGOGASTRODUODENOSCOPY (EGD);  Surgeon: Kristine Palmer MD;  Location: [address];  Service: Endoscopy;  Laterality: N/A;  fully vacc-inst mail-tb-left chest port    GROIN EXPLORATION  2 pre 2005 and 1 post 2005    total of 3 procedures    LAPAROSCOPIC CHOLECYSTECTOMY  05/2018    SHOULDER SURGERY Left      Social History     Socioeconomic History    Marital status:     Number of children: 2   Occupational History    Occupation: disabled [employer]    Tobacco Use    Smoking status: Never    Smokeless tobacco: Never   Substance and Sexual Activity    Alcohol use: No    Drug use: No    Sexual activity: Yes     Partners: Female   Social History Narrative    Lives with wife.       Family History   Problem Relation Age of Onset    Breast cancer Mother     Emphysema Father     Stroke Sister     Diabetes Brother     Ulcers Brother     No Known Problems Daughter     No Known Problems Daughter        Review of patient's allergies indicates:   Allergen Reactions    Rosuvastatin      myalgias       Current Outpatient Medications   Medication Sig    amitriptyline (ELAVIL) 25 MG tablet Take 25 mg by mouth nightly.    celecoxib (CELEBREX) 200 MG capsule TK ONE C PO BID    fluticasone propionate (FLONASE) 50 mcg/actuation nasal spray SHAKE LIQUID AND USE 2 SPRAYS(100 MCG) IN EACH NOSTRIL EVERY DAY    HYDROcodone-acetaminophen (NORCO) 7.5-325 mg per tablet Take 1 tablet by mouth every 8 (eight) hours as needed.    hydrOXYzine pamoate (VISTARIL) 50 MG Cap Take 50 mg by mouth.    LIDOcaine (LIDODERM) 5 % Place 1 patch onto the skin once daily. Remove & Discard patch within 12 hours or as directed by MD    pantoprazole (PROTONIX) 40 MG tablet Take 1 tablet (40 mg total) by mouth once daily.    pregabalin (LYRICA) 150 MG capsule Take 150 mg by  "mouth once daily.     No current facility-administered medications for this visit.       ROS:  GENERAL: No fever. No chills. No fatigue. Denies weight loss. Denies weight gain.  HEENT: Denies headaches. Denies vision change. Denies eye pain. Denies double vision. Denies ear pain.   CV: Denies chest pain.   PULM: Denies of shortness of breath.  GI: Denies constipation. No diarrhea. No abdominal pain. Denies nausea. Denies vomiting. No blood in stool.  HEME: Denies bleeding problems.  : Denies urgency. No painful urination. No blood in urine.  MS: Denies joint stiffness. Low back pain, L-sided. Shoulder pain. L testicular pain, chronic.  SKIN: Denies rash.   NEURO: Denies seizures. No weakness.  PSYCH:  Denies difficulty sleeping. No anxiety. Denies depression. No suicidal thoughts.       VITALS:   Vitals:    09/16/22 0804   BP: 108/73   Pulse: 64   Temp: 97.4 °F (36.3 °C)   Weight: 88.7 kg (195 lb 8.8 oz)   Height: 5' 8" (1.727 m)   PainSc:   5         PHYSICAL EXAM:   GENERAL: Well appearing, in no acute distress, alert and oriented x3.  PSYCH:  Mood and affect appropriate.  SKIN: Skin color, texture, turgor normal, no rashes or lesions.  HEENT:  Normocephalic, atraumatic. Cranial nerves grossly intact.  NECK: No pain with neck flexion, extension, or lateral flexion.   PULM: No evidence of respiratory difficulty, symmetric chest rise.  GI:  Non-distended  BACK: Normal range of motion. No pain to palpation over the spinous processes. No pain to palpation over facet joints. Mild pain with palpation of L SI joint, no GTB, ITB TTP BL. Straight leg raising in the supine position is negative to radicular pain.    EXTREMITIES: No deformities, edema, or skin discoloration.   MUSCULOSKELETAL: No L piriformis TTP. Shoulder, hip (log roll, compression), and knee provocative maneuvers are negative. Mild back pain with L hip flexion. Positive CRESENCIO. Positive Compression test. Bilateral upper and lower extremity strength is " normal and symmetric. No atrophy is noted.  NEURO: Sensation is equal and appropriate bilaterally. Bilateral upper and lower extremity coordination and muscle stretch reflexes are physiologic and symmetric. Plantar response are downgoing.   GAIT: normal.      LABS:  No pertinent    IMAGING:      MRI LUMBAR SPINE (9/9/2022)  FINDINGS:  Lumbar spine alignment is normal.  No spondylolisthesis.  No spondylolysis.  Vertebral body heights are well maintained without evidence for fracture.  No marrow signal abnormality to suggest an infiltrative process.     There is degenerative disc desiccation throughout the lumbar spine with mild associated height loss at L3-L4 and L4-L5.  Multifocal annular fissures most conspicuous at L1-L2 and L5-S1.  No endplate edema.     Distal spinal cord demonstrates normal contour and signal intensity.  Cauda equina appears normal without findings to suggest arachnoiditis.  Conus medullaris terminates at L1.     Right renal cortical cyst.  Remaining visualized abdominal organs demonstrate no significant abnormalities.  SI joints are symmetric.  Paraspinal musculature demonstrates normal bulk.     T12-L1: No spinal canal stenosis or neural foraminal narrowing.     L1-L2: Circumferential disc bulge causes mild mass effect on the ventral thecal sac and left lateral recess.  Bilateral facet arthropathy and bilateral ligamentum flavum buckling.  No spinal canal stenosis or neural foraminal narrowing.     L2-L3: Right foraminal/extraforaminal broad-based protrusion and bilateral ligamentum flavum buckling.  Findings contribute to mild right neural foraminal narrowing.  No spinal canal stenosis.     L3-L4: Circumferential disc bulge and bilateral ligamentum flavum buckling.  Findings contribute to mild-to-moderate left and mild right neural foraminal narrowing.  No spinal canal stenosis.     L4-L5: Circumferential disc bulge, bilateral facet arthropathy and bilateral ligamentum flavum buckling.   Findings contribute to mild left and mild-to-moderate right neural foraminal narrowing.     L5-S1: Posterior broad-based disc bulge and bilateral facet arthropathy.  No spinal canal stenosis or neural foraminal narrowing.     Impression:     1. Mild lumbar degenerative changes contributing to mild-to-moderate neural foraminal narrowing from L2-L3 through L4-L5.  No spinal canal stenosis.    ASSESSMENT: 66 y.o. year old male with pain, consistent with sacroiliitis and facet arthropathy.     DISCUSSION: Mr. Hope comes to us with left low back pain. He improved with PT and his exam shows only subtle findings. He indicates pain in the SIJ region. MRI shows mainly facet arthropathy but the pain is lower in the back.     PLAN:  Reviewed prior imaging (MRI lumbar spine) with patient  Discussed physical exam findings and likelihood that its his L SI joint causing the pain  Will schedule for L SIJ injection  Counseled patient on importance of activity modification, and continuing home exercises  Continue heat, Celebrex  RTC 2 weeks after procedure      I would like to thank Ana Dominguez, CATY for the opportunity to assist in the care of this patient. We had a very nice visit and I look forward to continuing their care. Please let me know if I can be of further assistance.     Luis Armando Dove MD  Department of Anesthesiology  [facility]  09/16/2022 8:55 AM

## 2022-09-19 ENCOUNTER — TELEPHONE (OUTPATIENT)
Dept: ADMINISTRATIVE | Facility: OTHER | Age: 67
End: 2022-09-19
Payer: MEDICARE

## 2022-09-20 ENCOUNTER — TELEPHONE (OUTPATIENT)
Dept: ADMINISTRATIVE | Facility: OTHER | Age: 67
End: 2022-09-20
Payer: MEDICARE

## 2022-09-22 ENCOUNTER — PATIENT MESSAGE (OUTPATIENT)
Dept: PAIN MEDICINE | Facility: OTHER | Age: 67
End: 2022-09-22
Payer: MEDICARE

## 2022-09-22 DIAGNOSIS — M46.1 SACROILIITIS: Primary | ICD-10-CM

## 2022-09-26 ENCOUNTER — TELEPHONE (OUTPATIENT)
Dept: PRIMARY CARE CLINIC | Facility: CLINIC | Age: 67
End: 2022-09-26
Payer: MEDICARE

## 2022-09-26 NOTE — TELEPHONE ENCOUNTER
Spoke to pt, discussed your message, pt verbalized understanding.  No other questions.  Pt will have injection.

## 2022-09-26 NOTE — TELEPHONE ENCOUNTER
Pt called, stated he is having a steroid injection on 10/11/2022, he wanted to know if this was ok    Please advise

## 2022-09-28 ENCOUNTER — TELEPHONE (OUTPATIENT)
Dept: HEPATOLOGY | Facility: CLINIC | Age: 67
End: 2022-09-28
Payer: MEDICARE

## 2022-09-28 NOTE — TELEPHONE ENCOUNTER
Per CHANDNI Ayon to take steroid injection for his back.  Patient verbalized understanding.      ----- Message from Kiet Rodriguez MA sent at 9/26/2022 11:36 AM CDT -----  Regarding: FW: Speak with office  Contact: Jeff  Pt would like to know if he can take a steroid injection.  ----- Message -----  From: Benny Burrows  Sent: 9/26/2022   9:55 AM CDT  To: [facility] Staff  Subject: Speak with office                                Pt is calling to speak with office about upcoming appt he has.    [phone]

## 2022-10-11 ENCOUNTER — HOSPITAL ENCOUNTER (OUTPATIENT)
Facility: OTHER | Age: 67
Discharge: HOME | End: 2022-10-11
Attending: ANESTHESIOLOGY | Admitting: ANESTHESIOLOGY
Payer: MEDICARE

## 2022-10-11 VITALS
TEMPERATURE: 98 F | RESPIRATION RATE: 16 BRPM | DIASTOLIC BLOOD PRESSURE: 80 MMHG | OXYGEN SATURATION: 98 % | HEART RATE: 48 BPM | SYSTOLIC BLOOD PRESSURE: 153 MMHG

## 2022-10-11 DIAGNOSIS — M46.1 SACROILIITIS: Primary | ICD-10-CM

## 2022-10-11 DIAGNOSIS — G89.29 CHRONIC PAIN: ICD-10-CM

## 2022-10-11 PROCEDURE — 25000003 PHARM REV CODE 250: Performed by: ANESTHESIOLOGY

## 2022-10-11 PROCEDURE — 27096 INJECT SACROILIAC JOINT: CPT | Mod: LT,,, | Performed by: ANESTHESIOLOGY

## 2022-10-11 PROCEDURE — 25500020 PHARM REV CODE 255: Performed by: ANESTHESIOLOGY

## 2022-10-11 PROCEDURE — 27096 INJECT SACROILIAC JOINT: CPT | Mod: LT | Performed by: ANESTHESIOLOGY

## 2022-10-11 PROCEDURE — 27096 PR INJECTION,SACROILIAC JOINT: ICD-10-PCS | Mod: LT,,, | Performed by: ANESTHESIOLOGY

## 2022-10-11 PROCEDURE — 63600175 PHARM REV CODE 636 W HCPCS: Performed by: ANESTHESIOLOGY

## 2022-10-11 RX ORDER — BUPIVACAINE HYDROCHLORIDE 2.5 MG/ML
INJECTION, SOLUTION EPIDURAL; INFILTRATION; INTRACAUDAL
Status: DISCONTINUED | OUTPATIENT
Start: 2022-10-11 | End: 2022-10-11 | Stop reason: HOSPADM

## 2022-10-11 RX ORDER — TRIAMCINOLONE ACETONIDE 40 MG/ML
INJECTION, SUSPENSION INTRA-ARTICULAR; INTRAMUSCULAR
Status: DISCONTINUED | OUTPATIENT
Start: 2022-10-11 | End: 2022-10-11 | Stop reason: HOSPADM

## 2022-10-11 RX ORDER — LIDOCAINE HYDROCHLORIDE 20 MG/ML
INJECTION, SOLUTION INFILTRATION; PERINEURAL
Status: DISCONTINUED | OUTPATIENT
Start: 2022-10-11 | End: 2022-10-11 | Stop reason: HOSPADM

## 2022-10-11 RX ORDER — SODIUM CHLORIDE 9 MG/ML
500 INJECTION, SOLUTION INTRAVENOUS CONTINUOUS
Status: ACTIVE | OUTPATIENT
Start: 2022-10-11

## 2022-10-11 RX ORDER — ALPRAZOLAM 0.5 MG/1
0.5 TABLET ORAL
Status: DISCONTINUED | OUTPATIENT
Start: 2022-10-11 | End: 2022-10-11 | Stop reason: HOSPADM

## 2022-10-11 RX ADMIN — ALPRAZOLAM 0.5 MG: 0.5 TABLET ORAL at 07:10

## 2022-10-11 NOTE — DISCHARGE SUMMARY
Discharge Note  Short Stay      SUMMARY     Admit Date: 10/11/2022    Attending Physician: Melody Berry      Discharge Physician: Melody Berry      Discharge Date: 10/11/2022 8:18 AM    Procedure(s) (LRB):  INJECTION,SACROILIAC JOINT LEFT CONTRAST (Left)    Final Diagnosis: Sacroiliitis [M46.1]    Disposition: Home or self care    Patient Instructions:   Current Discharge Medication List        CONTINUE these medications which have NOT CHANGED    Details   amitriptyline (ELAVIL) 25 MG tablet Take 25 mg by mouth nightly.      celecoxib (CELEBREX) 200 MG capsule TK ONE C PO BID      fluticasone propionate (FLONASE) 50 mcg/actuation nasal spray SHAKE LIQUID AND USE 2 SPRAYS(100 MCG) IN EACH NOSTRIL EVERY DAY  Qty: 48 g, Refills: 3      HYDROcodone-acetaminophen (NORCO) 7.5-325 mg per tablet Take 1 tablet by mouth every 8 (eight) hours as needed.    Comments: Quantity prescribed more than 7 day supply? Press F2 and select one:[id number]        hydrOXYzine pamoate (VISTARIL) 50 MG Cap Take 50 mg by mouth.      LIDOcaine (LIDODERM) 5 % Place 1 patch onto the skin once daily. Remove & Discard patch within 12 hours or as directed by MD  Qty: 7 patch, Refills: 0      pantoprazole (PROTONIX) 40 MG tablet Take 1 tablet (40 mg total) by mouth once daily.  Qty: 90 tablet, Refills: 0    Comments: **Patient requests 90 days supply**  Associated Diagnoses: Gastroesophageal reflux disease, unspecified whether esophagitis present      pregabalin (LYRICA) 150 MG capsule Take 150 mg by mouth once daily.                 Discharge Diagnosis: Sacroiliitis [M46.1]  Condition on Discharge: Stable with no complications to procedure   Diet on Discharge: Same as before.  Activity: as per instruction sheet.  Discharge to: Home with a responsible adult.  Follow up: 2-4 weeks       Please call my office or pager at [phone] if experienced any weakness or loss of sensation, fever > 101.5, pain uncontrolled with oral medications,  persistent nausea/vomiting/or diarrhea, redness or drainage from the incisions, or any other worrisome concerns. If physician on call was not reached or could not communicate with our office for any reason please go to the nearest emergency department      Melody Berry  10/11/2022

## 2022-10-11 NOTE — DISCHARGE INSTRUCTIONS

## 2022-10-11 NOTE — OP NOTE
Sacroiliac Joint Injection under Fluoroscopic Guidance    The procedure, risks, benefits, and options were discussed with the patient. There are no contraindications to the procedure. The patent expressed understanding and agreed to the procedure. Informed written consent was obtained prior to the start of the procedure and can be found in the patient's chart.    PATIENT NAME: Jeff Hope   MRN: [id number]     DATE OF PROCEDURE: 10/11/2022    PROCEDURE: Left Sacroiliac Joint Injection under Fluoroscopic Guidance    PRE-OP DIAGNOSIS: Sacroiliitis [M46.1]    POST-OP DIAGNOSIS: Same    PHYSICIAN: Melody Berry MD    ASSISTANTS: Dr. Bain     MEDICATIONS INJECTED: Preservative-free Kenalog 40mg with 3cc of Bupivacine 0.25%     LOCAL ANESTHETIC INJECTED: Xylocaine 2%     SEDATION: None    ESTIMATED BLOOD LOSS: None    COMPLICATIONS: None    TECHNIQUE: Time-out was performed to identify the patient and procedure to be performed. With the patient laying in a prone position, the surgical area was prepped and draped in the usual sterile fashion using ChloraPrep and a fenestrated drape. The sacroiliac joint was determined under fluoroscopy guidance. Skin anesthesia was achieved by injecting Lidocaine 2% over the injection site. The sacroiliac joint was  then approached with a 22 gauge, 3.5 inch spinal quinke needle that was introduced under fluoroscopic guidance in the AP and Lateral views. Once the needle tip was in the area of the joint, and there was no blood, contrast dye Omnipaque (240mg/mL) was injected to confirm placement and there was no vascular runoff. Fluoroscopic imaging in the AP and lateral views revealed a clear outline of the joint space. 4 mL of the medication mixture listed above was injected slowly intraarticular and kelly-articular. Displacement of the radio opaque contrast after injection of the medication confirmed that the medication went into the area of the joint. The needles were removed and  bleeding was nil. A sterile dressing was applied. No specimens collected. The patient tolerated the procedure well.       The patient was monitored after the procedure in the recovery area. They were given post-procedure and discharge instructions to follow at home. The patient was discharged in a stable condition.    Melody Berry MD

## 2022-10-12 ENCOUNTER — PATIENT MESSAGE (OUTPATIENT)
Dept: PAIN MEDICINE | Facility: OTHER | Age: 67
End: 2022-10-12
Payer: MEDICARE

## 2022-10-14 ENCOUNTER — LAB VISIT (OUTPATIENT)
Dept: LAB | Facility: HOSPITAL | Age: 67
End: 2022-10-14
Attending: INTERNAL MEDICINE
Payer: MEDICARE

## 2022-10-14 DIAGNOSIS — B16.9 ACUTE HEPATITIS B: ICD-10-CM

## 2022-10-14 DIAGNOSIS — B16.9 ACUTE VIRAL HEPATITIS B WITHOUT COMA AND WITHOUT DELTA AGENT: ICD-10-CM

## 2022-10-14 DIAGNOSIS — D64.9 ANEMIA, UNSPECIFIED TYPE: ICD-10-CM

## 2022-10-14 LAB
ALBUMIN SERPL BCP-MCNC: 4.4 G/DL (ref 3.5–5.2)
ALP SERPL-CCNC: 63 U/L (ref 55–135)
ALT SERPL W/O P-5'-P-CCNC: 14 U/L (ref 10–44)
ANION GAP SERPL CALC-SCNC: 7 MMOL/L (ref 8–16)
ANION GAP SERPL CALC-SCNC: 7 MMOL/L (ref 8–16)
AST SERPL-CCNC: 16 U/L (ref 10–40)
BASOPHILS # BLD AUTO: 0.02 K/UL (ref 0–0.2)
BASOPHILS NFR BLD: 0.3 % (ref 0–1.9)
BILIRUB DIRECT SERPL-MCNC: 0.2 MG/DL (ref 0.1–0.3)
BILIRUB SERPL-MCNC: 0.5 MG/DL (ref 0.1–1)
BUN SERPL-MCNC: 16 MG/DL (ref 8–23)
BUN SERPL-MCNC: 16 MG/DL (ref 8–23)
CALCIUM SERPL-MCNC: 9.9 MG/DL (ref 8.7–10.5)
CALCIUM SERPL-MCNC: 9.9 MG/DL (ref 8.7–10.5)
CHLORIDE SERPL-SCNC: 104 MMOL/L (ref 95–110)
CHLORIDE SERPL-SCNC: 104 MMOL/L (ref 95–110)
CO2 SERPL-SCNC: 29 MMOL/L (ref 23–29)
CO2 SERPL-SCNC: 29 MMOL/L (ref 23–29)
CREAT SERPL-MCNC: 1.1 MG/DL (ref 0.5–1.4)
CREAT SERPL-MCNC: 1.1 MG/DL (ref 0.5–1.4)
DIFFERENTIAL METHOD: NORMAL
EOSINOPHIL # BLD AUTO: 0 K/UL (ref 0–0.5)
EOSINOPHIL NFR BLD: 0.3 % (ref 0–8)
ERYTHROCYTE [DISTWIDTH] IN BLOOD BY AUTOMATED COUNT: 13.1 % (ref 11.5–14.5)
EST. GFR  (NO RACE VARIABLE): >60 ML/MIN/1.73 M^2
EST. GFR  (NO RACE VARIABLE): >60 ML/MIN/1.73 M^2
GLUCOSE SERPL-MCNC: 112 MG/DL (ref 70–110)
GLUCOSE SERPL-MCNC: 112 MG/DL (ref 70–110)
HBV SURFACE AG SERPL QL IA: NORMAL
HCT VFR BLD AUTO: 46 % (ref 40–54)
HGB BLD-MCNC: 15.3 G/DL (ref 14–18)
IMM GRANULOCYTES # BLD AUTO: 0.02 K/UL (ref 0–0.04)
IMM GRANULOCYTES NFR BLD AUTO: 0.3 % (ref 0–0.5)
INR PPP: 1 (ref 0.8–1.2)
LYMPHOCYTES # BLD AUTO: 2.5 K/UL (ref 1–4.8)
LYMPHOCYTES NFR BLD: 39.3 % (ref 18–48)
MCH RBC QN AUTO: 29.8 PG (ref 27–31)
MCHC RBC AUTO-ENTMCNC: 33.3 G/DL (ref 32–36)
MCV RBC AUTO: 90 FL (ref 82–98)
MONOCYTES # BLD AUTO: 0.3 K/UL (ref 0.3–1)
MONOCYTES NFR BLD: 5.1 % (ref 4–15)
NEUTROPHILS # BLD AUTO: 3.5 K/UL (ref 1.8–7.7)
NEUTROPHILS NFR BLD: 54.7 % (ref 38–73)
NRBC BLD-RTO: 0 /100 WBC
PLATELET # BLD AUTO: 167 K/UL (ref 150–450)
PMV BLD AUTO: 10.5 FL (ref 9.2–12.9)
POTASSIUM SERPL-SCNC: 3.6 MMOL/L (ref 3.5–5.1)
POTASSIUM SERPL-SCNC: 3.6 MMOL/L (ref 3.5–5.1)
PROT SERPL-MCNC: 7.4 G/DL (ref 6–8.4)
PROTHROMBIN TIME: 10.5 SEC (ref 9–12.5)
RBC # BLD AUTO: 5.14 M/UL (ref 4.6–6.2)
SODIUM SERPL-SCNC: 140 MMOL/L (ref 136–145)
SODIUM SERPL-SCNC: 140 MMOL/L (ref 136–145)
WBC # BLD AUTO: 6.44 K/UL (ref 3.9–12.7)

## 2022-10-14 PROCEDURE — 85025 COMPLETE CBC W/AUTO DIFF WBC: CPT | Performed by: INTERNAL MEDICINE

## 2022-10-14 PROCEDURE — 36415 COLL VENOUS BLD VENIPUNCTURE: CPT | Performed by: INTERNAL MEDICINE

## 2022-10-14 PROCEDURE — 85610 PROTHROMBIN TIME: CPT | Performed by: INTERNAL MEDICINE

## 2022-10-14 PROCEDURE — 86706 HEP B SURFACE ANTIBODY: CPT | Mod: 91 | Performed by: INTERNAL MEDICINE

## 2022-10-14 PROCEDURE — 87340 HEPATITIS B SURFACE AG IA: CPT | Performed by: INTERNAL MEDICINE

## 2022-10-14 PROCEDURE — 80053 COMPREHEN METABOLIC PANEL: CPT | Performed by: INTERNAL MEDICINE

## 2022-10-14 PROCEDURE — 87517 HEPATITIS B DNA QUANT: CPT | Performed by: INTERNAL MEDICINE

## 2022-10-15 ENCOUNTER — OFFICE VISIT (OUTPATIENT)
Dept: HEPATOLOGY | Facility: CLINIC | Age: 67
End: 2022-10-15
Payer: MEDICARE

## 2022-10-15 ENCOUNTER — TELEPHONE (OUTPATIENT)
Dept: HEPATOLOGY | Facility: CLINIC | Age: 67
End: 2022-10-15

## 2022-10-15 VITALS
DIASTOLIC BLOOD PRESSURE: 65 MMHG | HEART RATE: 71 BPM | OXYGEN SATURATION: 96 % | BODY MASS INDEX: 29.06 KG/M2 | TEMPERATURE: 98 F | WEIGHT: 191.13 LBS | SYSTOLIC BLOOD PRESSURE: 115 MMHG

## 2022-10-15 DIAGNOSIS — B16.9 ACUTE HEPATITIS B: Primary | ICD-10-CM

## 2022-10-15 LAB
HBV SURFACE AB SER-ACNC: 106.19 MIU/ML
HBV SURFACE AB SER-ACNC: REACTIVE M[IU]/ML

## 2022-10-15 PROCEDURE — 3074F PR MOST RECENT SYSTOLIC BLOOD PRESSURE < 130 MM HG: ICD-10-PCS | Mod: CPTII,S$GLB,, | Performed by: INTERNAL MEDICINE

## 2022-10-15 PROCEDURE — 99999 PR PBB SHADOW E&M-EST. PATIENT-LVL III: ICD-10-PCS | Mod: PBBFAC,,, | Performed by: INTERNAL MEDICINE

## 2022-10-15 PROCEDURE — 3288F FALL RISK ASSESSMENT DOCD: CPT | Mod: CPTII,S$GLB,, | Performed by: INTERNAL MEDICINE

## 2022-10-15 PROCEDURE — 1126F AMNT PAIN NOTED NONE PRSNT: CPT | Mod: CPTII,S$GLB,, | Performed by: INTERNAL MEDICINE

## 2022-10-15 PROCEDURE — 99212 OFFICE O/P EST SF 10 MIN: CPT | Mod: S$GLB,,, | Performed by: INTERNAL MEDICINE

## 2022-10-15 PROCEDURE — 3288F PR FALLS RISK ASSESSMENT DOCUMENTED: ICD-10-PCS | Mod: CPTII,S$GLB,, | Performed by: INTERNAL MEDICINE

## 2022-10-15 PROCEDURE — 3078F DIAST BP <80 MM HG: CPT | Mod: CPTII,S$GLB,, | Performed by: INTERNAL MEDICINE

## 2022-10-15 PROCEDURE — 1157F PR ADVANCE CARE PLAN OR EQUIV PRESENT IN MEDICAL RECORD: ICD-10-PCS | Mod: CPTII,S$GLB,, | Performed by: INTERNAL MEDICINE

## 2022-10-15 PROCEDURE — 1159F MED LIST DOCD IN RCRD: CPT | Mod: CPTII,S$GLB,, | Performed by: INTERNAL MEDICINE

## 2022-10-15 PROCEDURE — 3074F SYST BP LT 130 MM HG: CPT | Mod: CPTII,S$GLB,, | Performed by: INTERNAL MEDICINE

## 2022-10-15 PROCEDURE — 3078F PR MOST RECENT DIASTOLIC BLOOD PRESSURE < 80 MM HG: ICD-10-PCS | Mod: CPTII,S$GLB,, | Performed by: INTERNAL MEDICINE

## 2022-10-15 PROCEDURE — 99212 PR OFFICE/OUTPT VISIT, EST, LEVL II, 10-19 MIN: ICD-10-PCS | Mod: S$GLB,,, | Performed by: INTERNAL MEDICINE

## 2022-10-15 PROCEDURE — 99999 PR PBB SHADOW E&M-EST. PATIENT-LVL III: CPT | Mod: PBBFAC,,, | Performed by: INTERNAL MEDICINE

## 2022-10-15 PROCEDURE — 1126F PR PAIN SEVERITY QUANTIFIED, NO PAIN PRESENT: ICD-10-PCS | Mod: CPTII,S$GLB,, | Performed by: INTERNAL MEDICINE

## 2022-10-15 PROCEDURE — 1101F PT FALLS ASSESS-DOCD LE1/YR: CPT | Mod: CPTII,S$GLB,, | Performed by: INTERNAL MEDICINE

## 2022-10-15 PROCEDURE — 1101F PR PT FALLS ASSESS DOC 0-1 FALLS W/OUT INJ PAST YR: ICD-10-PCS | Mod: CPTII,S$GLB,, | Performed by: INTERNAL MEDICINE

## 2022-10-15 PROCEDURE — 1157F ADVNC CARE PLAN IN RCRD: CPT | Mod: CPTII,S$GLB,, | Performed by: INTERNAL MEDICINE

## 2022-10-15 PROCEDURE — 1159F PR MEDICATION LIST DOCUMENTED IN MEDICAL RECORD: ICD-10-PCS | Mod: CPTII,S$GLB,, | Performed by: INTERNAL MEDICINE

## 2022-10-15 NOTE — PROGRESS NOTES
"   [facility] Follow-up Note 7/20/2020    Reason for Visit:  There were no encounter diagnoses.    PCP: Roly Flannery       HPI:  This is a 66 y.o. male here for evaluation of: transaminase elevation.    Acute hep B has responded with Surface antigen seroconversion to Hep B surface antibody positive at least last 3 measurments. So, insurance wants to stop the Vemlidy.  That is Ok as long as he is not going to get Rituxan.  If he should get Rituxan anytime in the future, he should be started on Vemlidy at least a week before starting rituxan.      Labs every 6 months, starting today 10/15/2022:   Liver profile, HBsAg, HBsAb     63 y/o male with H/o follicular non-Hodgkin's lymphoma, malignant mets to bone, anti-neoplastic chemotherapy R-CHOP in Jan 2020, with pancytopenia, Pulm HTN, PE, mixed hyperlipidemia, was found to have elevated transaminases on 1/27/20 and again the next day.  AST in 500-700 range and ALT in 1511-9906 range.  Alk phos and T bili normal.  On Vemlidy since 2/6/2020, enzymes have normalized AST 24, ALT 18, HBV DNA has declined to <10, not detected (from 1.1 million IU/mL).  Hep B sAg has seroconverted to Hep B sAb positive on 9/25/2020 (approx. 9 weeks ago).  Thus, he has achieved "remission" what we used to refer to as "cure".  His latest blood tests at [facility] (ordered from [facility] (in [place] Everywhere) on 11/4/20:  CBC ok, CMP also Ok, In this patient's case, he will remain on Vemlidy for now, due to possibility he may need chemotherapy again.    -  Today, patient states: he feels fine,     History given by patient's wife:  Patient was diagnosed with NHL in Nov-Dec 2018 with diffuse abd lymph node enlargement, stage 4 with mets in bone marrow.  R-CHOP was started in Jan 2020 under the care of Dr. Dominik Farooq at [facility].  After the first treatment, patient had acute upper abd pain, so they held off treatment with R-CHOP.  Evaluation showed abd pain was due to gallbladder, so he " "had a lap cholecystectomy (at [facility]) .  After surgery, he had PEs (from "upper part of his body", not lower extremities).  All of this was treated, then R-CHOP was resumed around June/July 2019.  He received 6-7 cycles, every 21 days.  Last treatment was completed just before Christmas 2019.  Then, he was told he would return in 3 months (March 2020) for any future treatments.      He did have an evaluation with a PET-CT on 12/30/19 which showed near complete resolution of the lymphadenopathy, however, 2 small 6 mm subpleural nodules on the left lower lobe of the lung and 2 small lymph nodes in the left anterior mesentery were seen on this PET-CT.  Labs that day 12/30/19 showed AST was already elevated to 55 and ALT to 88.  Then there were no labs until Jan 27/2020, when AT, ALT were elevated as above.      HBsAg neg, HBeAg and HBcAb IgM were neg on 3/15/19 (prior to starting R-CHOP).     Now HBsAg +, HBeAg +, HBV DNA 1.13 million IU/mL. Vemlidy started on 2/12/20.     Patient has been on Vemlidy 25 mg daily and with this treatment, his transaminases have normalized and his HBV DNA is now <10, detetcted.  This is down from 1.13 million IU/ ml.      Thus, patient has responded well to Vemlidy and will need to remain on it, as long he gets chemotherapy until loss of hep B surface antigen, which could be several months.  If we stop it prematurely, I am afraid he may reactivate as he did being treated with Rituxan.            Current meds:   Current Outpatient Medications   Medication Sig    amitriptyline (ELAVIL) 25 MG tablet Take 25 mg by mouth nightly.    celecoxib (CELEBREX) 200 MG capsule TK ONE C PO BID    fluticasone propionate (FLONASE) 50 mcg/actuation nasal spray SHAKE LIQUID AND USE 2 SPRAYS(100 MCG) IN EACH NOSTRIL EVERY DAY    HYDROcodone-acetaminophen (NORCO) 7.5-325 mg per tablet Take 1 tablet by mouth every 8 (eight) hours as needed.    hydrOXYzine pamoate (VISTARIL) 50 MG Cap Take 50 mg by mouth.    " "LIDOcaine (LIDODERM) 5 % Place 1 patch onto the skin once daily. Remove & Discard patch within 12 hours or as directed by MD    pantoprazole (PROTONIX) 40 MG tablet Take 1 tablet (40 mg total) by mouth once daily.    pregabalin (LYRICA) 150 MG capsule Take 150 mg by mouth once daily.     No current facility-administered medications for this visit.     Facility-Administered Medications Ordered in Other Visits   Medication    0.9%  NaCl infusion     Patient's symptoms are revolving around a "bad shoulder".      Elevated liver enzymes: Yes  Abnormal imaging: Abd masses from NHL   Cirrhosis: No  Hepatitis C: No  Hepatitis B: No  Fatty liver: No  Encephalopathy: No  Post-hospital discharge: No  Symptoms: right shoulder pain    Primary hepatic manifestations:  Fatigue:Yes  Edema:Yes  Ascites:Yes  Encephalopathy:Yes  Abdominal pain:Yes  GI bleeds: Yes  Pruritus:Yes  Weight Changes:Yes  Changes in Bowel habits: Yes  Muscle cramps:No    Risk factors for liver disease:  No jaundice  No transfusions  No IVDU  Did not snort cocaine or similar agents  Did not live with anyone with hepatitis B or C  Sexual partner not tested  No hepatotoxic medications  No exposure to industrial toxins  Alcohol: None      ROS:  Constitutional: No fevers, chills, weight changes, fatigue  ENT: No allergies, nosebleeds,   CV: No chest pain  Pulm: No cough, shortness of breath  Ophtho: No vision changes  GI/Liver: see HPI  Derm: No rash, itching  Heme: No swollen glands, bruising  MSK: No joint pains, joint swelling  : No dysuria, hematuria, decrease in urine output  Endo: No hot or cold intolerance  Neuro: No confusion, disorientation, difficulty with sleep, memory, concentration, syncope, seizure  Psych: No anxiety, depression    Medical History:  has a past medical history of History of pulmonary embolus (PE) (4/15/2019), Non Hodgkin's lymphoma, Personal history of colonic polyps (09/08/2015), Peyronie disease, and S/P laparoscopic " cholecystectomy (5/27/2019).    Surgical History:  has a past surgical history that includes Shoulder surgery (Left); Groin exploration (2 pre 2005 and 1 post 2005); Laparoscopic cholecystectomy (05/2018); Esophagogastroduodenoscopy (N/A, 11/29/2021); and injection, sacroiliac joint (Left, 10/11/2022).    Family History: family history includes Breast cancer in his mother; Diabetes in his brother; Emphysema in his father; No Known Problems in his daughter and daughter; Stroke in his sister; Ulcers in his brother..     Social History:  reports that he has never smoked. He has never used smokeless tobacco. He reports that he does not drink alcohol and does not use drugs.    Review of patient's allergies indicates:   Allergen Reactions    Rosuvastatin      myalgias       Current Outpatient Rx   Medication Sig Dispense Refill    amitriptyline (ELAVIL) 25 MG tablet Take 25 mg by mouth nightly.      celecoxib (CELEBREX) 200 MG capsule TK ONE C PO BID      fluticasone propionate (FLONASE) 50 mcg/actuation nasal spray SHAKE LIQUID AND USE 2 SPRAYS(100 MCG) IN EACH NOSTRIL EVERY DAY 48 g 3    HYDROcodone-acetaminophen (NORCO) 7.5-325 mg per tablet Take 1 tablet by mouth every 8 (eight) hours as needed.      hydrOXYzine pamoate (VISTARIL) 50 MG Cap Take 50 mg by mouth.      LIDOcaine (LIDODERM) 5 % Place 1 patch onto the skin once daily. Remove & Discard patch within 12 hours or as directed by MD Barriga patch 0    pantoprazole (PROTONIX) 40 MG tablet Take 1 tablet (40 mg total) by mouth once daily. 90 tablet 0    pregabalin (LYRICA) 150 MG capsule Take 150 mg by mouth once daily.         Objective Findings:    Vital Signs:  There were no vitals taken for this visit.  There is no height or weight on file to calculate BMI.    Physical Exam:  General Appearance: Well appearing in no acute distress  Head:   Normocephalic, without obvious abnormality  Eyes:    No scleral icterus, EOMI  ENT: Neck supple, Lips, mucosa, and tongue normal;  "teeth and gums normal  Lungs: CTA bilaterally in anterior and posterior fields, no wheezes, no crackles.  Heart:  Regular rate and rhythm, S1, S2 normal, no murmurs heard  Abdomen: Soft, non tender, non distended with positive bowel sounds in all four quadrants. No hepatosplenomegaly, ascites, or mass  Extremities: 2+ pulses, no clubbing, cyanosis or edema  Skin: No rash  Neurologic: CN II-XII intact, alert, oriented x 3. No asterixis      Labs:  Lab Results   Component Value Date    WBC 6.44 10/14/2022    HGB 15.3 10/14/2022    HCT 46.0 10/14/2022     10/14/2022    CHOL 228 (H) 03/08/2022    TRIG 147 03/08/2022    HDL 62 03/08/2022    INR 1.0 10/14/2022    CREATININE 1.1 10/14/2022    CREATININE 1.1 10/14/2022    BUN 16 10/14/2022    BUN 16 10/14/2022    BILITOT 0.5 10/14/2022    BILITOT 0.5 10/14/2022    BILITOT 0.5 10/14/2022    ALT 14 10/14/2022    ALT 14 10/14/2022    ALT 14 10/14/2022    AST 16 10/14/2022    AST 16 10/14/2022    AST 16 10/14/2022    ALKPHOS 63 10/14/2022    ALKPHOS 63 10/14/2022    ALKPHOS 63 10/14/2022     10/14/2022     10/14/2022    K 3.6 10/14/2022    K 3.6 10/14/2022     10/14/2022     10/14/2022    CO2 29 10/14/2022    CO2 29 10/14/2022    TSH 0.585 03/08/2022    AFP 1.4 05/13/2021       Imaging:       Endoscopy:      Assessment:  No diagnosis found.   Acute Hepatitis (or acute exacerbation of chronic) "reactivation" after Rituxan:    Pt with NHL, HLD, PE, Pulm HTN, s/p lap valentina, s/p R-CHOP 6-7 cycles.   Here fr elevated transaminases:  elevated transaminases first noted on 12/30/19 when R-CHOP therapy was completed, then no labs until on 1/27/20 (and again the next day with a slight downward trend).  AST in 500 range and ALT in 1100 range.  Alk phos and T bili normal.  All labs have normalized since being on Vemlidy.      -  Acute hep B has responded with Surface antigen seroconversion to Hep B surface antibody positive at least last 3 measurments. So, " insurance wants to stop the Vemlidy.  That I Ok as long as he is not going to get Rituxan.  If he should get Rituxan anytime in the future, he should be started on Vemlidy at least a week before starting rituxan.      Labs every 6 months, starting today 11/8/2021:   CBC, CMP, PT INR, HBV DNA quant, HBsAg, HBsAb     Response of NHL to R-CHOP:   PET-CT 12/30/19: showed near complete resolution of the mesenteric lymph nodes, but interval development of 2 small 6 mm subpleural nodules on the left lower lung. And 2 left mesenteric lymph nodes.   Patient now sees Dr. José Antonio Del Cid, Oncologist at [facility] [place], (phone- [phone]).  Patient has been told:  If he should get Rituxan anytime in the future, he should be started on Vemlidy at least a week before starting rituxan.          US abd 1/28/20:  1.2 cm hypoechoic structure in the region of the janine hepatis.  This finding is indeterminate and may represent prominent periportal lymph node or cystic duct remnant in this patient that is status-post cholecystectomy.  CT abdomen without and with contrast could be done if clinically warranted.    Serologies have been ordered by Dr. Roly Flannery.  Will check results this week.     Anemia: resolved.   EGD 11/29/21 for eval of anemia:   - Normal esophagus.                          - Z-line, 37 cm from the incisors.                          - Friable gastric mucosa. Biopsied. - Neg for malignancy.                         - Normal examined duodenum.       Recommendations:  -  Labs every 6 months, starting today 10/15/2022: Liver profile, HBsAg, HBsAb      -  Vemlidy stopped (Needs to be restarted if possibility he needs chemotherapy).   -  Oncologist retired, He now sees Dr. José Antonio Del Cid, Oncologist at [facility] [place], (phone- [phone]) for lymphoma. If he should get Rituxan anytime in the future, he should be started on Vemlidy at least a week before starting rituxan.  -  Avoid alcohol, smoking, sedatives and meds with codeine.  -  Avoid  high intake of Tylenol (more than 4 extra-strength pills in one day)  -  Call us if any bleeding, fevers, confusion, disorientation occur  -  Return in 6 months, video/audio visit will be OK.       No follow-ups on file.      Order summary:  No orders of the defined types were placed in this encounter.      Thank you so much for allowing me to participate in the care of Jeff Haney MD

## 2022-10-15 NOTE — TELEPHONE ENCOUNTER
----- Message from Alla Haney MD sent at 10/15/2022  8:50 AM CDT -----  Recommendations:  -  Labs every 6 months, starting today 10/15/2022: Liver profile, HBsAg, HBsAb      -  Vemlidy stopped (Needs to be restarted if possibility he needs chemotherapy).   -  Oncologist retired, He now sees Dr. José Antonio Del Cid, Oncologist at [facility] [place], (phone- [phone]) for lymphoma. If he should get Rituxan anytime in the future, he should be started on Vemlidy at least a week before starting rituxan.  -  Avoid alcohol, smoking, sedatives and meds with codeine.  -  Avoid high intake of Tylenol (more than 4 extra-strength pills in one day)  -  Call us if any bleeding, fevers, confusion, disorientation occur  -  Return in 6 months, video/audio visit will be OK.

## 2022-10-15 NOTE — PATIENT INSTRUCTIONS
Recommendations:  -  Labs every 6 months, starting today 10/15/2022: Liver profile, HBsAg, HBsAb      -  Vemlidy stopped (Needs to be restarted if possibility he needs chemotherapy).   -  Oncologist retired, He now sees Dr. José Antonio Del Cid, Oncologist at [facility] [place], (phone- [phone]) for lymphoma. If he should get Rituxan anytime in the future, he should be started on Vemlidy at least a week before starting rituxan.  -  Avoid alcohol, smoking, sedatives and meds with codeine.  -  Avoid high intake of Tylenol (more than 4 extra-strength pills in one day)  -  Call us if any bleeding, fevers, confusion, disorientation occur  -  Return in 6 months, video/audio visit will be OK.

## 2022-10-15 NOTE — Clinical Note
Recommendations: -  Labs every 6 months, starting today 10/15/2022: Liver profile, HBsAg, HBsAb     -  Vemlidy stopped (Needs to be restarted if possibility he needs chemotherapy).  -  Oncologist retired, He now sees Dr. José Antonio Del Cid, Oncologist at [facility] [place], (phone- [phone]) for lymphoma. If he should get Rituxan anytime in the future, he should be started on Vemlidy at least a week before starting rituxan. -  Avoid alcohol, smoking, sedatives and meds with codeine. -  Avoid high intake of Tylenol (more than 4 extra-strength pills in one day) -  Call us if any bleeding, fevers, confusion, disorientation occur -  Return in 6 months, video/audio visit will be OK.

## 2022-10-20 ENCOUNTER — TELEPHONE (OUTPATIENT)
Dept: HEPATOLOGY | Facility: CLINIC | Age: 67
End: 2022-10-20
Payer: MEDICARE

## 2022-10-20 NOTE — TELEPHONE ENCOUNTER
----- Message from Alla Haney MD sent at 10/15/2022  8:50 AM CDT -----  Recommendations:  -  Labs every 6 months, starting today 10/15/2022: Liver profile, HBsAg, HBsAb      -  Vemlidy stopped (Needs to be restarted if possibility he needs chemotherapy).   -  Oncologist retired, He now sees Dr. José Antonio Del Cid, Oncologist at [place], (phone- [phone]) for lymphoma. If he should get Rituxan anytime in the future, he should be started on Vemlidy at least a week before starting rituxan.  -  Avoid alcohol, smoking, sedatives and meds with codeine.  -  Avoid high intake of Tylenol (more than 4 extra-strength pills in one day)  -  Call us if any bleeding, fevers, confusion, disorientation occur  -  Return in 6 months, video/audio visit will be OK.

## 2022-10-21 ENCOUNTER — TELEPHONE (OUTPATIENT)
Dept: SPINE | Facility: CLINIC | Age: 67
End: 2022-10-21
Payer: MEDICARE

## 2022-10-21 NOTE — TELEPHONE ENCOUNTER
This message is for patient in regards to his/her appointment 10/24/22 at 9:15a   With Melody Betancourt.      For the safety of all patients and staff members. We are requesting during this visit that all patients and visitors to wear required face mask at all times here at [facility].     If you have any questions or concerns please contact [phone]

## 2022-10-24 ENCOUNTER — OFFICE VISIT (OUTPATIENT)
Dept: SPINE | Facility: CLINIC | Age: 67
End: 2022-10-24
Payer: MEDICARE

## 2022-10-24 ENCOUNTER — TELEPHONE (OUTPATIENT)
Dept: ADMINISTRATIVE | Facility: OTHER | Age: 67
End: 2022-10-24
Payer: MEDICARE

## 2022-10-24 VITALS
BODY MASS INDEX: 28.74 KG/M2 | SYSTOLIC BLOOD PRESSURE: 133 MMHG | HEIGHT: 68 IN | WEIGHT: 189.63 LBS | DIASTOLIC BLOOD PRESSURE: 72 MMHG | TEMPERATURE: 99 F | HEART RATE: 66 BPM

## 2022-10-24 DIAGNOSIS — M47.816 SPONDYLOSIS OF LUMBAR REGION WITHOUT MYELOPATHY OR RADICULOPATHY: Primary | ICD-10-CM

## 2022-10-24 PROCEDURE — 3078F DIAST BP <80 MM HG: CPT | Mod: CPTII,S$GLB,, | Performed by: ANESTHESIOLOGY

## 2022-10-24 PROCEDURE — 99999 PR PBB SHADOW E&M-EST. PATIENT-LVL III: CPT | Mod: PBBFAC,,, | Performed by: ANESTHESIOLOGY

## 2022-10-24 PROCEDURE — 1157F PR ADVANCE CARE PLAN OR EQUIV PRESENT IN MEDICAL RECORD: ICD-10-PCS | Mod: CPTII,S$GLB,, | Performed by: ANESTHESIOLOGY

## 2022-10-24 PROCEDURE — 3288F FALL RISK ASSESSMENT DOCD: CPT | Mod: CPTII,S$GLB,, | Performed by: ANESTHESIOLOGY

## 2022-10-24 PROCEDURE — 1101F PR PT FALLS ASSESS DOC 0-1 FALLS W/OUT INJ PAST YR: ICD-10-PCS | Mod: CPTII,S$GLB,, | Performed by: ANESTHESIOLOGY

## 2022-10-24 PROCEDURE — 1125F AMNT PAIN NOTED PAIN PRSNT: CPT | Mod: CPTII,S$GLB,, | Performed by: ANESTHESIOLOGY

## 2022-10-24 PROCEDURE — 1160F RVW MEDS BY RX/DR IN RCRD: CPT | Mod: CPTII,S$GLB,, | Performed by: ANESTHESIOLOGY

## 2022-10-24 PROCEDURE — 99214 OFFICE O/P EST MOD 30 MIN: CPT | Mod: S$GLB,,, | Performed by: ANESTHESIOLOGY

## 2022-10-24 PROCEDURE — 1160F PR REVIEW ALL MEDS BY PRESCRIBER/CLIN PHARMACIST DOCUMENTED: ICD-10-PCS | Mod: CPTII,S$GLB,, | Performed by: ANESTHESIOLOGY

## 2022-10-24 PROCEDURE — 3075F SYST BP GE 130 - 139MM HG: CPT | Mod: CPTII,S$GLB,, | Performed by: ANESTHESIOLOGY

## 2022-10-24 PROCEDURE — 1157F ADVNC CARE PLAN IN RCRD: CPT | Mod: CPTII,S$GLB,, | Performed by: ANESTHESIOLOGY

## 2022-10-24 PROCEDURE — 99214 PR OFFICE/OUTPT VISIT, EST, LEVL IV, 30-39 MIN: ICD-10-PCS | Mod: S$GLB,,, | Performed by: ANESTHESIOLOGY

## 2022-10-24 PROCEDURE — 3078F PR MOST RECENT DIASTOLIC BLOOD PRESSURE < 80 MM HG: ICD-10-PCS | Mod: CPTII,S$GLB,, | Performed by: ANESTHESIOLOGY

## 2022-10-24 PROCEDURE — 99999 PR PBB SHADOW E&M-EST. PATIENT-LVL III: ICD-10-PCS | Mod: PBBFAC,,, | Performed by: ANESTHESIOLOGY

## 2022-10-24 PROCEDURE — 1159F PR MEDICATION LIST DOCUMENTED IN MEDICAL RECORD: ICD-10-PCS | Mod: CPTII,S$GLB,, | Performed by: ANESTHESIOLOGY

## 2022-10-24 PROCEDURE — 1101F PT FALLS ASSESS-DOCD LE1/YR: CPT | Mod: CPTII,S$GLB,, | Performed by: ANESTHESIOLOGY

## 2022-10-24 PROCEDURE — 1125F PR PAIN SEVERITY QUANTIFIED, PAIN PRESENT: ICD-10-PCS | Mod: CPTII,S$GLB,, | Performed by: ANESTHESIOLOGY

## 2022-10-24 PROCEDURE — 1159F MED LIST DOCD IN RCRD: CPT | Mod: CPTII,S$GLB,, | Performed by: ANESTHESIOLOGY

## 2022-10-24 PROCEDURE — 3288F PR FALLS RISK ASSESSMENT DOCUMENTED: ICD-10-PCS | Mod: CPTII,S$GLB,, | Performed by: ANESTHESIOLOGY

## 2022-10-24 PROCEDURE — 3075F PR MOST RECENT SYSTOLIC BLOOD PRESS GE 130-139MM HG: ICD-10-PCS | Mod: CPTII,S$GLB,, | Performed by: ANESTHESIOLOGY

## 2022-10-24 NOTE — H&P (VIEW-ONLY)
PCP: Roly Flannery MD    REFERRING PHYSICIAN: No ref. provider found    CHIEF COMPLAINT: left lower back pain    Original HISTORY OF PRESENT ILLNESS: Jeff Hope presents to the clinic for the evaluation of the above pain. The pain started a year ago.    Original Pain Description:  The pain is located in the superior left buttock and sometimes radiates to his midline lumbar back. The pain is described as aching and dull. Exacerbating factors: Sitting, Standing, Laying, Sleeping on that side, getting up in the morning, and Walking. Mitigating factors heat, ice, medications, physical therapy, and rest. Symptoms interfere with daily activity and sleeping. The patient feels like symptoms have been improving with physical therapy. Patient denies night fever/night sweats, urinary incontinence, bowel incontinence, significant weight loss, significant motor weakness, and loss of sensations.    Original PAIN SCORES:  Best: Pain is 6  Worst: Pain is 8  Usually: Pain is 6  Current: Pain is 5    INTERVAL HISTORY:   Interval History 10/24/2022: Jeff Hope returns for follow up. At our last visit we performed a left SIJ on 10/11/22. He reports minimal relief after injection. He returns today again reporting pain in the left low back and buttock. He notices it mainly with sitting, standing, or laying in bed. It does not appear to be severe in his estimation, but he is hoping that we can assist. His SIJ does not seem very painful today, but he does have pain with facet loading.     6 weeks of Conservative therapy (PT/Chiro/Home Exercises with Dates)  March 2022-May 2022  Still doing home exercises    Treatments / Medications: (Ice/Heat/NSAIDS/APAP/etc):  Tylenol (mild relief)  Ibuprofen (mild relief)  Celebrex (mild relief)  Norco (shoulder pain, helps with back pain)  Lyrica (shoulder pain, unsure if it helps with back pain)       Report:  Consistent with prescribed medications    Interventional Pain Procedures:  (Previous injections)  10/11/22: Left SIJ injection     Past Medical History:   Diagnosis Date    History of pulmonary embolus (PE) 4/15/2019    Pt with PE s/p hospitalization.  He was placed on xarelto with high risk with cancer.  He has been out of the medication for the past month. Restart Xarelto 15 mg b.i.d. for 21 days, then 20 mg q.day until cancer no longer active.    Non Hodgkin's lymphoma     Personal history of colonic polyps 09/08/2015    per [facility] report - repeat 9/2020    Peyronie disease     S/P laparoscopic cholecystectomy 5/27/2019     Past Surgical History:   Procedure Laterality Date    ESOPHAGOGASTRODUODENOSCOPY N/A 11/29/2021    Procedure: ESOPHAGOGASTRODUODENOSCOPY (EGD);  Surgeon: Kristine Palmer MD;  Location: [facility] ENDO ([address] [place] [address]);  Service: Endoscopy;  Laterality: N/A;  fully vacc-inst mail-tb-left chest port    GROIN EXPLORATION  2 pre 2005 and 1 post 2005    total of 3 procedures    INJECTION, SACROILIAC JOINT Left 10/11/2022    Procedure: INJECTION,SACROILIAC JOINT LEFT CONTRAST;  Surgeon: Melody Berry MD;  Location: [facility];  Service: Pain Management;  Laterality: Left;    LAPAROSCOPIC CHOLECYSTECTOMY  05/2018    SHOULDER SURGERY Left      Social History     Socioeconomic History    Marital status:     Number of children: 2   Occupational History    Occupation: disabled [employer]    Tobacco Use    Smoking status: Never    Smokeless tobacco: Never   Substance and Sexual Activity    Alcohol use: No    Drug use: No    Sexual activity: Yes     Partners: Female   Social History Narrative    Lives with wife.       Family History   Problem Relation Age of Onset    Breast cancer Mother     Emphysema Father     Stroke Sister     Diabetes Brother     Ulcers Brother     No Known Problems Daughter     No Known Problems Daughter        Review of patient's allergies indicates:   Allergen Reactions    Rosuvastatin      myalgias       Current Outpatient Medications   Medication  "Sig    amitriptyline (ELAVIL) 25 MG tablet Take 25 mg by mouth nightly.    celecoxib (CELEBREX) 200 MG capsule TK ONE C PO BID    fluticasone propionate (FLONASE) 50 mcg/actuation nasal spray SHAKE LIQUID AND USE 2 SPRAYS(100 MCG) IN EACH NOSTRIL EVERY DAY    HYDROcodone-acetaminophen (NORCO) 7.5-325 mg per tablet Take 1 tablet by mouth every 8 (eight) hours as needed.    hydrOXYzine pamoate (VISTARIL) 50 MG Cap Take 50 mg by mouth.    LIDOcaine (LIDODERM) 5 % Place 1 patch onto the skin once daily. Remove & Discard patch within 12 hours or as directed by MD    pantoprazole (PROTONIX) 40 MG tablet Take 1 tablet (40 mg total) by mouth once daily.    pregabalin (LYRICA) 150 MG capsule Take 150 mg by mouth once daily.     No current facility-administered medications for this visit.     Facility-Administered Medications Ordered in Other Visits   Medication    0.9%  NaCl infusion       ROS:  GENERAL: No fever. No chills. No fatigue. Denies weight loss. Denies weight gain.  HEENT: Denies headaches. Denies vision change. Denies eye pain. Denies double vision. Denies ear pain.   CV: Denies chest pain.   PULM: Denies of shortness of breath.  GI: Denies constipation. No diarrhea. No abdominal pain. Denies nausea. Denies vomiting. No blood in stool.  HEME: Denies bleeding problems.  : Denies urgency. No painful urination. No blood in urine.  MS: Denies joint stiffness. Low back pain, L-sided. Shoulder pain. L testicular pain, chronic.  SKIN: Denies rash.   NEURO: Denies seizures. No weakness.  PSYCH:  Denies difficulty sleeping. No anxiety. Denies depression. No suicidal thoughts.       VITALS:   Vitals:    10/24/22 0908   BP: 133/72   Pulse: 66   Temp: 98.6 °F (37 °C)   Weight: 86 kg (189 lb 9.5 oz)   Height: 5' 8" (1.727 m)   PainSc:   5   PainLoc: Back         PHYSICAL EXAM:   GENERAL: Well appearing, in no acute distress, alert and oriented x3.  PSYCH:  Mood and affect appropriate.  SKIN: Skin color, texture, turgor " normal, no rashes or lesions.  HEENT:  Normocephalic, atraumatic. Cranial nerves grossly intact.  NECK: No pain with neck flexion, extension, or lateral flexion.   PULM: No evidence of respiratory difficulty, symmetric chest rise.  GI:  Non-distended  BACK: Normal range of motion. No pain to palpation over the spinous processes. No pain to palpation over facet joints. Mild pain with palpation of L SI joint. No pain in GTB. Positive facet loading bilaterally today.  Straight leg raising in the supine position is negative to radicular pain.    EXTREMITIES: No deformities, edema, or skin discoloration.   MUSCULOSKELETAL: No L piriformis TTP. Shoulder, hip (log roll, compression), and knee provocative maneuvers are negative. Bilateral upper and lower extremity strength is normal and symmetric. No atrophy is noted.  NEURO: Sensation is equal and appropriate bilaterally. Bilateral upper and lower extremity coordination and muscle stretch reflexes are physiologic and symmetric. Plantar response are downgoing.   GAIT: normal.      LABS:  No pertinent    IMAGING:      MRI LUMBAR SPINE (9/9/2022)  FINDINGS:  Lumbar spine alignment is normal.  No spondylolisthesis.  No spondylolysis.  Vertebral body heights are well maintained without evidence for fracture.  No marrow signal abnormality to suggest an infiltrative process.     There is degenerative disc desiccation throughout the lumbar spine with mild associated height loss at L3-L4 and L4-L5.  Multifocal annular fissures most conspicuous at L1-L2 and L5-S1.  No endplate edema.     Distal spinal cord demonstrates normal contour and signal intensity.  Cauda equina appears normal without findings to suggest arachnoiditis.  Conus medullaris terminates at L1.     Right renal cortical cyst.  Remaining visualized abdominal organs demonstrate no significant abnormalities.  SI joints are symmetric.  Paraspinal musculature demonstrates normal bulk.     T12-L1: No spinal canal stenosis  or neural foraminal narrowing.     L1-L2: Circumferential disc bulge causes mild mass effect on the ventral thecal sac and left lateral recess.  Bilateral facet arthropathy and bilateral ligamentum flavum buckling.  No spinal canal stenosis or neural foraminal narrowing.     L2-L3: Right foraminal/extraforaminal broad-based protrusion and bilateral ligamentum flavum buckling.  Findings contribute to mild right neural foraminal narrowing.  No spinal canal stenosis.     L3-L4: Circumferential disc bulge and bilateral ligamentum flavum buckling.  Findings contribute to mild-to-moderate left and mild right neural foraminal narrowing.  No spinal canal stenosis.     L4-L5: Circumferential disc bulge, bilateral facet arthropathy and bilateral ligamentum flavum buckling.  Findings contribute to mild left and mild-to-moderate right neural foraminal narrowing.     L5-S1: Posterior broad-based disc bulge and bilateral facet arthropathy.  No spinal canal stenosis or neural foraminal narrowing.     Impression:     1. Mild lumbar degenerative changes contributing to mild-to-moderate neural foraminal narrowing from L2-L3 through L4-L5.  No spinal canal stenosis.    ASSESSMENT: 66 y.o. year old male with pain, consistent with sacroiliitis and facet arthropathy.     DISCUSSION: Mr. Hope comes to us with left low back pain. He improved with PT and his exam shows only subtle findings. He indicates pain in the SIJ region. However, he did not improve with a SIJ injection. MRI shows mainly facet arthropathy but the pain is lower in the back.     PLAN:  Continue Heat  Continue HEP  Schedule left L3-5 MBB  Will proceed to RFA if appropriate    Melody Berry  10/24/2022

## 2022-10-24 NOTE — PROGRESS NOTES
PCP: Roly Flannery MD    REFERRING PHYSICIAN: No ref. provider found    CHIEF COMPLAINT: left lower back pain    Original HISTORY OF PRESENT ILLNESS: Jeff Hope presents to the clinic for the evaluation of the above pain. The pain started a year ago.    Original Pain Description:  The pain is located in the superior left buttock and sometimes radiates to his midline lumbar back. The pain is described as aching and dull. Exacerbating factors: Sitting, Standing, Laying, Sleeping on that side, getting up in the morning, and Walking. Mitigating factors heat, ice, medications, physical therapy, and rest. Symptoms interfere with daily activity and sleeping. The patient feels like symptoms have been improving with physical therapy. Patient denies night fever/night sweats, urinary incontinence, bowel incontinence, significant weight loss, significant motor weakness, and loss of sensations.    Original PAIN SCORES:  Best: Pain is 6  Worst: Pain is 8  Usually: Pain is 6  Current: Pain is 5    INTERVAL HISTORY:   Interval History 10/24/2022: Jeff Hope returns for follow up. At our last visit we performed a left SIJ on 10/11/22. He reports minimal relief after injection. He returns today again reporting pain in the left low back and buttock. He notices it mainly with sitting, standing, or laying in bed. It does not appear to be severe in his estimation, but he is hoping that we can assist. His SIJ does not seem very painful today, but he does have pain with facet loading.     6 weeks of Conservative therapy (PT/Chiro/Home Exercises with Dates)  March 2022-May 2022  Still doing home exercises    Treatments / Medications: (Ice/Heat/NSAIDS/APAP/etc):  Tylenol (mild relief)  Ibuprofen (mild relief)  Celebrex (mild relief)  Norco (shoulder pain, helps with back pain)  Lyrica (shoulder pain, unsure if it helps with back pain)       Report:  Consistent with prescribed medications    Interventional Pain Procedures:  (Previous injections)  10/11/22: Left SIJ injection     Past Medical History:   Diagnosis Date    History of pulmonary embolus (PE) 4/15/2019    Pt with PE s/p hospitalization.  He was placed on xarelto with high risk with cancer.  He has been out of the medication for the past month. Restart Xarelto 15 mg b.i.d. for 21 days, then 20 mg q.day until cancer no longer active.    Non Hodgkin's lymphoma     Personal history of colonic polyps 09/08/2015    per [facility] report - repeat 9/2020    Peyronie disease     S/P laparoscopic cholecystectomy 5/27/2019     Past Surgical History:   Procedure Laterality Date    ESOPHAGOGASTRODUODENOSCOPY N/A 11/29/2021    Procedure: ESOPHAGOGASTRODUODENOSCOPY (EGD);  Surgeon: Kristine Palmer MD;  Location: [facility] ENDO ([address] [place] [address]);  Service: Endoscopy;  Laterality: N/A;  fully vacc-inst mail-tb-left chest port    GROIN EXPLORATION  2 pre 2005 and 1 post 2005    total of 3 procedures    INJECTION, SACROILIAC JOINT Left 10/11/2022    Procedure: INJECTION,SACROILIAC JOINT LEFT CONTRAST;  Surgeon: Melody Berry MD;  Location: [facility];  Service: Pain Management;  Laterality: Left;    LAPAROSCOPIC CHOLECYSTECTOMY  05/2018    SHOULDER SURGERY Left      Social History     Socioeconomic History    Marital status:     Number of children: 2   Occupational History    Occupation: disabled [employer]    Tobacco Use    Smoking status: Never    Smokeless tobacco: Never   Substance and Sexual Activity    Alcohol use: No    Drug use: No    Sexual activity: Yes     Partners: Female   Social History Narrative    Lives with wife.       Family History   Problem Relation Age of Onset    Breast cancer Mother     Emphysema Father     Stroke Sister     Diabetes Brother     Ulcers Brother     No Known Problems Daughter     No Known Problems Daughter        Review of patient's allergies indicates:   Allergen Reactions    Rosuvastatin      myalgias       Current Outpatient Medications   Medication  "Sig    amitriptyline (ELAVIL) 25 MG tablet Take 25 mg by mouth nightly.    celecoxib (CELEBREX) 200 MG capsule TK ONE C PO BID    fluticasone propionate (FLONASE) 50 mcg/actuation nasal spray SHAKE LIQUID AND USE 2 SPRAYS(100 MCG) IN EACH NOSTRIL EVERY DAY    HYDROcodone-acetaminophen (NORCO) 7.5-325 mg per tablet Take 1 tablet by mouth every 8 (eight) hours as needed.    hydrOXYzine pamoate (VISTARIL) 50 MG Cap Take 50 mg by mouth.    LIDOcaine (LIDODERM) 5 % Place 1 patch onto the skin once daily. Remove & Discard patch within 12 hours or as directed by MD    pantoprazole (PROTONIX) 40 MG tablet Take 1 tablet (40 mg total) by mouth once daily.    pregabalin (LYRICA) 150 MG capsule Take 150 mg by mouth once daily.     No current facility-administered medications for this visit.     Facility-Administered Medications Ordered in Other Visits   Medication    0.9%  NaCl infusion       ROS:  GENERAL: No fever. No chills. No fatigue. Denies weight loss. Denies weight gain.  HEENT: Denies headaches. Denies vision change. Denies eye pain. Denies double vision. Denies ear pain.   CV: Denies chest pain.   PULM: Denies of shortness of breath.  GI: Denies constipation. No diarrhea. No abdominal pain. Denies nausea. Denies vomiting. No blood in stool.  HEME: Denies bleeding problems.  : Denies urgency. No painful urination. No blood in urine.  MS: Denies joint stiffness. Low back pain, L-sided. Shoulder pain. L testicular pain, chronic.  SKIN: Denies rash.   NEURO: Denies seizures. No weakness.  PSYCH:  Denies difficulty sleeping. No anxiety. Denies depression. No suicidal thoughts.       VITALS:   Vitals:    10/24/22 0908   BP: 133/72   Pulse: 66   Temp: 98.6 °F (37 °C)   Weight: 86 kg (189 lb 9.5 oz)   Height: 5' 8" (1.727 m)   PainSc:   5   PainLoc: Back         PHYSICAL EXAM:   GENERAL: Well appearing, in no acute distress, alert and oriented x3.  PSYCH:  Mood and affect appropriate.  SKIN: Skin color, texture, turgor " normal, no rashes or lesions.  HEENT:  Normocephalic, atraumatic. Cranial nerves grossly intact.  NECK: No pain with neck flexion, extension, or lateral flexion.   PULM: No evidence of respiratory difficulty, symmetric chest rise.  GI:  Non-distended  BACK: Normal range of motion. No pain to palpation over the spinous processes. No pain to palpation over facet joints. Mild pain with palpation of L SI joint. No pain in GTB. Positive facet loading bilaterally today.  Straight leg raising in the supine position is negative to radicular pain.    EXTREMITIES: No deformities, edema, or skin discoloration.   MUSCULOSKELETAL: No L piriformis TTP. Shoulder, hip (log roll, compression), and knee provocative maneuvers are negative. Bilateral upper and lower extremity strength is normal and symmetric. No atrophy is noted.  NEURO: Sensation is equal and appropriate bilaterally. Bilateral upper and lower extremity coordination and muscle stretch reflexes are physiologic and symmetric. Plantar response are downgoing.   GAIT: normal.      LABS:  No pertinent    IMAGING:      MRI LUMBAR SPINE (9/9/2022)  FINDINGS:  Lumbar spine alignment is normal.  No spondylolisthesis.  No spondylolysis.  Vertebral body heights are well maintained without evidence for fracture.  No marrow signal abnormality to suggest an infiltrative process.     There is degenerative disc desiccation throughout the lumbar spine with mild associated height loss at L3-L4 and L4-L5.  Multifocal annular fissures most conspicuous at L1-L2 and L5-S1.  No endplate edema.     Distal spinal cord demonstrates normal contour and signal intensity.  Cauda equina appears normal without findings to suggest arachnoiditis.  Conus medullaris terminates at L1.     Right renal cortical cyst.  Remaining visualized abdominal organs demonstrate no significant abnormalities.  SI joints are symmetric.  Paraspinal musculature demonstrates normal bulk.     T12-L1: No spinal canal stenosis  or neural foraminal narrowing.     L1-L2: Circumferential disc bulge causes mild mass effect on the ventral thecal sac and left lateral recess.  Bilateral facet arthropathy and bilateral ligamentum flavum buckling.  No spinal canal stenosis or neural foraminal narrowing.     L2-L3: Right foraminal/extraforaminal broad-based protrusion and bilateral ligamentum flavum buckling.  Findings contribute to mild right neural foraminal narrowing.  No spinal canal stenosis.     L3-L4: Circumferential disc bulge and bilateral ligamentum flavum buckling.  Findings contribute to mild-to-moderate left and mild right neural foraminal narrowing.  No spinal canal stenosis.     L4-L5: Circumferential disc bulge, bilateral facet arthropathy and bilateral ligamentum flavum buckling.  Findings contribute to mild left and mild-to-moderate right neural foraminal narrowing.     L5-S1: Posterior broad-based disc bulge and bilateral facet arthropathy.  No spinal canal stenosis or neural foraminal narrowing.     Impression:     1. Mild lumbar degenerative changes contributing to mild-to-moderate neural foraminal narrowing from L2-L3 through L4-L5.  No spinal canal stenosis.    ASSESSMENT: 66 y.o. year old male with pain, consistent with sacroiliitis and facet arthropathy.     DISCUSSION: Mr. Hope comes to us with left low back pain. He improved with PT and his exam shows only subtle findings. He indicates pain in the SIJ region. However, he did not improve with a SIJ injection. MRI shows mainly facet arthropathy but the pain is lower in the back.     PLAN:  Continue Heat  Continue HEP  Schedule left L3-5 MBB  Will proceed to RFA if appropriate    Melody Berry  10/24/2022

## 2022-10-25 ENCOUNTER — TELEPHONE (OUTPATIENT)
Dept: ADMINISTRATIVE | Facility: OTHER | Age: 67
End: 2022-10-25
Payer: MEDICARE

## 2022-11-07 ENCOUNTER — PATIENT MESSAGE (OUTPATIENT)
Dept: PAIN MEDICINE | Facility: OTHER | Age: 67
End: 2022-11-07
Payer: MEDICARE

## 2022-11-08 ENCOUNTER — HOSPITAL ENCOUNTER (OUTPATIENT)
Facility: OTHER | Age: 67
Discharge: HOME | End: 2022-11-08
Attending: ANESTHESIOLOGY | Admitting: ANESTHESIOLOGY
Payer: MEDICARE

## 2022-11-08 VITALS
TEMPERATURE: 98 F | HEART RATE: 54 BPM | SYSTOLIC BLOOD PRESSURE: 164 MMHG | WEIGHT: 190 LBS | BODY MASS INDEX: 28.14 KG/M2 | OXYGEN SATURATION: 98 % | HEIGHT: 69 IN | DIASTOLIC BLOOD PRESSURE: 81 MMHG | RESPIRATION RATE: 16 BRPM

## 2022-11-08 DIAGNOSIS — M47.816 SPONDYLOSIS OF LUMBAR REGION WITHOUT MYELOPATHY OR RADICULOPATHY: ICD-10-CM

## 2022-11-08 DIAGNOSIS — G89.29 CHRONIC PAIN: ICD-10-CM

## 2022-11-08 DIAGNOSIS — M47.819 OSTEOARTHRITIS OF SPINE WITHOUT MYELOPATHY OR RADICULOPATHY, UNSPECIFIED SPINAL REGION: Primary | ICD-10-CM

## 2022-11-08 PROCEDURE — 64493 PR INJ DX/THER AGNT PARAVERT FACET JOINT,IMG GUIDE,LUMBAR/SAC,1ST LVL: ICD-10-PCS | Mod: KX,LT,, | Performed by: ANESTHESIOLOGY

## 2022-11-08 PROCEDURE — 64494 INJ PARAVERT F JNT L/S 2 LEV: CPT | Mod: KX,LT | Performed by: ANESTHESIOLOGY

## 2022-11-08 PROCEDURE — 64494 PR INJ DX/THER AGNT PARAVERT FACET JOINT,IMG GUIDE,LUMBAR/SAC, 2ND LEVEL: ICD-10-PCS | Mod: KX,LT,, | Performed by: ANESTHESIOLOGY

## 2022-11-08 PROCEDURE — 64494 INJ PARAVERT F JNT L/S 2 LEV: CPT | Mod: KX,LT,, | Performed by: ANESTHESIOLOGY

## 2022-11-08 PROCEDURE — 64493 INJ PARAVERT F JNT L/S 1 LEV: CPT | Mod: KX,LT,, | Performed by: ANESTHESIOLOGY

## 2022-11-08 PROCEDURE — 25000003 PHARM REV CODE 250: Performed by: ANESTHESIOLOGY

## 2022-11-08 PROCEDURE — 64493 INJ PARAVERT F JNT L/S 1 LEV: CPT | Mod: KX,LT | Performed by: ANESTHESIOLOGY

## 2022-11-08 RX ORDER — LIDOCAINE HYDROCHLORIDE 20 MG/ML
INJECTION, SOLUTION INFILTRATION; PERINEURAL
Status: DISCONTINUED | OUTPATIENT
Start: 2022-11-08 | End: 2022-11-08 | Stop reason: HOSPADM

## 2022-11-08 RX ORDER — BUPIVACAINE HYDROCHLORIDE 2.5 MG/ML
INJECTION, SOLUTION EPIDURAL; INFILTRATION; INTRACAUDAL
Status: DISCONTINUED | OUTPATIENT
Start: 2022-11-08 | End: 2022-11-08 | Stop reason: HOSPADM

## 2022-11-08 RX ORDER — SODIUM CHLORIDE 9 MG/ML
500 INJECTION, SOLUTION INTRAVENOUS CONTINUOUS
Status: DISCONTINUED | OUTPATIENT
Start: 2022-11-08 | End: 2022-11-08 | Stop reason: HOSPADM

## 2022-11-08 NOTE — OP NOTE
Diagnostic Lumbar Medial Branch Block Under Fluoroscopy    The procedure, risks, benefits, and options were discussed with the patient. There are no contraindications to the procedure. The patent expressed understanding and agreed to the procedure. Informed written consent was obtained prior to the start of the procedure and can be found in the patient's chart.    PATIENT NAME: Jeff Hope   MRN: [id number]     DATE OF PROCEDURE: 11/08/2022                                           PROCEDURE:  Diagnostic Left L3, L4, and L5 Lumbar Medial Branch Block under Fluoroscopy    PRE-OP DIAGNOSIS: Spondylosis of lumbar region without myelopathy or radiculopathy [M47.816] Lumbar spondylosis [M47.816]    POST-OP DIAGNOSIS: Same    PHYSICIAN: Melody Berry MD    ASSISTANTS: Dr. Nguyen    MEDICATIONS INJECTED:  Bupivicaine 0.25%    LOCAL ANESTHETIC INJECTED:   Xylocaine 2%    SEDATION: None    ESTIMATED BLOOD LOSS:  None    COMPLICATIONS:  None.    INTERVAL HISTORY: Patient has clinical and imaging findings suggestive of facet mediated pain.    TECHNIQUE: Time-out was performed to identify the patient and procedure to be performed. With the patient laying in a prone position, the surgical area was prepped and draped in the usual sterile fashion using ChloraPrep and fenestrated drape. The levels were determined under fluoroscopic guidance. Skin anesthesia was achieved by injecting Lidocaine 2% over the injection sites. A 25 gauge, 3.5 inch needle was introduced into the medial branch nerves at the junctions of the superior articular process and the transverse processes of the targeted sites using AP, lateral and/or contralateral oblique fluoroscopic imaging. After negative aspiration for blood or CSF was confirmed, 0.5 mL of the anesthetic listed above was then slowly injected at each site. The needles were removed and bleeding was nil. A sterile dressing was applied. No specimens collected. The patient tolerated the  procedure well.     The patient was monitored after the procedure in the recovery area. They were given post-procedure and discharge instructions to follow at home. The patient was discharged in a stable condition.    Melody Berry MD

## 2022-11-08 NOTE — PLAN OF CARE
Pt tolerated procedure well. Pt reports 3/10 pain after procedure. Assisted pt up for first time. Steady on feet. All discharge instructions given.

## 2022-11-08 NOTE — DISCHARGE SUMMARY
Discharge Note  Short Stay      SUMMARY     Admit Date: 11/8/2022    Attending Physician: Melody Berry      Discharge Physician: Melody Berry      Discharge Date: 11/8/2022 8:25 AM    Procedure(s) (LRB):  BLOCK, NERVE, LEFT L3-L5 MEDIAL BRANCH (Left)    Final Diagnosis: Spondylosis of lumbar region without myelopathy or radiculopathy [M47.816]    Disposition: Home or self care    Patient Instructions:   Current Discharge Medication List        CONTINUE these medications which have NOT CHANGED    Details   amitriptyline (ELAVIL) 25 MG tablet Take 25 mg by mouth nightly.      celecoxib (CELEBREX) 200 MG capsule TK ONE C PO BID      fluticasone propionate (FLONASE) 50 mcg/actuation nasal spray SHAKE LIQUID AND USE 2 SPRAYS(100 MCG) IN EACH NOSTRIL EVERY DAY  Qty: 48 g, Refills: 3      HYDROcodone-acetaminophen (NORCO) 7.5-325 mg per tablet Take 1 tablet by mouth every 8 (eight) hours as needed.    Comments: Quantity prescribed more than 7 day supply? Press F2 and select one:[id number]        hydrOXYzine pamoate (VISTARIL) 50 MG Cap Take 50 mg by mouth.      LIDOcaine (LIDODERM) 5 % Place 1 patch onto the skin once daily. Remove & Discard patch within 12 hours or as directed by MD  Qty: 7 patch, Refills: 0      pantoprazole (PROTONIX) 40 MG tablet Take 1 tablet (40 mg total) by mouth once daily.  Qty: 90 tablet, Refills: 0    Comments: **Patient requests 90 days supply**  Associated Diagnoses: Gastroesophageal reflux disease, unspecified whether esophagitis present      pregabalin (LYRICA) 150 MG capsule Take 150 mg by mouth once daily.                 Discharge Diagnosis: Spondylosis of lumbar region without myelopathy or radiculopathy [M47.816]  Condition on Discharge: Stable with no complications to procedure   Diet on Discharge: Same as before.  Activity: as per instruction sheet.  Discharge to: Home with a responsible adult.  Follow up: 2-4 weeks       Please call my office or pager at [phone] if  experienced any weakness or loss of sensation, fever > 101.5, pain uncontrolled with oral medications, persistent nausea/vomiting/or diarrhea, redness or drainage from the incisions, or any other worrisome concerns. If physician on call was not reached or could not communicate with our office for any reason please go to the nearest emergency department      Melody Berry  11/08/2022

## 2022-11-08 NOTE — DISCHARGE INSTRUCTIONS
Lumbar/Cervical/Joint Medial Branch Block Pain Diary    Patient Name:  :    Pre-procedure Pain Score: _____/10    Time of injection:     Please fill out the chart below to the best of your ability. We need to know how much percentage of relief in your pain that you have while the numbing medication is active. Please be mindful that this is a diagnostic test and may not last for a long time. If you are asleep during one of the times listed below, you do not have to record that time.     Time After the   Procedure % of pain relief   achieved Pain Score (0-10)   1 hour post-procedure     2 hours post-procedure     3 hours post-procedure     4 hours post-procedure     5 hours post-procedure     6 hours post-procedure     12 hours post-procedure     24 hours post-procedure       Please make sure to return this form or information to the clinic. This information is needed to proceed with your plan of care. There are several options that you can use to send this back to us.    Form can be faxed to us at [phone]  Call us to provide the information at [phone]  Send the information to us through your [facility] Chart    If you have any questions about completing this diary, please call us at [phone].    Thank you for allowing us to care for you today. You may receive a survey about the care we provided. Your feedback is valuable and helps us provide excellent care throughout the community.     Home Care Instructions for Pain Management:    1. DIET:   You may resume your normal diet today.   2. BATHING:   You may shower with luke warm water. No tub baths or anything that will soak injection sites under water for the next 24 hours.  3. DRESSING:   You may remove your bandage today.   4. ACTIVITY LEVEL:   You may resume your normal activities 24 hrs after your procedure. Nothing strenuous today.  5. MEDICATIONS:   You may resume your normal medications today. To restart blood thinners, ask your doctor.  6.  DRIVING    If you have received any sedatives by mouth today, you may not drive for 12 hours.    If you have received any sedation through your IV, you may not drive for 24 hrs.   7. SPECIAL INSTRUCTIONS:   No heat to the injection site for 24 hrs including, hot bath or shower, heating pad, moist heat, or hot tubs.    Use ice pack to injection site for any pain or discomfort.  Apply ice packs for 20 minute intervals as needed.    IF you have diabetes, be sure to monitor your blood sugar more closely. IF your injection contained steroids your blood sugar levels may become higher than normal.    If you are still having pain upon discharge:  Your pain may improve over the next 48 hours. The anesthetic (numbing medication) works immediately to 48 hours. IF your injection contained a steroid (anti-inflammatory medication), it takes approximately 3 days to start feeling relief and 7-10 days to see your greatest results from the medication. It is possible you may need subsequent injections. This would be discussed at your follow up appointment with pain management or your referring doctor.    Please call the PAIN MANAGEMENT office at [phone] or ON CALL pager at [phone] if you experienced any:   -Weakness or loss of sensation  -Fever > 101.5  -Pain uncontrolled with oral medications   -Persistent nausea, vomiting, or diarrhea  -Redness or drainage from the injection sites, or any other worrisome concerns.   If physician on call was not reached or could not communicate with our office for any reason please go to the nearest emergency department.

## 2022-11-09 ENCOUNTER — TELEPHONE (OUTPATIENT)
Dept: PAIN MEDICINE | Facility: CLINIC | Age: 67
End: 2022-11-09
Payer: MEDICARE

## 2022-11-09 NOTE — TELEPHONE ENCOUNTER
----- Message from Basilio Patel sent at 11/9/2022 12:08 PM CST -----  Regarding: Pain Dairy  Contact: DORIS JOHANSEN [[id number]]  Name of Who is Calling: DORIS JOHANSEN [[id number]]      What is the request in detail: Would like to speak with staff in regards to reporting pain dairy as follow as:    Time         Score                             Time        Score  1HR            4                                  6HR            4  2HR            3                                  12 hr           6  3HR            3                                   24 hr          6  4HR            4  5HR            4                                    overall percentage effective: no relief    Can the clinic reply by [facility]: no      What Number [facility]: [phone]

## 2022-11-11 ENCOUNTER — PATIENT MESSAGE (OUTPATIENT)
Dept: PAIN MEDICINE | Facility: CLINIC | Age: 67
End: 2022-11-11
Payer: MEDICARE

## 2022-11-14 ENCOUNTER — OFFICE VISIT (OUTPATIENT)
Dept: PAIN MEDICINE | Facility: CLINIC | Age: 67
End: 2022-11-14
Payer: MEDICARE

## 2022-11-14 VITALS
RESPIRATION RATE: 19 BRPM | HEART RATE: 86 BPM | HEIGHT: 69 IN | BODY MASS INDEX: 28.77 KG/M2 | DIASTOLIC BLOOD PRESSURE: 79 MMHG | TEMPERATURE: 99 F | WEIGHT: 194.25 LBS | SYSTOLIC BLOOD PRESSURE: 107 MMHG

## 2022-11-14 DIAGNOSIS — M47.816 SPONDYLOSIS OF LUMBAR REGION WITHOUT MYELOPATHY OR RADICULOPATHY: ICD-10-CM

## 2022-11-14 DIAGNOSIS — G89.29 OTHER CHRONIC PAIN: ICD-10-CM

## 2022-11-14 DIAGNOSIS — M47.819 OSTEOARTHRITIS OF SPINE WITHOUT MYELOPATHY OR RADICULOPATHY, UNSPECIFIED SPINAL REGION: Primary | ICD-10-CM

## 2022-11-14 PROCEDURE — 1159F MED LIST DOCD IN RCRD: CPT | Mod: CPTII,S$GLB,, | Performed by: ANESTHESIOLOGY

## 2022-11-14 PROCEDURE — 99999 PR PBB SHADOW E&M-EST. PATIENT-LVL III: CPT | Mod: PBBFAC,,, | Performed by: ANESTHESIOLOGY

## 2022-11-14 PROCEDURE — 99999 PR PBB SHADOW E&M-EST. PATIENT-LVL III: ICD-10-PCS | Mod: PBBFAC,,, | Performed by: ANESTHESIOLOGY

## 2022-11-14 PROCEDURE — 1159F PR MEDICATION LIST DOCUMENTED IN MEDICAL RECORD: ICD-10-PCS | Mod: CPTII,S$GLB,, | Performed by: ANESTHESIOLOGY

## 2022-11-14 PROCEDURE — 1157F PR ADVANCE CARE PLAN OR EQUIV PRESENT IN MEDICAL RECORD: ICD-10-PCS | Mod: CPTII,S$GLB,, | Performed by: ANESTHESIOLOGY

## 2022-11-14 PROCEDURE — 3008F PR BODY MASS INDEX (BMI) DOCUMENTED: ICD-10-PCS | Mod: CPTII,S$GLB,, | Performed by: ANESTHESIOLOGY

## 2022-11-14 PROCEDURE — 1160F RVW MEDS BY RX/DR IN RCRD: CPT | Mod: CPTII,S$GLB,, | Performed by: ANESTHESIOLOGY

## 2022-11-14 PROCEDURE — 99214 PR OFFICE/OUTPT VISIT, EST, LEVL IV, 30-39 MIN: ICD-10-PCS | Mod: S$GLB,,, | Performed by: ANESTHESIOLOGY

## 2022-11-14 PROCEDURE — 3008F BODY MASS INDEX DOCD: CPT | Mod: CPTII,S$GLB,, | Performed by: ANESTHESIOLOGY

## 2022-11-14 PROCEDURE — 3078F DIAST BP <80 MM HG: CPT | Mod: CPTII,S$GLB,, | Performed by: ANESTHESIOLOGY

## 2022-11-14 PROCEDURE — 1125F PR PAIN SEVERITY QUANTIFIED, PAIN PRESENT: ICD-10-PCS | Mod: CPTII,S$GLB,, | Performed by: ANESTHESIOLOGY

## 2022-11-14 PROCEDURE — 1125F AMNT PAIN NOTED PAIN PRSNT: CPT | Mod: CPTII,S$GLB,, | Performed by: ANESTHESIOLOGY

## 2022-11-14 PROCEDURE — 3074F SYST BP LT 130 MM HG: CPT | Mod: CPTII,S$GLB,, | Performed by: ANESTHESIOLOGY

## 2022-11-14 PROCEDURE — 1160F PR REVIEW ALL MEDS BY PRESCRIBER/CLIN PHARMACIST DOCUMENTED: ICD-10-PCS | Mod: CPTII,S$GLB,, | Performed by: ANESTHESIOLOGY

## 2022-11-14 PROCEDURE — 3078F PR MOST RECENT DIASTOLIC BLOOD PRESSURE < 80 MM HG: ICD-10-PCS | Mod: CPTII,S$GLB,, | Performed by: ANESTHESIOLOGY

## 2022-11-14 PROCEDURE — 1157F ADVNC CARE PLAN IN RCRD: CPT | Mod: CPTII,S$GLB,, | Performed by: ANESTHESIOLOGY

## 2022-11-14 PROCEDURE — 99214 OFFICE O/P EST MOD 30 MIN: CPT | Mod: S$GLB,,, | Performed by: ANESTHESIOLOGY

## 2022-11-14 PROCEDURE — 3074F PR MOST RECENT SYSTOLIC BLOOD PRESSURE < 130 MM HG: ICD-10-PCS | Mod: CPTII,S$GLB,, | Performed by: ANESTHESIOLOGY

## 2022-11-14 NOTE — PROGRESS NOTES
PCP: Roly Flannery MD    REFERRING PHYSICIAN: No ref. provider found    CHIEF COMPLAINT: left lower back pain    Original HISTORY OF PRESENT ILLNESS: Jeff Hope presents to the clinic for the evaluation of the above pain. The pain started a year ago.    Original Pain Description:  The pain is located in the superior left buttock and sometimes radiates to his midline lumbar back. The pain is described as aching and dull. Exacerbating factors: Sitting, Standing, Laying, Sleeping on that side, getting up in the morning, and Walking. Mitigating factors heat, ice, medications, physical therapy, and rest. Symptoms interfere with daily activity and sleeping. The patient feels like symptoms have been improving with physical therapy. Patient denies night fever/night sweats, urinary incontinence, bowel incontinence, significant weight loss, significant motor weakness, and loss of sensations.    Original PAIN SCORES:  Best: Pain is 6  Worst: Pain is 8  Usually: Pain is 6  Current: Pain is 5    INTERVAL HISTORY (10/24/22):  Jeff Hope returns for follow up. At our last visit we performed a left SIJ on 10/11/22. He reports minimal relief after injection. He returns today again reporting pain in the left low back and buttock. He notices it mainly with sitting, standing, or laying in bed. It does not appear to be severe in his estimation, but he is hoping that we can assist. His SIJ does not seem very painful today, but he does have pain with facet loading.     INTERVAL HISTORY (11/14/22):  Patient returns to clinic today for follow up. Patient with recent Left L3-5 MBB on 11/8/22. Patient reports 80% relief for 2 days followed by return of symptoms. Pain today is 4/10 in the low back on the left side. Pain does not radiate and stays in the low back. Patient continues with celecoxib, lyrica and norco with some relief. Denies saddle anesthesia, bladder/bowel incontinence, new weakness or loss of sensation.    6 weeks  of Conservative therapy (PT/Chiro/Home Exercises with Dates)  March 2022-May 2022  Still doing home exercises    Treatments / Medications: (Ice/Heat/NSAIDS/APAP/etc):  Tylenol (mild relief)  Ibuprofen (mild relief)  Celebrex (mild relief)  Norco (shoulder pain, helps with back pain)  Lyrica (shoulder pain, unsure if it helps with back pain)       Report:  Consistent with prescribed medications    Interventional Pain Procedures: (Previous injections)  10/11/22: Left SIJ injection   11/08/22: Left L3-5 MBB - 80% relief for 2 days    Past Medical History:   Diagnosis Date    History of pulmonary embolus (PE) 4/15/2019    Pt with PE s/p hospitalization.  He was placed on xarelto with high risk with cancer.  He has been out of the medication for the past month. Restart Xarelto 15 mg b.i.d. for 21 days, then 20 mg q.day until cancer no longer active.    Non Hodgkin's lymphoma     Personal history of colonic polyps 09/08/2015    per [facility] report - repeat 9/2020    Peyronie disease     S/P laparoscopic cholecystectomy 5/27/2019     Past Surgical History:   Procedure Laterality Date    ESOPHAGOGASTRODUODENOSCOPY N/A 11/29/2021    Procedure: ESOPHAGOGASTRODUODENOSCOPY (EGD);  Surgeon: Kristine Palmer MD;  Location: [facility] ([address] [place] [address]);  Service: Endoscopy;  Laterality: N/A;  fully vacc-inst mail-tb-left chest port    GROIN EXPLORATION  2 pre 2005 and 1 post 2005    total of 3 procedures    INJECTION OF ANESTHETIC AGENT AROUND NERVE Left 11/8/2022    Procedure: BLOCK, NERVE, LEFT L3-L5 MEDIAL BRANCH;  Surgeon: Melody Berry MD;  Location: [facility];  Service: Pain Management;  Laterality: Left;    INJECTION, SACROILIAC JOINT Left 10/11/2022    Procedure: INJECTION,SACROILIAC JOINT LEFT CONTRAST;  Surgeon: Melody Berry MD;  Location: [facility];  Service: Pain Management;  Laterality: Left;    LAPAROSCOPIC CHOLECYSTECTOMY  05/2018    SHOULDER SURGERY Left      Social History     Socioeconomic History     Marital status:     Number of children: 2   Occupational History    Occupation: disabled [employer]    Tobacco Use    Smoking status: Never    Smokeless tobacco: Never   Substance and Sexual Activity    Alcohol use: No    Drug use: No    Sexual activity: Yes     Partners: Female   Social History Narrative    Lives with wife.       Family History   Problem Relation Age of Onset    Breast cancer Mother     Emphysema Father     Stroke Sister     Diabetes Brother     Ulcers Brother     No Known Problems Daughter     No Known Problems Daughter        Review of patient's allergies indicates:   Allergen Reactions    Rosuvastatin      myalgias       Current Outpatient Medications   Medication Sig    amitriptyline (ELAVIL) 25 MG tablet Take 25 mg by mouth nightly.    celecoxib (CELEBREX) 200 MG capsule TK ONE C PO BID    fluticasone propionate (FLONASE) 50 mcg/actuation nasal spray SHAKE LIQUID AND USE 2 SPRAYS(100 MCG) IN EACH NOSTRIL EVERY DAY    HYDROcodone-acetaminophen (NORCO) 7.5-325 mg per tablet Take 1 tablet by mouth every 8 (eight) hours as needed.    hydrOXYzine pamoate (VISTARIL) 50 MG Cap Take 50 mg by mouth.    LIDOcaine (LIDODERM) 5 % Place 1 patch onto the skin once daily. Remove & Discard patch within 12 hours or as directed by MD    pregabalin (LYRICA) 150 MG capsule Take 150 mg by mouth once daily.    pantoprazole (PROTONIX) 40 MG tablet Take 1 tablet (40 mg total) by mouth once daily.     No current facility-administered medications for this visit.     Facility-Administered Medications Ordered in Other Visits   Medication    0.9%  NaCl infusion       ROS:  GENERAL: No fever. No chills. No fatigue. Denies weight loss. Denies weight gain.  HEENT: Denies headaches. Denies vision change. Denies eye pain. Denies double vision. Denies ear pain.   CV: Denies chest pain.   PULM: Denies of shortness of breath.  GI: Denies constipation. No diarrhea. No abdominal pain. Denies nausea. Denies vomiting. No  "blood in stool.  HEME: Denies bleeding problems.  : Denies urgency. No painful urination. No blood in urine.  MS: Denies joint stiffness. Low back pain, L-sided. Shoulder pain. L testicular pain, chronic.  SKIN: Denies rash.   NEURO: Denies seizures. No weakness.  PSYCH:  Denies difficulty sleeping. No anxiety. Denies depression. No suicidal thoughts.       VITALS:   Vitals:    11/14/22 1433   BP: 107/79   Pulse: 86   Resp: 19   Temp: 98.5 °F (36.9 °C)   Weight: 88.1 kg (194 lb 3.6 oz)   Height: 5' 9" (1.753 m)   PainSc:   3   PainLoc: Back         PHYSICAL EXAM:   GENERAL: Well appearing, in no acute distress, alert and oriented x3.  PSYCH:  Mood and affect appropriate.  SKIN: Skin color, texture, turgor normal, no rashes or lesions.  HEENT:  Normocephalic, atraumatic. Cranial nerves grossly intact.  NECK: No pain with neck flexion, extension, or lateral flexion.   PULM: No evidence of respiratory difficulty, symmetric chest rise.  GI:  Non-distended  BACK: Normal range of motion. No pain to palpation over the spinous processes. No pain to palpation over facet joints. Mild pain with palpation of L SI joint. No pain in GTB. Positive facet loading bilaterally today.  Straight leg raising in the supine position is negative to radicular pain.    EXTREMITIES: No deformities, edema, or skin discoloration.   MUSCULOSKELETAL: No L piriformis TTP. Shoulder, hip (log roll, compression), and knee provocative maneuvers are negative. Bilateral upper and lower extremity strength is normal and symmetric. No atrophy is noted.  NEURO: Sensation is equal and appropriate bilaterally. Bilateral upper and lower extremity coordination and muscle stretch reflexes are physiologic and symmetric. Plantar response are downgoing.   GAIT: normal.      LABS:  No pertinent    IMAGING:      MRI LUMBAR SPINE (9/9/2022)  FINDINGS:  Lumbar spine alignment is normal.  No spondylolisthesis.  No spondylolysis.  Vertebral body heights are well " maintained without evidence for fracture.  No marrow signal abnormality to suggest an infiltrative process.     There is degenerative disc desiccation throughout the lumbar spine with mild associated height loss at L3-L4 and L4-L5.  Multifocal annular fissures most conspicuous at L1-L2 and L5-S1.  No endplate edema.     Distal spinal cord demonstrates normal contour and signal intensity.  Cauda equina appears normal without findings to suggest arachnoiditis.  Conus medullaris terminates at L1.     Right renal cortical cyst.  Remaining visualized abdominal organs demonstrate no significant abnormalities.  SI joints are symmetric.  Paraspinal musculature demonstrates normal bulk.     T12-L1: No spinal canal stenosis or neural foraminal narrowing.     L1-L2: Circumferential disc bulge causes mild mass effect on the ventral thecal sac and left lateral recess.  Bilateral facet arthropathy and bilateral ligamentum flavum buckling.  No spinal canal stenosis or neural foraminal narrowing.     L2-L3: Right foraminal/extraforaminal broad-based protrusion and bilateral ligamentum flavum buckling.  Findings contribute to mild right neural foraminal narrowing.  No spinal canal stenosis.     L3-L4: Circumferential disc bulge and bilateral ligamentum flavum buckling.  Findings contribute to mild-to-moderate left and mild right neural foraminal narrowing.  No spinal canal stenosis.     L4-L5: Circumferential disc bulge, bilateral facet arthropathy and bilateral ligamentum flavum buckling.  Findings contribute to mild left and mild-to-moderate right neural foraminal narrowing.     L5-S1: Posterior broad-based disc bulge and bilateral facet arthropathy.  No spinal canal stenosis or neural foraminal narrowing.     Impression:     1. Mild lumbar degenerative changes contributing to mild-to-moderate neural foraminal narrowing from L2-L3 through L4-L5.  No spinal canal stenosis.    ASSESSMENT: 66 y.o. year old male with pain, consistent  with sacroiliitis and facet arthropathy.     DISCUSSION: Mr. Hope comes to us with left low back pain. He improved with PT and his exam shows only subtle findings. He indicates pain in the SIJ region. However, he did not improve with a SIJ injection. MRI shows mainly facet arthropathy but the pain is lower in the back. Patient reports 80% improvement following 1st MBB at Left L3, L4, L5 for two days.     PLAN:  Continue Heat  Continue HEP  Schedule left L3-5 MBB (2 of 2)  Will proceed to RFA if appropriate    Toby Nguyen MD  11/14/2022

## 2022-11-28 ENCOUNTER — TELEPHONE (OUTPATIENT)
Dept: PAIN MEDICINE | Facility: OTHER | Age: 67
End: 2022-11-28
Payer: MEDICARE

## 2022-11-28 ENCOUNTER — TELEPHONE (OUTPATIENT)
Dept: SPINE | Facility: CLINIC | Age: 67
End: 2022-11-28
Payer: MEDICARE

## 2022-11-28 NOTE — TELEPHONE ENCOUNTER
----- Message from Ailin Ugarte sent at 11/28/2022  2:41 PM CST -----  Regarding: Patient Advice              Name of Who is Calling:  Jeff Hope    Who Left The Message:  Jeff Hope      What is the request in detail:     Patient called requesting to cancel his scheduled injection  with  Melody Berry MD.  Please give a call back at your earliest convenience and further advise..  Thank you!      Reply by [facility]: NO      Preferred Call Back :  [phone] (X)

## 2023-01-05 ENCOUNTER — TELEPHONE (OUTPATIENT)
Dept: PRIMARY CARE CLINIC | Facility: CLINIC | Age: 68
End: 2023-01-05
Payer: MEDICARE

## 2023-01-05 NOTE — TELEPHONE ENCOUNTER
----- Message from Tania Pacheco sent at 1/5/2023 11:16 AM CST -----  Contact: [phone]  Pt is calling he states he shouls have had an appt set up for him and there is not one please give return call

## 2023-02-07 DIAGNOSIS — Z00.00 ENCOUNTER FOR MEDICARE ANNUAL WELLNESS EXAM: ICD-10-CM

## 2023-02-09 DIAGNOSIS — Z00.00 ENCOUNTER FOR MEDICARE ANNUAL WELLNESS EXAM: ICD-10-CM

## 2023-02-13 PROBLEM — D80.1 HYPOGAMMAGLOBULINEMIA: Status: ACTIVE | Noted: 2023-02-13

## 2023-02-14 ENCOUNTER — HOSPITAL ENCOUNTER (OUTPATIENT)
Dept: RADIOLOGY | Facility: HOSPITAL | Age: 68
Discharge: HOME | End: 2023-02-14
Attending: INTERNAL MEDICINE
Payer: MEDICARE

## 2023-02-14 ENCOUNTER — OFFICE VISIT (OUTPATIENT)
Dept: PRIMARY CARE CLINIC | Facility: CLINIC | Age: 68
End: 2023-02-14
Payer: MEDICARE

## 2023-02-14 VITALS
WEIGHT: 195.56 LBS | TEMPERATURE: 98 F | BODY MASS INDEX: 28.96 KG/M2 | OXYGEN SATURATION: 96 % | DIASTOLIC BLOOD PRESSURE: 68 MMHG | HEIGHT: 69 IN | HEART RATE: 79 BPM | SYSTOLIC BLOOD PRESSURE: 116 MMHG

## 2023-02-14 DIAGNOSIS — K21.9 GASTROESOPHAGEAL REFLUX DISEASE WITHOUT ESOPHAGITIS: ICD-10-CM

## 2023-02-14 DIAGNOSIS — K21.9 GASTROESOPHAGEAL REFLUX DISEASE, UNSPECIFIED WHETHER ESOPHAGITIS PRESENT: ICD-10-CM

## 2023-02-14 DIAGNOSIS — R29.818 NEUROGENIC CLAUDICATION: ICD-10-CM

## 2023-02-14 DIAGNOSIS — T46.6X5A MYALGIA DUE TO STATIN: ICD-10-CM

## 2023-02-14 DIAGNOSIS — Z86.59 HISTORY OF DEPRESSION: ICD-10-CM

## 2023-02-14 DIAGNOSIS — I70.0 AORTIC ATHEROSCLEROSIS: ICD-10-CM

## 2023-02-14 DIAGNOSIS — E27.8 ADRENAL HYPERTROPHY: ICD-10-CM

## 2023-02-14 DIAGNOSIS — M46.1 SACROILIITIS: ICD-10-CM

## 2023-02-14 DIAGNOSIS — J47.0 BRONCHIECTASIS WITH ACUTE LOWER RESPIRATORY INFECTION: ICD-10-CM

## 2023-02-14 DIAGNOSIS — C82.90 FOLLICULAR NON-HODGKIN'S LYMPHOMA: Primary | ICD-10-CM

## 2023-02-14 DIAGNOSIS — C79.52 MALIGNANT NEOPLASM METASTATIC TO BONE MARROW: ICD-10-CM

## 2023-02-14 DIAGNOSIS — D80.1 HYPOGAMMAGLOBULINEMIA: ICD-10-CM

## 2023-02-14 DIAGNOSIS — I27.20 PULMONARY HYPERTENSION: ICD-10-CM

## 2023-02-14 DIAGNOSIS — R05.9 COUGH: ICD-10-CM

## 2023-02-14 DIAGNOSIS — R73.9 HYPERGLYCEMIA: ICD-10-CM

## 2023-02-14 DIAGNOSIS — Z12.5 SCREENING FOR MALIGNANT NEOPLASM OF PROSTATE: ICD-10-CM

## 2023-02-14 DIAGNOSIS — E78.2 MIXED HYPERLIPIDEMIA: ICD-10-CM

## 2023-02-14 DIAGNOSIS — M79.10 MYALGIA DUE TO STATIN: ICD-10-CM

## 2023-02-14 DIAGNOSIS — Z00.00 HEALTHCARE MAINTENANCE: ICD-10-CM

## 2023-02-14 PROBLEM — M54.50 CHRONIC LOW BACK PAIN: Status: RESOLVED | Noted: 2019-11-19 | Resolved: 2023-02-14

## 2023-02-14 PROBLEM — G89.29 CHRONIC LOW BACK PAIN: Status: RESOLVED | Noted: 2019-11-19 | Resolved: 2023-02-14

## 2023-02-14 LAB — SARS-COV-2 RNA RESP QL NAA+PROBE: NOT DETECTED

## 2023-02-14 PROCEDURE — 1125F PR PAIN SEVERITY QUANTIFIED, PAIN PRESENT: ICD-10-PCS | Mod: HCNC,CPTII,S$GLB, | Performed by: INTERNAL MEDICINE

## 2023-02-14 PROCEDURE — 99499 RISK ADDL DX/OHS AUDIT: ICD-10-PCS | Mod: HCNC,S$GLB,, | Performed by: INTERNAL MEDICINE

## 2023-02-14 PROCEDURE — 99215 PR OFFICE/OUTPT VISIT, EST, LEVL V, 40-54 MIN: ICD-10-PCS | Mod: HCNC,S$GLB,, | Performed by: INTERNAL MEDICINE

## 2023-02-14 PROCEDURE — 99215 OFFICE O/P EST HI 40 MIN: CPT | Mod: HCNC,S$GLB,, | Performed by: INTERNAL MEDICINE

## 2023-02-14 PROCEDURE — 1159F PR MEDICATION LIST DOCUMENTED IN MEDICAL RECORD: ICD-10-PCS | Mod: HCNC,CPTII,S$GLB, | Performed by: INTERNAL MEDICINE

## 2023-02-14 PROCEDURE — 1159F MED LIST DOCD IN RCRD: CPT | Mod: HCNC,CPTII,S$GLB, | Performed by: INTERNAL MEDICINE

## 2023-02-14 PROCEDURE — 1160F PR REVIEW ALL MEDS BY PRESCRIBER/CLIN PHARMACIST DOCUMENTED: ICD-10-PCS | Mod: HCNC,CPTII,S$GLB, | Performed by: INTERNAL MEDICINE

## 2023-02-14 PROCEDURE — 3074F PR MOST RECENT SYSTOLIC BLOOD PRESSURE < 130 MM HG: ICD-10-PCS | Mod: HCNC,CPTII,S$GLB, | Performed by: INTERNAL MEDICINE

## 2023-02-14 PROCEDURE — 3074F SYST BP LT 130 MM HG: CPT | Mod: HCNC,CPTII,S$GLB, | Performed by: INTERNAL MEDICINE

## 2023-02-14 PROCEDURE — 1125F AMNT PAIN NOTED PAIN PRSNT: CPT | Mod: HCNC,CPTII,S$GLB, | Performed by: INTERNAL MEDICINE

## 2023-02-14 PROCEDURE — 71046 X-RAY EXAM CHEST 2 VIEWS: CPT | Mod: TC,HCNC

## 2023-02-14 PROCEDURE — 71046 XR CHEST PA AND LATERAL: ICD-10-PCS | Mod: 26,HCNC,, | Performed by: RADIOLOGY

## 2023-02-14 PROCEDURE — 3288F PR FALLS RISK ASSESSMENT DOCUMENTED: ICD-10-PCS | Mod: HCNC,CPTII,S$GLB, | Performed by: INTERNAL MEDICINE

## 2023-02-14 PROCEDURE — 3078F DIAST BP <80 MM HG: CPT | Mod: HCNC,CPTII,S$GLB, | Performed by: INTERNAL MEDICINE

## 2023-02-14 PROCEDURE — 71046 X-RAY EXAM CHEST 2 VIEWS: CPT | Mod: 26,HCNC,, | Performed by: RADIOLOGY

## 2023-02-14 PROCEDURE — U0005 INFEC AGEN DETEC AMPLI PROBE: HCPCS | Performed by: INTERNAL MEDICINE

## 2023-02-14 PROCEDURE — 1101F PR PT FALLS ASSESS DOC 0-1 FALLS W/OUT INJ PAST YR: ICD-10-PCS | Mod: HCNC,CPTII,S$GLB, | Performed by: INTERNAL MEDICINE

## 2023-02-14 PROCEDURE — 1160F RVW MEDS BY RX/DR IN RCRD: CPT | Mod: HCNC,CPTII,S$GLB, | Performed by: INTERNAL MEDICINE

## 2023-02-14 PROCEDURE — 99499 UNLISTED E&M SERVICE: CPT | Mod: HCNC,S$GLB,, | Performed by: INTERNAL MEDICINE

## 2023-02-14 PROCEDURE — 3078F PR MOST RECENT DIASTOLIC BLOOD PRESSURE < 80 MM HG: ICD-10-PCS | Mod: HCNC,CPTII,S$GLB, | Performed by: INTERNAL MEDICINE

## 2023-02-14 PROCEDURE — 3288F FALL RISK ASSESSMENT DOCD: CPT | Mod: HCNC,CPTII,S$GLB, | Performed by: INTERNAL MEDICINE

## 2023-02-14 PROCEDURE — 1157F PR ADVANCE CARE PLAN OR EQUIV PRESENT IN MEDICAL RECORD: ICD-10-PCS | Mod: HCNC,CPTII,S$GLB, | Performed by: INTERNAL MEDICINE

## 2023-02-14 PROCEDURE — 3008F BODY MASS INDEX DOCD: CPT | Mod: HCNC,CPTII,S$GLB, | Performed by: INTERNAL MEDICINE

## 2023-02-14 PROCEDURE — 1157F ADVNC CARE PLAN IN RCRD: CPT | Mod: HCNC,CPTII,S$GLB, | Performed by: INTERNAL MEDICINE

## 2023-02-14 PROCEDURE — 3008F PR BODY MASS INDEX (BMI) DOCUMENTED: ICD-10-PCS | Mod: HCNC,CPTII,S$GLB, | Performed by: INTERNAL MEDICINE

## 2023-02-14 PROCEDURE — 1101F PT FALLS ASSESS-DOCD LE1/YR: CPT | Mod: HCNC,CPTII,S$GLB, | Performed by: INTERNAL MEDICINE

## 2023-02-14 PROCEDURE — 3044F PR MOST RECENT HEMOGLOBIN A1C LEVEL <7.0%: ICD-10-PCS | Mod: HCNC,CPTII,S$GLB, | Performed by: INTERNAL MEDICINE

## 2023-02-14 PROCEDURE — 3044F HG A1C LEVEL LT 7.0%: CPT | Mod: HCNC,CPTII,S$GLB, | Performed by: INTERNAL MEDICINE

## 2023-02-14 RX ORDER — ALBUTEROL SULFATE 90 UG/1
2 AEROSOL, METERED RESPIRATORY (INHALATION) EVERY 6 HOURS PRN
Qty: 18 G | Refills: 1 | Status: SHIPPED | OUTPATIENT
Start: 2023-02-14 | End: 2023-04-18

## 2023-02-14 NOTE — PATIENT INSTRUCTIONS
Thank you so much for coming in to see me today.  Please do not hesitate to call with any questions or concerns or if you feel that you need to be seen.         Please stop you pantoprazole for 14 days prior to bringing in a stool sample to look for infection.

## 2023-02-14 NOTE — ASSESSMENT & PLAN NOTE
No medications currently due to statin intolerance and hepatitis-B history.   no longer on hepatitis-B treatments.  Normal LFTs.  · Will consider meds based on lab results

## 2023-02-14 NOTE — PROGRESS NOTES
.  This note has been generated using voice-recognition software. There may be typographical errors that have been missed during proof-reading.     Primary Care Provider Appointment    Subjective:      Patient ID: Jeff Hope is a 67 y.o. male here for follow up.     Chief Complaint: Follow-up    HPI:  This is a pleasant 63-year-old male with bronchiectasis, hyperlipidemia, history of pulmonary embolism on chronic anticoagulation with a follicular non-Hodgkin's lymphoma, acute/reactivated hepatitis B here for f/u.  Pt last seen 06/09/2022 03/16/2022 patient seen by Urology for Peyronie's disease.  04/04/2022 patient seen by Spine Services for sciatic pain and neurogenic claudication.  PT.  05/16/2022 patient seen by pain management.  05/26/2022 patient seen by hepatology for reactivation of acute hepatitis be after Rituxan.  Q.6 months labs.  Follow-up 6 months.  08/15/2022 patient seen by pain management for chronic back pain.  MRI ordered.  [facility]  09/16/2022 patient seen by pain management  .  Sacroiliitis.  Plan for left SI joint injection.  10/11/2022 patient admitted for SI joint injection.  10/15/2022 patient seen by hepatology with history of reactivation of hepatitis-B.  Labs every 6 months.  Follow-up 6 months.  10/24/2022 patient seen for Spine Services for sacroiliitis.  Left L3-5 MBB ordered.  11/08/2022 procedure with pain management.  12/06/2022 procedure with pain management.  11/14/2022 patient seen by pain management    Pt notes that injections for back pain did not help initially but has gotten better.  Still doing home exercises.  Pain is located in the L buttock area.  Pain is not as bad with ambulation as noted in the past.    Taking PPI BID and QD depending.  Improved symptoms but better.    Occasional cough with green mucus.  Using flonase.  Some post nasal gtt.   No FALCON/SOB.  Not feeling poorly.        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    Pulmonary hypertension    Myalgia due to statin    Vitamin D deficiency    Abnormal MMSE    Allergic rhinitis    Adrenal hypertrophy    Bilateral shoulder bursitis    Chronic viral hepatitis B    Trigger finger of left thumb    Bilateral shoulder pain    Chronic thumb pain, bilateral    Breast mass, right    Peyronie's disease    Hypotestosteronemia in male    Neurogenic claudication    Iron deficiency anemia    Gastroesophageal reflux disease without esophagitis    Myofascial pain    Spondylosis of lumbar region without myelopathy or radiculopathy    Decreased range of motion of lumbar spine    Hypogammaglobulinemia    Sacroiliitis        Past Surgical History:   Procedure Laterality Date    ESOPHAGOGASTRODUODENOSCOPY N/A 11/29/2021    Procedure: ESOPHAGOGASTRODUODENOSCOPY (EGD);  Surgeon: Kristine Palmer MD;  [facility]  Service: Endoscopy;  Laterality: N/A;  fully vacc-inst mail-tb-left chest port    GROIN EXPLORATION  2 pre 2005 and 1 post 2005    total of 3 procedures    INJECTION OF ANESTHETIC AGENT AROUND NERVE Left 11/8/2022    Procedure: BLOCK, NERVE, LEFT L3-L5 MEDIAL BRANCH;  Surgeon: Melody Berry MD;  Location: [facility] PAIN MGT;  Service: Pain Management;  Laterality: Left;    INJECTION, SACROILIAC JOINT Left 10/11/2022    Procedure: INJECTION,SACROILIAC JOINT LEFT CONTRAST;  Surgeon: Melody Berry MD;  Location: [facility] PAIN MGT;  Service: Pain Management;  Laterality: Left;    LAPAROSCOPIC CHOLECYSTECTOMY  05/2018    SHOULDER SURGERY Left        Past Medical History:   Diagnosis Date    History of pulmonary embolus (PE) 4/15/2019    Pt with PE s/p hospitalization.  He was placed on xarelto with high risk with cancer.  He has been out of  "the medication for the past month. Restart Xarelto 15 mg b.i.d. for 21 days, then 20 mg q.day until cancer no longer active.    Non Hodgkin's lymphoma     Personal history of colonic polyps 09/08/2015    per [facility] report - repeat 9/2020    Peyronie disease     S/P laparoscopic cholecystectomy 5/27/2019       Social History     Socioeconomic History    Marital status:     Number of children: 2   Occupational History    Occupation: disabled [employer]    Tobacco Use    Smoking status: Never    Smokeless tobacco: Never   Substance and Sexual Activity    Alcohol use: No    Drug use: No    Sexual activity: Yes     Partners: Female   Social History Narrative    Lives with wife.         Review of Systems    PHQ9 12/10/2019   Total Score 3        Checklist of Daily Activities:        Objective:   /68 (BP Location: Left arm, Patient Position: Sitting, BP Method: Medium (Manual))   Pulse 79   Temp 98.4 °F (36.9 °C) (Oral)   Ht 5' 9" (1.753 m)   Wt 88.7 kg (195 lb 8.8 oz)   SpO2 96%   BMI 28.88 kg/m²     Physical Exam        Lab Results   Component Value Date    WBC 6.44 10/14/2022    HGB 15.3 10/14/2022    HCT 46.0 10/14/2022     10/14/2022    CHOL 212 (H) 02/14/2023    TRIG 111 02/14/2023    HDL 57 02/14/2023    ALT 14 10/14/2022    ALT 14 10/14/2022    ALT 14 10/14/2022    AST 16 10/14/2022    AST 16 10/14/2022    AST 16 10/14/2022     10/14/2022     10/14/2022    K 3.6 10/14/2022    K 3.6 10/14/2022     10/14/2022     10/14/2022    CREATININE 1.1 10/14/2022    CREATININE 1.1 10/14/2022    BUN 16 10/14/2022    BUN 16 10/14/2022    CO2 29 10/14/2022    CO2 29 10/14/2022    TSH 0.674 02/14/2023    PSA 0.69 02/14/2023    INR 1.0 10/14/2022    HGBA1C 5.4 02/14/2023       Current Outpatient Medications on File Prior to Visit   Medication Sig Dispense Refill    amitriptyline (ELAVIL) 25 MG tablet Take 25 mg by mouth nightly.      celecoxib (CELEBREX) 200 MG capsule TK ONE C PO " "BID      fluticasone propionate (FLONASE) 50 mcg/actuation nasal spray SHAKE LIQUID AND USE 2 SPRAYS(100 MCG) IN EACH NOSTRIL EVERY DAY 48 g 3    HYDROcodone-acetaminophen (NORCO) 7.5-325 mg per tablet Take 1 tablet by mouth every 8 (eight) hours as needed.      hydrOXYzine pamoate (VISTARIL) 50 MG Cap Take 50 mg by mouth.      LIDOcaine (LIDODERM) 5 % Place 1 patch onto the skin once daily. Remove & Discard patch within 12 hours or as directed by MD 7 patch 0    pregabalin (LYRICA) 150 MG capsule Take 150 mg by mouth once daily.      pantoprazole (PROTONIX) 40 MG tablet Take 1 tablet (40 mg total) by mouth once daily. 90 tablet 0     Current Facility-Administered Medications on File Prior to Visit   Medication Dose Route Frequency Provider Last Rate Last Admin    0.9%  NaCl infusion  500 mL Intravenous Continuous Amado Parekh MD             Assessment:   67 y.o. male with multiple co-morbid illnesses here for continued follow up of medical problems.      Plan:     Problem List Items Addressed This Visit          Neuro    Neurogenic claudication     Followed by pain management.  Abnormal findings noted on MRI.  Initially after epidurals, patient felt symptoms were not improved.  Patient does note that pain with ambulation continues to be present, but has improved.  Patient does continue to do home exercises.  Continue home exercises.  Consider going back to pain management for further procedures should pain began to worsen again.            Psychiatric    History of depression     Treated in the past, doing better now with improved pain and medical issues            Pulmonary    Bronchiectasis     Noted on CT of chest in [place] 11/2018:  "Minimal bibasilar bronchiectasis ".  Wheezing today but no systemic c/o.  No SOB/F/C.  occasional productive green cough.    CXR today  Albuterol as needed.  Pt watched HFA video.             Relevant Orders    X-Ray Chest PA And Lateral (Completed)    Pulmonary hypertension     Noted " "on ECHO at [facility]. 12/2013.  Patient does have bronchiectasis noted on imaging.  Exacerbation today.  Asymptomatic.  Will follow.  No need for further imaging at this time.            Cardiac/Vascular    Mixed hyperlipidemia     No medications currently due to statin intolerance and hepatitis-B history.   no longer on hepatitis-B treatments.  Normal LFTs.  Will consider meds based on lab results         Relevant Orders    Lipid Panel (Completed)    TSH (Completed)    Aortic atherosclerosis     On Ct 11/29/2018 in [place]: "Scattered calcified atheromatous disease of the aortic arch"   Asymptomatic, will follow.  Will check labs and consider starting statin.              Immunology/Multi System    Hypogammaglobulinemia     Noted on labs in care everywhere with hematology.  Decreased IgM noted on multiple labs.    Continue heme f/u and discussed flu vaccine today.  Vaccines otherwise [facility]            Oncology    Follicular non-Hodgkin's lymphoma - Primary     Patient will be getting a new oncologist in the near future.  Patient chose not to have follow-up imaging as he wanted to wait for his new oncologist..  ARIANNA on last imaging.   Notes and labs reviewed in Care everywhere.  Patient encouraged to reconsider waiting on imaging.  He states that his new oncologist will be seeing him soon.  He is to call with any questions or concerns.         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  No recent imaging.  Patient has appointment with new oncologist upcoming.   Pt encouraged to consider needed imaging prior to oncology appointment.  He declines again at this time.  Encouraged follow-up with new oncologist.            Endocrine    Adrenal hypertrophy     Noted on PET 12/30/2019.  "  Stable nodular hypertrophy of the left adrenal gland"  No concerning findings on most recent " CT, will follow            GI    Gastroesophageal reflux disease without esophagitis     Patient treated for H pylori.  Patient was to increased BP I to b.i.d. for 8 weeks. Pt continues to use PPI once and twice daily based on symptoms which are ongoing.   Overall much better.  Hold PPI for 14 days and will repeat stool for H pylori and if negative and continued symptoms, back to GI            Orthopedic    Myalgia due to statin     Indication for high-dose statin with increased ACC risk score.  Patient with myalgias associated with statin use in the past.  Lipids today.  Will consider starting meds agaif based on lab results          Sacroiliitis     Seen by PM and s/p injection and also lumbar CSI.  Initially not better, but has improved.    Will folllow.  Continue home exercises and lyrica            Other    Healthcare maintenance     ACP reviewed 08/2021   Yearly labs (rest )  To be completed today.  discussed PSA and pt agrees to check  Flu vaccine discussed, will wait due to patient's wheezing.            Other Visit Diagnoses       Hyperglycemia        Relevant Orders    Hemoglobin A1C (Completed)    Screening for malignant neoplasm of prostate        Relevant Orders    PSA, SCREENING (Completed)    Gastroesophageal reflux disease, unspecified whether esophagitis present        Relevant Orders    H. pylori antigen, stool (Completed)    Cough        Relevant Orders    COVID-19 Routine Screening (Completed)            Health Maintenance         Date Due Completion Date    Hemoglobin A1c (Diabetic Prevention Screening) Never done ---    Influenza Vaccine (1) 09/01/2022 12/21/2021    Colorectal Cancer Screening 05/18/2024 5/18/2021    Override on 5/18/2021: Done (repeat 3 year)    Pneumococcal Vaccines (Age 65+) (3 - PPSV23 if available, else PCV20) 08/18/2025 8/18/2020    Lipid Panel 03/08/2027 3/8/2022    TETANUS VACCINE 06/15/2030 6/15/2020          Medications Reconciliation:   I have reconciled the  patient's home medications with the patient/family. I have updated all changes.  Refer to After-Visit Medication List.      Medication List            Accurate as of February 14, 2023 11:59 PM. If you have any questions, ask your nurse or doctor.                START taking these medications      albuterol 90 mcg/actuation inhaler  Commonly known as: VENTOLIN HFA  Inhale 2 puffs into the lungs every 6 (six) hours as needed for Wheezing. Rescue  Started by: Roly Flannery MD            CONTINUE taking these medications      amitriptyline 25 MG tablet  Commonly known as: ELAVIL     celecoxib 200 MG capsule  Commonly known as: CeleBREX     fluticasone propionate 50 mcg/actuation nasal spray  Commonly known as: FLONASE  SHAKE LIQUID AND USE 2 SPRAYS(100 MCG) IN EACH NOSTRIL EVERY DAY     HYDROcodone-acetaminophen 7.5-325 mg per tablet  Commonly known as: NORCO     hydrOXYzine pamoate 50 MG Cap  Commonly known as: VISTARIL     LIDOcaine 5 %  Commonly known as: LIDODERM  Place 1 patch onto the skin once daily. Remove & Discard patch within 12 hours or as directed by MD     pantoprazole 40 MG tablet  Commonly known as: PROTONIX  Take 1 tablet (40 mg total) by mouth once daily.     pregabalin 150 MG capsule  Commonly known as: LYRICA               Where to Get Your Medications        These medications were sent to [id number] - [facility] [address] [place] [address] AT [address]  [address] [facility] [address]      Phone: [phone]   albuterol 90 mcg/actuation inhaler              No follow-ups on file. Total clinical care time was 50 min. The following issues were discussed:  Need for new oncologist and refusal to have imaging until that time, need for yearly labs, hepatology appointment, wheezing with cough, GERD symptoms, flu discussed, PSA discussed     Roly Flannery MD  Internal Medicine  [facility]  [facility]  [phone]

## 2023-02-14 NOTE — ASSESSMENT & PLAN NOTE
Noted on labs in care everywhere with hematology.  Decreased IgM noted on multiple labs.    · Continue heme f/u and discussed flu vaccine today.  Vaccines otherwise UTD

## 2023-02-14 NOTE — ASSESSMENT & PLAN NOTE
· ACP reviewed 08/2021  ·  Yearly labs (rest )  To be completed today.  discussed PSA and pt agrees to check  · Flu vaccine discussed, will wait due to patient's wheezing.

## 2023-02-14 NOTE — ASSESSMENT & PLAN NOTE
Seen by PM and s/p injection and also lumbar CSI.  Initially not better, but has improved.    · Will albin.  Continue home exercises and lyrica

## 2023-02-14 NOTE — ASSESSMENT & PLAN NOTE
"Noted on CT of chest in [place] 11/2018:  "Minimal bibasilar bronchiectasis ".  Wheezing today but no systemic c/o.  No SOB/F/C.  occasional productive green cough.    · CXR today  · Albuterol as needed.  Pt watched HFA video.      "

## 2023-02-14 NOTE — ASSESSMENT & PLAN NOTE
Indication for high-dose statin with increased ACC risk score.  Patient with myalgias associated with statin use in the past.  · Lipids today.  Will consider starting meds agaif based on lab results

## 2023-02-14 NOTE — ASSESSMENT & PLAN NOTE
"On Ct 11/29/2018 in [place]: "Scattered calcified atheromatous disease of the aortic arch"   · Asymptomatic, will follow.  Will check labs and consider starting statin.    "

## 2023-02-14 NOTE — ASSESSMENT & PLAN NOTE
Patient treated for H pylori.  Patient was to increased BP I to b.i.d. for 8 weeks. Pt continues to use PPI once and twice daily based on symptoms which are ongoing.   Overall much better.  · Hold PPI for 14 days and will repeat stool for H pylori and if negative and continued symptoms, back to GI

## 2023-02-15 ENCOUNTER — LAB VISIT (OUTPATIENT)
Dept: LAB | Facility: HOSPITAL | Age: 68
End: 2023-02-15
Attending: INTERNAL MEDICINE
Payer: MEDICARE

## 2023-02-15 DIAGNOSIS — K21.9 GASTROESOPHAGEAL REFLUX DISEASE, UNSPECIFIED WHETHER ESOPHAGITIS PRESENT: ICD-10-CM

## 2023-02-15 PROCEDURE — 87338 HPYLORI STOOL AG IA: CPT | Mod: HCNC | Performed by: INTERNAL MEDICINE

## 2023-02-15 RX ORDER — ATORVASTATIN CALCIUM 10 MG/1
10 TABLET, FILM COATED ORAL DAILY
Qty: 90 TABLET | Refills: 3 | Status: SHIPPED | OUTPATIENT
Start: 2023-02-15 | End: 2023-04-18

## 2023-02-16 NOTE — ASSESSMENT & PLAN NOTE
Followed by pain management.  Abnormal findings noted on MRI.  Initially after epidurals, patient felt symptoms were not improved.  Patient does note that pain with ambulation continues to be present, but has improved.  Patient does continue to do home exercises.  · Continue home exercises.  Consider going back to pain management for further procedures should pain began to worsen again.

## 2023-02-16 NOTE — ASSESSMENT & PLAN NOTE
Noted on ECHO at [facility]. 12/2013.  Patient does have bronchiectasis noted on imaging.  Exacerbation today.  · Asymptomatic.  Will follow.  No need for further imaging at this time.

## 2023-02-16 NOTE — ASSESSMENT & PLAN NOTE
Patient will be getting a new oncologist in the near future.  Patient chose not to have follow-up imaging as he wanted to wait for his new oncologist..  ARIANNA on last imaging.   Notes and labs reviewed in Care everywhere.  · Patient encouraged to reconsider waiting on imaging.  He states that his new oncologist will be seeing him soon.  He is to call with any questions or concerns.

## 2023-02-16 NOTE — ASSESSMENT & PLAN NOTE
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  No recent imaging.  Patient has appointment with new oncologist upcoming.  ·  Pt encouraged to consider needed imaging prior to oncology appointment.  He declines again at this time.  Encouraged follow-up with new oncologist.

## 2023-02-23 LAB
H PYLORI AG STL QL IA: NOT DETECTED
SPECIMEN SOURCE: NORMAL

## 2023-03-08 ENCOUNTER — TELEPHONE (OUTPATIENT)
Dept: PRIMARY CARE CLINIC | Facility: CLINIC | Age: 68
End: 2023-03-08
Payer: MEDICARE

## 2023-03-08 NOTE — TELEPHONE ENCOUNTER
----- Message from Roly Flannery MD sent at 2/15/2023  2:59 PM CST -----  Please call patient with results.  LDL increased.  I would like to try another cholesterol medication.  Will call in low-dose atorvastatin.  We have him let us know of any side effect of medication.  Normal thyroid function.  PSA within normal limits.  No evidence f diabetes on screening.

## 2023-03-08 NOTE — TELEPHONE ENCOUNTER
----- Message from Roly Flannery MD sent at 2/15/2023  2:53 PM CST -----  Please call patient with results.  Please remind him to not bring in this sample until he has been off of the pantoprazole for at least 14 days

## 2023-03-08 NOTE — TELEPHONE ENCOUNTER
Spoke to pt, he will  a fit kit (iFOBT kit) from [facility] in [facility]. He will turn in stool sample around 03/17/23. He did state he has been off of pantoprazole on 02/14/23 when he saw Dr Flannery.

## 2023-03-08 NOTE — TELEPHONE ENCOUNTER
----- Message from Roly Flannery MD sent at 2/15/2023  2:54 PM CST -----  Please call patient with results.  Negative for COVID

## 2023-03-08 NOTE — TELEPHONE ENCOUNTER
----- Message from Roly Flannery MD sent at 2/17/2023 12:00 PM CST -----  Please check to see if he brought in this stool sample.  He was instructed to wait 2 weeks off his PPI before bringing this is.

## 2023-03-26 DIAGNOSIS — R19.5 LOOSE STOOLS: Primary | ICD-10-CM

## 2023-03-26 DIAGNOSIS — A04.8 H. PYLORI INFECTION: ICD-10-CM

## 2023-04-14 ENCOUNTER — LAB VISIT (OUTPATIENT)
Dept: LAB | Facility: HOSPITAL | Age: 68
End: 2023-04-14
Attending: INTERNAL MEDICINE
Payer: MEDICARE

## 2023-04-14 DIAGNOSIS — B16.9 ACUTE VIRAL HEPATITIS B WITHOUT COMA AND WITHOUT DELTA AGENT: ICD-10-CM

## 2023-04-14 DIAGNOSIS — B19.10 HEPATITIS B INFECTION WITHOUT DELTA AGENT WITHOUT HEPATIC COMA, UNSPECIFIED CHRONICITY: ICD-10-CM

## 2023-04-14 LAB
ALBUMIN SERPL BCP-MCNC: 4 G/DL (ref 3.5–5.2)
ALP SERPL-CCNC: 62 U/L (ref 55–135)
ALT SERPL W/O P-5'-P-CCNC: 12 U/L (ref 10–44)
AST SERPL-CCNC: 18 U/L (ref 10–40)
BILIRUB DIRECT SERPL-MCNC: 0.2 MG/DL (ref 0.1–0.3)
BILIRUB SERPL-MCNC: 0.5 MG/DL (ref 0.1–1)
HBV SURFACE AB SER-ACNC: 93.18 MIU/ML
HBV SURFACE AB SER-ACNC: REACTIVE M[IU]/ML
HBV SURFACE AG SERPL QL IA: NORMAL
PROT SERPL-MCNC: 6.8 G/DL (ref 6–8.4)

## 2023-04-14 PROCEDURE — 87517 HEPATITIS B DNA QUANT: CPT | Mod: HCNC | Performed by: INTERNAL MEDICINE

## 2023-04-14 PROCEDURE — 80076 HEPATIC FUNCTION PANEL: CPT | Mod: HCNC | Performed by: INTERNAL MEDICINE

## 2023-04-14 PROCEDURE — 87340 HEPATITIS B SURFACE AG IA: CPT | Mod: HCNC | Performed by: INTERNAL MEDICINE

## 2023-04-14 PROCEDURE — 86706 HEP B SURFACE ANTIBODY: CPT | Mod: 91,HCNC | Performed by: INTERNAL MEDICINE

## 2023-04-14 PROCEDURE — 36415 COLL VENOUS BLD VENIPUNCTURE: CPT | Mod: HCNC | Performed by: INTERNAL MEDICINE

## 2023-04-18 ENCOUNTER — OFFICE VISIT (OUTPATIENT)
Dept: PRIMARY CARE CLINIC | Facility: CLINIC | Age: 68
End: 2023-04-18
Payer: MEDICARE

## 2023-04-18 VITALS
DIASTOLIC BLOOD PRESSURE: 78 MMHG | SYSTOLIC BLOOD PRESSURE: 126 MMHG | TEMPERATURE: 98 F | WEIGHT: 195.19 LBS | HEART RATE: 66 BPM | HEIGHT: 69 IN | OXYGEN SATURATION: 97 % | BODY MASS INDEX: 28.91 KG/M2

## 2023-04-18 DIAGNOSIS — M47.27 OSTEOARTHRITIS OF SPINE WITH RADICULOPATHY, LUMBOSACRAL REGION: ICD-10-CM

## 2023-04-18 DIAGNOSIS — E78.2 MIXED HYPERLIPIDEMIA: ICD-10-CM

## 2023-04-18 DIAGNOSIS — Z00.00 HEALTHCARE MAINTENANCE: ICD-10-CM

## 2023-04-18 DIAGNOSIS — K21.9 GASTROESOPHAGEAL REFLUX DISEASE WITHOUT ESOPHAGITIS: ICD-10-CM

## 2023-04-18 DIAGNOSIS — B18.1 CHRONIC VIRAL HEPATITIS B: ICD-10-CM

## 2023-04-18 DIAGNOSIS — K21.9 GASTROESOPHAGEAL REFLUX DISEASE, UNSPECIFIED WHETHER ESOPHAGITIS PRESENT: Primary | ICD-10-CM

## 2023-04-18 DIAGNOSIS — J47.9 BRONCHIECTASIS WITHOUT COMPLICATION: ICD-10-CM

## 2023-04-18 PROBLEM — R29.818 NEUROGENIC CLAUDICATION: Status: RESOLVED | Noted: 2021-08-23 | Resolved: 2023-04-18

## 2023-04-18 PROCEDURE — 3008F PR BODY MASS INDEX (BMI) DOCUMENTED: ICD-10-PCS | Mod: HCNC,CPTII,S$GLB, | Performed by: INTERNAL MEDICINE

## 2023-04-18 PROCEDURE — 1125F PR PAIN SEVERITY QUANTIFIED, PAIN PRESENT: ICD-10-PCS | Mod: HCNC,CPTII,S$GLB, | Performed by: INTERNAL MEDICINE

## 2023-04-18 PROCEDURE — 3078F PR MOST RECENT DIASTOLIC BLOOD PRESSURE < 80 MM HG: ICD-10-PCS | Mod: HCNC,CPTII,S$GLB, | Performed by: INTERNAL MEDICINE

## 2023-04-18 PROCEDURE — 99214 OFFICE O/P EST MOD 30 MIN: CPT | Mod: HCNC,S$GLB,, | Performed by: INTERNAL MEDICINE

## 2023-04-18 PROCEDURE — 1159F PR MEDICATION LIST DOCUMENTED IN MEDICAL RECORD: ICD-10-PCS | Mod: HCNC,CPTII,S$GLB, | Performed by: INTERNAL MEDICINE

## 2023-04-18 PROCEDURE — 3044F PR MOST RECENT HEMOGLOBIN A1C LEVEL <7.0%: ICD-10-PCS | Mod: HCNC,CPTII,S$GLB, | Performed by: INTERNAL MEDICINE

## 2023-04-18 PROCEDURE — 3288F PR FALLS RISK ASSESSMENT DOCUMENTED: ICD-10-PCS | Mod: HCNC,CPTII,S$GLB, | Performed by: INTERNAL MEDICINE

## 2023-04-18 PROCEDURE — 3044F HG A1C LEVEL LT 7.0%: CPT | Mod: HCNC,CPTII,S$GLB, | Performed by: INTERNAL MEDICINE

## 2023-04-18 PROCEDURE — 3074F SYST BP LT 130 MM HG: CPT | Mod: HCNC,CPTII,S$GLB, | Performed by: INTERNAL MEDICINE

## 2023-04-18 PROCEDURE — 1160F RVW MEDS BY RX/DR IN RCRD: CPT | Mod: HCNC,CPTII,S$GLB, | Performed by: INTERNAL MEDICINE

## 2023-04-18 PROCEDURE — 1157F PR ADVANCE CARE PLAN OR EQUIV PRESENT IN MEDICAL RECORD: ICD-10-PCS | Mod: HCNC,CPTII,S$GLB, | Performed by: INTERNAL MEDICINE

## 2023-04-18 PROCEDURE — 1160F PR REVIEW ALL MEDS BY PRESCRIBER/CLIN PHARMACIST DOCUMENTED: ICD-10-PCS | Mod: HCNC,CPTII,S$GLB, | Performed by: INTERNAL MEDICINE

## 2023-04-18 PROCEDURE — 1101F PT FALLS ASSESS-DOCD LE1/YR: CPT | Mod: HCNC,CPTII,S$GLB, | Performed by: INTERNAL MEDICINE

## 2023-04-18 PROCEDURE — 1157F ADVNC CARE PLAN IN RCRD: CPT | Mod: HCNC,CPTII,S$GLB, | Performed by: INTERNAL MEDICINE

## 2023-04-18 PROCEDURE — 1101F PR PT FALLS ASSESS DOC 0-1 FALLS W/OUT INJ PAST YR: ICD-10-PCS | Mod: HCNC,CPTII,S$GLB, | Performed by: INTERNAL MEDICINE

## 2023-04-18 PROCEDURE — 99214 PR OFFICE/OUTPT VISIT, EST, LEVL IV, 30-39 MIN: ICD-10-PCS | Mod: HCNC,S$GLB,, | Performed by: INTERNAL MEDICINE

## 2023-04-18 PROCEDURE — 3008F BODY MASS INDEX DOCD: CPT | Mod: HCNC,CPTII,S$GLB, | Performed by: INTERNAL MEDICINE

## 2023-04-18 PROCEDURE — 3288F FALL RISK ASSESSMENT DOCD: CPT | Mod: HCNC,CPTII,S$GLB, | Performed by: INTERNAL MEDICINE

## 2023-04-18 PROCEDURE — 3078F DIAST BP <80 MM HG: CPT | Mod: HCNC,CPTII,S$GLB, | Performed by: INTERNAL MEDICINE

## 2023-04-18 PROCEDURE — 1125F AMNT PAIN NOTED PAIN PRSNT: CPT | Mod: HCNC,CPTII,S$GLB, | Performed by: INTERNAL MEDICINE

## 2023-04-18 PROCEDURE — 3074F PR MOST RECENT SYSTOLIC BLOOD PRESSURE < 130 MM HG: ICD-10-PCS | Mod: HCNC,CPTII,S$GLB, | Performed by: INTERNAL MEDICINE

## 2023-04-18 PROCEDURE — 1159F MED LIST DOCD IN RCRD: CPT | Mod: HCNC,CPTII,S$GLB, | Performed by: INTERNAL MEDICINE

## 2023-04-18 RX ORDER — ROSUVASTATIN CALCIUM 5 MG/1
5 TABLET, COATED ORAL DAILY
Qty: 90 TABLET | Refills: 3 | Status: SHIPPED | OUTPATIENT
Start: 2023-04-18 | End: 2024-03-13

## 2023-04-18 NOTE — ASSESSMENT & PLAN NOTE
Patient followed by hepatology.  Vemlidy in the past.  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  · Patient instructed to contact hepatology if he should need to restart Rituxan with high risk of reactivation.  · Continue follow-up with hepatology.

## 2023-04-18 NOTE — ASSESSMENT & PLAN NOTE
"Noted on CT of chest in [place] 11/2018:  "Minimal bibasilar bronchiectasis ".  Wheezing at last visit resolved with use of albuterol.  No further productive cough.  Chest x-ray at last visit without concerning findings.  · Episode of reactive airway likely associated with viral illness.  Patient to call with any symptoms.  Pathology discussed today.  "

## 2023-04-18 NOTE — PROGRESS NOTES
.  This note has been generated using voice-recognition software. There may be typographical errors that have been missed during proof-reading.     Primary Care Provider Appointment    Subjective:      Patient ID: Jeff Hope is a 67 y.o. male here for follow up.     Chief Complaint: Follow-up    HPI:  This is a pleasant 67-year-old male with bronchiectasis, hyperlipidemia, history of pulmonary embolism on chronic anticoagulation with a follicular non-Hodgkin's lymphoma, acute/reactivated hepatitis B here for f/u.  Pt last seen 14/2023.  03/06/2023 patient seen by outside oncologist.  CT chest abdomen pelvis ordered.    Pt notes R sciatic discomfort for about 1 month with driving only.  Epidurals in the past for the L.  Did not help.  Doing exercises daily for sciatic from PT.  Not an issue when a passenger.    No further wheezing after using alb.  No longer taking.    Still having heartburn symptoms.  Did not stop PPI prior to h pylori testing which was neg.  Brought in another sample but did not get a results.  Not taking PPI now and ongoing symptoms.      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    Pulmonary hypertension    Myalgia due to statin    Vitamin D deficiency    Abnormal MMSE    Allergic rhinitis    Adrenal hypertrophy    Bilateral shoulder bursitis    Chronic viral hepatitis B    Trigger finger of left thumb    Bilateral shoulder pain    Chronic thumb pain, bilateral    Breast mass, right    Peyronie's disease    Hypotestosteronemia in male    Iron deficiency anemia    Gastroesophageal reflux disease without esophagitis    Myofascial pain    Osteoarthritis of spine with radiculopathy, lumbosacral region    Decreased range of motion of lumbar spine    Hypogammaglobulinemia     Sacroiliitis        Past Surgical History:   Procedure Laterality Date    ESOPHAGOGASTRODUODENOSCOPY N/A 11/29/2021    Procedure: ESOPHAGOGASTRODUODENOSCOPY (EGD);  Surgeon: Kristine Palmer MD;  Location: [facility] ENDO (4TH FLR);  Service: Endoscopy;  Laterality: N/A;  fully vacc-inst mail-tb-left chest port    GROIN EXPLORATION  2 pre 2005 and 1 post 2005    total of 3 procedures    INJECTION OF ANESTHETIC AGENT AROUND NERVE Left 11/8/2022    Procedure: BLOCK, NERVE, LEFT L3-L5 MEDIAL BRANCH;  Surgeon: Melody Berry MD;  Location: [facility] PAIN MGT;  Service: Pain Management;  Laterality: Left;    INJECTION, SACROILIAC JOINT Left 10/11/2022    Procedure: INJECTION,SACROILIAC JOINT LEFT CONTRAST;  Surgeon: Melody Berry MD;  Location: [facility] PAIN MGT;  Service: Pain Management;  Laterality: Left;    LAPAROSCOPIC CHOLECYSTECTOMY  05/2018    SHOULDER SURGERY Left        Past Medical History:   Diagnosis Date    History of pulmonary embolus (PE) 4/15/2019    Pt with PE s/p hospitalization.  He was placed on xarelto with high risk with cancer.  He has been out of the medication for the past month. Restart Xarelto 15 mg b.i.d. for 21 days, then 20 mg q.day until cancer no longer active.    Non Hodgkin's lymphoma     Personal history of colonic polyps 09/08/2015    per [facility] report - repeat 9/2020    Peyronie disease     S/P laparoscopic cholecystectomy 5/27/2019       Social History     Socioeconomic History    Marital status:     Number of children: 2   Occupational History    Occupation: disabled [employer]    Tobacco Use    Smoking status: Never    Smokeless tobacco: Never   Substance and Sexual Activity    Alcohol use: No    Drug use: No    Sexual activity: Yes     Partners: Female   Social History Narrative    Lives with wife.         Review of Systems    PHQ9 12/10/2019   Total Score 3        Checklist of Daily Activities:        Objective:   /78 (BP Location: Left arm, Patient Position: Sitting,  "BP Method: Medium (Manual))   Pulse 66   Temp 98.3 °F (36.8 °C) (Oral)   Ht 5' 9" (1.753 m)   Wt 88.6 kg (195 lb 3.5 oz)   SpO2 97%   BMI 28.83 kg/m²     Physical Exam  Constitutional:       General: He is not in acute distress.     Appearance: Normal appearance. He is not ill-appearing, toxic-appearing or diaphoretic.   HENT:      Head: Normocephalic and atraumatic.   Cardiovascular:      Rate and Rhythm: Normal rate and regular rhythm.      Heart sounds: Normal heart sounds.   Pulmonary:      Effort: Pulmonary effort is normal.      Breath sounds: Normal breath sounds.   Abdominal:      Palpations: Abdomen is soft.      Tenderness: There is no abdominal tenderness. There is no guarding or rebound.   Musculoskeletal:      Right lower leg: No edema.      Left lower leg: No edema.   Lymphadenopathy:      Cervical: No cervical adenopathy.   Neurological:      Mental Status: He is alert.           Lab Results   Component Value Date    WBC 6.44 10/14/2022    HGB 15.3 10/14/2022    HCT 46.0 10/14/2022     10/14/2022    CHOL 212 (H) 02/14/2023    TRIG 111 02/14/2023    HDL 57 02/14/2023    ALT 12 04/14/2023    AST 18 04/14/2023     10/14/2022     10/14/2022    K 3.6 10/14/2022    K 3.6 10/14/2022     10/14/2022     10/14/2022    CREATININE 1.1 10/14/2022    CREATININE 1.1 10/14/2022    BUN 16 10/14/2022    BUN 16 10/14/2022    CO2 29 10/14/2022    CO2 29 10/14/2022    TSH 0.674 02/14/2023    PSA 0.69 02/14/2023    INR 1.0 10/14/2022    HGBA1C 5.4 02/14/2023       Current Outpatient Medications on File Prior to Visit   Medication Sig Dispense Refill    amitriptyline (ELAVIL) 25 MG tablet Take 25 mg by mouth nightly.      celecoxib (CELEBREX) 200 MG capsule TK ONE C PO BID      fluticasone propionate (FLONASE) 50 mcg/actuation nasal spray SHAKE LIQUID AND USE 2 SPRAYS(100 MCG) IN EACH NOSTRIL EVERY DAY 48 g 3    HYDROcodone-acetaminophen (NORCO) 7.5-325 mg per tablet Take 1 tablet by " "mouth every 8 (eight) hours as needed.      hydrOXYzine pamoate (VISTARIL) 50 MG Cap Take 50 mg by mouth.      LIDOcaine (LIDODERM) 5 % Place 1 patch onto the skin once daily. Remove & Discard patch within 12 hours or as directed by MD 7 patch 0    pregabalin (LYRICA) 150 MG capsule Take 150 mg by mouth once daily.      [DISCONTINUED] albuterol (VENTOLIN HFA) 90 mcg/actuation inhaler Inhale 2 puffs into the lungs every 6 (six) hours as needed for Wheezing. Rescue 18 g 1    [DISCONTINUED] atorvastatin (LIPITOR) 10 MG tablet Take 1 tablet (10 mg total) by mouth once daily. 90 tablet 3    pantoprazole (PROTONIX) 40 MG tablet Take 1 tablet (40 mg total) by mouth once daily. 90 tablet 0     Current Facility-Administered Medications on File Prior to Visit   Medication Dose Route Frequency Provider Last Rate Last Admin    0.9%  NaCl infusion  500 mL Intravenous Continuous Amado Parekh MD             Assessment:   67 y.o. male with multiple co-morbid illnesses here for continued follow up of medical problems.      Plan:     Problem List Items Addressed This Visit          Neuro    Osteoarthritis of spine with radiculopathy, lumbosacral region     Epidural in the past for left-sided symptoms.  Not effective.  Patient now notes right-sided symptoms.  Only associated with driving.  Not present when he has passenger in the car.  On review patient notes that he puts his car seat very far back with driving.  Patient does continue to do exercises given to him by physical therapy for sciatic discomfort in the past.  Discussed importance of posture.  Pathology reviewed.  Patient will put his see closer and if this is not effective will obtain a sciatic seat cushion to improve spinal positioning.  He will call if this is not effective and will send him back to physical therapy.            Pulmonary    Bronchiectasis     Noted on CT of chest in [place] 11/2018:  "Minimal bibasilar bronchiectasis ".  Wheezing at last visit resolved with " use of albuterol.  No further productive cough.  Chest x-ray at last visit without concerning findings.  Episode of reactive airway likely associated with viral illness.  Patient to call with any symptoms.  Pathology discussed today.            Cardiac/Vascular    Mixed hyperlipidemia     Reviewed with hepatology in the past.  No contraindication to statin trial.  Last LFTs within normal limits.  Patient given atorvastatin, noted myalgias.  Intolerance of Crestor in the past.  Importance of statins discussed.  Patient wishes to retry Crestor.  Will evaluate again at next visit.  Patient to call with any side effects.            GI    Chronic viral hepatitis B     Patient followed by hepatology.  Vemlidy in the past.  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  Patient instructed to contact hepatology if he should need to restart Rituxan with high risk of reactivation.  Continue follow-up with hepatology.         Gastroesophageal reflux disease without esophagitis     Treated for H pylori in the past.  Brought H pylori stamps full but was continuing to take PPI.  Currently off PPI.  Occasional symptoms.  Repeat H pylori testing now.  Patient okay to restart PPI after testing.  If continued symptoms on once daily PPI, back to GI for evaluation.            Other    Healthcare maintenance     ACP reviewed 08/2021   Yearly labs with PSA 02/2023              Other Visit Diagnoses       Gastroesophageal reflux disease, unspecified whether esophagitis present    -  Primary    Relevant Orders    H. pylori antigen, stool            Health Maintenance         Date Due Completion Date    Influenza Vaccine (1) 09/01/2022 12/21/2021    Colorectal Cancer Screening 05/18/2024 5/18/2021    Override on 5/18/2021: Done (repeat 3 year)    Pneumococcal Vaccines (Age 65+) (3 - PPSV23 if available, else PCV20) 08/18/2025 8/18/2020    Hemoglobin A1c (Diabetic Prevention Screening) 02/14/2026 2/14/2023    Lipid Panel  02/14/2028 2/14/2023    TETANUS VACCINE 06/15/2030 6/15/2020          Medications Reconciliation:   I have not reconciled the patient's home medications with the patient/family. I have updated all changes.  Refer to After-Visit Medication List.      Medication List            Accurate as of April 18, 2023 10:18 AM. If you have any questions, ask your nurse or doctor.                START taking these medications      rosuvastatin 5 MG tablet  Commonly known as: CRESTOR  Take 1 tablet (5 mg total) by mouth once daily.  Started by: Roly Flannery MD            CONTINUE taking these medications      amitriptyline 25 MG tablet  Commonly known as: ELAVIL     celecoxib 200 MG capsule  Commonly known as: CeleBREX     fluticasone propionate 50 mcg/actuation nasal spray  Commonly known as: FLONASE  SHAKE LIQUID AND USE 2 SPRAYS(100 MCG) IN EACH NOSTRIL EVERY DAY     HYDROcodone-acetaminophen 7.5-325 mg per tablet  Commonly known as: NORCO     hydrOXYzine pamoate 50 MG Cap  Commonly known as: VISTARIL     LIDOcaine 5 %  Commonly known as: LIDODERM  Place 1 patch onto the skin once daily. Remove & Discard patch within 12 hours or as directed by MD     pantoprazole 40 MG tablet  Commonly known as: PROTONIX  Take 1 tablet (40 mg total) by mouth once daily.     pregabalin 150 MG capsule  Commonly known as: LYRICA            STOP taking these medications      albuterol 90 mcg/actuation inhaler  Commonly known as: VENTOLIN HFA  Stopped by: Roly Flannery MD     atorvastatin 10 MG tablet  Commonly known as: LIPITOR  Stopped by: Roly Flannery MD               Where to Get Your Medications        These medications were sent to [id number] - [place], [place] - [address] AT [facility] [address]  [address], [facility] [address]      Phone: [phone]   rosuvastatin 5 MG tablet              Follow up in about 3 months (around 7/18/2023). Total clinical care time was 35 min.  The following issues were discussed:  Sciatic symptoms and pathology, labs reviewed, need for statin, GERD symptoms     Roly Flannery MD  Internal Medicine  [facility]  [phone]

## 2023-04-18 NOTE — ASSESSMENT & PLAN NOTE
Reviewed with hepatology in the past.  No contraindication to statin trial.  Last LFTs within normal limits.  Patient given atorvastatin, noted myalgias.  Intolerance of Crestor in the past.  · Importance of statins discussed.  Patient wishes to retry Crestor.  Will evaluate again at next visit.  Patient to call with any side effects.

## 2023-04-18 NOTE — ASSESSMENT & PLAN NOTE
Treated for H pylori in the past.  Brought H pylori stamps full but was continuing to take PPI.  Currently off PPI.  Occasional symptoms.  · Repeat H pylori testing now.  Patient okay to restart PPI after testing.  If continued symptoms on once daily PPI, back to GI for evaluation.

## 2023-04-18 NOTE — ASSESSMENT & PLAN NOTE
Epidural in the past for left-sided symptoms.  Not effective.  Patient now notes right-sided symptoms.  Only associated with driving.  Not present when he has passenger in the car.  On review patient notes that he puts his car seat very far back with driving.  Patient does continue to do exercises given to him by physical therapy for sciatic discomfort in the past.  · Discussed importance of posture.  Pathology reviewed.  Patient will put his see closer and if this is not effective will obtain a sciatic seat cushion to improve spinal positioning.  He will call if this is not effective and will send him back to physical therapy.

## 2023-04-24 ENCOUNTER — LAB VISIT (OUTPATIENT)
Dept: LAB | Facility: HOSPITAL | Age: 68
End: 2023-04-24
Attending: INTERNAL MEDICINE
Payer: MEDICARE

## 2023-04-24 DIAGNOSIS — K21.9 GASTROESOPHAGEAL REFLUX DISEASE, UNSPECIFIED WHETHER ESOPHAGITIS PRESENT: ICD-10-CM

## 2023-04-24 PROCEDURE — 87338 HPYLORI STOOL AG IA: CPT | Mod: HCNC | Performed by: INTERNAL MEDICINE

## 2023-04-25 ENCOUNTER — PATIENT MESSAGE (OUTPATIENT)
Dept: RESEARCH | Facility: HOSPITAL | Age: 68
End: 2023-04-25
Payer: MEDICARE

## 2023-05-02 ENCOUNTER — PATIENT MESSAGE (OUTPATIENT)
Dept: RESEARCH | Facility: HOSPITAL | Age: 68
End: 2023-05-02
Payer: MEDICARE

## 2023-05-02 LAB
H PYLORI AG STL QL IA: NOT DETECTED
SPECIMEN SOURCE: NORMAL

## 2023-05-03 ENCOUNTER — TELEPHONE (OUTPATIENT)
Dept: PRIMARY CARE CLINIC | Facility: CLINIC | Age: 68
End: 2023-05-03
Payer: MEDICARE

## 2023-05-03 NOTE — TELEPHONE ENCOUNTER
----- Message from Roly Flannery MD sent at 5/3/2023  1:18 PM CDT -----  Please call patient with results.  negative

## 2023-05-16 ENCOUNTER — PATIENT MESSAGE (OUTPATIENT)
Dept: RESEARCH | Facility: HOSPITAL | Age: 68
End: 2023-05-16
Payer: MEDICARE

## 2023-05-22 PROBLEM — Z00.00 HEALTHCARE MAINTENANCE: Status: RESOLVED | Noted: 2019-11-19 | Resolved: 2023-05-22

## 2023-07-17 NOTE — PROGRESS NOTES
.  This note has been generated using voice-recognition software. There may be typographical errors that have been missed during proof-reading.     Primary Care Provider Appointment    Subjective:      Patient ID: Jeff Hope is a 67 y.o. male here for follow up.     Chief Complaint: Dizziness and Extremity Weakness  HPI:  This is a pleasant 67-year-old male with bronchiectasis, hyperlipidemia, history of pulmonary embolism on chronic anticoagulation with a follicular non-Hodgkin's lymphoma, acute/reactivated hepatitis B here for f/u.  Pt last seen 04/18/2023      Pt with 2 months of L buttock pain.  Using meds/exercising/ice/heat and better but still present.  Pt notes pain in arm radiating biceps down anterior arm.  Cramping pain.  Can happen anytime but not at night when sleeping.  Not associated with lifting or movements.  Sudden onset and keep still and resolved spontaneously.    Dizziness has occurred recently.  Happened in the past.  Sudden onset.  Not associated with URI.  Present for months.  Lasting 2-3 minutes.  Unsure of trigger.  Resolves with sitting.  3lb wt loss.  Starts that he is drinking plenty of water.    GERD is doing well.  No meds.  Occasional TUMS.  Not once weekly.    Taking crestor and no SE.    Sees PM for worker's comp for shoulder pain and neck.  .    Not interested in COVID booster.    Constipation with some BRBPR.  On narcotics but has not had this issue in the past.          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    Pulmonary hypertension    Myalgia due to statin    Vitamin D deficiency    Abnormal MMSE    Allergic rhinitis    Adrenal hypertrophy    Bilateral shoulder bursitis    Chronic viral hepatitis B    Trigger finger of left thumb    Bilateral shoulder  pain    Chronic thumb pain, bilateral    Breast mass, right    Peyronie's disease    Hypotestosteronemia in male    Iron deficiency anemia    Gastroesophageal reflux disease without esophagitis    Myofascial pain    Osteoarthritis of spine with radiculopathy, lumbosacral region    Decreased range of motion of lumbar spine    Hypogammaglobulinemia    Sacroiliitis    Dizziness    Pain of right upper extremity        Past Surgical History:   Procedure Laterality Date    ESOPHAGOGASTRODUODENOSCOPY N/A 11/29/2021    Procedure: ESOPHAGOGASTRODUODENOSCOPY (EGD);  Surgeon: Kristine Palmer MD;  [facility]  Service: Endoscopy;  Laterality: N/A;  fully vacc-inst mail-tb-left chest port    GROIN EXPLORATION  2 pre 2005 and 1 post 2005    total of 3 procedures    INJECTION OF ANESTHETIC AGENT AROUND NERVE Left 11/8/2022    Procedure: BLOCK, NERVE, LEFT L3-L5 MEDIAL BRANCH;  Surgeon: Melody Berry MD;  Location: [facility] PAIN MGT;  Service: Pain Management;  Laterality: Left;    INJECTION, SACROILIAC JOINT Left 10/11/2022    Procedure: INJECTION,SACROILIAC JOINT LEFT CONTRAST;  Surgeon: Melody Berry MD;  Location: [facility] PAIN [facility]GT;  Service: Pain Management;  Laterality: Left;    LAPAROSCOPIC CHOLECYSTECTOMY  05/2018    SHOULDER SURGERY Left        Past Medical History:   Diagnosis Date    History of pulmonary embolus (PE) 4/15/2019    Pt with PE s/p hospitalization.  He was placed on xarelto with high risk with cancer.  He has been out of the medication for the past month. Restart Xarelto 15 mg b.i.d. for 21 days, then 20 mg q.day until cancer no longer active.    Non Hodgkin's lymphoma     Personal history of colonic polyps 09/08/2015    per [facility] report - repeat 9/2020    Peyronie disease     S/P laparoscopic cholecystectomy 5/27/2019       Social History     Socioeconomic History    Marital status:     Number of children: 2   Occupational History    Occupation: disabled [employer]   "  Tobacco Use    Smoking status: Never    Smokeless tobacco: Never   Substance and Sexual Activity    Alcohol use: No    Drug use: No    Sexual activity: Yes     Partners: Female   Social History Narrative    Lives with wife.         Review of Systems    PHQ9 12/10/2019   Total Score 3        Checklist of Daily Activities:        Objective:   /78 (BP Location: Left arm, Patient Position: Sitting, BP Method: Large (Manual))   Pulse 74   Temp 98.5 °F (36.9 °C) (Oral)   Ht 5' 9" (1.753 m)   Wt 86.9 kg (191 lb 9.3 oz)   SpO2 99%   BMI 28.29 kg/m²     Physical Exam  Constitutional:       General: He is not in acute distress.     Appearance: Normal appearance. He is not ill-appearing, toxic-appearing or diaphoretic.   HENT:      Head: Normocephalic and atraumatic.      Right Ear: Ear canal normal. A middle ear effusion is present. Tympanic membrane is not injected, erythematous, retracted or bulging.      Left Ear: Ear canal normal.   Cardiovascular:      Rate and Rhythm: Normal rate and regular rhythm.      Heart sounds: Normal heart sounds.   Pulmonary:      Effort: Pulmonary effort is normal.      Breath sounds: Normal breath sounds.   Abdominal:      Palpations: Abdomen is soft.      Tenderness: There is no abdominal tenderness. There is no guarding or rebound.   Musculoskeletal:      Right lower leg: No edema.      Left lower leg: No edema.   Lymphadenopathy:      Cervical: No cervical adenopathy.   Neurological:      Mental Status: He is alert.           Lab Results   Component Value Date    WBC 6.44 10/14/2022    HGB 15.3 10/14/2022    HCT 46.0 10/14/2022     10/14/2022    CHOL 212 (H) 02/14/2023    TRIG 111 02/14/2023    HDL 57 02/14/2023    ALT 12 04/14/2023    AST 18 04/14/2023     10/14/2022     10/14/2022    K 3.6 10/14/2022    K 3.6 10/14/2022     10/14/2022     10/14/2022    CREATININE 1.1 10/14/2022    CREATININE 1.1 10/14/2022    BUN 16 10/14/2022    BUN 16 " 10/14/2022    CO2 29 10/14/2022    CO2 29 10/14/2022    TSH 0.674 02/14/2023    PSA 0.69 02/14/2023    INR 1.0 10/14/2022    HGBA1C 5.4 02/14/2023       Current Outpatient Medications on File Prior to Visit   Medication Sig Dispense Refill    celecoxib (CELEBREX) 200 MG capsule TK ONE C PO BID      HYDROcodone-acetaminophen (NORCO) 7.5-325 mg per tablet Take 1 tablet by mouth every 8 (eight) hours as needed.      pregabalin (LYRICA) 150 MG capsule Take 150 mg by mouth once daily.      rosuvastatin (CRESTOR) 5 MG tablet Take 1 tablet (5 mg total) by mouth once daily. 90 tablet 3    [DISCONTINUED] amitriptyline (ELAVIL) 10 MG tablet Take 10 mg by mouth every evening.      [DISCONTINUED] hydrOXYzine pamoate (VISTARIL) 50 MG Cap Take 50 mg by mouth.      [DISCONTINUED] LIDOcaine (LIDODERM) 5 % Place 1 patch onto the skin once daily. Remove & Discard patch within 12 hours or as directed by MD 7 patch 0    amitriptyline (ELAVIL) 25 MG tablet Take 25 mg by mouth nightly.      [DISCONTINUED] fluticasone propionate (FLONASE) 50 mcg/actuation nasal spray SHAKE LIQUID AND USE 2 SPRAYS(100 MCG) IN EACH NOSTRIL EVERY DAY (Patient not taking: Reported on 7/18/2023) 48 g 3    [DISCONTINUED] pantoprazole (PROTONIX) 40 MG tablet Take 1 tablet (40 mg total) by mouth once daily. (Patient not taking: Reported on 7/18/2023) 90 tablet 0     Current Facility-Administered Medications on File Prior to Visit   Medication Dose Route Frequency Provider Last Rate Last Admin    0.9%  NaCl infusion  500 mL Intravenous Continuous Amado Parekh MD             Assessment:   67 y.o. male with multiple co-morbid illnesses here for continued follow up of medical problems.      Plan:     Problem List Items Addressed This Visit          Neuro    Osteoarthritis of spine with radiculopathy, lumbosacral region     Pt states that 2 epidurals were effective.  If so, would qualify for CSI.    Back to PM here for eval  Toradol IM today            Orthopedic     Pain of right upper extremity     Pain in R bicep with radiation down anterior arm.  Appears to be in T1 dermatome.  Followed by PM here for LBP and PM with workers comp for neck and shoulder pain.    Pt to d/w his outside PM MD.    torodol IM today  Continue celebrex, hydrocodone, elavil, lyrica from PM             Other    Healthcare maintenance - Primary     ACP reviewed 08/2021   Yearly labs with PSA 02/2023  Refusing covid booster today  optometry             Relevant Orders    Ambulatory referral/consult to Optometry    Dizziness     Likely associated with ear fullness and JANEEN.    Restart flonase            Health Maintenance         Date Due Completion Date    COVID-19 Vaccine (7 - Moderna series) 10/09/2022 6/9/2022    Influenza Vaccine (1) 09/01/2023 12/21/2021    Colorectal Cancer Screening 05/18/2024 5/18/2021    Override on 5/18/2021: Done (repeat 3 year)    Pneumococcal Vaccines (Age 65+) (3 - PPSV23 if available, else PCV20) 08/18/2025 8/18/2020    Hemoglobin A1c (Diabetic Prevention Screening) 02/14/2026 2/14/2023    Lipid Panel 02/14/2028 2/14/2023    TETANUS VACCINE 06/15/2030 6/15/2020          Medications Reconciliation:   I have reconciled the patient's home medications with the patient/family. I have updated all changes.  Refer to After-Visit Medication List.      Medication List            Accurate as of July 18, 2023  8:53 AM. If you have any questions, ask your nurse or doctor.                CHANGE how you take these medications      amitriptyline 25 MG tablet  Commonly known as: ELAVIL  What changed: Another medication with the same name was removed. Continue taking this medication, and follow the directions you see here.  Changed by: Roly Flannery MD            CONTINUE taking these medications      celecoxib 200 MG capsule  Commonly known as: CeleBREX     fluticasone propionate 50 mcg/actuation nasal spray  Commonly known as: FLONASE  SHAKE LIQUID AND USE 2 SPRAYS(100 MCG) IN EACH  NOSTRIL EVERY DAY     HYDROcodone-acetaminophen 7.5-325 mg per tablet  Commonly known as: NORCO     pregabalin 150 MG capsule  Commonly known as: LYRICA     rosuvastatin 5 MG tablet  Commonly known as: CRESTOR  Take 1 tablet (5 mg total) by mouth once daily.            STOP taking these medications      hydrOXYzine pamoate 50 MG Cap  Commonly known as: VISTARIL  Stopped by: Roly Flannery MD     LIDOcaine 5 %  Commonly known as: LIDODERM  Stopped by: Roly Flannery MD     pantoprazole 40 MG tablet  Commonly known as: PROTONIX  Stopped by: Roly Flannery MD               Where to Get Your Medications        These medications were sent to [id number] - [facility] [address]  [address] [place] [address]      Phone: [phone]   fluticasone propionate 50 mcg/actuation nasal spray              Follow up in about 4 months (around 11/18/2023). Total clinical care time was 40 min. The following issues were discussed: dizziness, pain  in biceps and LBP, gerd, review of last labs and med rec, refusing covid     Roly Flannery MD  Internal Medicine  [facility]  [phone]

## 2023-07-18 ENCOUNTER — TELEPHONE (OUTPATIENT)
Dept: PRIMARY CARE CLINIC | Facility: CLINIC | Age: 68
End: 2023-07-18

## 2023-07-18 ENCOUNTER — TELEPHONE (OUTPATIENT)
Dept: OPTOMETRY | Facility: CLINIC | Age: 68
End: 2023-07-18
Payer: MEDICARE

## 2023-07-18 ENCOUNTER — OFFICE VISIT (OUTPATIENT)
Dept: PRIMARY CARE CLINIC | Facility: CLINIC | Age: 68
End: 2023-07-18
Payer: MEDICARE

## 2023-07-18 VITALS
HEART RATE: 74 BPM | DIASTOLIC BLOOD PRESSURE: 78 MMHG | SYSTOLIC BLOOD PRESSURE: 128 MMHG | TEMPERATURE: 99 F | HEIGHT: 69 IN | WEIGHT: 191.56 LBS | OXYGEN SATURATION: 99 % | BODY MASS INDEX: 28.37 KG/M2

## 2023-07-18 DIAGNOSIS — M47.27 OSTEOARTHRITIS OF SPINE WITH RADICULOPATHY, LUMBOSACRAL REGION: ICD-10-CM

## 2023-07-18 DIAGNOSIS — R42 DIZZINESS: ICD-10-CM

## 2023-07-18 DIAGNOSIS — Z00.00 HEALTHCARE MAINTENANCE: Primary | ICD-10-CM

## 2023-07-18 DIAGNOSIS — M79.601 PAIN OF RIGHT UPPER EXTREMITY: ICD-10-CM

## 2023-07-18 PROCEDURE — 3074F SYST BP LT 130 MM HG: CPT | Mod: HCNC,CPTII,S$GLB, | Performed by: INTERNAL MEDICINE

## 2023-07-18 PROCEDURE — 3044F HG A1C LEVEL LT 7.0%: CPT | Mod: HCNC,CPTII,S$GLB, | Performed by: INTERNAL MEDICINE

## 2023-07-18 PROCEDURE — 1159F MED LIST DOCD IN RCRD: CPT | Mod: HCNC,CPTII,S$GLB, | Performed by: INTERNAL MEDICINE

## 2023-07-18 PROCEDURE — 1160F PR REVIEW ALL MEDS BY PRESCRIBER/CLIN PHARMACIST DOCUMENTED: ICD-10-PCS | Mod: HCNC,CPTII,S$GLB, | Performed by: INTERNAL MEDICINE

## 2023-07-18 PROCEDURE — 1160F RVW MEDS BY RX/DR IN RCRD: CPT | Mod: HCNC,CPTII,S$GLB, | Performed by: INTERNAL MEDICINE

## 2023-07-18 PROCEDURE — 3288F FALL RISK ASSESSMENT DOCD: CPT | Mod: HCNC,CPTII,S$GLB, | Performed by: INTERNAL MEDICINE

## 2023-07-18 PROCEDURE — 1125F PR PAIN SEVERITY QUANTIFIED, PAIN PRESENT: ICD-10-PCS | Mod: HCNC,CPTII,S$GLB, | Performed by: INTERNAL MEDICINE

## 2023-07-18 PROCEDURE — 1101F PT FALLS ASSESS-DOCD LE1/YR: CPT | Mod: HCNC,CPTII,S$GLB, | Performed by: INTERNAL MEDICINE

## 2023-07-18 PROCEDURE — 3078F DIAST BP <80 MM HG: CPT | Mod: HCNC,CPTII,S$GLB, | Performed by: INTERNAL MEDICINE

## 2023-07-18 PROCEDURE — 3044F PR MOST RECENT HEMOGLOBIN A1C LEVEL <7.0%: ICD-10-PCS | Mod: HCNC,CPTII,S$GLB, | Performed by: INTERNAL MEDICINE

## 2023-07-18 PROCEDURE — 3288F PR FALLS RISK ASSESSMENT DOCUMENTED: ICD-10-PCS | Mod: HCNC,CPTII,S$GLB, | Performed by: INTERNAL MEDICINE

## 2023-07-18 PROCEDURE — 1157F ADVNC CARE PLAN IN RCRD: CPT | Mod: HCNC,CPTII,S$GLB, | Performed by: INTERNAL MEDICINE

## 2023-07-18 PROCEDURE — 3008F PR BODY MASS INDEX (BMI) DOCUMENTED: ICD-10-PCS | Mod: HCNC,CPTII,S$GLB, | Performed by: INTERNAL MEDICINE

## 2023-07-18 PROCEDURE — 99215 PR OFFICE/OUTPT VISIT, EST, LEVL V, 40-54 MIN: ICD-10-PCS | Mod: HCNC,S$GLB,, | Performed by: INTERNAL MEDICINE

## 2023-07-18 PROCEDURE — 3078F PR MOST RECENT DIASTOLIC BLOOD PRESSURE < 80 MM HG: ICD-10-PCS | Mod: HCNC,CPTII,S$GLB, | Performed by: INTERNAL MEDICINE

## 2023-07-18 PROCEDURE — 3074F PR MOST RECENT SYSTOLIC BLOOD PRESSURE < 130 MM HG: ICD-10-PCS | Mod: HCNC,CPTII,S$GLB, | Performed by: INTERNAL MEDICINE

## 2023-07-18 PROCEDURE — 1125F AMNT PAIN NOTED PAIN PRSNT: CPT | Mod: HCNC,CPTII,S$GLB, | Performed by: INTERNAL MEDICINE

## 2023-07-18 PROCEDURE — 1159F PR MEDICATION LIST DOCUMENTED IN MEDICAL RECORD: ICD-10-PCS | Mod: HCNC,CPTII,S$GLB, | Performed by: INTERNAL MEDICINE

## 2023-07-18 PROCEDURE — 99215 OFFICE O/P EST HI 40 MIN: CPT | Mod: HCNC,S$GLB,, | Performed by: INTERNAL MEDICINE

## 2023-07-18 PROCEDURE — 1101F PR PT FALLS ASSESS DOC 0-1 FALLS W/OUT INJ PAST YR: ICD-10-PCS | Mod: HCNC,CPTII,S$GLB, | Performed by: INTERNAL MEDICINE

## 2023-07-18 PROCEDURE — 3008F BODY MASS INDEX DOCD: CPT | Mod: HCNC,CPTII,S$GLB, | Performed by: INTERNAL MEDICINE

## 2023-07-18 PROCEDURE — 1157F PR ADVANCE CARE PLAN OR EQUIV PRESENT IN MEDICAL RECORD: ICD-10-PCS | Mod: HCNC,CPTII,S$GLB, | Performed by: INTERNAL MEDICINE

## 2023-07-18 RX ORDER — FLUTICASONE PROPIONATE 50 MCG
SPRAY, SUSPENSION (ML) NASAL
Qty: 48 G | Refills: 3 | Status: SHIPPED | OUTPATIENT
Start: 2023-07-18

## 2023-07-18 RX ORDER — KETOROLAC TROMETHAMINE 15 MG/ML
15 INJECTION, SOLUTION INTRAMUSCULAR; INTRAVENOUS
Status: SHIPPED | OUTPATIENT
Start: 2023-07-18 | End: 2023-07-21

## 2023-07-18 RX ORDER — AMITRIPTYLINE HYDROCHLORIDE 10 MG/1
10 TABLET, FILM COATED ORAL NIGHTLY
COMMUNITY
Start: 2023-05-30 | End: 2023-07-18

## 2023-07-18 NOTE — ASSESSMENT & PLAN NOTE
Pt states that 2 epidurals were effective.  If so, would qualify for CSI.    · Back to PM here for eval  · Toradol IM today

## 2023-07-18 NOTE — ASSESSMENT & PLAN NOTE
Pain in R bicep with radiation down anterior arm.  Appears to be in T1 dermatome.  Followed by PM here for LBP and PM with workers comp for neck and shoulder pain.    · Pt to d/w his outside PM MD.    · torodol IM today  · Continue celebrex, hydrocodone, elavil, lyrica from PM

## 2023-07-18 NOTE — ASSESSMENT & PLAN NOTE
· ACP reviewed 08/2021  ·  Yearly labs with PSA 02/2023  · Refusing covid booster today  · optometry

## 2023-07-18 NOTE — PATIENT INSTRUCTIONS
Thank you so much for coming in to see me today.  Please do not hesitate to call with any questions or concerns or if you feel that you need to be seen.       Dr. Berry @ [phone]

## 2023-07-21 ENCOUNTER — TELEPHONE (OUTPATIENT)
Dept: ADMINISTRATIVE | Facility: OTHER | Age: 68
End: 2023-07-21
Payer: MEDICARE

## 2023-07-21 ENCOUNTER — OFFICE VISIT (OUTPATIENT)
Dept: PAIN MEDICINE | Facility: CLINIC | Age: 68
End: 2023-07-21
Payer: MEDICARE

## 2023-07-21 VITALS
BODY MASS INDEX: 28.54 KG/M2 | DIASTOLIC BLOOD PRESSURE: 81 MMHG | HEART RATE: 67 BPM | WEIGHT: 192.69 LBS | HEIGHT: 69 IN | OXYGEN SATURATION: 100 % | RESPIRATION RATE: 18 BRPM | TEMPERATURE: 98 F | SYSTOLIC BLOOD PRESSURE: 114 MMHG

## 2023-07-21 DIAGNOSIS — M46.1 SACROILIITIS: Primary | ICD-10-CM

## 2023-07-21 DIAGNOSIS — M47.816 SPONDYLOSIS OF LUMBAR REGION WITHOUT MYELOPATHY OR RADICULOPATHY: ICD-10-CM

## 2023-07-21 PROCEDURE — 3288F FALL RISK ASSESSMENT DOCD: CPT | Mod: HCNC,CPTII,S$GLB, | Performed by: ANESTHESIOLOGY

## 2023-07-21 PROCEDURE — 1159F PR MEDICATION LIST DOCUMENTED IN MEDICAL RECORD: ICD-10-PCS | Mod: HCNC,CPTII,S$GLB, | Performed by: ANESTHESIOLOGY

## 2023-07-21 PROCEDURE — 3079F DIAST BP 80-89 MM HG: CPT | Mod: HCNC,CPTII,S$GLB, | Performed by: ANESTHESIOLOGY

## 2023-07-21 PROCEDURE — 3074F PR MOST RECENT SYSTOLIC BLOOD PRESSURE < 130 MM HG: ICD-10-PCS | Mod: HCNC,CPTII,S$GLB, | Performed by: ANESTHESIOLOGY

## 2023-07-21 PROCEDURE — 3074F SYST BP LT 130 MM HG: CPT | Mod: HCNC,CPTII,S$GLB, | Performed by: ANESTHESIOLOGY

## 2023-07-21 PROCEDURE — 3044F PR MOST RECENT HEMOGLOBIN A1C LEVEL <7.0%: ICD-10-PCS | Mod: HCNC,CPTII,S$GLB, | Performed by: ANESTHESIOLOGY

## 2023-07-21 PROCEDURE — 3008F PR BODY MASS INDEX (BMI) DOCUMENTED: ICD-10-PCS | Mod: HCNC,CPTII,S$GLB, | Performed by: ANESTHESIOLOGY

## 2023-07-21 PROCEDURE — 1157F ADVNC CARE PLAN IN RCRD: CPT | Mod: HCNC,CPTII,S$GLB, | Performed by: ANESTHESIOLOGY

## 2023-07-21 PROCEDURE — 1101F PR PT FALLS ASSESS DOC 0-1 FALLS W/OUT INJ PAST YR: ICD-10-PCS | Mod: HCNC,CPTII,S$GLB, | Performed by: ANESTHESIOLOGY

## 2023-07-21 PROCEDURE — 99214 OFFICE O/P EST MOD 30 MIN: CPT | Mod: HCNC,S$GLB,, | Performed by: ANESTHESIOLOGY

## 2023-07-21 PROCEDURE — 1101F PT FALLS ASSESS-DOCD LE1/YR: CPT | Mod: HCNC,CPTII,S$GLB, | Performed by: ANESTHESIOLOGY

## 2023-07-21 PROCEDURE — 3288F PR FALLS RISK ASSESSMENT DOCUMENTED: ICD-10-PCS | Mod: HCNC,CPTII,S$GLB, | Performed by: ANESTHESIOLOGY

## 2023-07-21 PROCEDURE — 3044F HG A1C LEVEL LT 7.0%: CPT | Mod: HCNC,CPTII,S$GLB, | Performed by: ANESTHESIOLOGY

## 2023-07-21 PROCEDURE — 1157F PR ADVANCE CARE PLAN OR EQUIV PRESENT IN MEDICAL RECORD: ICD-10-PCS | Mod: HCNC,CPTII,S$GLB, | Performed by: ANESTHESIOLOGY

## 2023-07-21 PROCEDURE — 99214 PR OFFICE/OUTPT VISIT, EST, LEVL IV, 30-39 MIN: ICD-10-PCS | Mod: HCNC,S$GLB,, | Performed by: ANESTHESIOLOGY

## 2023-07-21 PROCEDURE — 1125F PR PAIN SEVERITY QUANTIFIED, PAIN PRESENT: ICD-10-PCS | Mod: HCNC,CPTII,S$GLB, | Performed by: ANESTHESIOLOGY

## 2023-07-21 PROCEDURE — 3008F BODY MASS INDEX DOCD: CPT | Mod: HCNC,CPTII,S$GLB, | Performed by: ANESTHESIOLOGY

## 2023-07-21 PROCEDURE — 99999 PR PBB SHADOW E&M-EST. PATIENT-LVL III: CPT | Mod: PBBFAC,HCNC,, | Performed by: ANESTHESIOLOGY

## 2023-07-21 PROCEDURE — 99999 PR PBB SHADOW E&M-EST. PATIENT-LVL III: ICD-10-PCS | Mod: PBBFAC,HCNC,, | Performed by: ANESTHESIOLOGY

## 2023-07-21 PROCEDURE — 1125F AMNT PAIN NOTED PAIN PRSNT: CPT | Mod: HCNC,CPTII,S$GLB, | Performed by: ANESTHESIOLOGY

## 2023-07-21 PROCEDURE — 1159F MED LIST DOCD IN RCRD: CPT | Mod: HCNC,CPTII,S$GLB, | Performed by: ANESTHESIOLOGY

## 2023-07-21 PROCEDURE — 3079F PR MOST RECENT DIASTOLIC BLOOD PRESSURE 80-89 MM HG: ICD-10-PCS | Mod: HCNC,CPTII,S$GLB, | Performed by: ANESTHESIOLOGY

## 2023-07-21 NOTE — PROGRESS NOTES
PCP: Roly Flannery MD    REFERRING PHYSICIAN: No ref. provider found    CHIEF COMPLAINT: left lower back pain    Original HISTORY OF PRESENT ILLNESS: Jeff Hope presents to the clinic for the evaluation of the above pain. The pain started a year ago.    Original Pain Description:  The pain is located in the superior left buttock and sometimes radiates to his midline lumbar back. The pain is described as aching and dull. Exacerbating factors: Sitting, Standing, Laying, Sleeping on that side, getting up in the morning, and Walking. Mitigating factors heat, ice, medications, physical therapy, and rest. Symptoms interfere with daily activity and sleeping. The patient feels like symptoms have been improving with physical therapy. Patient denies night fever/night sweats, urinary incontinence, bowel incontinence, significant weight loss, significant motor weakness, and loss of sensations.    Original PAIN SCORES:  Best: Pain is 6  Worst: Pain is 8  Usually: Pain is 6  Current: Pain is 5    INTERVAL HISTORY (7/21/23):  Jeff Hope returns to clinic for reevaluation of low back pain. He had a left L3-5 MBB performed on 11/8/22 with reassuring resolution of pain at that time. The second procedure was scheduled on 12/6/23, but it was canceled. Pain today is 4/10. He reports that his low back pain is worse on the L and worsened with exertion. His L SI injection gave him relief for about 6 months. He noticed an increase in pain with exertion about 2 months ago similar to his low back pain previously. He denies any new changes in health since previous evaluation.     INTERVAL HISTORY (11/14/22):  Patient returns to clinic today for follow up. Patient with recent Left L3-5 MBB on 11/8/22. Patient reports 80% relief for 2 days followed by return of symptoms. Pain today is 4/10 in the low back on the left side. Pain does not radiate and stays in the low back. Patient continues with celecoxib, lyrica and norco with some  relief. Denies saddle anesthesia, bladder/bowel incontinence, new weakness or loss of sensation.    INTERVAL HISTORY (10/24/22):  Jeff Hope returns for follow up. At our last visit we performed a left SIJ on 10/11/22. He reports minimal relief after injection. He returns today again reporting pain in the left low back and buttock. He notices it mainly with sitting, standing, or laying in bed. It does not appear to be severe in his estimation, but he is hoping that we can assist. His SIJ does not seem very painful today, but he does have pain with facet loading.     6 weeks of Conservative therapy (PT/Chiro/Home Exercises with Dates)  March 2022-May 2022  Still doing home exercises    Treatments / Medications: (Ice/Heat/NSAIDS/APAP/etc):  Tylenol (mild relief)  Ibuprofen (mild relief)  Celebrex (mild relief)  Norco (shoulder pain, helps with back pain)  Lyrica (shoulder pain, unsure if it helps with back pain)       Report:  Consistent with prescribed medications    Interventional Pain Procedures: (Previous injections)  10/11/22: Left SIJ injection - 80% for 7 months  11/08/22: Left L3-5 MBB - 80% relief for 2 days    Past Medical History:   Diagnosis Date    History of pulmonary embolus (PE) 4/15/2019    Pt with PE s/p hospitalization.  He was placed on xarelto with high risk with cancer.  He has been out of the medication for the past month. Restart Xarelto 15 mg b.i.d. for 21 days, then 20 mg q.day until cancer no longer active.    Non Hodgkin's lymphoma     Personal history of colonic polyps 09/08/2015    per [facility] report - repeat 9/2020    Peyronie disease     S/P laparoscopic cholecystectomy 5/27/2019     Past Surgical History:   Procedure Laterality Date    ESOPHAGOGASTRODUODENOSCOPY N/A 11/29/2021    Procedure: ESOPHAGOGASTRODUODENOSCOPY (EGD);  Surgeon: Kristine Palmer MD;  Location: [facility] ([address] [place] [address]);  Service: Endoscopy;  Laterality: N/A;  fully vacc-inst mail-tb-left chest port    GROIN  EXPLORATION  2 pre 2005 and 1 post 2005    total of 3 procedures    INJECTION OF ANESTHETIC AGENT AROUND NERVE Left 11/8/2022    Procedure: BLOCK, NERVE, LEFT L3-L5 MEDIAL BRANCH;  Surgeon: Melody Berry MD;  Location: [facility] PAIN MGT;  Service: Pain Management;  Laterality: Left;    INJECTION, SACROILIAC JOINT Left 10/11/2022    Procedure: INJECTION,SACROILIAC JOINT LEFT CONTRAST;  Surgeon: Melody Berry MD;  Location: [facility] PAIN MGT;  Service: Pain Management;  Laterality: Left;    LAPAROSCOPIC CHOLECYSTECTOMY  05/2018    SHOULDER SURGERY Left      Social History     Socioeconomic History    Marital status:     Number of children: 2   Occupational History    Occupation: disabled [employer]    Tobacco Use    Smoking status: Never    Smokeless tobacco: Never   Substance and Sexual Activity    Alcohol use: No    Drug use: No    Sexual activity: Yes     Partners: Female   Social History Narrative    Lives with wife.       Family History   Problem Relation Age of Onset    Breast cancer Mother     Emphysema Father     Stroke Sister     Diabetes Brother     Ulcers Brother     No Known Problems Daughter     No Known Problems Daughter        Review of patient's allergies indicates:   Allergen Reactions    Atorvastatin      myalgias    Rosuvastatin      myalgias       Current Outpatient Medications   Medication Sig    amitriptyline (ELAVIL) 25 MG tablet Take 25 mg by mouth nightly.    celecoxib (CELEBREX) 200 MG capsule TK ONE C PO BID    fluticasone propionate (FLONASE) 50 mcg/actuation nasal spray SHAKE LIQUID AND USE 2 SPRAYS(100 MCG) IN EACH NOSTRIL EVERY DAY    HYDROcodone-acetaminophen (NORCO) 7.5-325 mg per tablet Take 1 tablet by mouth every 8 (eight) hours as needed.    pregabalin (LYRICA) 150 MG capsule Take 150 mg by mouth once daily.    rosuvastatin (CRESTOR) 5 MG tablet Take 1 tablet (5 mg total) by mouth once daily.     No current facility-administered medications for this visit.  "    Facility-Administered Medications Ordered in Other Visits   Medication    0.9%  NaCl infusion       ROS:  GENERAL: No fever. No chills. No fatigue. Denies weight loss. Denies weight gain.  HEENT: Denies headaches. Denies vision change. Denies eye pain. Denies double vision. Denies ear pain.   CV: Denies chest pain.   PULM: Denies of shortness of breath.  GI: Denies constipation. No diarrhea. No abdominal pain. Denies nausea. Denies vomiting. No blood in stool.  HEME: Denies bleeding problems.  : Denies urgency. No painful urination. No blood in urine.  MS: Denies joint stiffness. Low back pain, L-sided. Shoulder pain.   SKIN: Denies rash.   NEURO: Denies seizures. No weakness.  PSYCH:  Denies difficulty sleeping. No anxiety. Denies depression. No suicidal thoughts.       VITALS:   Vitals:    07/21/23 0954   BP: 114/81   Pulse: 67   Resp: 18   Temp: 97.8 °F (36.6 °C)   TempSrc: Oral   SpO2: 100%   Weight: 87.4 kg (192 lb 10.9 oz)   Height: 5' 9" (1.753 m)   PainSc:   8   PainLoc: Back           PHYSICAL EXAM:   GENERAL: Well appearing, in no acute distress, alert and oriented x3.  PSYCH:  Mood and affect appropriate.  SKIN: Skin color, texture, turgor normal, no rashes or lesions.  HEENT:  Normocephalic, atraumatic. Cranial nerves grossly intact.  NECK: No pain with neck flexion, extension, or lateral flexion.   PULM: No evidence of respiratory difficulty, symmetric chest rise.  GI:  Non-distended  BACK:  Left SIJ: +Twin, +CRESENCIO, +Compression, +Sacral thrust test. Positive facet loading bilaterally today. Normal range of motion. No pain to palpation over the spinous processes. No pain to palpation over facet joints.  Straight leg raising in the supine position is negative to radicular pain.    EXTREMITIES: No deformities, edema, or skin discoloration.   MUSCULOSKELETAL: No L piriformis TTP. Shoulder, hip (log roll, compression), and knee provocative maneuvers are negative. Bilateral upper and lower extremity " strength is normal and symmetric. No atrophy is noted.  NEURO: Sensation is equal and appropriate bilaterally. Bilateral upper and lower extremity coordination and muscle stretch reflexes are physiologic and symmetric. Plantar response are downgoing.   GAIT: normal.      LABS:  No pertinent    IMAGING:      MRI LUMBAR SPINE (9/9/2022)  FINDINGS:  Lumbar spine alignment is normal.  No spondylolisthesis.  No spondylolysis.  Vertebral body heights are well maintained without evidence for fracture.  No marrow signal abnormality to suggest an infiltrative process.     There is degenerative disc desiccation throughout the lumbar spine with mild associated height loss at L3-L4 and L4-L5.  Multifocal annular fissures most conspicuous at L1-L2 and L5-S1.  No endplate edema.     Distal spinal cord demonstrates normal contour and signal intensity.  Cauda equina appears normal without findings to suggest arachnoiditis.  Conus medullaris terminates at L1.     Right renal cortical cyst.  Remaining visualized abdominal organs demonstrate no significant abnormalities.  SI joints are symmetric.  Paraspinal musculature demonstrates normal bulk.     T12-L1: No spinal canal stenosis or neural foraminal narrowing.     L1-L2: Circumferential disc bulge causes mild mass effect on the ventral thecal sac and left lateral recess.  Bilateral facet arthropathy and bilateral ligamentum flavum buckling.  No spinal canal stenosis or neural foraminal narrowing.     L2-L3: Right foraminal/extraforaminal broad-based protrusion and bilateral ligamentum flavum buckling.  Findings contribute to mild right neural foraminal narrowing.  No spinal canal stenosis.     L3-L4: Circumferential disc bulge and bilateral ligamentum flavum buckling.  Findings contribute to mild-to-moderate left and mild right neural foraminal narrowing.  No spinal canal stenosis.     L4-L5: Circumferential disc bulge, bilateral facet arthropathy and bilateral ligamentum flavum  buckling.  Findings contribute to mild left and mild-to-moderate right neural foraminal narrowing.     L5-S1: Posterior broad-based disc bulge and bilateral facet arthropathy.  No spinal canal stenosis or neural foraminal narrowing.     Impression:     1. Mild lumbar degenerative changes contributing to mild-to-moderate neural foraminal narrowing from L2-L3 through L4-L5.  No spinal canal stenosis.    ASSESSMENT: 67 y.o. year old male with pain, consistent with sacroiliitis and facet arthropathy.     DISCUSSION: Mr. Hope comes to us with left low back pain. He improved with PT and his exam shows only subtle findings. He indicates pain in the SIJ region. MRI shows mainly facet arthropathy but the pain is lower in the back. He reports an 80% relief of pain for about 7 months with the previous SI injection. Patient reports 80% improvement following 1st MBB at Left L3, L4, L5 for two days.     PLAN:  Continue Heat  Continue HEP  Schedule left SI injection  RTC 2 weeks after procedure, will discuss MBB (2/2) if appropriate    Sharath Gruber MD  07/21/2023

## 2023-08-21 ENCOUNTER — PATIENT MESSAGE (OUTPATIENT)
Dept: PAIN MEDICINE | Facility: OTHER | Age: 68
End: 2023-08-21
Payer: MEDICARE

## 2023-08-22 ENCOUNTER — TELEPHONE (OUTPATIENT)
Dept: PAIN MEDICINE | Facility: OTHER | Age: 68
End: 2023-08-22
Payer: MEDICARE

## 2023-08-22 ENCOUNTER — HOSPITAL ENCOUNTER (OUTPATIENT)
Facility: OTHER | Age: 68
Discharge: HOME | End: 2023-08-22
Attending: ANESTHESIOLOGY | Admitting: ANESTHESIOLOGY
Payer: MEDICARE

## 2023-08-22 VITALS
DIASTOLIC BLOOD PRESSURE: 70 MMHG | HEIGHT: 69 IN | RESPIRATION RATE: 16 BRPM | OXYGEN SATURATION: 97 % | BODY MASS INDEX: 28.14 KG/M2 | WEIGHT: 190 LBS | TEMPERATURE: 98 F | HEART RATE: 60 BPM | SYSTOLIC BLOOD PRESSURE: 120 MMHG

## 2023-08-22 DIAGNOSIS — G89.29 CHRONIC PAIN: ICD-10-CM

## 2023-08-22 DIAGNOSIS — M46.1 SACROILIITIS: Primary | ICD-10-CM

## 2023-08-22 PROCEDURE — 63600175 PHARM REV CODE 636 W HCPCS: Mod: HCNC | Performed by: ANESTHESIOLOGY

## 2023-08-22 PROCEDURE — 27096 INJECT SACROILIAC JOINT: CPT | Mod: HCNC,LT,, | Performed by: ANESTHESIOLOGY

## 2023-08-22 PROCEDURE — 27096 PR INJECTION,SACROILIAC JOINT: ICD-10-PCS | Mod: HCNC,LT,, | Performed by: ANESTHESIOLOGY

## 2023-08-22 PROCEDURE — 27096 INJECT SACROILIAC JOINT: CPT | Mod: HCNC,LT | Performed by: ANESTHESIOLOGY

## 2023-08-22 PROCEDURE — 25500020 PHARM REV CODE 255: Mod: HCNC | Performed by: ANESTHESIOLOGY

## 2023-08-22 PROCEDURE — 25000003 PHARM REV CODE 250: Mod: HCNC | Performed by: ANESTHESIOLOGY

## 2023-08-22 RX ORDER — LIDOCAINE HYDROCHLORIDE 20 MG/ML
INJECTION, SOLUTION INFILTRATION; PERINEURAL
Status: DISCONTINUED | OUTPATIENT
Start: 2023-08-22 | End: 2023-08-22 | Stop reason: HOSPADM

## 2023-08-22 RX ORDER — TRIAMCINOLONE ACETONIDE 40 MG/ML
INJECTION, SUSPENSION INTRA-ARTICULAR; INTRAMUSCULAR
Status: DISCONTINUED | OUTPATIENT
Start: 2023-08-22 | End: 2023-08-22 | Stop reason: HOSPADM

## 2023-08-22 RX ORDER — SODIUM CHLORIDE 9 MG/ML
INJECTION, SOLUTION INTRAVENOUS CONTINUOUS
Status: DISCONTINUED | OUTPATIENT
Start: 2023-08-22 | End: 2023-08-22 | Stop reason: HOSPADM

## 2023-08-22 RX ORDER — ALPRAZOLAM 0.5 MG/1
1 TABLET ORAL ONCE
Status: COMPLETED | OUTPATIENT
Start: 2023-08-22 | End: 2023-08-22

## 2023-08-22 RX ORDER — BUPIVACAINE HYDROCHLORIDE 2.5 MG/ML
INJECTION, SOLUTION EPIDURAL; INFILTRATION; INTRACAUDAL
Status: DISCONTINUED | OUTPATIENT
Start: 2023-08-22 | End: 2023-08-22 | Stop reason: HOSPADM

## 2023-08-22 RX ADMIN — ALPRAZOLAM 1 MG: 0.5 TABLET ORAL at 09:08

## 2023-08-22 NOTE — DISCHARGE INSTRUCTIONS

## 2023-08-22 NOTE — DISCHARGE SUMMARY
Discharge Note  Short Stay      SUMMARY     Admit Date: 8/22/2023    Attending Physician: Yu Berry MD      Discharge Physician: Toby Nguyen      Discharge Date: 8/22/2023 9:45 AM    Procedure(s) (LRB):  INJECTION,SACROILIAC JOINT, LEFT SOONER DATE (Left)    Final Diagnosis: Sacroiliitis [M46.1]    Disposition: Home or self care    Patient Instructions:   Current Discharge Medication List        CONTINUE these medications which have NOT CHANGED    Details   amitriptyline (ELAVIL) 25 MG tablet Take 25 mg by mouth nightly.      celecoxib (CELEBREX) 200 MG capsule TK ONE C PO BID      fluticasone propionate (FLONASE) 50 mcg/actuation nasal spray SHAKE LIQUID AND USE 2 SPRAYS(100 MCG) IN EACH NOSTRIL EVERY DAY  Qty: 48 g, Refills: 3      HYDROcodone-acetaminophen (NORCO) 7.5-325 mg per tablet Take 1 tablet by mouth every 8 (eight) hours as needed.    Comments: Quantity prescribed more than 7 day supply? Press F2 and select one:[id number]        pregabalin (LYRICA) 150 MG capsule Take 150 mg by mouth once daily.      rosuvastatin (CRESTOR) 5 MG tablet Take 1 tablet (5 mg total) by mouth once daily.  Qty: 90 tablet, Refills: 3                 Discharge Diagnosis: Sacroiliitis [M46.1]  Condition on Discharge: Stable with no complications to procedure   Diet on Discharge: Same as before.  Activity: as per instruction sheet.  Discharge to: Home with a responsible adult.  Follow up: 2-4 weeks       SINGLE VIEW CHEST:

 

HISTORY:

Hypoxia.

 

COMPARISON:

11/07/2013

 

FINDINGS:

Single view of the chest show normal sized cardiomediastinal silhouette. There is no evidence of cons
olidation, mass, or pleural effusion. The bones are unremarkable.

 

IMPRESSION:

No evidence of acute cardiopulmonary disease.

 

POS: [facility]

## 2023-08-22 NOTE — OP NOTE
Sacroiliac Joint Injection under Fluoroscopic Guidance    The procedure, risks, benefits, and options were discussed with the patient. There are no contraindications to the procedure. The patent expressed understanding and agreed to the procedure. Informed written consent was obtained prior to the start of the procedure and can be found in the patient's chart.    PATIENT NAME: Jeff Hope   MRN: [id number]     DATE OF PROCEDURE: 08/22/2023    PROCEDURE: Left Sacroiliac Joint Injection under Fluoroscopic Guidance    PRE-OP DIAGNOSIS: Sacroiliitis [M46.1]    POST-OP DIAGNOSIS: Same    PHYSICIAN: Melody Berry MD    ASSISTANTS: Toby Nguyen M.D. ([facility] Fellow)     MEDICATIONS INJECTED: Preservative-free Kenalog 40mg with 3cc of Bupivacine 0.25%     LOCAL ANESTHETIC INJECTED: Xylocaine 2%     SEDATION: None    ESTIMATED BLOOD LOSS: None    COMPLICATIONS: None    TECHNIQUE: Time-out was performed to identify the patient and procedure to be performed. With the patient laying in a prone position, the surgical area was prepped and draped in the usual sterile fashion using ChloraPrep and a fenestrated drape. The sacroiliac joint was determined under fluoroscopy guidance. Skin anesthesia was achieved by injecting Lidocaine 2% over the injection site. The sacroiliac joint was  then approached with a 22 gauge, 3.5 inch spinal quinke needle that was introduced under fluoroscopic guidance in the AP and Lateral views. Once the needle tip was in the area of the joint, and there was no blood, contrast dye Omnipaque (300mg/mL) was injected to confirm placement and there was no vascular runoff. Fluoroscopic imaging in the AP and lateral views revealed a clear outline of the joint space. 4 mL of the medication mixture listed above was injected slowly intraarticular and kelly-articular. Displacement of the radio opaque contrast after injection of the medication confirmed that the medication went into the area of the joint.  The needles were removed and bleeding was nil.  A sterile dressing was applied. No specimens collected. The patient tolerated the procedure well.       The patient was monitored after the procedure in the recovery area. They were given post-procedure and discharge instructions to follow at home. The patient was discharged in a stable condition.    Toby Nguyen MD     I reviewed and edited the fellow's note. I conducted my own interview and physical examination. I agree with the findings. I was present and supervising all critical portions of the procedure.

## 2023-08-22 NOTE — H&P
HPI  Patient presenting for Procedure(s) (LRB):  INJECTION,SACROILIAC JOINT, LEFT SOONER DATE (Left)     Patient on Anti-coagulation No    No health changes since previous encounter    Past Medical History:   Diagnosis Date    History of pulmonary embolus (PE) 4/15/2019    Pt with PE s/p hospitalization.  He was placed on xarelto with high risk with cancer.  He has been out of the medication for the past month. Restart Xarelto 15 mg b.i.d. for 21 days, then 20 mg q.day until cancer no longer active.    Non Hodgkin's lymphoma     Personal history of colonic polyps 09/08/2015    per [facility] report - repeat 9/2020    Peyronie disease     S/P laparoscopic cholecystectomy 5/27/2019     Past Surgical History:   Procedure Laterality Date    ESOPHAGOGASTRODUODENOSCOPY N/A 11/29/2021    Procedure: ESOPHAGOGASTRODUODENOSCOPY (EGD);  Surgeon: Kristine Palmer MD;  Location: [facility] ([address] [place] [address]);  Service: Endoscopy;  Laterality: N/A;  fully vacc-inst mail-tb-left chest port    GROIN EXPLORATION  2 pre 2005 and 1 post 2005    total of 3 procedures    INJECTION OF ANESTHETIC AGENT AROUND NERVE Left 11/8/2022    Procedure: BLOCK, NERVE, LEFT L3-L5 MEDIAL BRANCH;  Surgeon: Melody Berry MD;  Location: [facility]IN [facility]GT;  Service: Pain Management;  Laterality: Left;    INJECTION, SACROILIAC JOINT Left 10/11/2022    Procedure: INJECTION,SACROILIAC JOINT LEFT CONTRAST;  Surgeon: Melody Berry MD;  Location: [facility] PAIN [facility]GT;  Service: Pain Management;  Laterality: Left;    LAPAROSCOPIC CHOLECYSTECTOMY  05/2018    SHOULDER SURGERY Left      Review of patient's allergies indicates:   Allergen Reactions    Atorvastatin      myalgias    Rosuvastatin      myalgias      No current facility-administered medications for this encounter.     Facility-Administered Medications Ordered in Other Encounters   Medication    0.9%  NaCl infusion       PMHx, PSHx, Allergies, Medications reviewed in epic    ROS negative except pain complaints in  HPI    OBJECTIVE:    There were no vitals taken for this visit.    PHYSICAL EXAMINATION:    GENERAL: Well appearing, in no acute distress, alert and oriented x3.  PSYCH:  Mood and affect appropriate.  SKIN: Skin color, texture, turgor normal, no rashes or lesions which will impact the procedure.  CV: RRR with palpation of the radial artery.  PULM: No evidence of respiratory difficulty, symmetric chest rise. Clear to auscultation.  NEURO: Cranial nerves grossly intact.    Plan:    Proceed with procedure as planned Procedure(s) (LRB):  INJECTION,SACROILIAC JOINT, LEFT SOONER DATE (Left)    Toby Nguyen  08/22/2023

## 2023-08-22 NOTE — TELEPHONE ENCOUNTER
----- Message from Pratik Diego MA sent at 8/21/2023  4:01 PM CDT -----  Contact: Patient  Please assist.  ----- Message -----  From: Clay Freitas  Sent: 8/21/2023   3:37 PM CDT  To: Jamal Oliver Staff    Type:  Patient Call          Who Called: Patient         Does the patient know what this is regarding?: requesting a call back for appt time of his arrival time  for tomorrow ; please advise           Would the patient rather a call back or a response via [facility]? Call           Best Call Back Number: [phone] (home)              Additional Information:

## 2023-10-23 PROBLEM — Z00.00 HEALTHCARE MAINTENANCE: Status: RESOLVED | Noted: 2019-11-19 | Resolved: 2023-10-23

## 2023-10-24 ENCOUNTER — TELEPHONE (OUTPATIENT)
Dept: PRIMARY CARE CLINIC | Facility: CLINIC | Age: 68
End: 2023-10-24
Payer: MEDICARE

## 2023-10-24 ENCOUNTER — TELEPHONE (OUTPATIENT)
Dept: HEPATOLOGY | Facility: CLINIC | Age: 68
End: 2023-10-24
Payer: MEDICARE

## 2023-10-24 NOTE — TELEPHONE ENCOUNTER
----- Message from Yue Stevenson sent at 10/24/2023  8:32 AM CDT -----  Regarding: orders  Name of Who is Calling: pt        What is the request in detail: pt received a letter saying it's time for him to get labs done, not sure for which provider, and would like those orders put in and call back to get them scheduled, please advise.        Can the clinic reply by [facility]: no          What Number to [facility] Back [facility]: [phone]

## 2023-10-24 NOTE — TELEPHONE ENCOUNTER
----- Message from Yue Stevenson sent at 10/24/2023  8:32 AM CDT -----  Regarding: orders  Name of Who is Calling: pt        What is the request in detail: pt received a letter saying it's time for him to get labs done, not sure for which provider, and would like those orders put in and call back to get them scheduled, please advise.        Can the clinic reply by [facility]: no          What Number to [facility] Back [facility]: [phone]

## 2023-11-14 ENCOUNTER — OFFICE VISIT (OUTPATIENT)
Dept: PRIMARY CARE CLINIC | Facility: CLINIC | Age: 68
End: 2023-11-14
Payer: MEDICARE

## 2023-11-14 VITALS
HEART RATE: 75 BPM | DIASTOLIC BLOOD PRESSURE: 78 MMHG | SYSTOLIC BLOOD PRESSURE: 124 MMHG | WEIGHT: 192 LBS | TEMPERATURE: 98 F | HEIGHT: 69 IN | BODY MASS INDEX: 28.44 KG/M2 | OXYGEN SATURATION: 97 %

## 2023-11-14 DIAGNOSIS — Z23 NEED FOR VACCINATION: ICD-10-CM

## 2023-11-14 DIAGNOSIS — C82.90 FOLLICULAR NON-HODGKIN'S LYMPHOMA: ICD-10-CM

## 2023-11-14 DIAGNOSIS — E78.2 MIXED HYPERLIPIDEMIA: ICD-10-CM

## 2023-11-14 DIAGNOSIS — R42 DIZZINESS: Primary | ICD-10-CM

## 2023-11-14 DIAGNOSIS — Z00.00 HEALTHCARE MAINTENANCE: ICD-10-CM

## 2023-11-14 PROCEDURE — 1159F PR MEDICATION LIST DOCUMENTED IN MEDICAL RECORD: ICD-10-PCS | Mod: HCNC,CPTII,S$GLB, | Performed by: INTERNAL MEDICINE

## 2023-11-14 PROCEDURE — 3288F PR FALLS RISK ASSESSMENT DOCUMENTED: ICD-10-PCS | Mod: HCNC,CPTII,S$GLB, | Performed by: INTERNAL MEDICINE

## 2023-11-14 PROCEDURE — 1125F AMNT PAIN NOTED PAIN PRSNT: CPT | Mod: HCNC,CPTII,S$GLB, | Performed by: INTERNAL MEDICINE

## 2023-11-14 PROCEDURE — 1101F PT FALLS ASSESS-DOCD LE1/YR: CPT | Mod: HCNC,CPTII,S$GLB, | Performed by: INTERNAL MEDICINE

## 2023-11-14 PROCEDURE — 1123F ACP DISCUSS/DSCN MKR DOCD: CPT | Mod: HCNC,CPTII,S$GLB, | Performed by: INTERNAL MEDICINE

## 2023-11-14 PROCEDURE — 3044F PR MOST RECENT HEMOGLOBIN A1C LEVEL <7.0%: ICD-10-PCS | Mod: HCNC,CPTII,S$GLB, | Performed by: INTERNAL MEDICINE

## 2023-11-14 PROCEDURE — 99214 PR OFFICE/OUTPT VISIT, EST, LEVL IV, 30-39 MIN: ICD-10-PCS | Mod: HCNC,S$GLB,, | Performed by: INTERNAL MEDICINE

## 2023-11-14 PROCEDURE — 3008F BODY MASS INDEX DOCD: CPT | Mod: HCNC,CPTII,S$GLB, | Performed by: INTERNAL MEDICINE

## 2023-11-14 PROCEDURE — 1160F RVW MEDS BY RX/DR IN RCRD: CPT | Mod: HCNC,CPTII,S$GLB, | Performed by: INTERNAL MEDICINE

## 2023-11-14 PROCEDURE — 3288F FALL RISK ASSESSMENT DOCD: CPT | Mod: HCNC,CPTII,S$GLB, | Performed by: INTERNAL MEDICINE

## 2023-11-14 PROCEDURE — G0008 ADMIN INFLUENZA VIRUS VAC: HCPCS | Mod: HCNC,S$GLB,, | Performed by: INTERNAL MEDICINE

## 2023-11-14 PROCEDURE — 1101F PR PT FALLS ASSESS DOC 0-1 FALLS W/OUT INJ PAST YR: ICD-10-PCS | Mod: HCNC,CPTII,S$GLB, | Performed by: INTERNAL MEDICINE

## 2023-11-14 PROCEDURE — 3078F DIAST BP <80 MM HG: CPT | Mod: HCNC,CPTII,S$GLB, | Performed by: INTERNAL MEDICINE

## 2023-11-14 PROCEDURE — 1123F PR ADV CARE PLAN DISCUSSED, PLAN OR SURROGATE DOCUMENTED: ICD-10-PCS | Mod: HCNC,CPTII,S$GLB, | Performed by: INTERNAL MEDICINE

## 2023-11-14 PROCEDURE — 3044F HG A1C LEVEL LT 7.0%: CPT | Mod: HCNC,CPTII,S$GLB, | Performed by: INTERNAL MEDICINE

## 2023-11-14 PROCEDURE — G0008 FLU VACCINE - QUADRIVALENT - ADJUVANTED: ICD-10-PCS | Mod: HCNC,S$GLB,, | Performed by: INTERNAL MEDICINE

## 2023-11-14 PROCEDURE — 90694 VACC AIIV4 NO PRSRV 0.5ML IM: CPT | Mod: HCNC,S$GLB,, | Performed by: INTERNAL MEDICINE

## 2023-11-14 PROCEDURE — 1125F PR PAIN SEVERITY QUANTIFIED, PAIN PRESENT: ICD-10-PCS | Mod: HCNC,CPTII,S$GLB, | Performed by: INTERNAL MEDICINE

## 2023-11-14 PROCEDURE — 1159F MED LIST DOCD IN RCRD: CPT | Mod: HCNC,CPTII,S$GLB, | Performed by: INTERNAL MEDICINE

## 2023-11-14 PROCEDURE — 3008F PR BODY MASS INDEX (BMI) DOCUMENTED: ICD-10-PCS | Mod: HCNC,CPTII,S$GLB, | Performed by: INTERNAL MEDICINE

## 2023-11-14 PROCEDURE — 99214 OFFICE O/P EST MOD 30 MIN: CPT | Mod: HCNC,S$GLB,, | Performed by: INTERNAL MEDICINE

## 2023-11-14 PROCEDURE — 3078F PR MOST RECENT DIASTOLIC BLOOD PRESSURE < 80 MM HG: ICD-10-PCS | Mod: HCNC,CPTII,S$GLB, | Performed by: INTERNAL MEDICINE

## 2023-11-14 PROCEDURE — 90694 FLU VACCINE - QUADRIVALENT - ADJUVANTED: ICD-10-PCS | Mod: HCNC,S$GLB,, | Performed by: INTERNAL MEDICINE

## 2023-11-14 PROCEDURE — 3074F PR MOST RECENT SYSTOLIC BLOOD PRESSURE < 130 MM HG: ICD-10-PCS | Mod: HCNC,CPTII,S$GLB, | Performed by: INTERNAL MEDICINE

## 2023-11-14 PROCEDURE — 3074F SYST BP LT 130 MM HG: CPT | Mod: HCNC,CPTII,S$GLB, | Performed by: INTERNAL MEDICINE

## 2023-11-14 PROCEDURE — 1160F PR REVIEW ALL MEDS BY PRESCRIBER/CLIN PHARMACIST DOCUMENTED: ICD-10-PCS | Mod: HCNC,CPTII,S$GLB, | Performed by: INTERNAL MEDICINE

## 2023-11-14 NOTE — ASSESSMENT & PLAN NOTE
Random.  Intermittent.  Lasting 10 minutes.  No prodrome.  No LOC/falls.  Similar to occupancies in the past but worse  Check orthostatics  Check [place]-Hallpike  Labs  Multiple high risk meds, but none new and no chges.

## 2023-11-14 NOTE — PROGRESS NOTES
".  This note has been generated using voice-recognition software. There may be typographical errors that have been missed during proof-reading.     Primary Care Provider Appointment    Subjective:      Patient ID: Jeff Hope is a 67 y.o. male here for follow up.     Chief Complaint: Follow-up      HPI:  This is a pleasant 67-year-old male with bronchiectasis, hyperlipidemia, history of pulmonary embolism on chronic anticoagulation with a follicular non-Hodgkin's lymphoma, acute/reactivated hepatitis B here for f/u.  Pt last seen  07/18/2023 07/21/2023 patient seen by pain management for sacroiliitis.  08/22/2023 SI joint injection for sacroiliitis  10/30/2023 patient seen by Heme-Onc.   Repeat CT scan 6 months.    Dizziness.  Started a few years ago and got better with meclizine.  Similar as to before.  Restarted about  about 1 year ago.  Not sleeping.  No new meds.  Room does not spin.  No constant.  Not sure what triggers.  Did happen once with chewing.  When happens he cannot walk.  Lasts 10-15 minutes.  No nausea or HA associated with this.  No LOC.  "It feels as though I am going to die".  No palpitations.  No sweating.  happens 3 times a month.  No indication it will happen.  No hearing loss or tinnitus.        L sided abdominal pain.  Patient feels this pain is similar to the discomfort he had prior to being diagnosed with lymphoma.  CT scan ordered for next month ordered by oncology.    No further bicep pain.  CSI improvered L SI pain but still present.        Advance Care Planning     Date: 11/14/2023    Power of   I initiated the process of voluntary advance care planning today and explained the importance of this process to the patient.  I introduced the concept of advance directives to the patient, as well. Then the patient received detailed information about the importance of designating a Health Care Power of  (HCPOA). He was also instructed to communicate with this person about " their wishes for future healthcare, should he become sick and lose decision-making capacity. The patient has previously appointed a HCPOA. After our discussion, the patient has decided to complete a HCPOA and has appointed his significant other, health care agent:  Izabel Hope  & health care agent number:  see chart  I spent a total time of 5 minutes discussing this issue with the patient.    Advance Care Planning    Reviewed LW and POA and no chges made                        Past Surgical History:   Procedure Laterality Date    ESOPHAGOGASTRODUODENOSCOPY N/A 11/29/2021    Procedure: ESOPHAGOGASTRODUODENOSCOPY (EGD);  Surgeon: Kristine Palmer MD;  [facility]  Service: Endoscopy;  Laterality: N/A;  fully vacc-inst mail-tb-left chest port    GROIN EXPLORATION  2 pre 2005 and 1 post 2005    total of 3 procedures    INJECTION OF ANESTHETIC AGENT AROUND NERVE Left 11/8/2022    Procedure: BLOCK, NERVE, LEFT L3-L5 MEDIAL BRANCH;  Surgeon: Melody Berry MD;  Location: [facility] PAIN MGT;  Service: Pain Management;  Laterality: Left;    INJECTION, SACROILIAC JOINT Left 10/11/2022    Procedure: INJECTION,SACROILIAC JOINT LEFT CONTRAST;  Surgeon: Melody Berry MD;  Location: [facility] PAIN MGT;  Service: Pain Management;  Laterality: Left;    INJECTION, SACROILIAC JOINT Left 8/22/2023    Procedure: INJECTION,SACROILIAC JOINT, LEFT SOONER DATE;  Surgeon: Melody Berry MD;  Location: [facility] PAIN MGT;  Service: Pain Management;  Laterality: Left;    LAPAROSCOPIC CHOLECYSTECTOMY  05/2018    SHOULDER SURGERY Left        Past Medical History:   Diagnosis Date    History of pulmonary embolus (PE) 4/15/2019    Pt with PE s/p hospitalization.  He was placed on xarelto with high risk with cancer.  He has been out of the medication for the past month. Restart Xarelto 15 mg b.i.d. for 21 days, then 20 mg q.day until cancer no longer active.    Non Hodgkin's lymphoma     Personal history of colonic polyps  09/08/2015    per [facility] report - repeat 9/2020    Peyronie disease     S/P laparoscopic cholecystectomy 5/27/2019       Social History     Socioeconomic History    Marital status:     Number of children: 2   Occupational History    Occupation: disabled [employer]    Tobacco Use    Smoking status: Never    Smokeless tobacco: Never   Substance and Sexual Activity    Alcohol use: No    Drug use: No    Sexual activity: Yes     Partners: Female   Social History Narrative    Lives with wife.       Social Determinants of Health     Financial Resource Strain: Medium Risk (11/19/2019)    Overall Financial Resource Strain (CARDIA)     Difficulty of Paying Living Expenses: Somewhat hard   Food Insecurity: Food Insecurity Present (11/19/2019)    Hunger Vital Sign     Worried About Running Out of Food in the Last Year: Often true     Ran Out of Food in the Last Year: Sometimes true   Transportation Needs: No Transportation Needs (11/19/2019)    PRAPARE - Transportation     Lack of Transportation (Medical): No     Lack of Transportation (Non-Medical): No   Physical Activity: Inactive (11/19/2019)    Exercise Vital Sign     Days of Exercise per Week: 0 days     Minutes of Exercise per Session: 0 min   Stress: Stress Concern Present (11/19/2019)    [employer] [employer] - Occupational Stress Questionnaire     Feeling of Stress : Rather much   Social Connections: Moderately Isolated (11/19/2019)    Social Connection and Isolation Panel [NHANES]     Frequency of Communication with Friends and Family: Twice a week     Frequency of Social Gatherings with Friends and Family: Twice a week     Attends Adventism Services: Never     Active Member of Clubs or Organizations: No     Attends Club or Organization Meetings: Never     Marital Status:        Review of Systems         No data to display                 Checklist of Daily Activities:        Objective:   /78 (BP Location: Left arm, Patient  "Position: Sitting, BP Method: Medium (Manual))   Pulse 75   Temp 97.7 °F (36.5 °C) (Oral)   Ht 5' 9" (1.753 m)   Wt 87.1 kg (192 lb 0.3 oz)   SpO2 97%   BMI 28.36 kg/m²     Physical Exam  Constitutional:       General: He is not in acute distress.     Appearance: Normal appearance. He is not ill-appearing, toxic-appearing or diaphoretic.   HENT:      Head: Normocephalic and atraumatic.   Cardiovascular:      Rate and Rhythm: Normal rate and regular rhythm.      Heart sounds: Normal heart sounds.   Pulmonary:      Effort: Pulmonary effort is normal.      Breath sounds: Normal breath sounds.   Abdominal:      Palpations: Abdomen is soft.      Tenderness: There is no abdominal tenderness. There is no guarding or rebound.   Musculoskeletal:      Right lower leg: No edema.      Left lower leg: No edema.   Lymphadenopathy:      Cervical: No cervical adenopathy.   Neurological:      Mental Status: He is alert.      Comments: No nystagmus on Di-Hallpike .  No ataxic gait             Lab Results   Component Value Date    WBC 6.01 11/14/2023    HGB 15.6 11/14/2023    HCT 45.4 11/14/2023     11/14/2023    CHOL 212 (H) 02/14/2023    TRIG 111 02/14/2023    HDL 57 02/14/2023    ALT 12 11/14/2023    AST 19 11/14/2023     11/14/2023    K 4.2 11/14/2023     11/14/2023    CREATININE 1.0 11/14/2023    BUN 19 11/14/2023    CO2 29 11/14/2023    TSH 0.719 11/14/2023    PSA 0.69 02/14/2023    INR 1.0 10/14/2022    HGBA1C 5.4 02/14/2023       Current Outpatient Medications on File Prior to Visit   Medication Sig Dispense Refill    amitriptyline (ELAVIL) 25 MG tablet Take 25 mg by mouth nightly.      celecoxib (CELEBREX) 200 MG capsule TK ONE C PO BID      fluticasone propionate (FLONASE) 50 mcg/actuation nasal spray SHAKE LIQUID AND USE 2 SPRAYS(100 MCG) IN EACH NOSTRIL EVERY DAY 48 g 3    HYDROcodone-acetaminophen (NORCO) 7.5-325 mg per tablet Take 1 tablet by mouth every 8 (eight) hours as needed.      " pregabalin (LYRICA) 150 MG capsule Take 150 mg by mouth once daily.      rosuvastatin (CRESTOR) 5 MG tablet Take 1 tablet (5 mg total) by mouth once daily. 90 tablet 3     Current Facility-Administered Medications on File Prior to Visit   Medication Dose Route Frequency Provider Last Rate Last Admin    0.9%  NaCl infusion  500 mL Intravenous Continuous Amado Parekh MD             Assessment:   67 y.o. male with multiple co-morbid illnesses here for continued follow up of medical problems.      Plan:     Problem List Items Addressed This Visit          Cardiac/Vascular    Mixed hyperlipidemia    Relevant Orders    TSH (Completed)       Oncology    Follicular non-Hodgkin's lymphoma     Patient followed by Oncology.  L [facility].  Patient notes left-sided abdominal pain.  Similar to pain he had prior to be diagnosed with lymphoma.  Per patient CT scan scheduled for next month with oncology.            Other    Healthcare maintenance     ACP reviewed today   Yearly labs with PSA 02/2023  Optometry  Flu vaccine today.  RSV in approximately 3 weeks.           Dizziness - Primary     Random.  Intermittent.  Lasting 10 minutes.  No prodrome.  No LOC/falls.  Similar to occupancies in the past but worse  Check orthostatics  Check Di-Hallpike  Labs  Multiple high risk meds, but none new and no chges.         Relevant Orders    CBC Auto Differential (Completed)    Comprehensive Metabolic Panel (Completed)    TSH (Completed)    Ambulatory referral/consult to ENT     Other Visit Diagnoses       Need for vaccination        Relevant Orders    Influenza - Quadrivalent (Adjuvanted) (Completed)            Health Maintenance         Date Due Completion Date    RSV Vaccine (Age 60+ and Pregnant patients) (1 - 1-dose 60+ series) Never done ---    COVID-19 Vaccine (7 - 2023-24 season) 09/01/2023 6/9/2022    Colorectal Cancer Screening 05/18/2024 5/18/2021    Override on 5/18/2021: Done (repeat 3 year)    Pneumococcal Vaccines (Age 65+) (3 -  PPSV23 or PCV20) 08/18/2025 8/18/2020    Hemoglobin A1c (Diabetic Prevention Screening) 02/14/2026 2/14/2023    Lipid Panel 02/14/2028 2/14/2023    TETANUS VACCINE 06/15/2030 6/15/2020          Medications Reconciliation:   I have reconciled the patient's home medications with the patient/family. I have updated all changes.  Refer to After-Visit Medication List.      Medication List            Accurate as of November 14, 2023 11:59 PM. If you have any questions, ask your nurse or doctor.                CONTINUE taking these medications      amitriptyline 25 MG tablet  Commonly known as: ELAVIL     celecoxib 200 MG capsule  Commonly known as: CeleBREX     fluticasone propionate 50 mcg/actuation nasal spray  Commonly known as: FLONASE  SHAKE LIQUID AND USE 2 SPRAYS(100 MCG) IN EACH NOSTRIL EVERY DAY     HYDROcodone-acetaminophen 7.5-325 mg per tablet  Commonly known as: NORCO     pregabalin 150 MG capsule  Commonly known as: LYRICA     rosuvastatin 5 MG tablet  Commonly known as: CRESTOR  Take 1 tablet (5 mg total) by mouth once daily.                   Follow up in about 3 months (around 2/14/2024). Total clinical care time was 30 min. The following issues were discussed:  Dizziness, need for optometry     Roly Flannery MD  Internal Medicine  [facility]  [phone]

## 2023-11-14 NOTE — PROGRESS NOTES
Orthostatic BP test done on patient this morning.     Lying /68, pulse 60, O2% 97  Sitting /70, pulse 68, O2% 98  Standing /72, pulse 68, O2% 97  No symptoms while doing test.     Pt did report he forgot to mention to Dr Flannery that at times he does see white spots and gets fogginess with the dizziness associated.

## 2023-11-14 NOTE — TELEPHONE ENCOUNTER
----- Message from Alla Haney MD sent at 9/18/2020  4:17 PM CDT -----  Please inform patient results are OK.   Spoke with patient, she is emotional on the phone as she recently got terminated from her job and her insurance will be ending on 11/30/23. Patient would like to follow up with PCP in office before insurance ends for physical and to discuss a right sided abdominal \"knot\" that occurs randomly for about a min each time. She previously had her gallbladder removed. She states \"last night it happened when I was laying on my back, I grabbed the area that was in pain and it felt like a large knot and it hurt with touch\". She denies other symptoms of nausea, vomiting, fevers, and denies a pattern to the pain. Patient asking if there is a possibility patient can be seen in the next two weeks before insurance ends, she does not want to schedule with any other provider in office due to strong trust she has in PCP. Please advise.

## 2023-11-21 ENCOUNTER — TELEPHONE (OUTPATIENT)
Dept: PRIMARY CARE CLINIC | Facility: CLINIC | Age: 68
End: 2023-11-21
Payer: MEDICARE

## 2023-11-21 NOTE — TELEPHONE ENCOUNTER
----- Message from Roly Flannery MD sent at 11/20/2023  3:32 PM CST -----  Please call patient with results.  No anemia.  Normal thyroid function.  Normal glucose, renal function, liver function.

## 2023-11-21 NOTE — ASSESSMENT & PLAN NOTE
ACP reviewed today   Yearly labs with PSA 02/2023  Optometry  Flu vaccine today.  RSV in approximately 3 weeks.

## 2023-11-21 NOTE — ASSESSMENT & PLAN NOTE
Patient followed by Oncology.  L [facility].  Patient notes left-sided abdominal pain.  Similar to pain he had prior to be diagnosed with lymphoma.  Per patient CT scan scheduled for next month with oncology.

## 2023-11-28 ENCOUNTER — OFFICE VISIT (OUTPATIENT)
Dept: OPTOMETRY | Facility: CLINIC | Age: 68
End: 2023-11-28
Payer: MEDICARE

## 2023-11-28 DIAGNOSIS — H52.4 PRESBYOPIA: ICD-10-CM

## 2023-11-28 DIAGNOSIS — Z13.5 GLAUCOMA SCREENING: ICD-10-CM

## 2023-11-28 DIAGNOSIS — H25.13 NUCLEAR SCLEROSIS, BILATERAL: Primary | ICD-10-CM

## 2023-11-28 PROCEDURE — 1101F PT FALLS ASSESS-DOCD LE1/YR: CPT | Mod: HCNC,CPTII,S$GLB, | Performed by: OPTOMETRIST

## 2023-11-28 PROCEDURE — 3044F PR MOST RECENT HEMOGLOBIN A1C LEVEL <7.0%: ICD-10-PCS | Mod: HCNC,CPTII,S$GLB, | Performed by: OPTOMETRIST

## 2023-11-28 PROCEDURE — 99999 PR PBB SHADOW E&M-EST. PATIENT-LVL II: ICD-10-PCS | Mod: PBBFAC,HCNC,, | Performed by: OPTOMETRIST

## 2023-11-28 PROCEDURE — 99999 PR PBB SHADOW E&M-EST. PATIENT-LVL II: CPT | Mod: PBBFAC,HCNC,, | Performed by: OPTOMETRIST

## 2023-11-28 PROCEDURE — 3288F PR FALLS RISK ASSESSMENT DOCUMENTED: ICD-10-PCS | Mod: HCNC,CPTII,S$GLB, | Performed by: OPTOMETRIST

## 2023-11-28 PROCEDURE — 92004 COMPRE OPH EXAM NEW PT 1/>: CPT | Mod: HCNC,S$GLB,, | Performed by: OPTOMETRIST

## 2023-11-28 PROCEDURE — 92015 DETERMINE REFRACTIVE STATE: CPT | Mod: HCNC,S$GLB,, | Performed by: OPTOMETRIST

## 2023-11-28 PROCEDURE — 92004 PR EYE EXAM, NEW PATIENT,COMPREHESV: ICD-10-PCS | Mod: HCNC,S$GLB,, | Performed by: OPTOMETRIST

## 2023-11-28 PROCEDURE — 3288F FALL RISK ASSESSMENT DOCD: CPT | Mod: HCNC,CPTII,S$GLB, | Performed by: OPTOMETRIST

## 2023-11-28 PROCEDURE — 92015 PR REFRACTION: ICD-10-PCS | Mod: HCNC,S$GLB,, | Performed by: OPTOMETRIST

## 2023-11-28 PROCEDURE — 1157F ADVNC CARE PLAN IN RCRD: CPT | Mod: HCNC,CPTII,S$GLB, | Performed by: OPTOMETRIST

## 2023-11-28 PROCEDURE — 3044F HG A1C LEVEL LT 7.0%: CPT | Mod: HCNC,CPTII,S$GLB, | Performed by: OPTOMETRIST

## 2023-11-28 PROCEDURE — 1159F MED LIST DOCD IN RCRD: CPT | Mod: HCNC,CPTII,S$GLB, | Performed by: OPTOMETRIST

## 2023-11-28 PROCEDURE — 1160F RVW MEDS BY RX/DR IN RCRD: CPT | Mod: HCNC,CPTII,S$GLB, | Performed by: OPTOMETRIST

## 2023-11-28 PROCEDURE — 1159F PR MEDICATION LIST DOCUMENTED IN MEDICAL RECORD: ICD-10-PCS | Mod: HCNC,CPTII,S$GLB, | Performed by: OPTOMETRIST

## 2023-11-28 PROCEDURE — 1126F PR PAIN SEVERITY QUANTIFIED, NO PAIN PRESENT: ICD-10-PCS | Mod: HCNC,CPTII,S$GLB, | Performed by: OPTOMETRIST

## 2023-11-28 PROCEDURE — 1160F PR REVIEW ALL MEDS BY PRESCRIBER/CLIN PHARMACIST DOCUMENTED: ICD-10-PCS | Mod: HCNC,CPTII,S$GLB, | Performed by: OPTOMETRIST

## 2023-11-28 PROCEDURE — 1157F PR ADVANCE CARE PLAN OR EQUIV PRESENT IN MEDICAL RECORD: ICD-10-PCS | Mod: HCNC,CPTII,S$GLB, | Performed by: OPTOMETRIST

## 2023-11-28 PROCEDURE — 1126F AMNT PAIN NOTED NONE PRSNT: CPT | Mod: HCNC,CPTII,S$GLB, | Performed by: OPTOMETRIST

## 2023-11-28 PROCEDURE — 1101F PR PT FALLS ASSESS DOC 0-1 FALLS W/OUT INJ PAST YR: ICD-10-PCS | Mod: HCNC,CPTII,S$GLB, | Performed by: OPTOMETRIST

## 2023-11-28 NOTE — PROGRESS NOTES
HPI    68 Y/o female is here for routine eye exam with C/o pt is having trouble   seeing with both distance and near vision pt state he has Rx glasses for   watching television does not have them present today   Pt denies pain and discomfort   No f/f    Eye med: no gtt   Last edited by Husam Morales MA on 11/28/2023  9:39 AM.            Assessment /Plan     For exam results, see Encounter Report.    Nuclear sclerosis, bilateral    Glaucoma screening    Presbyopia      Cat OU w difficulty night driving.  Discussed surgery--pt wishes to discuss w family    PLAN:    Wrote optional spex Rx  Rtc 1 yr, or pt will call sooner if wishes cat eval w Dr Montes De Oca

## 2023-12-14 NOTE — PROGRESS NOTES
"   [facility] Follow-up Note 7/20/2020    Reason for Visit:  There were no encounter diagnoses.    PCP: Roly Flannery       HPI:  This is a 68 y.o. male here for evaluation of: transaminase elevation.    Acute hep B has responded with Surface antigen seroconversion to Hep B surface antibody positive at least last 3 measurments. So, insurance wants to stop the Vemlidy.  That is Ok as long as he is not going to get Rituxan.  If he should get Rituxan anytime in the future, he should be started on Vemlidy at least a week before starting rituxan.      Labs every 6 months, starting 10/15/2022:   Liver profile, HBsAg, HBsAb - have been negative/normal.   Has been off Vemlidy for more than a year.   Last CT abd pelvis 3/2023 was negative for any tumor.     Return PRN, if referred by PCP.      63 y/o male with H/o follicular non-Hodgkin's lymphoma, malignant mets to bone, anti-neoplastic chemotherapy R-CHOP in Jan 2020, with pancytopenia, Pulm HTN, PE, mixed hyperlipidemia, was found to have elevated transaminases on 1/27/20 and again the next day.  AST in 500-700 range and ALT in 0175-5422 range.  Alk phos and T bili normal.  On Vemlidy since 2/6/2020, enzymes have normalized AST 24, ALT 18, HBV DNA has declined to <10, not detected (from 1.1 million IU/mL).  Hep B sAg has seroconverted to Hep B sAb positive on 9/25/2020 (approx. 9 weeks ago).  Thus, he has achieved "remission" what we used to refer to as "cure".  His latest blood tests [place] [facility] [place] [facility] [place]where[place] on 11/4/20:  CBC ok, CMP also Ok, In this patient's case, he will remain on Vemlidy for now, due to possibility he may need chemotherapy again.    -  Today, patient states: he feels fine,     History given by patient's wife:  Patient was diagnosed with NHL in Nov-Dec 2018 with diffuse abd lymph node enlargement, stage 4 with mets in bone marrow.  R-CHOP was started in Jan 2020 under the care of Dr. Dominik Farooq at [facility]. " " After the first treatment, patient had acute upper abd pain, so they held off treatment with R-CHOP.  Evaluation showed abd pain was due to gallbladder, so he had a lap cholecystectomy (at [facility]) .  After surgery, he had PEs (from "upper part of his body", not lower extremities).  All of this was treated, then R-CHOP was resumed around June/July 2019.  He received 6-7 cycles, every 21 days.  Last treatment was completed just before Christmas 2019.  Then, he was told he would return in 3 months (March 2020) for any future treatments.      He did have an evaluation with a PET-CT on 12/30/19 which showed near complete resolution of the lymphadenopathy, however, 2 small 6 mm subpleural nodules on the left lower lobe of the lung and 2 small lymph nodes in the left anterior mesentery were seen on this PET-CT.  Labs that day 12/30/19 showed AST was already elevated to 55 and ALT to 88.  Then there were no labs until Jan 27/2020, when AT, ALT were elevated as above.      HBsAg neg, HBeAg and HBcAb IgM were neg on 3/15/19 (prior to starting R-CHOP).     Now HBsAg +, HBeAg +, HBV DNA 1.13 million IU/mL. Vemlidy started on 2/12/20.     Patient has been on Vemlidy 25 mg daily and with this treatment, his transaminases have normalized and his HBV DNA is now <10, detetcted.  This is down from 1.13 million IU/ ml.      Thus, patient has responded well to Vemlidy and will need to remain on it, as long he gets chemotherapy until loss of hep B surface antigen, which could be several months.  If we stop it prematurely, I am afraid he may reactivate as he did being treated with Rituxan.            Current meds:   Current Outpatient Medications   Medication Sig    amitriptyline (ELAVIL) 25 MG tablet Take 25 mg by mouth nightly.    celecoxib (CELEBREX) 200 MG capsule TK ONE C PO BID    ergocalciferol (ERGOCALCIFEROL) 50,000 unit Cap Take 1 capsule by mouth every 7 days.    fluticasone propionate (FLONASE) 50 mcg/actuation nasal " "spray SHAKE LIQUID AND USE 2 SPRAYS(100 MCG) IN EACH NOSTRIL EVERY DAY    HYDROcodone-acetaminophen (NORCO) 7.5-325 mg per tablet Take 1 tablet by mouth every 8 (eight) hours as needed.    pregabalin (LYRICA) 150 MG capsule Take 150 mg by mouth once daily.    OMNIPAQUE 300 300 mg iodine/mL injection     pantoprazole (PROTONIX) 40 MG tablet     rosuvastatin (CRESTOR) 5 MG tablet Take 1 tablet (5 mg total) by mouth once daily. (Patient not taking: Reported on 12/15/2023)     No current facility-administered medications for this visit.     Facility-Administered Medications Ordered in Other Visits   Medication    0.9%  NaCl infusion     Patient's symptoms are revolving around a "bad shoulder".      Elevated liver enzymes: Yes  Abnormal imaging: Abd masses from NHL   Cirrhosis: No  Hepatitis C: No  Hepatitis B: No  Fatty liver: No  Encephalopathy: No  Post-hospital discharge: No  Symptoms: right shoulder pain    Primary hepatic manifestations:  Fatigue:Yes  Edema:Yes  Ascites:Yes  Encephalopathy:Yes  Abdominal pain:Yes  GI bleeds: Yes  Pruritus:Yes  Weight Changes:Yes  Changes in Bowel habits: Yes  Muscle cramps:No    Risk factors for liver disease:  No jaundice  No transfusions  No IVDU  Did not snort cocaine or similar agents  Did not live with anyone with hepatitis B or C  Sexual partner not tested  No hepatotoxic medications  No exposure to industrial toxins  Alcohol: None      ROS:  Constitutional: No fevers, chills, weight changes, fatigue  ENT: No allergies, nosebleeds,   CV: No chest pain  Pulm: No cough, shortness of breath  Ophtho: No vision changes  GI/Liver: see HPI  Derm: No rash, itching  Heme: No swollen glands, bruising  MSK: No joint pains, joint swelling  : No dysuria, hematuria, decrease in urine output  Endo: No hot or cold intolerance  Neuro: No confusion, disorientation, difficulty with sleep, memory, concentration, syncope, seizure  Psych: No anxiety, depression    Medical History:  has a past " medical history of History of pulmonary embolus (PE) (4/15/2019), Non Hodgkin's lymphoma, Personal history of colonic polyps (09/08/2015), Peyronie disease, and S/P laparoscopic cholecystectomy (5/27/2019).    Surgical History:  has a past surgical history that includes Shoulder surgery (Left); Groin exploration (2 pre 2005 and 1 post 2005); Laparoscopic cholecystectomy (05/2018); Esophagogastroduodenoscopy (N/A, 11/29/2021); injection, sacroiliac joint (Left, 10/11/2022); Injection of anesthetic agent around nerve (Left, 11/8/2022); and injection, sacroiliac joint (Left, 8/22/2023).    Family History: family history includes Breast cancer in his mother; Diabetes in his brother; Emphysema in his father; No Known Problems in his daughter and daughter; Stroke in his sister; Ulcers in his brother..     Social History:  reports that he has never smoked. He has never used smokeless tobacco. He reports that he does not drink alcohol and does not use drugs.    Review of patient's allergies indicates:   Allergen Reactions    Atorvastatin      myalgias    Rosuvastatin      myalgias       Current Outpatient Rx   Medication Sig Dispense Refill    amitriptyline (ELAVIL) 25 MG tablet Take 25 mg by mouth nightly.      celecoxib (CELEBREX) 200 MG capsule TK ONE C PO BID      ergocalciferol (ERGOCALCIFEROL) 50,000 unit Cap Take 1 capsule by mouth every 7 days.      fluticasone propionate (FLONASE) 50 mcg/actuation nasal spray SHAKE LIQUID AND USE 2 SPRAYS(100 MCG) IN EACH NOSTRIL EVERY DAY 48 g 3    HYDROcodone-acetaminophen (NORCO) 7.5-325 mg per tablet Take 1 tablet by mouth every 8 (eight) hours as needed.      pregabalin (LYRICA) 150 MG capsule Take 150 mg by mouth once daily.      OMNIPAQUE 300 300 mg iodine/mL injection       pantoprazole (PROTONIX) 40 MG tablet       rosuvastatin (CRESTOR) 5 MG tablet Take 1 tablet (5 mg total) by mouth once daily. (Patient not taking: Reported on 12/15/2023) 90 tablet 3       Objective  "Findings:    Vital Signs:  /66 (BP Location: Right arm, Patient Position: Sitting, BP Method: Medium (Automatic))   Pulse 71   Ht 5' 9" (1.753 m)   Wt 86.9 kg (191 lb 9.3 oz)   SpO2 (!) 94%   BMI 28.29 kg/m²   Body mass index is 28.29 kg/m².    Physical Exam:  General Appearance: Well appearing in no acute distress  Head:   Normocephalic, without obvious abnormality  Eyes:    No scleral icterus, EOMI  ENT: Neck supple, Lips, mucosa, and tongue normal; teeth and gums normal  Lungs: CTA bilaterally in anterior and posterior fields, no wheezes, no crackles.  Heart:  Regular rate and rhythm, S1, S2 normal, no murmurs heard  Abdomen: Soft, non tender, non distended with positive bowel sounds in all four quadrants. No hepatosplenomegaly, ascites, or mass  Extremities: 2+ pulses, no clubbing, cyanosis or edema  Skin: No rash  Neurologic: CN II-XII intact, alert, oriented x 3. No asterixis      Labs:  Lab Results   Component Value Date    WBC 6.01 11/14/2023    HGB 15.6 11/14/2023    HCT 45.4 11/14/2023     11/14/2023    CHOL 212 (H) 02/14/2023    TRIG 111 02/14/2023    HDL 57 02/14/2023    INR 1.0 10/14/2022    CREATININE 1.0 11/14/2023    BUN 19 11/14/2023    BILITOT 0.4 11/14/2023    ALT 12 11/14/2023    AST 19 11/14/2023    ALKPHOS 66 11/14/2023     11/14/2023    K 4.2 11/14/2023     11/14/2023    CO2 29 11/14/2023    TSH 0.719 11/14/2023    PSA 0.69 02/14/2023    HGBA1C 5.4 02/14/2023    AFP 1.4 05/13/2021       Imaging:       Endoscopy:      Assessment:  No diagnosis found.   Acute Hepatitis (or acute exacerbation of chronic) "reactivation" after Rituxan:    Pt with NHL, HLD, PE, Pulm HTN, s/p lap valentina, s/p R-CHOP 6-7 cycles.   Here fr elevated transaminases:  elevated transaminases first noted on 12/30/19 when R-CHOP therapy was completed, then no labs until on 1/27/20 (and again the next day with a slight downward trend).  AST in 500 range and ALT in 1100 range.  Alk phos and T bili " normal.  All labs have normalized since being on Vemlidy.      - Acute hep B has responded with Surface antigen seroconversion to Hep B surface antibody positive at least last 3 measurments. So, insurance wants to stop the Vemlidy.  That is Ok as long as he is not going to get Rituxan.  If he should get Rituxan anytime in the future, he should be started on Vemlidy at least a week before starting rituxan.      Labs every 6 months, starting 10/15/2022:   Liver profile, HBsAg, HBsAb - have been negative/normal.   Has been off Vemlidy for more than a year.   Last CT abd pelvis 3/2023 was negative for any tumor.     Return PRN, if referred by PCP.    Response of NHL to R-CHOP:   PET-CT 12/30/19: showed near complete resolution of the mesenteric lymph nodes, but interval development of 2 small 6 mm subpleural nodules on the left lower lung. And 2 left mesenteric lymph nodes.   Patient now sees Dr. José Antonio Del Cid, [place]cologist [place] [facility] [place], (phone- [phone]).  Patient has been told:  If he should get Rituxan anytime in the future, he should be started on Vemlidy at least a week before starting rituxan.          US abd 1/28/20:  1.2 cm hypoechoic structure in the region of the janine hepatis.  This finding is indeterminate and may represent prominent periportal lymph node or cystic duct remnant in this patient that is status-post cholecystectomy.  CT abdomen without and with contrast could be done if clinically warranted.    Serologies have been ordered by Dr. Roly Flannery.  Will check results this week.     Anemia: resolved.   EGD 11/29/21 for eval of anemia:   - Normal esophagus.                          - Z-line, 37 cm from the incisors.                          - Friable gastric mucosa. Biopsied. - Neg for malignancy.                         - Normal examined duodenum.       Recommendations:  Hep B responded with seroconversion.  HBV DNA negative.  -  Vemlidy stopped.  -  Vemlidy Needs to be restarted if possibility he  needs chemotherapy.   -  Oncologist retired, He now [place]. José Antonio Del Cid[place] [facility] [place]- [phone][place] for lymphoma. If he should get Rituxan anytime in the future, he should be started on Vemlidy at least a week before starting rituxan.  -  Avoid alcohol, smoking, sedatives and meds with codeine.  -  Avoid high intake of Tylenol (more than 4 extra-strength pills in one day)  -  Call us if any bleeding, fevers, confusion, disorientation occur  -  Return PRN if PCP refers.       Follow up if symptoms worsen or fail to improve.      Order summary:  No orders of the defined types were placed in this encounter.        Thank you so much for allowing me to participate in the care of Jeff Haney MD

## 2023-12-15 ENCOUNTER — OFFICE VISIT (OUTPATIENT)
Dept: HEPATOLOGY | Facility: CLINIC | Age: 68
End: 2023-12-15
Payer: MEDICARE

## 2023-12-15 VITALS
SYSTOLIC BLOOD PRESSURE: 115 MMHG | DIASTOLIC BLOOD PRESSURE: 66 MMHG | OXYGEN SATURATION: 94 % | HEIGHT: 69 IN | HEART RATE: 71 BPM | WEIGHT: 191.56 LBS | BODY MASS INDEX: 28.37 KG/M2

## 2023-12-15 DIAGNOSIS — B16.9 ACUTE HEPATITIS B: Primary | ICD-10-CM

## 2023-12-15 PROCEDURE — 99999 PR PBB SHADOW E&M-EST. PATIENT-LVL III: ICD-10-PCS | Mod: PBBFAC,HCNC,, | Performed by: INTERNAL MEDICINE

## 2023-12-15 PROCEDURE — 1101F PR PT FALLS ASSESS DOC 0-1 FALLS W/OUT INJ PAST YR: ICD-10-PCS | Mod: HCNC,CPTII,S$GLB, | Performed by: INTERNAL MEDICINE

## 2023-12-15 PROCEDURE — 3288F PR FALLS RISK ASSESSMENT DOCUMENTED: ICD-10-PCS | Mod: HCNC,CPTII,S$GLB, | Performed by: INTERNAL MEDICINE

## 2023-12-15 PROCEDURE — 3078F PR MOST RECENT DIASTOLIC BLOOD PRESSURE < 80 MM HG: ICD-10-PCS | Mod: HCNC,CPTII,S$GLB, | Performed by: INTERNAL MEDICINE

## 2023-12-15 PROCEDURE — 99214 OFFICE O/P EST MOD 30 MIN: CPT | Mod: HCNC,S$GLB,, | Performed by: INTERNAL MEDICINE

## 2023-12-15 PROCEDURE — 1159F MED LIST DOCD IN RCRD: CPT | Mod: HCNC,CPTII,S$GLB, | Performed by: INTERNAL MEDICINE

## 2023-12-15 PROCEDURE — 3008F PR BODY MASS INDEX (BMI) DOCUMENTED: ICD-10-PCS | Mod: HCNC,CPTII,S$GLB, | Performed by: INTERNAL MEDICINE

## 2023-12-15 PROCEDURE — 1157F ADVNC CARE PLAN IN RCRD: CPT | Mod: HCNC,CPTII,S$GLB, | Performed by: INTERNAL MEDICINE

## 2023-12-15 PROCEDURE — 99999 PR PBB SHADOW E&M-EST. PATIENT-LVL III: CPT | Mod: PBBFAC,HCNC,, | Performed by: INTERNAL MEDICINE

## 2023-12-15 PROCEDURE — 1157F PR ADVANCE CARE PLAN OR EQUIV PRESENT IN MEDICAL RECORD: ICD-10-PCS | Mod: HCNC,CPTII,S$GLB, | Performed by: INTERNAL MEDICINE

## 2023-12-15 PROCEDURE — 3044F PR MOST RECENT HEMOGLOBIN A1C LEVEL <7.0%: ICD-10-PCS | Mod: HCNC,CPTII,S$GLB, | Performed by: INTERNAL MEDICINE

## 2023-12-15 PROCEDURE — 3074F PR MOST RECENT SYSTOLIC BLOOD PRESSURE < 130 MM HG: ICD-10-PCS | Mod: HCNC,CPTII,S$GLB, | Performed by: INTERNAL MEDICINE

## 2023-12-15 PROCEDURE — 3078F DIAST BP <80 MM HG: CPT | Mod: HCNC,CPTII,S$GLB, | Performed by: INTERNAL MEDICINE

## 2023-12-15 PROCEDURE — 1159F PR MEDICATION LIST DOCUMENTED IN MEDICAL RECORD: ICD-10-PCS | Mod: HCNC,CPTII,S$GLB, | Performed by: INTERNAL MEDICINE

## 2023-12-15 PROCEDURE — 99214 PR OFFICE/OUTPT VISIT, EST, LEVL IV, 30-39 MIN: ICD-10-PCS | Mod: HCNC,S$GLB,, | Performed by: INTERNAL MEDICINE

## 2023-12-15 PROCEDURE — 3074F SYST BP LT 130 MM HG: CPT | Mod: HCNC,CPTII,S$GLB, | Performed by: INTERNAL MEDICINE

## 2023-12-15 PROCEDURE — 3008F BODY MASS INDEX DOCD: CPT | Mod: HCNC,CPTII,S$GLB, | Performed by: INTERNAL MEDICINE

## 2023-12-15 PROCEDURE — 3288F FALL RISK ASSESSMENT DOCD: CPT | Mod: HCNC,CPTII,S$GLB, | Performed by: INTERNAL MEDICINE

## 2023-12-15 PROCEDURE — 3044F HG A1C LEVEL LT 7.0%: CPT | Mod: HCNC,CPTII,S$GLB, | Performed by: INTERNAL MEDICINE

## 2023-12-15 PROCEDURE — 1101F PT FALLS ASSESS-DOCD LE1/YR: CPT | Mod: HCNC,CPTII,S$GLB, | Performed by: INTERNAL MEDICINE

## 2023-12-15 RX ORDER — PANTOPRAZOLE SODIUM 40 MG/1
TABLET, DELAYED RELEASE ORAL
COMMUNITY
Start: 2023-07-21

## 2023-12-15 RX ORDER — IOHEXOL 300 MG/ML
INJECTION, SOLUTION INTRAVENOUS
COMMUNITY
Start: 2023-08-22 | End: 2024-03-13

## 2023-12-15 RX ORDER — ERGOCALCIFEROL 1.25 MG/1
1 CAPSULE ORAL
COMMUNITY

## 2023-12-15 NOTE — PATIENT INSTRUCTIONS
Recommendations:  Hep B responded with seroconversion.  HBV DNA negative.  -  Vemlidy stopped.  -  Vemlidy Needs to be restarted if possibility he needs chemotherapy.   -  Oncologist retired, He now [place]. José Antonio Del Cid[place] [facility] [place]- [phone][place] for lymphoma. If he should get Rituxan anytime in the future, he should be started on Vemlidy at least a week before starting rituxan.  -  Avoid alcohol, smoking, sedatives and meds with codeine.  -  Avoid high intake of Tylenol (more than 4 extra-strength pills in one day)  -  Call us if any bleeding, fevers, confusion, disorientation occur  -  Return PRN if PCP refers.

## 2023-12-15 NOTE — Clinical Note
Recommendations: Hep B responded with seroconversion.  HBV DNA negative. -  Vemlidy stopped. -  Vemlidy Needs to be restarted if possibility he needs chemotherapy.  -  Oncologist retired, He now [place]. José Antonio Del Cid[place] [facility] [place]- [phone][place] for lymphoma. If he should get Rituxan anytime in the future, he should be started on Vemlidy at least a week before starting rituxan. -  Avoid alcohol, smoking, sedatives and meds with codeine. -  Avoid high intake of Tylenol (more than 4 extra-strength pills in one day) -  Call us if any bleeding, fevers, confusion, disorientation occur -  Return PRN if PCP refers.

## 2023-12-22 ENCOUNTER — TELEPHONE (OUTPATIENT)
Dept: OPHTHALMOLOGY | Facility: CLINIC | Age: 68
End: 2023-12-22
Payer: MEDICARE

## 2023-12-22 NOTE — TELEPHONE ENCOUNTER
----- Message from Margo Fraire sent at 12/22/2023  2:44 PM CST -----  Regarding: Cataract Eval  Patient called in regards to scheduling from referral from Dr.Brian Enriquez-oni Montes De Oca    Please call back to further assist- [phone]

## 2024-01-08 ENCOUNTER — TELEPHONE (OUTPATIENT)
Dept: OPHTHALMOLOGY | Facility: CLINIC | Age: 69
End: 2024-01-08
Payer: MEDICARE

## 2024-01-08 NOTE — TELEPHONE ENCOUNTER
----- Message from Gloria Peralta sent at 1/8/2024  2:02 PM CST -----  Regarding: Update      Name Of Caller:    Jeff      Contact Preference:    [phone]       Nature of call:   Pt received a portal message informing him that an earlier appt time for the Cataract Eval was available. He wants decline and keep the original appt date.

## 2024-02-01 ENCOUNTER — TELEPHONE (OUTPATIENT)
Dept: OTOLARYNGOLOGY | Facility: CLINIC | Age: 69
End: 2024-02-01
Payer: MEDICARE

## 2024-02-01 NOTE — TELEPHONE ENCOUNTER
----- Message from Jessica Esparza sent at 2/1/2024  2:07 PM CST -----  Regarding: Appt Reschedule / Returning Missed Call  Contact: [phone]  Pt is calling stating that he is returning a missed call to Yue about getting his appt rescheduled. He stated the reschedule date was fine. He would like a call back to confirm appt has been rescheduled.

## 2024-02-15 ENCOUNTER — OFFICE VISIT (OUTPATIENT)
Dept: OTOLARYNGOLOGY | Facility: CLINIC | Age: 69
End: 2024-02-15
Payer: MEDICARE

## 2024-02-15 ENCOUNTER — CLINICAL SUPPORT (OUTPATIENT)
Dept: AUDIOLOGY | Facility: CLINIC | Age: 69
End: 2024-02-15
Payer: MEDICARE

## 2024-02-15 DIAGNOSIS — R42 DIZZINESS AND GIDDINESS: ICD-10-CM

## 2024-02-15 DIAGNOSIS — H93.293 ABNORMAL AUDITORY PERCEPTION, BILATERAL: Primary | ICD-10-CM

## 2024-02-15 DIAGNOSIS — H61.22 LEFT EAR IMPACTED CERUMEN: ICD-10-CM

## 2024-02-15 DIAGNOSIS — R42 INTERMITTENT LIGHTHEADEDNESS: Primary | ICD-10-CM

## 2024-02-15 DIAGNOSIS — R42 DIZZINESS: ICD-10-CM

## 2024-02-15 PROCEDURE — 92550 TYMPANOMETRY & REFLEX THRESH: CPT | Mod: HCNC,S$GLB,,

## 2024-02-15 PROCEDURE — 1159F MED LIST DOCD IN RCRD: CPT | Mod: HCNC,CPTII,S$GLB, | Performed by: OTOLARYNGOLOGY

## 2024-02-15 PROCEDURE — 1157F ADVNC CARE PLAN IN RCRD: CPT | Mod: HCNC,CPTII,S$GLB, | Performed by: OTOLARYNGOLOGY

## 2024-02-15 PROCEDURE — 99999 PR PBB SHADOW E&M-EST. PATIENT-LVL II: CPT | Mod: PBBFAC,HCNC,, | Performed by: OTOLARYNGOLOGY

## 2024-02-15 PROCEDURE — 99203 OFFICE O/P NEW LOW 30 MIN: CPT | Mod: 25,HCNC,S$GLB, | Performed by: OTOLARYNGOLOGY

## 2024-02-15 PROCEDURE — 69210 REMOVE IMPACTED EAR WAX UNI: CPT | Mod: HCNC,S$GLB,, | Performed by: OTOLARYNGOLOGY

## 2024-02-15 PROCEDURE — 92557 COMPREHENSIVE HEARING TEST: CPT | Mod: HCNC,S$GLB,,

## 2024-02-15 NOTE — Clinical Note
Symptoms not consistent with vertigo.  Defer VNG testing.  Cardiogenic or neurogenic presyncope seems to be more likely.  It does not appear to be positional by history.  If cardiologic and were neurologic workup is not diagnostic or if symptoms become more consistent with vertigo VNG testing is recommended as would be vestibular therapy.

## 2024-02-15 NOTE — PROGRESS NOTES
History:  Jeff Hope, a 68 y.o. male, was seen today for an audiologic evaluation. He reported experiencing episodic dizziness for about 1 year, sometimes seemingly provoked by chewing and yawning. He also noted left ear irritation, including tingling and a draining sensation. He denied communication difficulty, though he noted keeping his television and Bluetooth headset at high volumes.     Results:  Tympanometry revealed Type A tympanogram in the right ear and Type A tympanogram in the left ear. Ipsilateral acoustic reflexes were present at normal levels in both ears. Contralateral acoustic reflexes were present at normal or elevated levels in both ears.  Pure tone audiometry revealed  normal hearing sensitivity in the right ear and normal hearing sensitivity in the left ear.  Speech reception thresholds were obtained at 10 dB in the right ear and 10 dB in the left ear.  Word recognition scores were 100% in the right ear and 100% in the left ear.    Recommendations:  Otologic evaluation as scheduled  Hearing protection in noise  Return for follow-up audiologic evaluation if concerns for hearing are noted.

## 2024-02-15 NOTE — PROGRESS NOTES
[facility]    Subjective:      Patient: Jeff Hope Patient PCP: Roly Flannery MD         :  1955     Sex:  male      MRN:  [id number]          Date of Visit: 02/15/2024      Chief Complaint: Dizziness (Pt complains of dizziness for 1 year. Also feels like he has drainage )      Patient ID: Jeff Hope is a 68 y.o. male     Patient is a  lifelong NON-smoker with a past medical history of bronchiectasis, HLD, pulmonary embolism, hypogammaglobulinemia, GERD and chronic myofascial pain on Elavil 25 mg nightly and Lyrica 150 mg daily referred to me by Dr. Roly Flannery in consultation for dizziness for the last year or so.  This is episodic lasting as much as 3-5 minutes.  Patient describes the feeling of fogginess of the eyes and lightheadedness like he might pass out but also describing a sensation of imbalance like he might fall.  Denies any sensation of true vertigo or any associated ear fullness ringing roaring hissing or hearing loss associated with these events.  Symptoms are very transient and even the most severe episodes only last up to 5 minutes.  They are not specifically associated with sitting up or standing up.  They can occur at rest.  Does not specifically identify any palpitations nausea or sweating.  No known cardiovascular history.  Never seen by Cardiology.    Audiogram performed today with completely normal thresholds throughout normal tympanometry 10 dB SRT and 100% discrimination bilaterally.  No head/brain imaging.  No prior history of migraines or any focal neurologic defects.      Labs:  WBC   Date Value Ref Range Status   2023 6.01 3.90 - 12.70 K/uL Final     Hemoglobin   Date Value Ref Range Status   2023 15.6 14.0 - 18.0 g/dL Final     Platelets   Date Value Ref Range Status   2023 180 150 - 450 K/uL Final     Creatinine   Date Value Ref Range Status   2023 1.0 0.5 - 1.4 mg/dL Final     TSH   Date Value Ref Range Status   2023 0.719 0.400  - 4.000 uIU/mL Final     Hemoglobin A1C   Date Value Ref Range Status   02/14/2023 5.4 4.0 - 5.6 % Final     Comment:     ADA Screening Guidelines:  5.7-6.4%  Consistent with prediabetes  >or=6.5%  Consistent with diabetes    High levels of fetal hemoglobin interfere with the HbA1C  assay. Heterozygous hemoglobin variants (HbS, HgC, etc)do  not significantly interfere with this assay.   However, presence of multiple variants may affect accuracy.         Past Medical History  He has a past medical history of History of pulmonary embolus (PE), Non Hodgkin's lymphoma, Personal history of colonic polyps, Peyronie disease, and S/P laparoscopic cholecystectomy.    Family / Surgical / Social History  His family history includes Breast cancer in his mother; Diabetes in his brother; Emphysema in his father; No Known Problems in his daughter and daughter; Stroke in his sister; Ulcers in his brother.    Past Surgical History:   Procedure Laterality Date    ESOPHAGOGASTRODUODENOSCOPY N/A 11/29/2021    Procedure: ESOPHAGOGASTRODUODENOSCOPY (EGD);  Surgeon: Kristine Palmer MD;  Location: [address]);  Service: Endoscopy;  Laterality: N/A;  fully vacc-inst mail-tb-left chest port    GROIN EXPLORATION  2 pre 2005 and 1 post 2005    total of 3 procedures    INJECTION OF ANESTHETIC AGENT AROUND NERVE Left 11/8/2022    Procedure: BLOCK, NERVE, LEFT L3-L5 MEDIAL BRANCH;  Surgeon: Melody Berry MD;  [facility];  Service: Pain Management;  Laterality: Left;    INJECTION, SACROILIAC JOINT Left 10/11/2022    Procedure: INJECTION,SACROILIAC JOINT LEFT CONTRAST;  Surgeon: Melody Berry MD;  Location: [facility];  Service: Pain Management;  Laterality: Left;    INJECTION, SACROILIAC JOINT Left 8/22/2023    Procedure: INJECTION,SACROILIAC JOINT, LEFT SOONER DATE;  Surgeon: Melody Berry MD;  [facility];  Service: Pain Management;  Laterality: Left;    LAPAROSCOPIC CHOLECYSTECTOMY  05/2018  "   SHOULDER SURGERY Left        Social History     Tobacco Use    Smoking status: Never    Smokeless tobacco: Never   Substance and Sexual Activity    Alcohol use: No    Drug use: No    Sexual activity: Yes     Partners: Female       Medications  He has a current medication list which includes the following prescription(s): amitriptyline, celecoxib, ergocalciferol, fluticasone propionate, hydrocodone-acetaminophen, omnipaque 300, pantoprazole, pregabalin, and rosuvastatin, and the following Facility-Administered Medications: sodium chloride 0.9%.      Allergies  Review of patient's allergies indicates:   Allergen Reactions    Atorvastatin      myalgias    Rosuvastatin      myalgias       All medications, allergies, and past history have been reviewed.    Objective:      Vitals:      11/14/2023     8:10 AM 11/28/2023     9:23 AM 12/15/2023     8:30 AM   Vitals - 1 value per visit   SYSTOLIC 124  115   DIASTOLIC 78  66   Pulse 75  71   Temp 97.7 °F (36.5 °C)     SPO2 97 %  94 %   Weight (lb) 192.02  191.58   Weight (kg) 87.1  86.9   Height 5' 9" (1.753 m)  5' 9" (1.753 m)   BMI (Calculated) 28.3  28.3   Pain Score Six Zero        There is no height or weight on file to calculate BSA.    Physical Exam:    GENERAL  APPEARANCE -  alert, appears stated age, and cooperative  BARRIER(S) TO COMMUNICATION -  none VOICE - appropriate for age and gender    INTEGUMENTARY  no suspicious head and neck lesions    HEENT  HEAD: Normocephalic, without obvious abnormality, atraumatic  FACE: INSPECTION - Symmetric, no signs of trauma, no suspicious lesion(s)      STRENGTH - facial symmetry intact     PALPATION -  No masses     SALIVARY GLANDS - non-tender with no appreciable mass    NECK/THYROID: normal atraumatic, no neck masses, normal thyroid, no jvd    EYES  Normal occular alignment and mobility with no visible nystagmus at rest    EARS/NOSE/MOUTH/THROAT  EARS  PINNAE AND EXTERNAL EARS - no suspicious lesion OTOSCOPIC EXAM (surgical " microscopy was used for visualization/instrumentation): EAR EXAM - Wax preventing exam / occluding the ear canal or causing symptoms noted was cleared with curette and micro without trauma to reveal a normal EAC, TM and ME exam bilaterally.  HEARING - grossly intact to voice/finger rub    NOSE AND SINUSES  EXTERNAL NOSE - Grossly normal for age/sex  SEPTUM - normal/no obstruction on anterior exam without decongestion TURBINATES - within normal limits MUCOSA - within normal limits     MOUTH AND THROAT   ORAL CAVITY, LIPS, TEETH, GUMS & TONGUE - moist, no suspicious lesions  OROPHARYNX /TONSILS/PHARYNGEAL WALLS/HYPOPHARYNX - no erythema or exudates  NASOPHARYNX - limited mirror exam - unable to visualize due to anatomy/gag  LARYNX -  - limited mirror exam - unable to visualize due to anatomy/gag      CHEST AND LUNG   INSPECTION & AUSCULTATION - normal effort, no stridor    CARDIOVASCULAR  AUSCULTATION & PERIPHERAL VASCULAR - regular rate and rhythm.    NEUROLOGIC  MENTAL STATUS - alert, interactive CRANIAL NERVES - normal    MERRITT-HALLPIKE - RIGHT - no nystagmus LEFT - no nystagmus  HEAD THRUSTS - Catch up saccades absent bilaterally   GAIT - normal w/o drift and normal turn bilaterally    LYMPHATIC  HEAD AND NECK - non-palpable; SUPRACLAVICULAR - deferred      Procedure(s):  Cerumen removal performed.  See procedure note.          Assessment:      Problem List Items Addressed This Visit          Other    Dizziness            Plan:      Symptoms inconsistent with inner ear disease.  Patient does describe some imbalance but it seems to be more vascular neurologic.  He has vision changes with blurring of the vision as well as some presyncopal type symptoms.  Hearing is normal.  VNG testing deferred at this time.  There is no clear evidence of vestibular dysfunction would benefit from vestibular therapy.      Patient encouraged to work with his primary care physician for workup of possible cardiac arrhythmia or other  causes of presyncope and if not identifiable imaging and neurology consultation would be recommended.  If symptoms do become more consistent with vertigo we will proceed with VNG testing and if appropriate central neurologic imaging depending upon those findings.    Follow up as needed          Voice recognition software was used in the creation of this note/communication and any sound-alike errors which may have occurred from its use should be taken in context when interpreting.  If such errors prevent a clear understanding of the note/communication, please contact the office for clarification.

## 2024-02-15 NOTE — PROCEDURES
"Ear Cerumen Removal    Date/Time: 2/15/2024 9:00 AM    Performed by: Sharath Thomas MD  Authorized by: Sharath Thomas MD    Time out: Immediately prior to procedure a "time out" was called to verify the correct patient, procedure, equipment, support staff and site/side marked as required.    Consent Done?:  Yes (Verbal)    Local anesthetic:  None  Location details:  Left ear  Procedure type: curette    Cerumen  Removal Results:  Cerumen completely removed  Patient tolerance:  Patient tolerated the procedure well with no immediate complications     See note for details.     "

## 2024-02-21 ENCOUNTER — OFFICE VISIT (OUTPATIENT)
Dept: OPHTHALMOLOGY | Facility: CLINIC | Age: 69
End: 2024-02-21
Payer: MEDICARE

## 2024-02-21 DIAGNOSIS — H25.13 NUCLEAR SCLEROSIS, BILATERAL: Primary | ICD-10-CM

## 2024-02-21 PROCEDURE — 1101F PT FALLS ASSESS-DOCD LE1/YR: CPT | Mod: HCNC,CPTII,S$GLB, | Performed by: OPHTHALMOLOGY

## 2024-02-21 PROCEDURE — 1160F RVW MEDS BY RX/DR IN RCRD: CPT | Mod: HCNC,CPTII,S$GLB, | Performed by: OPHTHALMOLOGY

## 2024-02-21 PROCEDURE — 1126F AMNT PAIN NOTED NONE PRSNT: CPT | Mod: HCNC,CPTII,S$GLB, | Performed by: OPHTHALMOLOGY

## 2024-02-21 PROCEDURE — 1157F ADVNC CARE PLAN IN RCRD: CPT | Mod: HCNC,CPTII,S$GLB, | Performed by: OPHTHALMOLOGY

## 2024-02-21 PROCEDURE — 1159F MED LIST DOCD IN RCRD: CPT | Mod: HCNC,CPTII,S$GLB, | Performed by: OPHTHALMOLOGY

## 2024-02-21 PROCEDURE — 3288F FALL RISK ASSESSMENT DOCD: CPT | Mod: HCNC,CPTII,S$GLB, | Performed by: OPHTHALMOLOGY

## 2024-02-21 PROCEDURE — 99999 PR PBB SHADOW E&M-EST. PATIENT-LVL III: CPT | Mod: PBBFAC,HCNC,, | Performed by: OPHTHALMOLOGY

## 2024-02-21 PROCEDURE — 92136 OPHTHALMIC BIOMETRY: CPT | Mod: HCNC,RT,S$GLB, | Performed by: OPHTHALMOLOGY

## 2024-02-21 PROCEDURE — 99204 OFFICE O/P NEW MOD 45 MIN: CPT | Mod: HCNC,S$GLB,, | Performed by: OPHTHALMOLOGY

## 2024-02-21 RX ORDER — PHENYLEPHRINE HYDROCHLORIDE 100 MG/ML
1 SOLUTION/ DROPS OPHTHALMIC
Status: CANCELLED | OUTPATIENT
Start: 2024-02-21

## 2024-02-21 RX ORDER — PREDNISOLONE ACETATE-GATIFLOXACIN-BROMFENAC .75; 5; 1 MG/ML; MG/ML; MG/ML
1 SUSPENSION/ DROPS OPHTHALMIC 3 TIMES DAILY
Qty: 5 ML | Refills: 3 | Status: SHIPPED | OUTPATIENT
Start: 2024-02-21

## 2024-02-21 RX ORDER — MOXIFLOXACIN 5 MG/ML
1 SOLUTION/ DROPS OPHTHALMIC
Status: CANCELLED | OUTPATIENT
Start: 2024-02-21

## 2024-02-21 RX ORDER — SODIUM CHLORIDE 0.9 % (FLUSH) 0.9 %
10 SYRINGE (ML) INJECTION
Status: DISCONTINUED | OUTPATIENT
Start: 2024-02-21 | End: 2024-03-13

## 2024-02-21 RX ORDER — CYCLOP/TROP/PROPA/PHEN/KET/WAT 1-1-0.1%
1 DROPS (EA) OPHTHALMIC (EYE)
Status: CANCELLED | OUTPATIENT
Start: 2024-02-21

## 2024-02-21 NOTE — PROGRESS NOTES
HPI    Patient present today for Cataract Evaluation   Patient state glare at night VA OU. Blurry OU   Flashes and floaters OU     No meds  Last edited by Yohan Lewis on 2/21/2024  8:06 AM.            Assessment /Plan     For exam results, see Encounter Report.    Nuclear sclerosis, bilateral      Visually Significant Cataract: Patient reports decreased vision consistent with the clinical amount of lenticular opacity, which reaches the level of visual significance and affects activities of daily living.     Specifically, this patient describes difficulty with:  - driving safely at night  - reading road signs  - reading small print  - deciphering medicine bottles  - reading the newspaper  - using the phone  - reading texts     Risks, benefits, and alternatives to cataract surgery were discussed and the consent reviewed. IOL options were discussed, including ATIOLs and the associated side effects and additional patient cost associated with them.   IOL Selections:   Right eye  IOL: CNA0T0 21.0     Left eye  IOL: CNA0T0 21.5    Pt wishes to have RIGHT eye done first.  Mild AMD, pterygium,   Will wear glasses...

## 2024-02-26 ENCOUNTER — TELEPHONE (OUTPATIENT)
Dept: OPHTHALMOLOGY | Facility: CLINIC | Age: 69
End: 2024-02-26
Payer: MEDICARE

## 2024-02-26 PROBLEM — Z00.00 HEALTHCARE MAINTENANCE: Status: RESOLVED | Noted: 2019-11-19 | Resolved: 2024-02-26

## 2024-02-26 NOTE — TELEPHONE ENCOUNTER
----- Message from Cindy Radford sent at 2/26/2024  8:28 AM CST -----  Regarding: schedule surgery  Contact: self @ [phone]  Pt says he would like to schedule his surgery for 4-15-24 or later.  Pls call.

## 2024-02-29 ENCOUNTER — OFFICE VISIT (OUTPATIENT)
Dept: PRIMARY CARE CLINIC | Facility: CLINIC | Age: 69
End: 2024-02-29
Payer: MEDICARE

## 2024-02-29 VITALS
OXYGEN SATURATION: 99 % | DIASTOLIC BLOOD PRESSURE: 76 MMHG | HEIGHT: 69 IN | TEMPERATURE: 98 F | SYSTOLIC BLOOD PRESSURE: 114 MMHG | HEART RATE: 65 BPM | BODY MASS INDEX: 28.56 KG/M2 | WEIGHT: 192.81 LBS

## 2024-02-29 DIAGNOSIS — R55 NEAR SYNCOPE: ICD-10-CM

## 2024-02-29 DIAGNOSIS — E27.8 ADRENAL HYPERTROPHY: Primary | ICD-10-CM

## 2024-02-29 DIAGNOSIS — C79.52 MALIGNANT NEOPLASM METASTATIC TO BONE MARROW: ICD-10-CM

## 2024-02-29 DIAGNOSIS — J47.9 BRONCHIECTASIS WITHOUT COMPLICATION: ICD-10-CM

## 2024-02-29 DIAGNOSIS — Z00.00 HEALTHCARE MAINTENANCE: ICD-10-CM

## 2024-02-29 DIAGNOSIS — D80.1 HYPOGAMMAGLOBULINEMIA: ICD-10-CM

## 2024-02-29 DIAGNOSIS — N50.819 PAIN IN TESTICLE, UNSPECIFIED LATERALITY: ICD-10-CM

## 2024-02-29 DIAGNOSIS — R42 DIZZINESS: ICD-10-CM

## 2024-02-29 DIAGNOSIS — I27.20 PULMONARY HYPERTENSION: ICD-10-CM

## 2024-02-29 DIAGNOSIS — Z86.19 HISTORY OF HEPATITIS B: ICD-10-CM

## 2024-02-29 DIAGNOSIS — M46.1 SACROILIITIS: ICD-10-CM

## 2024-02-29 DIAGNOSIS — I70.0 AORTIC ATHEROSCLEROSIS: ICD-10-CM

## 2024-02-29 PROCEDURE — 3288F FALL RISK ASSESSMENT DOCD: CPT | Mod: HCNC,CPTII,S$GLB, | Performed by: INTERNAL MEDICINE

## 2024-02-29 PROCEDURE — 1125F AMNT PAIN NOTED PAIN PRSNT: CPT | Mod: HCNC,CPTII,S$GLB, | Performed by: INTERNAL MEDICINE

## 2024-02-29 PROCEDURE — 99215 OFFICE O/P EST HI 40 MIN: CPT | Mod: HCNC,S$GLB,, | Performed by: INTERNAL MEDICINE

## 2024-02-29 PROCEDURE — 3078F DIAST BP <80 MM HG: CPT | Mod: HCNC,CPTII,S$GLB, | Performed by: INTERNAL MEDICINE

## 2024-02-29 PROCEDURE — 3008F BODY MASS INDEX DOCD: CPT | Mod: HCNC,CPTII,S$GLB, | Performed by: INTERNAL MEDICINE

## 2024-02-29 PROCEDURE — 3074F SYST BP LT 130 MM HG: CPT | Mod: HCNC,CPTII,S$GLB, | Performed by: INTERNAL MEDICINE

## 2024-02-29 PROCEDURE — 1101F PT FALLS ASSESS-DOCD LE1/YR: CPT | Mod: HCNC,CPTII,S$GLB, | Performed by: INTERNAL MEDICINE

## 2024-02-29 PROCEDURE — 1157F ADVNC CARE PLAN IN RCRD: CPT | Mod: HCNC,CPTII,S$GLB, | Performed by: INTERNAL MEDICINE

## 2024-02-29 PROCEDURE — 1159F MED LIST DOCD IN RCRD: CPT | Mod: HCNC,CPTII,S$GLB, | Performed by: INTERNAL MEDICINE

## 2024-02-29 PROCEDURE — 1160F RVW MEDS BY RX/DR IN RCRD: CPT | Mod: HCNC,CPTII,S$GLB, | Performed by: INTERNAL MEDICINE

## 2024-02-29 NOTE — ASSESSMENT & PLAN NOTE
"On Ct 11/29/2018 in [place]: "Scattered calcified atheromatous disease of the aortic arch"   Asymptomatic, will follow.  On crestor 5 only.  May try to increase in the future when stable  "

## 2024-02-29 NOTE — ASSESSMENT & PLAN NOTE
"Noted on CT of chest in [place] 11/2018:  "Minimal bibasilar bronchiectasis ".  Currently asymptomatic reactive airway dz in the past.  Doing well today  Will follow.  Encouraged to have next covid vaccine--refusing  "

## 2024-02-29 NOTE — ASSESSMENT & PLAN NOTE
Noted on labs in care everywhere with hematology.  Decreased IgM noted on multiple labs.    Continue heme f/u and again encouraged to get covid vaccine

## 2024-02-29 NOTE — ASSESSMENT & PLAN NOTE
Seen by PM and s/p injection and also lumbar CSI.  Initially not better, but has improved.  Now with increased testicular pain.    Continue f/u PM.  To start taking lyrica daily.

## 2024-02-29 NOTE — ASSESSMENT & PLAN NOTE
"Near syncopal event in 11/2023.  No LOC but pt thought might have occurred if stood.  Other "smaller" events with most recent in the past 2 weeks.  Neg eval with ENT  Carotid u./s  Event monitor  Could this be due to meds?  Refer to neuro  "

## 2024-02-29 NOTE — PROGRESS NOTES
".  This note has been generated using voice-recognition software. There may be typographical errors that have been missed during proof-reading.     Primary Care Provider Appointment    Subjective:      Patient ID: Jeff [place] is a 68 y.o. male here for follow up.     Chief Complaint: Follow-up    HPI:  This is a pleasant 68-year-old male with bronchiectasis, hyperlipidemia, history of pulmonary embolism on chronic anticoagulation with a follicular non-Hodgkin's lymphoma, h/oacute/reactivated hepatitis B here for f/u.  Pt last seen  11/14/2023 11/28/2023 patient seen by Optometry  12/15/2023 patient seen by hepatology  02/15/2024 patient seen by ENT for evaluation of dizziness.  Felt to not be due to vertigo.  02/21/2024 patient seen by Ophthalmology for evaluation of possible cataract surgery    Pt notes insomnia.  Pt notes that had significant episode of dizziness.  Feels that vision was white.  No further "big' episodes.  No SOB/palpitations.  Lasted 5-6 minutes.  No prodrome.  Sitting in chair.  Last 'small' episode last week.  Lasted about a few minutes.  Happened when walking.      Next month has CT scheduled for lymphoma  Ongoing testicular pain.  5-6 today.  On lyrica/celbrex/elivil.  Sees pain management.  No tylenol.  Has tried accupunture.        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    Pulmonary hypertension    Myalgia due to statin    Vitamin D deficiency    Abnormal MMSE    Allergic rhinitis    Adrenal hypertrophy    Bilateral shoulder bursitis    History of hepatitis B    Trigger finger of left thumb    Bilateral shoulder pain    Chronic thumb pain, bilateral    Breast mass, right    Peyronie's disease    Hypotestosteronemia in male    Iron deficiency anemia    " Gastroesophageal reflux disease without esophagitis    Myofascial pain    Osteoarthritis of spine with radiculopathy, lumbosacral region    Decreased range of motion of lumbar spine    Hypogammaglobulinemia    Sacroiliitis    Dizziness    Pain of right upper extremity        Past Surgical History:   Procedure Laterality Date    ESOPHAGOGASTRODUODENOSCOPY N/A 11/29/2021    Procedure: ESOPHAGOGASTRODUODENOSCOPY (EGD);  Surgeon: Kristine Palmer MD;  Location: [facility] ([address] [place] [address]);  Service: Endoscopy;  Laterality: N/A;  fully vacc-inst mail-tb-left chest port    GROIN EXPLORATION  2 pre 2005 and 1 post 2005    total of 3 procedures    INJECTION OF ANESTHETIC AGENT AROUND NERVE Left 11/8/2022    Procedure: BLOCK, NERVE, LEFT L3-L5 MEDIAL BRANCH;  Surgeon: Melody Berry MD;  Location: [facility];  Service: Pain Management;  Laterality: Left;    INJECTION, SACROILIAC JOINT Left 10/11/2022    Procedure: INJECTION,SACROILIAC JOINT LEFT CONTRAST;  Surgeon: Melody Berry MD;  Location: [facility];  Service: Pain Management;  Laterality: Left;    INJECTION, SACROILIAC JOINT Left 8/22/2023    Procedure: INJECTION,SACROILIAC JOINT, LEFT SOONER DATE;  Surgeon: Melody Berry MD;  Location: [facility];  Service: Pain Management;  Laterality: Left;    LAPAROSCOPIC CHOLECYSTECTOMY  05/2018    SHOULDER SURGERY Left        Past Medical History:   Diagnosis Date    History of pulmonary embolus (PE) 4/15/2019    Pt with PE s/p hospitalization.  He was placed on xarelto with high risk with cancer.  He has been out of the medication for the past month. Restart Xarelto 15 mg b.i.d. for 21 days, then 20 mg q.day until cancer no longer active.    Non Hodgkin's lymphoma     Personal history of colonic polyps 09/08/2015    per [facility] report - repeat 9/2020    Peyronie disease     S/P laparoscopic cholecystectomy 5/27/2019       Social History     Socioeconomic History    Marital status:     Number of children: 2  "  Occupational History    Occupation: disabled [employer]    Tobacco Use    Smoking status: Never    Smokeless tobacco: Never   Substance and Sexual Activity    Alcohol use: No    Drug use: No    Sexual activity: Yes     Partners: Female   Social History Narrative    Lives with wife.       Social Determinants of Health     Financial Resource Strain: Medium Risk (11/19/2019)    Overall Financial Resource Strain (CARDIA)     Difficulty of Paying Living Expenses: Somewhat hard   Food Insecurity: Food Insecurity Present (11/19/2019)    Hunger Vital Sign     Worried About Running Out of Food in the Last Year: Often true     Ran Out of Food in the Last Year: Sometimes true   Transportation Needs: No Transportation Needs (11/19/2019)    PRAPARE - Transportation     Lack of Transportation (Medical): No     Lack of Transportation (Non-Medical): No   Physical Activity: Inactive (11/19/2019)    Exercise Vital Sign     Days of Exercise per Week: 0 days     Minutes of Exercise per Session: 0 min   Stress: Stress Concern Present (11/19/2019)    [employer] [place] [employer] - Occupational Stress Questionnaire     Feeling of Stress : Rather much   Social Connections: Moderately Isolated (11/19/2019)    Social Connection and Isolation Panel [NHANES]     Frequency of Communication with Friends and Family: Twice a week     Frequency of Social Gatherings with Friends and Family: Twice a week     Attends Islam Services: Never     Active Member of Clubs or Organizations: No     Attends Club or Organization Meetings: Never     Marital Status:        Review of Systems         No data to display                 Checklist of Daily Activities:        Objective:   /76 (BP Location: Left arm, Patient Position: Sitting, BP Method: Medium (Manual))   Pulse 65   Temp 97.6 °F (36.4 °C) (Oral)   Ht 5' 9" (1.753 m)   Wt 87.5 kg (192 lb 12.7 oz)   SpO2 99%   BMI 28.47 kg/m²     Physical Exam  Constitutional:     "   General: He is not in acute distress.     Appearance: Normal appearance. He is not ill-appearing, toxic-appearing or diaphoretic.   HENT:      Head: Normocephalic and atraumatic.   Cardiovascular:      Rate and Rhythm: Normal rate and regular rhythm.      Heart sounds: Normal heart sounds.   Pulmonary:      Effort: Pulmonary effort is normal.      Breath sounds: Normal breath sounds.   Abdominal:      Palpations: Abdomen is soft.      Tenderness: There is no abdominal tenderness. There is no guarding or rebound.   Musculoskeletal:      Right lower leg: No edema.      Left lower leg: No edema.   Lymphadenopathy:      Cervical: No cervical adenopathy.   Neurological:      Mental Status: He is alert.             Lab Results   Component Value Date    WBC 6.01 11/14/2023    HGB 15.6 11/14/2023    HCT 45.4 11/14/2023     11/14/2023    CHOL 212 (H) 02/14/2023    TRIG 111 02/14/2023    HDL 57 02/14/2023    ALT 12 11/14/2023    AST 19 11/14/2023     11/14/2023    K 4.2 11/14/2023     11/14/2023    CREATININE 1.0 11/14/2023    BUN 19 11/14/2023    CO2 29 11/14/2023    TSH 0.719 11/14/2023    PSA 0.69 02/14/2023    INR 1.0 10/14/2022    HGBA1C 5.4 02/14/2023       Current Outpatient Medications on File Prior to Visit   Medication Sig Dispense Refill    amitriptyline (ELAVIL) 25 MG tablet Take 25 mg by mouth nightly.      celecoxib (CELEBREX) 200 MG capsule TK ONE C PO BID      ergocalciferol (ERGOCALCIFEROL) 50,000 unit Cap Take 1 capsule by mouth every 7 days.      fluticasone propionate (FLONASE) 50 mcg/actuation nasal spray SHAKE LIQUID AND USE 2 SPRAYS(100 MCG) IN EACH NOSTRIL EVERY DAY 48 g 3    pantoprazole (PROTONIX) 40 MG tablet       prednisolon/gatiflox/bromfenac (PREDNISOL ACE-GATIFLOX-BROMFEN) 1-0.5-0.075 % DrpS Apply 1 drop to eye 3 (three) times daily. in operative eye for 1 month after surgery 5 mL 3    pregabalin (LYRICA) 150 MG capsule Take 150 mg by mouth once daily.      [DISCONTINUED]  "OMNIPAQUE 300 300 mg iodine/mL injection       [DISCONTINUED] rosuvastatin (CRESTOR) 5 MG tablet Take 1 tablet (5 mg total) by mouth once daily. 90 tablet 3     Current Facility-Administered Medications on File Prior to Visit   Medication Dose Route Frequency Provider Last Rate Last Admin    0.9%  NaCl infusion  500 mL Intravenous Continuous Amado Parekh MD        [DISCONTINUED] sodium chloride 0.9% flush 10 mL  10 mL Intravenous PRN Jefferson Montes De Oca MD             Assessment:   68 y.o. male with multiple co-morbid illnesses here for continued follow up of medical problems.      Plan:     Problem List Items Addressed This Visit          Pulmonary    Bronchiectasis     Noted on CT of chest in [place] 11/2018:  "Minimal bibasilar bronchiectasis ".  Currently asymptomatic reactive airway dz in the past.  Doing well today  Will follow.  Encouraged to have next covid vaccine--refusing            Cardiac/Vascular    Aortic atherosclerosis     On Ct 11/29/2018 in [place]: "Scattered calcified atheromatous disease of the aortic arch"   Asymptomatic, will follow.  On crestor 5 only.  May try to increase in the future when stable         Pulmonary hypertension     Noted on ECHO at [facility]. 12/2013.  Patient does have bronchiectasis noted on imaging.  No recent exacerbation  Asymptomatic.  Will follow.  No need for further imaging at this time.            Renal/    Testicular pain     Patient is status post 3 surgeries.  Worsening of pain.   Followed by PM.  Not taking lyrica and elavil daily.  Taking celebrex as needed.    Start tylenol 2 grams a day  Take lyrica daily and eval if helping.  If tolerating and still pain, add elavil daily.  Eval in 6 weeks            Immunology/Multi System    Hypogammaglobulinemia     Noted on labs in care everywhere with hematology.  Decreased IgM noted on multiple labs.    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  No recent imaging.  Patient has appointment for f/u CT next month  F/u onc as ordered and await results of CT.  Pt feels that he has had some of the some type of right lateral abd pain/tightness that he had when first diagnosed with lymphoma.              Endocrine    Adrenal hypertrophy - Primary     Noted again on imaging 03/2024.  Stable.  Per imaging, no further specific imaging evaluation is needed.  Will follow clinically            GI    History of hepatitis B     Patient followed by hepatology.  Vemlidy in the past.  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  Patient instructed to contact hepatology if he should need to restart Rituxan with high risk of reactivation.              Orthopedic    Sacroiliitis     Seen by PM and s/p injection and also lumbar CSI.  Initially not better, but has improved.  Now with increased testicular pain.    Continue f/u PM.  To start taking lyrica daily.              Other    Healthcare maintenance     ACP reviewed 11/2023   Yearly labs with PSA 02/2023  Refusing covid vaccine         Dizziness     Near syncopal event in 11/2023.  No LOC but pt thought might have occurred if stood.  Other "smaller" events with most recent in the past 2 weeks.  Neg eval with ENT  Carotid u./s  Event monitor  Could this be due to meds?  Refer to neuro         Relevant Orders    Cardiac event monitor    Ambulatory referral/consult to Neurology    US Carotid Bilateral (Completed)     Other Visit Diagnoses       Near syncope        Relevant Orders    US Carotid Bilateral (Completed)            Health Maintenance         Date Due Completion Date    COVID-19 Vaccine (7 - 2023-24 season) 09/01/2023 6/9/2022    Colorectal Cancer Screening 05/18/2024 5/18/2021    Override on 5/18/2021: Done (repeat 3 year)    Pneumococcal Vaccines (Age 65+) (3 of 3 - PPSV23 or PCV20) 08/18/2025 8/18/2020    Hemoglobin A1c (Diabetic Prevention " Screening) 02/14/2026 2/14/2023    Lipid Panel 02/14/2028 2/14/2023    TETANUS VACCINE 06/15/2030 6/15/2020          Medications Reconciliation:   I have not reconciled the patient's home medications with the patient/family. I have updated all changes.  Refer to After-Visit Medication List.      Medication List            Accurate as of February 29, 2024 11:59 PM. If you have any questions, ask your nurse or doctor.                CONTINUE taking these medications      amitriptyline 25 MG tablet  Commonly known as: ELAVIL     celecoxib 200 MG capsule  Commonly known as: CeleBREX     ergocalciferol 50,000 unit Cap  Commonly known as: ERGOCALCIFEROL     fluticasone propionate 50 mcg/actuation nasal spray  Commonly known as: FLONASE  SHAKE LIQUID AND USE 2 SPRAYS(100 MCG) IN EACH NOSTRIL EVERY DAY     OMNIPAQUE 300 300 mg iodine/mL injection  Generic drug: iohexoL     pantoprazole 40 MG tablet  Commonly known as: PROTONIX     prednisol ace-gatiflox-bromfen 1-0.5-0.075 % Drps  Apply 1 drop to eye 3 (three) times daily. in operative eye for 1 month after surgery     pregabalin 150 MG capsule  Commonly known as: LYRICA     rosuvastatin 5 MG tablet  Commonly known as: CRESTOR  Take 1 tablet (5 mg total) by mouth once daily.            STOP taking these medications      HYDROcodone-acetaminophen 7.5-325 mg per tablet  Commonly known as: NORCO  Stopped by: Roly Flannery MD                   Follow up in about 6 weeks (around 4/11/2024). Total clinical care time was 45 min. The following issues were discussed:  Abdominal and testicular pain, colonoscopy due, abdominal pain, review of CT results, ongoing dizziness and near-syncope with ENT appointment     Roly Flannery MD  Internal Medicine  [facility]  [phone]

## 2024-02-29 NOTE — ASSESSMENT & PLAN NOTE
Patient followed by hepatology.  Vemlidy in the past.  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  Patient instructed to contact hepatology if he should need to restart Rituxan with high risk of reactivation.

## 2024-02-29 NOTE — ASSESSMENT & PLAN NOTE
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  No recent imaging.  Patient has appointment for f/u CT next month  F/u onc as ordered and await results of CT.  Pt feels that he has had some of the some type of right lateral abd pain/tightness that he had when first diagnosed with lymphoma.

## 2024-02-29 NOTE — ASSESSMENT & PLAN NOTE
Patient is status post 3 surgeries.  Worsening of pain.   Followed by PM.  Not taking lyrica and elavil daily.  Taking celebrex as needed.    Start tylenol 2 grams a day  Take lyrica daily and eval if helping.  If tolerating and still pain, add elavil daily.  Eval in 6 weeks

## 2024-02-29 NOTE — ASSESSMENT & PLAN NOTE
Noted on ECHO at [facility]. 12/2013.  Patient does have bronchiectasis noted on imaging.  No recent exacerbation  Asymptomatic.  Will follow.  No need for further imaging at this time.

## 2024-03-06 ENCOUNTER — HOSPITAL ENCOUNTER (OUTPATIENT)
Dept: RADIOLOGY | Facility: OTHER | Age: 69
Discharge: HOME | End: 2024-03-06
Attending: INTERNAL MEDICINE
Payer: MEDICARE

## 2024-03-06 ENCOUNTER — TELEPHONE (OUTPATIENT)
Dept: PRIMARY CARE CLINIC | Facility: CLINIC | Age: 69
End: 2024-03-06
Payer: MEDICARE

## 2024-03-06 DIAGNOSIS — R42 DIZZINESS: ICD-10-CM

## 2024-03-06 DIAGNOSIS — R55 NEAR SYNCOPE: ICD-10-CM

## 2024-03-06 PROCEDURE — 93880 EXTRACRANIAL BILAT STUDY: CPT | Mod: 26,HCNC,, | Performed by: RADIOLOGY

## 2024-03-06 PROCEDURE — 93880 EXTRACRANIAL BILAT STUDY: CPT | Mod: TC,HCNC

## 2024-03-06 NOTE — TELEPHONE ENCOUNTER
Called and spoke to patient regarding their US results, patient understands.     Patient reports that he had some water in the morning and took his medications but did not eat. While he was driving, he felt very dizzy and lightheaded and quickly drove to a parking lot and parked his car. He sat there and drank some water and the dizziness went away in about 5 minutes. He denies LOC, trauma to the head, blurry vision, nausea, vomiting, diarrhea, palpitations, chest pain, SOB, headaches.     Patient states he has appointment with Neurology tomorrow in the meantime. Offered patient LYFT transportation to and from appointment due to dizzy episode today, patient accepted, scheduled for LYFT ride arrangements tomorrow at 1 PM.     Will inform Dr. Flannery of his situation. Advised patient to continue hydrating and eating well in the meantime. Provided patient information about what symptoms to look out for that may warrant a visit to Urgent Care/ED. Patient voiced understanding.

## 2024-03-06 NOTE — TELEPHONE ENCOUNTER
----- Message from Roly Flannery MD sent at 3/6/2024  3:57 PM CST -----  Please call patient with results.  No carotid artery disease noted.

## 2024-03-07 ENCOUNTER — OFFICE VISIT (OUTPATIENT)
Dept: NEUROLOGY | Facility: CLINIC | Age: 69
End: 2024-03-07
Payer: MEDICARE

## 2024-03-07 VITALS
WEIGHT: 193.81 LBS | DIASTOLIC BLOOD PRESSURE: 93 MMHG | HEART RATE: 85 BPM | BODY MASS INDEX: 29.37 KG/M2 | SYSTOLIC BLOOD PRESSURE: 144 MMHG | HEIGHT: 68 IN

## 2024-03-07 DIAGNOSIS — R42 DIZZINESS: ICD-10-CM

## 2024-03-07 PROCEDURE — 3077F SYST BP >= 140 MM HG: CPT | Mod: HCNC,CPTII,S$GLB, | Performed by: STUDENT IN AN ORGANIZED HEALTH CARE EDUCATION/TRAINING PROGRAM

## 2024-03-07 PROCEDURE — 1159F MED LIST DOCD IN RCRD: CPT | Mod: HCNC,CPTII,S$GLB, | Performed by: STUDENT IN AN ORGANIZED HEALTH CARE EDUCATION/TRAINING PROGRAM

## 2024-03-07 PROCEDURE — 99215 OFFICE O/P EST HI 40 MIN: CPT | Mod: HCNC,S$GLB,, | Performed by: STUDENT IN AN ORGANIZED HEALTH CARE EDUCATION/TRAINING PROGRAM

## 2024-03-07 PROCEDURE — 3008F BODY MASS INDEX DOCD: CPT | Mod: HCNC,CPTII,S$GLB, | Performed by: STUDENT IN AN ORGANIZED HEALTH CARE EDUCATION/TRAINING PROGRAM

## 2024-03-07 PROCEDURE — 1126F AMNT PAIN NOTED NONE PRSNT: CPT | Mod: HCNC,CPTII,S$GLB, | Performed by: STUDENT IN AN ORGANIZED HEALTH CARE EDUCATION/TRAINING PROGRAM

## 2024-03-07 PROCEDURE — 99999 PR PBB SHADOW E&M-EST. PATIENT-LVL III: CPT | Mod: PBBFAC,HCNC,, | Performed by: STUDENT IN AN ORGANIZED HEALTH CARE EDUCATION/TRAINING PROGRAM

## 2024-03-07 PROCEDURE — 1157F ADVNC CARE PLAN IN RCRD: CPT | Mod: HCNC,CPTII,S$GLB, | Performed by: STUDENT IN AN ORGANIZED HEALTH CARE EDUCATION/TRAINING PROGRAM

## 2024-03-07 PROCEDURE — 3080F DIAST BP >= 90 MM HG: CPT | Mod: HCNC,CPTII,S$GLB, | Performed by: STUDENT IN AN ORGANIZED HEALTH CARE EDUCATION/TRAINING PROGRAM

## 2024-03-07 NOTE — PROGRESS NOTES
"  [facility] - NEUROLOGY [address]TH [facility] [place]    Date: 3/7/24  Patient Name: Jeff [place]   MRN: [id number]   PCP: Roly Flannery  Referring Provider: Roly Flannery MD    Assessment:   68 year old male presenting with episodic lightheadedness, not true vertigo.  Neurological examination entirely normal. Low suspicion for intracranial pathology as the cause of symptoms as he is describing lightheadedness and not vertigo.  Carotid dopplers are normal.  Agree with cardiac event monitor.  Advised patient to track symptoms and food/water intake when they occur; if he can check his blood sugar and blood pressure when symptomatic that would be optimal.  If this is unrevealing, will then pursue further imaging.      Plan:     Problem List Items Addressed This Visit          Other    Dizziness       Subha Davies MD    Patient note was created using MModal Dictation.  Any errors in syntax or even information may not have been identified and edited on initial review prior to signing this note.  Subjective:   Patient seen in consultation at the request of Roly Flannery MD for the evaluation of dizziness. A copy of this note will be sent to the referring physician.        HPI:   Mr. Jeff [place] is a 68 y.o. male presenting for evaluation of dizziness.     "Patient is a  lifelong NON-smoker with a past medical history of bronchiectasis, HLD, pulmonary embolism, hypogammaglobulinemia, GERD and chronic myofascial pain on Elavil 25 mg nightly and Lyrica 150 mg daily referred to me by Dr. Roly Flannery in consultation for dizziness for the last year or so.  This is episodic lasting as much as 3-5 minutes.  Patient describes the feeling of fogginess of the eyes and lightheadedness like he might pass out but also describing a sensation of imbalance like he might fall.  Denies any sensation of true vertigo or any associated ear fullness ringing roaring hissing or hearing loss " "associated with these events.  Symptoms are very transient and even the most severe episodes only last up to 5 minutes.  They are not specifically associated with sitting up or standing up.  They can occur at rest.  Does not specifically identify any palpitations nausea or sweating.  No known cardiovascular history.  Never seen by Cardiology."     Episodic dizziness lasting 3-5 minutes although sometimes even less frequently.  Feels lightheaded like he might pass out.  Room not spinning.  Reports symptoms are not positional.  He has not lost consciousness. Gets a sensation of imbalance as if he might fall, feels "foggy" in the head.   He had an episode yesterday while in his car.  Reports he only ate 1 cookie in the morning otherwise had not eaten that day.  Did drink some water.      Workup: ENT evaluation normal, carotid dopplers no stenosis.  Cardiac event monitor ordered.    PAST MEDICAL HISTORY:  Past Medical History:   Diagnosis Date    History of pulmonary embolus (PE) 4/15/2019    Pt with PE s/p hospitalization.  He was placed on xarelto with high risk with cancer.  He has been out of the medication for the past month. Restart Xarelto 15 mg b.i.d. for 21 days, then 20 mg q.day until cancer no longer active.    Non Hodgkin's lymphoma     Personal history of colonic polyps 09/08/2015    per [facility] report - repeat 9/2020    Peyronie disease     S/P laparoscopic cholecystectomy 5/27/2019       PAST SURGICAL HISTORY:  Past Surgical History:   Procedure Laterality Date    ESOPHAGOGASTRODUODENOSCOPY N/A 11/29/2021    Procedure: ESOPHAGOGASTRODUODENOSCOPY (EGD);  Surgeon: Kristine Palmer MD;  Location: [address];  Service: Endoscopy;  Laterality: N/A;  fully vacc-inst mail-tb-left chest port    GROIN EXPLORATION  2 pre 2005 and 1 post 2005    total of 3 procedures    INJECTION OF ANESTHETIC AGENT AROUND NERVE Left 11/8/2022    Procedure: BLOCK, NERVE, LEFT L3-L5 MEDIAL BRANCH;  Surgeon: Melody Berry, " MD;  Location: [facility];  Service: Pain Management;  Laterality: Left;    INJECTION, SACROILIAC JOINT Left 10/11/2022    Procedure: INJECTION,SACROILIAC JOINT LEFT CONTRAST;  Surgeon: Melody Berry MD;  Location: [facility];  Service: Pain Management;  Laterality: Left;    INJECTION, SACROILIAC JOINT Left 8/22/2023    Procedure: INJECTION,SACROILIAC JOINT, LEFT SOONER DATE;  Surgeon: Melody Berry MD;  Location: [facility];  Service: Pain Management;  Laterality: Left;    LAPAROSCOPIC CHOLECYSTECTOMY  05/2018    SHOULDER SURGERY Left        CURRENT MEDS:  Current Outpatient Medications   Medication Sig Dispense Refill    amitriptyline (ELAVIL) 25 MG tablet Take 25 mg by mouth nightly.      celecoxib (CELEBREX) 200 MG capsule TK ONE C PO BID      ergocalciferol (ERGOCALCIFEROL) 50,000 unit Cap Take 1 capsule by mouth every 7 days.      fluticasone propionate (FLONASE) 50 mcg/actuation nasal spray SHAKE LIQUID AND USE 2 SPRAYS(100 MCG) IN EACH NOSTRIL EVERY DAY 48 g 3    OMNIPAQUE 300 300 mg iodine/mL injection       pantoprazole (PROTONIX) 40 MG tablet       prednisolon/gatiflox/bromfenac (PREDNISOL ACE-GATIFLOX-BROMFEN) 1-0.5-0.075 % DrpS Apply 1 drop to eye 3 (three) times daily. in operative eye for 1 month after surgery 5 mL 3    pregabalin (LYRICA) 150 MG capsule Take 150 mg by mouth once daily.      rosuvastatin (CRESTOR) 5 MG tablet Take 1 tablet (5 mg total) by mouth once daily. 90 tablet 3     Current Facility-Administered Medications   Medication Dose Route Frequency Provider Last Rate Last Admin    sodium chloride 0.9% flush 10 mL  10 mL Intravenous PRN Jefferson Montes De Oca MD         Facility-Administered Medications Ordered in Other Visits   Medication Dose Route Frequency Provider Last Rate Last Admin    0.9%  NaCl infusion  500 mL Intravenous Continuous Amado Parekh MD           ALLERGIES:  Review of patient's allergies indicates:   Allergen Reactions    Atorvastatin      myalgias     "Rosuvastatin      myalgias       FAMILY HISTORY:  Family History   Problem Relation Age of Onset    Breast cancer Mother     Emphysema Father     Stroke Sister     Diabetes Brother     Ulcers Brother     No Known Problems Daughter     No Known Problems Daughter        SOCIAL HISTORY:  Social History     Tobacco Use    Smoking status: Never    Smokeless tobacco: Never   Substance Use Topics    Alcohol use: No    Drug use: No       Review of Systems:  12 system review of systems is negative except for the symptoms mentioned in HPI.      Objective:     Vitals:    03/07/24 1412   BP: (!) 144/93   Pulse: 85   Weight: 87.9 kg (193 lb 12.6 oz)   Height: 5' 8" (1.727 m)     General: NAD, well nourished   Eyes: no tearing, discharge, no erythema   ENT: moist mucous membranes of the oral cavity, nares patent    Neck: Supple, full range of motion  Cardiovascular: Warm and well perfused, pulses equal and symmetrical  Lungs: Normal work of breathing, normal chest wall excursions  Skin: No rash, lesions, or breakdown on exposed skin  Psychiatry: Mood and affect are appropriate   Abdomen: soft, non tender, non distended  Extremeties: No cyanosis, clubbing or edema.    Neurological   MENTAL STATUS: Alert and oriented to person, place, and time. Attention and concentration within normal limits. Speech without dysarthria, able to name and repeat without difficulty. Recent and remote memory within normal limits   CRANIAL NERVES: Visual fields intact. PERRL. EOMI. Facial sensation intact. Face symmetrical. Hearing grossly intact. Full shoulder shrug bilaterally. Tongue protrudes midline   SENSORY: Sensation is intact to light touch throughout.  Joint position perception intact. Negative Romberg.   MOTOR: Normal bulk and tone. No pronator drift.  5/5 deltoid, biceps, triceps, interosseous, hand  bilaterally. 5/5 iliopsoas, knee extension/flexion, foot dorsi/plantarflexion bilaterally.    REFLEXES: Symmetric and 2+ throughout. Toes " down going bilaterally.   CEREBELLAR/COORDINATION/GAIT: Gait steady with normal arm swing and stride length.  Heel to shin intact. Finger to nose intact. Normal rapid alternating movements.

## 2024-03-11 ENCOUNTER — TELEPHONE (OUTPATIENT)
Dept: PRIMARY CARE CLINIC | Facility: CLINIC | Age: 69
End: 2024-03-11
Payer: MEDICARE

## 2024-03-11 DIAGNOSIS — R55 NEAR SYNCOPE: Primary | ICD-10-CM

## 2024-03-11 NOTE — TELEPHONE ENCOUNTER
Called and spoke with patient, he has not received his Holter Monitor yet. On review of appointments, Holter monitor scheduled for 3/12/2024.

## 2024-03-12 ENCOUNTER — CLINICAL SUPPORT (OUTPATIENT)
Dept: CARDIOLOGY | Facility: HOSPITAL | Age: 69
End: 2024-03-12
Attending: INTERNAL MEDICINE
Payer: MEDICARE

## 2024-03-12 DIAGNOSIS — R42 DIZZINESS: ICD-10-CM

## 2024-03-12 PROCEDURE — 93272 ECG/REVIEW INTERPRET ONLY: CPT | Mod: ,,, | Performed by: INTERNAL MEDICINE

## 2024-03-12 PROCEDURE — 93271 ECG/MONITORING AND ANALYSIS: CPT

## 2024-03-13 ENCOUNTER — OFFICE VISIT (OUTPATIENT)
Dept: CARDIOLOGY | Facility: CLINIC | Age: 69
End: 2024-03-13
Payer: MEDICARE

## 2024-03-13 VITALS
DIASTOLIC BLOOD PRESSURE: 72 MMHG | HEART RATE: 75 BPM | WEIGHT: 192.38 LBS | SYSTOLIC BLOOD PRESSURE: 110 MMHG | BODY MASS INDEX: 29.25 KG/M2 | OXYGEN SATURATION: 98 %

## 2024-03-13 DIAGNOSIS — R55 NEAR SYNCOPE: ICD-10-CM

## 2024-03-13 PROCEDURE — 1157F ADVNC CARE PLAN IN RCRD: CPT | Mod: HCNC,CPTII,S$GLB, | Performed by: INTERNAL MEDICINE

## 2024-03-13 PROCEDURE — 99999 PR PBB SHADOW E&M-EST. PATIENT-LVL III: CPT | Mod: PBBFAC,HCNC,, | Performed by: INTERNAL MEDICINE

## 2024-03-13 PROCEDURE — 3074F SYST BP LT 130 MM HG: CPT | Mod: HCNC,CPTII,S$GLB, | Performed by: INTERNAL MEDICINE

## 2024-03-13 PROCEDURE — 1101F PT FALLS ASSESS-DOCD LE1/YR: CPT | Mod: HCNC,CPTII,S$GLB, | Performed by: INTERNAL MEDICINE

## 2024-03-13 PROCEDURE — 3288F FALL RISK ASSESSMENT DOCD: CPT | Mod: HCNC,CPTII,S$GLB, | Performed by: INTERNAL MEDICINE

## 2024-03-13 PROCEDURE — 3078F DIAST BP <80 MM HG: CPT | Mod: HCNC,CPTII,S$GLB, | Performed by: INTERNAL MEDICINE

## 2024-03-13 PROCEDURE — 1160F RVW MEDS BY RX/DR IN RCRD: CPT | Mod: HCNC,CPTII,S$GLB, | Performed by: INTERNAL MEDICINE

## 2024-03-13 PROCEDURE — 93005 ELECTROCARDIOGRAM TRACING: CPT | Mod: HCNC

## 2024-03-13 PROCEDURE — 93010 ELECTROCARDIOGRAM REPORT: CPT | Mod: HCNC,S$GLB,, | Performed by: INTERNAL MEDICINE

## 2024-03-13 PROCEDURE — 1126F AMNT PAIN NOTED NONE PRSNT: CPT | Mod: HCNC,CPTII,S$GLB, | Performed by: INTERNAL MEDICINE

## 2024-03-13 PROCEDURE — 3008F BODY MASS INDEX DOCD: CPT | Mod: HCNC,CPTII,S$GLB, | Performed by: INTERNAL MEDICINE

## 2024-03-13 PROCEDURE — 99204 OFFICE O/P NEW MOD 45 MIN: CPT | Mod: HCNC,25,S$GLB, | Performed by: INTERNAL MEDICINE

## 2024-03-13 PROCEDURE — 1159F MED LIST DOCD IN RCRD: CPT | Mod: HCNC,CPTII,S$GLB, | Performed by: INTERNAL MEDICINE

## 2024-03-13 NOTE — PROGRESS NOTES
[facility]    Chief Complaint  Chief Complaint   Patient presents with    Dizziness       HPI:    Referred for evaluation of near syncopal episodes.  Have been occurring over the past year.  Has had about 5 of them.  Describes it is a feeling of lightheadedness like he is about to pass out.  Has never lost consciousness.  Last anywhere from 1-5 minutes.  No specific aggravating or alleviating factors have been noted.  Not related to postural changes.  No associated symptoms.  Specifically no associated palpitations, dyspnea, chest discomfort, diaphoresis, nausea or vomiting.  No prior cardiac problems or workup.  He reports being active without exertional dyspnea or chest discomfort.    Medications  Current Outpatient Medications   Medication Sig Dispense Refill    celecoxib (CELEBREX) 200 MG capsule TK ONE C PO BID      pregabalin (LYRICA) 150 MG capsule Take 150 mg by mouth once daily.      amitriptyline (ELAVIL) 25 MG tablet Take 25 mg by mouth nightly.      ergocalciferol (ERGOCALCIFEROL) 50,000 unit Cap Take 1 capsule by mouth every 7 days.      fluticasone propionate (FLONASE) 50 mcg/actuation nasal spray SHAKE LIQUID AND USE 2 SPRAYS(100 MCG) IN EACH NOSTRIL EVERY DAY 48 g 3    pantoprazole (PROTONIX) 40 MG tablet       prednisolon/gatiflox/bromfenac (PREDNISOL ACE-GATIFLOX-BROMFEN) 1-0.5-0.075 % DrpS Apply 1 drop to eye 3 (three) times daily. in operative eye for 1 month after surgery 5 mL 3     No current facility-administered medications for this visit.     Facility-Administered Medications Ordered in Other Visits   Medication Dose Route Frequency Provider Last Rate Last Admin    0.9%  NaCl infusion  500 mL Intravenous Continuous Amado Parekh MD            History  Past Medical History:   Diagnosis Date    History of pulmonary embolus (PE) 4/15/2019    Pt with PE s/p hospitalization.  He was placed on xarelto with high risk with cancer.  He has been out of the medication for the past  month. Restart Xarelto 15 mg b.i.d. for 21 days, then 20 mg q.day until cancer no longer active.    Non Hodgkin's lymphoma     Personal history of colonic polyps 09/08/2015    per [facility] report - repeat 9/2020    Peyronie disease     S/P laparoscopic cholecystectomy 5/27/2019     Past Surgical History:   Procedure Laterality Date    ESOPHAGOGASTRODUODENOSCOPY N/A 11/29/2021    Procedure: ESOPHAGOGASTRODUODENOSCOPY (EGD);  Surgeon: Kristine Palmer MD;  Location: [facility] ENDO (4TH FLR);  Service: Endoscopy;  Laterality: N/A;  fully vacc-inst mail-tb-left chest port    GROIN EXPLORATION  2 pre 2005 and 1 post 2005    total of 3 procedures    INJECTION OF ANESTHETIC AGENT AROUND NERVE Left 11/8/2022    Procedure: BLOCK, NERVE, LEFT L3-L5 MEDIAL BRANCH;  Surgeon: Melody Berry MD;  Location: [facility] PAIN MGT;  Service: Pain Management;  Laterality: Left;    INJECTION, SACROILIAC JOINT Left 10/11/2022    Procedure: INJECTION,SACROILIAC JOINT LEFT CONTRAST;  Surgeon: Melody Berry MD;  Location: [facility] PAIN MGT;  Service: Pain Management;  Laterality: Left;    INJECTION, SACROILIAC JOINT Left 8/22/2023    Procedure: INJECTION,SACROILIAC JOINT, LEFT SOONER DATE;  Surgeon: Melody Berry MD;  Location: [facility] PAIN MGT;  Service: Pain Management;  Laterality: Left;    LAPAROSCOPIC CHOLECYSTECTOMY  05/2018    SHOULDER SURGERY Left      Social History     Socioeconomic History    Marital status:     Number of children: 2   Occupational History    Occupation: disabled [employer]    Tobacco Use    Smoking status: Never    Smokeless tobacco: Never   Substance and Sexual Activity    Alcohol use: No    Drug use: No    Sexual activity: Yes     Partners: Female   Social History Narrative    Lives with wife.       Social Determinants of Health     Financial Resource Strain: Medium Risk (11/19/2019)    Overall Financial Resource Strain (CARDIA)     Difficulty of Paying Living Expenses: Somewhat hard   Food Insecurity:  Food Insecurity Present (11/19/2019)    Hunger Vital Sign     Worried About Running Out of Food in the Last Year: Often true     Ran Out of Food in the Last Year: Sometimes true   Transportation Needs: No Transportation Needs (11/19/2019)    PRAPARE - Transportation     Lack of Transportation (Medical): No     Lack of Transportation (Non-Medical): No   Physical Activity: Inactive (11/19/2019)    Exercise Vital Sign     Days of Exercise per Week: 0 days     Minutes of Exercise per Session: 0 min   Stress: Stress Concern Present (11/19/2019)    [place] [employer] - Occupational Stress Questionnaire     Feeling of Stress : Rather much   Social Connections: Moderately Isolated (11/19/2019)    Social Connection and Isolation Panel [NHANES]     Frequency of Communication with Friends and Family: Twice a week     Frequency of Social Gatherings with Friends and Family: Twice a week     Attends [employer] Services: Never     Active Member of Clubs or Organizations: No     Attends Club or Organization Meetings: Never     Marital Status:      Family History   Problem Relation Age of Onset    Breast cancer Mother     Emphysema Father     Stroke Sister     Diabetes Brother     Ulcers Brother     No Known Problems Daughter     No Known Problems Daughter         Allergies  Review of patient's allergies indicates:   Allergen Reactions    Atorvastatin      myalgias    Rosuvastatin      myalgias       Review of Systems   Review of Systems   Constitutional: Negative for malaise/fatigue, weight gain and weight loss.   Eyes:  Negative for visual disturbance.   Cardiovascular:  Positive for near-syncope. Negative for chest pain, claudication, cyanosis, dyspnea on exertion, irregular heartbeat, leg swelling, orthopnea, palpitations, paroxysmal nocturnal dyspnea and syncope.   Respiratory:  Negative for cough, hemoptysis, shortness of breath, sleep disturbances due to breathing and wheezing.     Hematologic/Lymphatic: Negative for bleeding problem. Does not bruise/bleed easily.   Skin:  Negative for poor wound healing.   Musculoskeletal:  Negative for muscle cramps and myalgias.   Gastrointestinal:  Negative for abdominal pain, anorexia, diarrhea, heartburn, hematemesis, hematochezia, melena, nausea and vomiting.   Genitourinary:  Negative for hematuria and nocturia.   Neurological:  Negative for excessive daytime sleepiness, dizziness, focal weakness, light-headedness and weakness.       Physical Exam  Vitals:    03/13/24 0955   BP: 110/72   Pulse: 75     Wt Readings from Last 1 Encounters:   03/13/24 87.3 kg (192 lb 6.4 oz)     Physical Exam  Vitals and nursing note reviewed.   Constitutional:       General: He is not in acute distress.     Appearance: He is not toxic-appearing or diaphoretic.   HENT:      Head: Normocephalic and atraumatic.      Mouth/Throat:      Lips: Pink.      Mouth: Mucous membranes are moist.   Eyes:      General: No scleral icterus.     Conjunctiva/sclera: Conjunctivae normal.   Neck:      Thyroid: No thyromegaly.      Vascular: No carotid bruit, hepatojugular reflux or JVD.      Trachea: Trachea normal.   Cardiovascular:      Rate and Rhythm: Normal rate and regular rhythm. No extrasystoles are present.     Chest Wall: PMI is not displaced.      Pulses:           Carotid pulses are 2+ on the right side and 2+ on the left side.       Radial pulses are 2+ on the right side and 2+ on the left side.        Dorsalis pedis pulses are 2+ on the right side and 2+ on the left side.        Posterior tibial pulses are 2+ on the right side and 2+ on the left side.      Heart sounds: S1 normal and S2 normal. No murmur heard.     No friction rub. No S3 or S4 sounds.   Pulmonary:      Effort: Pulmonary effort is normal. No tachypnea, bradypnea, accessory muscle usage or respiratory distress.      Breath sounds: Normal breath sounds and air entry. No decreased breath sounds, wheezing, rhonchi  or rales.   Abdominal:      General: Bowel sounds are normal. There is no distension or abdominal bruit.      Palpations: Abdomen is soft. There is no hepatomegaly, splenomegaly or pulsatile mass.      Tenderness: There is no abdominal tenderness.   Musculoskeletal:         General: No tenderness or deformity.      Right lower leg: No edema.      Left lower leg: No edema.   Skin:     General: Skin is warm and dry.      Capillary Refill: Capillary refill takes less than 2 seconds.      Coloration: Skin is not cyanotic or pale.      Nails: There is no clubbing.   Neurological:      General: No focal deficit present.      Mental Status: He is alert and oriented to person, place, and time.   Psychiatric:         Attention and Perception: Attention normal.         Mood and Affect: Mood normal.         Speech: Speech normal.         Behavior: Behavior normal. Behavior is cooperative.         Labs  Lab Visit on 11/14/2023   Component Date Value Ref Range Status    WBC 11/14/2023 6.01  3.90 - 12.70 K/uL Final    RBC 11/14/2023 5.01  4.60 - 6.20 M/uL Final    Hemoglobin 11/14/2023 15.6  14.0 - 18.0 g/dL Final    Hematocrit 11/14/2023 45.4  40.0 - 54.0 % Final    MCV 11/14/2023 91  82 - 98 fL Final    MCH 11/14/2023 31.1 (H)  27.0 - 31.0 pg Final    MCHC 11/14/2023 34.4  32.0 - 36.0 g/dL Final    RDW 11/14/2023 12.7  11.5 - 14.5 % Final    Platelets 11/14/2023 180  150 - 450 K/uL Final    MPV 11/14/2023 10.4  9.2 - 12.9 fL Final    Immature Granulocytes 11/14/2023 0.2  0.0 - 0.5 % Final    Gran # (ANC) 11/14/2023 2.8  1.8 - 7.7 K/uL Final    Immature Grans (Abs) 11/14/2023 0.01  0.00 - 0.04 K/uL Final    Comment: Mild elevation in immature granulocytes is non specific and   can be seen in a variety of conditions including stress response,   acute inflammation, trauma and pregnancy. Correlation with other   laboratory and clinical findings is essential.      Lymph # 11/14/2023 2.6  1.0 - 4.8 K/uL Final    Mono # 11/14/2023 0.5   0.3 - 1.0 K/uL Final    Eos # 11/14/2023 0.1  0.0 - 0.5 K/uL Final    Baso # 11/14/2023 0.03  0.00 - 0.20 K/uL Final    nRBC 11/14/2023 0  0 /100 WBC Final    Gran % 11/14/2023 46.5  38.0 - 73.0 % Final    Lymph % 11/14/2023 43.6  18.0 - 48.0 % Final    Mono % 11/14/2023 8.2  4.0 - 15.0 % Final    Eosinophil % 11/14/2023 1.0  0.0 - 8.0 % Final    Basophil % 11/14/2023 0.5  0.0 - 1.9 % Final    Differential Method 11/14/2023 Automated   Final    Sodium 11/14/2023 141  136 - 145 mmol/L Final    Potassium 11/14/2023 4.2  3.5 - 5.1 mmol/L Final    Chloride 11/14/2023 104  95 - 110 mmol/L Final    CO2 11/14/2023 29  23 - 29 mmol/L Final    Glucose 11/14/2023 88  70 - 110 mg/dL Final    BUN 11/14/2023 19  8 - 23 mg/dL Final    Creatinine 11/14/2023 1.0  0.5 - 1.4 mg/dL Final    Calcium 11/14/2023 9.9  8.7 - 10.5 mg/dL Final    Total Protein 11/14/2023 7.3  6.0 - 8.4 g/dL Final    Albumin 11/14/2023 4.1  3.5 - 5.2 g/dL Final    Total Bilirubin 11/14/2023 0.4  0.1 - 1.0 mg/dL Final    Comment: For infants and newborns, interpretation of results should be based  on gestational age, weight and in agreement with clinical  observations.    Premature Infant recommended reference ranges:  Up to 24 hours.............<8.0 mg/dL  Up to 48 hours............<12.0 mg/dL  3-5 days..................<15.0 mg/dL  6-29 days.................<15.0 mg/dL      Alkaline Phosphatase 11/14/2023 66  55 - 135 U/L Final    AST 11/14/2023 19  10 - 40 U/L Final    ALT 11/14/2023 12  10 - 44 U/L Final    eGFR 11/14/2023 >60.0  >60 mL/min/1.73 m^2 Final    Anion Gap 11/14/2023 8  8 - 16 mmol/L Final    TSH 11/14/2023 0.719  0.400 - 4.000 uIU/mL Final       Imaging  US Carotid Bilateral    Result Date: 3/6/2024  EXAMINATION: US CAROTID BILATERAL CLINICAL HISTORY: Dizziness and giddiness TECHNIQUE: Grayscale and color Doppler ultrasound examination of the carotid and vertebral artery systems bilaterally.  Stenosis estimates are per the NASCET measurement  criteria. COMPARISON: None. FINDINGS: Right: Internal Carotid Artery (ICA) peak systolic velocity 68 cm/sec ICA/CCA peak systolic velocity ratio: 0.7 Plaque formation: Homogeneous Vertebral artery: Antegrade flow and normal waveform. Left: Internal Carotid Artery (ICA)  peak systolic velocity 71 cm/sec ICA/CCA peak systolic velocity ratio: 0.9 Plaque formation: Homogeneous Vertebral artery: Antegrade flow and normal waveform.     No evidence of a hemodynamically significant carotid bifurcation stenosis. Electronically signed by: Gen Elkins Jr Date:    03/06/2024 Time:    09:31    IOL Master - OU - Both Eyes    Result Date: 2/21/2024  IOL calculations reviewed and lens selections made based on patient preferences. IOL selection documented in progress note.       Assessment  1. Near syncope  - Ambulatory referral/consult to Cardiology  - EKG 12-lead  - Echo; Future      Plan and Discussion    No clear etiology based on his symptoms.  Agree with event monitor.  Echocardiogram to rule out structural heart disease.    The 10-year ASCVD risk score (Castro DK, et al., 2019) is: 8.3%    Values used to calculate the score:      Age: 68 years      Sex: Male      Is Non- : Yes      Diabetic: No      Tobacco smoker: No      Systolic Blood Pressure: 110 mmHg      Is BP treated: No      HDL Cholesterol: 57 mg/dL      Total Cholesterol: 212 mg/dL     Follow Up  Follow up in about 6 weeks (around 4/24/2024).      Andres Metz MD

## 2024-03-14 ENCOUNTER — TELEPHONE (OUTPATIENT)
Dept: OPHTHALMOLOGY | Facility: CLINIC | Age: 69
End: 2024-03-14
Payer: MEDICARE

## 2024-03-14 DIAGNOSIS — H25.11 NUCLEAR SCLEROTIC CATARACT OF RIGHT EYE: Primary | ICD-10-CM

## 2024-03-14 LAB
OHS QRS DURATION: 74 MS
OHS QTC CALCULATION: 422 MS

## 2024-03-22 ENCOUNTER — HOSPITAL ENCOUNTER (OUTPATIENT)
Dept: CARDIOLOGY | Facility: OTHER | Age: 69
Discharge: HOME | End: 2024-03-22
Attending: INTERNAL MEDICINE
Payer: MEDICARE

## 2024-03-22 VITALS
DIASTOLIC BLOOD PRESSURE: 72 MMHG | HEART RATE: 75 BPM | BODY MASS INDEX: 29.1 KG/M2 | HEIGHT: 68 IN | SYSTOLIC BLOOD PRESSURE: 110 MMHG | WEIGHT: 192 LBS

## 2024-03-22 DIAGNOSIS — R55 NEAR SYNCOPE: ICD-10-CM

## 2024-03-22 LAB
ASCENDING AORTA: 2.35 CM
AV MEAN GRADIENT: 3 MMHG
AV PEAK GRADIENT: 5 MMHG
BSA FOR ECHO PROCEDURE: 2.04 M2
CV ECHO LV RWT: 0.33 CM
DOP CALC AO PEAK VEL: 1.12 M/S
DOP CALC AO VTI: 23.8 CM
DOP CALC LVOT AREA: 2.8 CM2
DOP CALC LVOT DIAMETER: 1.9 CM
E WAVE DECELERATION TIME: 255.4 MSEC
E/A RATIO: 0.96
E/E' RATIO: 7.44 M/S
ECHO LV POSTERIOR WALL: 0.81 CM (ref 0.6–1.1)
FRACTIONAL SHORTENING: 39 % (ref 28–44)
INTERVENTRICULAR SEPTUM: 0.77 CM (ref 0.6–1.1)
IVC DIAMETER: 2 CM
IVRT: 83.73 MSEC
LA MAJOR: 4.82 CM
LA MINOR: 5.11 CM
LA WIDTH: 3.1 CM
LEFT ATRIUM SIZE: 3.55 CM
LEFT ATRIUM VOLUME INDEX MOD: 17.9 ML/M2
LEFT ATRIUM VOLUME INDEX: 23.1 ML/M2
LEFT ATRIUM VOLUME MOD: 35.99 CM3
LEFT ATRIUM VOLUME: 46.4 CM3
LEFT INTERNAL DIMENSION IN SYSTOLE: 3.01 CM (ref 2.1–4)
LEFT VENTRICLE DIASTOLIC VOLUME INDEX: 56.48 ML/M2
LEFT VENTRICLE DIASTOLIC VOLUME: 113.53 ML
LEFT VENTRICLE MASS INDEX: 64 G/M2
LEFT VENTRICLE SYSTOLIC VOLUME INDEX: 17.5 ML/M2
LEFT VENTRICLE SYSTOLIC VOLUME: 35.2 ML
LEFT VENTRICULAR INTERNAL DIMENSION IN DIASTOLE: 4.91 CM (ref 3.5–6)
LEFT VENTRICULAR MASS: 129.55 G
LV LATERAL E/E' RATIO: 7.44 M/S
LV SEPTAL E/E' RATIO: 7.44 M/S
MV PEAK A VEL: 0.7 M/S
MV PEAK E VEL: 0.67 M/S
MV STENOSIS PRESSURE HALF TIME: 76.11 MS
MV VALVE AREA P 1/2 METHOD: 2.89 CM2
PISA TR MAX VEL: 2.21 M/S
PULM VEIN S/D RATIO: 1.04
PV PEAK D VEL: 0.45 M/S
PV PEAK GRADIENT: 4 MMHG
PV PEAK S VEL: 0.47 M/S
PV PEAK VELOCITY: 1.02 M/S
RA MAJOR: 4.03 CM
RA PRESSURE ESTIMATED: 3 MMHG
RA WIDTH: 3.8 CM
RV TB RVSP: 5 MMHG
STJ: 2.36 CM
TDI LATERAL: 0.09 M/S
TDI SEPTAL: 0.09 M/S
TDI: 0.09 M/S
TR MAX PG: 20 MMHG
TV REST PULMONARY ARTERY PRESSURE: 23 MMHG
Z-SCORE OF LEFT VENTRICULAR DIMENSION IN END DIASTOLE: -1.82
Z-SCORE OF LEFT VENTRICULAR DIMENSION IN END SYSTOLE: -1.45

## 2024-03-22 PROCEDURE — 93306 TTE W/DOPPLER COMPLETE: CPT | Mod: HCNC

## 2024-03-22 PROCEDURE — 93306 TTE W/DOPPLER COMPLETE: CPT | Mod: 26,HCNC,, | Performed by: INTERNAL MEDICINE

## 2024-04-01 ENCOUNTER — TELEPHONE (OUTPATIENT)
Dept: OPHTHALMOLOGY | Facility: CLINIC | Age: 69
End: 2024-04-01
Payer: MEDICARE

## 2024-04-01 DIAGNOSIS — H25.12 NUCLEAR SCLEROTIC CATARACT OF LEFT EYE: Primary | ICD-10-CM

## 2024-04-15 ENCOUNTER — TELEPHONE (OUTPATIENT)
Dept: OPHTHALMOLOGY | Facility: CLINIC | Age: 69
End: 2024-04-15
Payer: MEDICARE

## 2024-04-15 ENCOUNTER — PATIENT MESSAGE (OUTPATIENT)
Dept: OPHTHALMOLOGY | Facility: CLINIC | Age: 69
End: 2024-04-15
Payer: MEDICARE

## 2024-04-15 NOTE — TELEPHONE ENCOUNTER
Patient given arrival time of 9:15 am on Thursday April 18. Nothing to eat or drink after 11:59 pm. Start drops into the operative eye today. [id number]

## 2024-04-15 NOTE — PROGRESS NOTES
.  This note has been generated using voice-recognition software. There may be typographical errors that have been missed during proof-reading.     Primary Care Provider Appointment    Subjective:      Patient ID: Jeff Hope is a 68 y.o. male here for follow up.     Chief Complaint: Follow-up  HPI:  This is a pleasant 68-year-old male with bronchiectasis, hyperlipidemia, history of pulmonary embolism on chronic anticoagulation with a follicular non-Hodgkin's lymphoma, h/oacute/reactivated hepatitis B here for f/u.  Pt last seen  02/29/2024 03/07/2024 patient seen by Neurology for dizziness.  03/13/2024 patient seen by Cardiology for near-syncope.  EKG and echo ordered.    2 episodes of dizziness with nausea.  Sudden onset. One episode was while standing and the other when bending over.  Some nausea with these episodes.  Lasting 3-5 minutes.  Better when resting.  Resolves spontaneously.  Did not have impending doom feeling like prior on these last 2 episodes.    Taking elavil only as needed.    No recent urology visit.  Testosterone injection in the past.    Agrees to PSA for screening.    Has not scheduled colon--he will schedule with Dr Galan.    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    Pulmonary hypertension    Myalgia due to statin    Vitamin D deficiency    Abnormal MMSE    Allergic rhinitis    Adrenal hypertrophy    Bilateral shoulder bursitis    History of hepatitis B    Trigger finger of left thumb    Bilateral shoulder pain    Chronic thumb pain, bilateral    Breast mass, right    Peyronie's disease    Hypotestosteronemia in male    Iron deficiency anemia    Gastroesophageal reflux disease without esophagitis    Myofascial pain    Osteoarthritis of  spine with radiculopathy, lumbosacral region    Decreased range of motion of lumbar spine    Hypogammaglobulinemia    Sacroiliitis    Dizziness    Pain of right upper extremity        Past Surgical History:   Procedure Laterality Date    ESOPHAGOGASTRODUODENOSCOPY N/A 11/29/2021    Procedure: ESOPHAGOGASTRODUODENOSCOPY (EGD);  Surgeon: Kristine Palmer MD;  Location: [facility] ENDO (4TH FLR);  Service: Endoscopy;  Laterality: N/A;  fully vacc-inst mail-tb-left chest port    GROIN EXPLORATION  2 pre 2005 and 1 post 2005    total of 3 procedures    INJECTION OF ANESTHETIC AGENT AROUND NERVE Left 11/8/2022    Procedure: BLOCK, NERVE, LEFT L3-L5 MEDIAL BRANCH;  Surgeon: Melody Berry MD;  Location: [facility] PAIN MGT;  Service: Pain Management;  Laterality: Left;    INJECTION, SACROILIAC JOINT Left 10/11/2022    Procedure: INJECTION,SACROILIAC JOINT LEFT CONTRAST;  Surgeon: Melody Berry MD;  Location: [facility] PAIN MGT;  Service: Pain Management;  Laterality: Left;    INJECTION, SACROILIAC JOINT Left 8/22/2023    Procedure: INJECTION,SACROILIAC JOINT, LEFT SOONER DATE;  Surgeon: Melody Berry MD;  Location: [facility] PAIN MGT;  Service: Pain Management;  Laterality: Left;    LAPAROSCOPIC CHOLECYSTECTOMY  05/2018    SHOULDER SURGERY Left        Past Medical History:   Diagnosis Date    History of pulmonary embolus (PE) 4/15/2019    Pt with PE s/p hospitalization.  He was placed on xarelto with high risk with cancer.  He has been out of the medication for the past month. Restart Xarelto 15 mg b.i.d. for 21 days, then 20 mg q.day until cancer no longer active.    Non Hodgkin's lymphoma     Personal history of colonic polyps 09/08/2015    per [facility] report - repeat 9/2020    Peyronie disease     S/P laparoscopic cholecystectomy 5/27/2019       Social History     Socioeconomic History    Marital status:     Number of children: 2   Occupational History    Occupation: disabled [employer]    Tobacco Use    Smoking  "status: Never    Smokeless tobacco: Never   Substance and Sexual Activity    Alcohol use: No    Drug use: No    Sexual activity: Yes     Partners: Female   Social History Narrative    Lives with wife.       Social Determinants of Health     Financial Resource Strain: Medium Risk (11/19/2019)    Overall Financial Resource Strain (CARDIA)     Difficulty of Paying Living Expenses: Somewhat hard   Food Insecurity: Food Insecurity Present (11/19/2019)    Hunger Vital Sign     Worried About Running Out of Food in the Last Year: Often true     Ran Out of Food in the Last Year: Sometimes true   Transportation Needs: No Transportation Needs (11/19/2019)    PRAPARE - Transportation     Lack of Transportation (Medical): No     Lack of Transportation (Non-Medical): No   Physical Activity: Inactive (11/19/2019)    Exercise Vital Sign     Days of Exercise per Week: 0 days     Minutes of Exercise per Session: 0 min   Stress: Stress Concern Present (11/19/2019)    [employer] - Occupational Stress Questionnaire     Feeling of Stress : Rather much   Social Connections: Moderately Isolated (11/19/2019)    Social Connection and Isolation Panel [NHANES]     Frequency of Communication with Friends and Family: Twice a week     Frequency of Social Gatherings with Friends and Family: Twice a week     Attends Mormonism Services: Never     Active Member of Clubs or Organizations: No     Attends Club or Organization Meetings: Never     Marital Status:        Review of Systems         No data to display                 Checklist of Daily Activities:        Objective:   /74 (BP Location: Left arm, Patient Position: Sitting, BP Method: Medium (Manual))   Pulse 68   Temp 98.3 °F (36.8 °C) (Oral)   Ht 5' 8" (1.727 m)   Wt 87.9 kg (193 lb 14.3 oz)   SpO2 96%   BMI 29.48 kg/m²     Physical Exam  Constitutional:       General: He is not in acute distress.     Appearance: Normal appearance. He is not " ill-appearing, toxic-appearing or diaphoretic.   HENT:      Head: Normocephalic and atraumatic.   Cardiovascular:      Rate and Rhythm: Normal rate and regular rhythm.      Heart sounds: Normal heart sounds.   Pulmonary:      Effort: Pulmonary effort is normal.      Breath sounds: Normal breath sounds.   Abdominal:      Palpations: Abdomen is soft.      Tenderness: There is no abdominal tenderness. There is no guarding or rebound.   Musculoskeletal:      Right lower leg: No edema.      Left lower leg: No edema.   Lymphadenopathy:      Cervical: No cervical adenopathy.   Neurological:      Mental Status: He is alert.             Lab Results   Component Value Date    WBC 6.01 11/14/2023    HGB 15.6 11/14/2023    HCT 45.4 11/14/2023     11/14/2023    CHOL 212 (H) 02/14/2023    TRIG 111 02/14/2023    HDL 57 02/14/2023    ALT 12 11/14/2023    AST 19 11/14/2023     11/14/2023    K 4.2 11/14/2023     11/14/2023    CREATININE 1.0 11/14/2023    BUN 19 11/14/2023    CO2 29 11/14/2023    TSH 0.719 11/14/2023    PSA 0.69 02/14/2023    INR 1.0 10/14/2022    HGBA1C 5.4 02/14/2023       Current Outpatient Medications on File Prior to Visit   Medication Sig Dispense Refill    celecoxib (CELEBREX) 200 MG capsule TK ONE C PO BID      ergocalciferol (ERGOCALCIFEROL) 50,000 unit Cap Take 1 capsule by mouth every 7 days.      fluticasone propionate (FLONASE) 50 mcg/actuation nasal spray SHAKE LIQUID AND USE 2 SPRAYS(100 MCG) IN EACH NOSTRIL EVERY DAY 48 g 3    pantoprazole (PROTONIX) 40 MG tablet       prednisolon/gatiflox/bromfenac (PREDNISOL ACE-GATIFLOX-BROMFEN) 1-0.5-0.075 % DrpS Apply 1 drop to eye 3 (three) times daily. in operative eye for 1 month after surgery 5 mL 3    pregabalin (LYRICA) 150 MG capsule Take 150 mg by mouth once daily.      [DISCONTINUED] amitriptyline (ELAVIL) 25 MG tablet Take 25 mg by mouth nightly.       Current Facility-Administered Medications on File Prior to Visit   Medication Dose  Route Frequency Provider Last Rate Last Admin    0.9%  NaCl infusion  500 mL Intravenous Continuous Amado Parekh MD             Assessment:   68 y.o. male with multiple co-morbid illnesses here for continued follow up of medical problems.      Plan:     Problem List Items Addressed This Visit          Cardiac/Vascular    Mixed hyperlipidemia - Primary    Relevant Orders    Lipid Panel       Renal/    Testicular pain     Patient is status post 3 surgeries.  Worsening of pain.   Followed by PM in the past.  Not taking elavil daily.  Taking celebrex as needed.    D/c elavil and trial of venlafaxine low dose and nancy in 4 week            Other    Healthcare maintenance     ACP reviewed 11/2023   Yearly labs with PSA today  Coln--pt to schedule with Awetry.           Dizziness     2 episodes since last visit.  No concerning findings on ECHO, holter, carotid U/S.  Will send note and results to neurology.  Unclear etiology.  Could this be due to meds?  D/c elavil.            Relevant Orders    CBC Auto Differential    Comprehensive Metabolic Panel     Other Visit Diagnoses       Screening for prostate cancer        Relevant Orders    PSA, SCREENING            Health Maintenance         Date Due Completion Date    COVID-19 Vaccine (7 - 2023-24 season) 09/01/2023 6/9/2022    Colorectal Cancer Screening 05/18/2024 5/18/2021    Override on 5/18/2021: Done (repeat 3 year)    Pneumococcal Vaccines (Age 65+) (3 of 3 - PPSV23 or PCV20) 08/18/2025 8/18/2020    Hemoglobin A1c (Diabetic Prevention Screening) 02/14/2026 2/14/2023    Lipid Panel 02/14/2028 2/14/2023    TETANUS VACCINE 06/15/2030 6/15/2020          Medications Reconciliation:   I have not reconciled the patient's home medications with the patient/family. I have updated all changes.  Refer to After-Visit Medication List.      Medication List            Accurate as of April 16, 2024  8:42 AM. If you have any questions, ask your nurse or doctor.                START  taking these medications      venlafaxine 37.5 MG 24 hr capsule  Commonly known as: EFFEXOR-XR  Take 1 capsule (37.5 mg total) by mouth once daily.  Started by: Roly Flannery MD            CONTINUE taking these medications      celecoxib 200 MG capsule  Commonly known as: CeleBREX     ergocalciferol 50,000 unit Cap  Commonly known as: ERGOCALCIFEROL     fluticasone propionate 50 mcg/actuation nasal spray  Commonly known as: FLONASE  SHAKE LIQUID AND USE 2 SPRAYS(100 MCG) IN EACH NOSTRIL EVERY DAY     pantoprazole 40 MG tablet  Commonly known as: PROTONIX     prednisol ace-gatiflox-bromfen 1-0.5-0.075 % Drps  Apply 1 drop to eye 3 (three) times daily. in operative eye for 1 month after surgery     pregabalin 150 MG capsule  Commonly known as: LYRICA            STOP taking these medications      amitriptyline 25 MG tablet  Commonly known as: ELAVIL  Stopped by: Roly Flannery MD               Where to Get Your Medications        These medications were sent to [id number] - [facility] [address]  [address] [facility] [address]      Phone: [phone]   venlafaxine 37.5 MG 24 hr capsule              Follow up in about 4 weeks (around 5/14/2024). Total clinical care time was 30 min. The following issues were discussed: testicular pain, need for colon, PSA, yearly labs, need for onc f/u imaging, dizziness     Roly Flannery MD  Internal Medicine  [facility]  [phone]

## 2024-04-15 NOTE — TELEPHONE ENCOUNTER
----- Message from Amado Jade sent at 4/15/2024 11:24 AM CDT -----  Regarding: appointment  Contact: [phone]  Pt daughter is calling in to see when soul her dad start taking eye drops and which eye is pt having surgery ? Please contact pt daughter at [phone]

## 2024-04-16 ENCOUNTER — TELEPHONE (OUTPATIENT)
Dept: PRIMARY CARE CLINIC | Facility: CLINIC | Age: 69
End: 2024-04-16
Payer: MEDICARE

## 2024-04-16 ENCOUNTER — OFFICE VISIT (OUTPATIENT)
Dept: PRIMARY CARE CLINIC | Facility: CLINIC | Age: 69
End: 2024-04-16
Attending: INTERNAL MEDICINE
Payer: MEDICARE

## 2024-04-16 ENCOUNTER — LAB VISIT (OUTPATIENT)
Dept: LAB | Facility: HOSPITAL | Age: 69
End: 2024-04-16
Attending: INTERNAL MEDICINE
Payer: MEDICARE

## 2024-04-16 VITALS
DIASTOLIC BLOOD PRESSURE: 74 MMHG | HEIGHT: 68 IN | SYSTOLIC BLOOD PRESSURE: 116 MMHG | OXYGEN SATURATION: 96 % | BODY MASS INDEX: 29.38 KG/M2 | WEIGHT: 193.88 LBS | TEMPERATURE: 98 F | HEART RATE: 68 BPM

## 2024-04-16 DIAGNOSIS — Z00.00 HEALTHCARE MAINTENANCE: ICD-10-CM

## 2024-04-16 DIAGNOSIS — E78.2 MIXED HYPERLIPIDEMIA: ICD-10-CM

## 2024-04-16 DIAGNOSIS — R42 DIZZINESS: ICD-10-CM

## 2024-04-16 DIAGNOSIS — E78.2 MIXED HYPERLIPIDEMIA: Primary | ICD-10-CM

## 2024-04-16 DIAGNOSIS — Z12.5 SCREENING FOR PROSTATE CANCER: ICD-10-CM

## 2024-04-16 DIAGNOSIS — N50.819 PAIN IN TESTICLE, UNSPECIFIED LATERALITY: ICD-10-CM

## 2024-04-16 LAB
ALBUMIN SERPL BCP-MCNC: 3.9 G/DL (ref 3.5–5.2)
ALP SERPL-CCNC: 61 U/L (ref 55–135)
ALT SERPL W/O P-5'-P-CCNC: 14 U/L (ref 10–44)
ANION GAP SERPL CALC-SCNC: 7 MMOL/L (ref 8–16)
AST SERPL-CCNC: 20 U/L (ref 10–40)
BASOPHILS # BLD AUTO: 0.03 K/UL (ref 0–0.2)
BASOPHILS NFR BLD: 0.5 % (ref 0–1.9)
BILIRUB SERPL-MCNC: 0.4 MG/DL (ref 0.1–1)
BUN SERPL-MCNC: 15 MG/DL (ref 8–23)
CALCIUM SERPL-MCNC: 9.7 MG/DL (ref 8.7–10.5)
CHLORIDE SERPL-SCNC: 105 MMOL/L (ref 95–110)
CHOLEST SERPL-MCNC: 199 MG/DL (ref 120–199)
CHOLEST/HDLC SERPL: 3.5 {RATIO} (ref 2–5)
CO2 SERPL-SCNC: 28 MMOL/L (ref 23–29)
COMPLEXED PSA SERPL-MCNC: 0.8 NG/ML (ref 0–4)
CREAT SERPL-MCNC: 0.8 MG/DL (ref 0.5–1.4)
DIFFERENTIAL METHOD BLD: NORMAL
EOSINOPHIL # BLD AUTO: 0 K/UL (ref 0–0.5)
EOSINOPHIL NFR BLD: 0.7 % (ref 0–8)
ERYTHROCYTE [DISTWIDTH] IN BLOOD BY AUTOMATED COUNT: 12.9 % (ref 11.5–14.5)
EST. GFR  (NO RACE VARIABLE): >60 ML/MIN/1.73 M^2
GLUCOSE SERPL-MCNC: 97 MG/DL (ref 70–110)
HCT VFR BLD AUTO: 45.8 % (ref 40–54)
HDLC SERPL-MCNC: 57 MG/DL (ref 40–75)
HDLC SERPL: 28.6 % (ref 20–50)
HGB BLD-MCNC: 15.2 G/DL (ref 14–18)
IMM GRANULOCYTES # BLD AUTO: 0.02 K/UL (ref 0–0.04)
IMM GRANULOCYTES NFR BLD AUTO: 0.4 % (ref 0–0.5)
LDLC SERPL CALC-MCNC: 126.2 MG/DL (ref 63–159)
LYMPHOCYTES # BLD AUTO: 2.3 K/UL (ref 1–4.8)
LYMPHOCYTES NFR BLD: 41.8 % (ref 18–48)
MCH RBC QN AUTO: 30 PG (ref 27–31)
MCHC RBC AUTO-ENTMCNC: 33.2 G/DL (ref 32–36)
MCV RBC AUTO: 91 FL (ref 82–98)
MONOCYTES # BLD AUTO: 0.4 K/UL (ref 0.3–1)
MONOCYTES NFR BLD: 7.9 % (ref 4–15)
NEUTROPHILS # BLD AUTO: 2.7 K/UL (ref 1.8–7.7)
NEUTROPHILS NFR BLD: 48.7 % (ref 38–73)
NONHDLC SERPL-MCNC: 142 MG/DL
NRBC BLD-RTO: 0 /100 WBC
PLATELET # BLD AUTO: 184 K/UL (ref 150–450)
PMV BLD AUTO: 10.2 FL (ref 9.2–12.9)
POTASSIUM SERPL-SCNC: 4.1 MMOL/L (ref 3.5–5.1)
PROT SERPL-MCNC: 7 G/DL (ref 6–8.4)
RBC # BLD AUTO: 5.06 M/UL (ref 4.6–6.2)
SODIUM SERPL-SCNC: 140 MMOL/L (ref 136–145)
TRIGL SERPL-MCNC: 79 MG/DL (ref 30–150)
WBC # BLD AUTO: 5.46 K/UL (ref 3.9–12.7)

## 2024-04-16 PROCEDURE — 3288F FALL RISK ASSESSMENT DOCD: CPT | Mod: HCNC,CPTII,S$GLB, | Performed by: INTERNAL MEDICINE

## 2024-04-16 PROCEDURE — 1160F RVW MEDS BY RX/DR IN RCRD: CPT | Mod: HCNC,CPTII,S$GLB, | Performed by: INTERNAL MEDICINE

## 2024-04-16 PROCEDURE — 99214 OFFICE O/P EST MOD 30 MIN: CPT | Mod: HCNC,S$GLB,, | Performed by: INTERNAL MEDICINE

## 2024-04-16 PROCEDURE — 3074F SYST BP LT 130 MM HG: CPT | Mod: HCNC,CPTII,S$GLB, | Performed by: INTERNAL MEDICINE

## 2024-04-16 PROCEDURE — 1159F MED LIST DOCD IN RCRD: CPT | Mod: HCNC,CPTII,S$GLB, | Performed by: INTERNAL MEDICINE

## 2024-04-16 PROCEDURE — 3078F DIAST BP <80 MM HG: CPT | Mod: HCNC,CPTII,S$GLB, | Performed by: INTERNAL MEDICINE

## 2024-04-16 PROCEDURE — 1157F ADVNC CARE PLAN IN RCRD: CPT | Mod: HCNC,CPTII,S$GLB, | Performed by: INTERNAL MEDICINE

## 2024-04-16 PROCEDURE — 80061 LIPID PANEL: CPT | Mod: HCNC | Performed by: INTERNAL MEDICINE

## 2024-04-16 PROCEDURE — 80053 COMPREHEN METABOLIC PANEL: CPT | Mod: HCNC | Performed by: INTERNAL MEDICINE

## 2024-04-16 PROCEDURE — 84153 ASSAY OF PSA TOTAL: CPT | Mod: HCNC | Performed by: INTERNAL MEDICINE

## 2024-04-16 PROCEDURE — 3008F BODY MASS INDEX DOCD: CPT | Mod: HCNC,CPTII,S$GLB, | Performed by: INTERNAL MEDICINE

## 2024-04-16 PROCEDURE — 36415 COLL VENOUS BLD VENIPUNCTURE: CPT | Mod: HCNC | Performed by: INTERNAL MEDICINE

## 2024-04-16 PROCEDURE — 1125F AMNT PAIN NOTED PAIN PRSNT: CPT | Mod: HCNC,CPTII,S$GLB, | Performed by: INTERNAL MEDICINE

## 2024-04-16 PROCEDURE — 1101F PT FALLS ASSESS-DOCD LE1/YR: CPT | Mod: HCNC,CPTII,S$GLB, | Performed by: INTERNAL MEDICINE

## 2024-04-16 PROCEDURE — 85025 COMPLETE CBC W/AUTO DIFF WBC: CPT | Mod: HCNC | Performed by: INTERNAL MEDICINE

## 2024-04-16 RX ORDER — VENLAFAXINE HYDROCHLORIDE 37.5 MG/1
37.5 CAPSULE, EXTENDED RELEASE ORAL DAILY
Qty: 30 CAPSULE | Refills: 3 | Status: SHIPPED | OUTPATIENT
Start: 2024-04-16 | End: 2024-05-13 | Stop reason: SDUPTHER

## 2024-04-16 NOTE — ASSESSMENT & PLAN NOTE
Patient is status post 3 surgeries.  Worsening of pain.   Followed by PM in the past.  Not taking elavil daily.  Taking celebrex as needed.    D/c elavil and trial of venlafaxine low dose and nancy in 4 week   Refill approved via Dr. Mason

## 2024-04-16 NOTE — TELEPHONE ENCOUNTER
----- Message from Roly Flannery MD sent at 4/15/2024  8:46 PM CDT -----  Please call patient with results.  No irregular heartbeat noted during symptoms.

## 2024-04-16 NOTE — ASSESSMENT & PLAN NOTE
2 episodes since last visit.  No concerning findings on ECHO, holter, carotid U/S.  Will send note and results to neurology.  Unclear etiology.  Could this be due to meds?  D/c elavil.

## 2024-04-17 ENCOUNTER — TELEPHONE (OUTPATIENT)
Dept: NEUROLOGY | Facility: CLINIC | Age: 69
End: 2024-04-17
Payer: MEDICARE

## 2024-04-17 NOTE — PRE-PROCEDURE INSTRUCTIONS
Unable to reach pt via phone.  Left voicemail with arrival time also informing pt of need for responsible  accompaniment and instructing pt to follow pre-procedure instructions provided via [facility] portal.  The following message was sent to pt's portal.      Dear Jeff     Below you will find basic pre-procedure instructions in preparation for your procedure on 4/18/24 with Dr. Montes De Oca     You should have received your arrival time already from Dr's office.     - Nothing to eat or drink after midnight the night before your procedure until after your procedure, except AM meds with small sips of water.     - HOLD all oral Diabetic medications night before and morning of procedure  - HOLD all Insulin morning of procedure  - HOLD all Fluid pills morning of procedure  - HOLD all non-insulin shots until after surgery (Ozempic, Mounjaro, Trulicity, Victoza, Byetta, Wegovy and Adlyxin) (7 days prior)  - HOLD all vitamins, minerals and herbal supplements morning of procedure   - TAKE all B/P meds, EXCEPT those that contain a fluid pill (ex. Lasix, Hydroclorothiazide/HCTZ, Spirnolactone)  - USE inhalers as needed and bring AM of surgery  - USE EYE DROPS as directed  -TAKE blood thinner meds AM of surgery unless otherwise instructed by your provider to not take     - Shower and wash face with dial soap for 3 mins PM prior and AM of surgery  - No powder, lotions, creams, oils, gels, ointments, makeup,  or jewelry    - Wear comfortable clothing (button up shirt)     (Patient is required to have a responsible ride to transport home, ride may not leave while patient is in surgery)     -- [facility], 2nd floor Surgery Center, located   @ [address] [place] [address]  2nd Floor Registration        If you have any questions or concerns please feel free to contact your surgeon's office.           Please reply to this message as receipt of delivery.     Catina, LPN [facility]  Pre-Admit - Anesthesia Dept

## 2024-04-17 NOTE — TELEPHONE ENCOUNTER
Left message on patients voicemail asking to return my call to schedule a follow up with Dr DILLARD

## 2024-04-18 ENCOUNTER — TELEPHONE (OUTPATIENT)
Dept: PRIMARY CARE CLINIC | Facility: CLINIC | Age: 69
End: 2024-04-18
Payer: MEDICARE

## 2024-04-18 ENCOUNTER — HOSPITAL ENCOUNTER (OUTPATIENT)
Facility: HOSPITAL | Age: 69
Discharge: HOME | End: 2024-04-18
Attending: OPHTHALMOLOGY | Admitting: OPHTHALMOLOGY
Payer: MEDICARE

## 2024-04-18 ENCOUNTER — OFFICE VISIT (OUTPATIENT)
Dept: OPHTHALMOLOGY | Facility: CLINIC | Age: 69
End: 2024-04-18
Payer: MEDICARE

## 2024-04-18 VITALS
RESPIRATION RATE: 16 BRPM | HEART RATE: 65 BPM | WEIGHT: 193 LBS | BODY MASS INDEX: 29.25 KG/M2 | OXYGEN SATURATION: 100 % | TEMPERATURE: 98 F | HEIGHT: 68 IN | SYSTOLIC BLOOD PRESSURE: 134 MMHG | DIASTOLIC BLOOD PRESSURE: 75 MMHG

## 2024-04-18 DIAGNOSIS — H25.13 NUCLEAR SCLEROSIS, BILATERAL: Primary | ICD-10-CM

## 2024-04-18 DIAGNOSIS — H25.13 NUCLEAR SCLEROSIS, BILATERAL: ICD-10-CM

## 2024-04-18 DIAGNOSIS — Z98.890 POST-OPERATIVE STATE: ICD-10-CM

## 2024-04-18 DIAGNOSIS — H25.11 NUCLEAR SCLEROTIC CATARACT OF RIGHT EYE: Primary | ICD-10-CM

## 2024-04-18 PROCEDURE — 1157F ADVNC CARE PLAN IN RCRD: CPT | Mod: CPTII,S$GLB,, | Performed by: OPHTHALMOLOGY

## 2024-04-18 PROCEDURE — 66984 XCAPSL CTRC RMVL W/O ECP: CPT | Mod: RT,,, | Performed by: OPHTHALMOLOGY

## 2024-04-18 PROCEDURE — 25000003 PHARM REV CODE 250: Performed by: OPHTHALMOLOGY

## 2024-04-18 PROCEDURE — 99900035 HC TECH TIME PER 15 MIN (STAT)

## 2024-04-18 PROCEDURE — 1101F PT FALLS ASSESS-DOCD LE1/YR: CPT | Mod: CPTII,S$GLB,, | Performed by: OPHTHALMOLOGY

## 2024-04-18 PROCEDURE — 94760 N-INVAS EAR/PLS OXIMETRY 1: CPT

## 2024-04-18 PROCEDURE — 63600175 PHARM REV CODE 636 W HCPCS: Performed by: OPHTHALMOLOGY

## 2024-04-18 PROCEDURE — 36000706: Performed by: OPHTHALMOLOGY

## 2024-04-18 PROCEDURE — V2632 POST CHMBR INTRAOCULAR LENS: HCPCS | Performed by: OPHTHALMOLOGY

## 2024-04-18 PROCEDURE — 3288F FALL RISK ASSESSMENT DOCD: CPT | Mod: CPTII,S$GLB,, | Performed by: OPHTHALMOLOGY

## 2024-04-18 PROCEDURE — 1160F RVW MEDS BY RX/DR IN RCRD: CPT | Mod: CPTII,S$GLB,, | Performed by: OPHTHALMOLOGY

## 2024-04-18 PROCEDURE — 36000707: Performed by: OPHTHALMOLOGY

## 2024-04-18 PROCEDURE — 99999 PR PBB SHADOW E&M-EST. PATIENT-LVL III: CPT | Mod: PBBFAC,,, | Performed by: OPHTHALMOLOGY

## 2024-04-18 PROCEDURE — 1125F AMNT PAIN NOTED PAIN PRSNT: CPT | Mod: CPTII,S$GLB,, | Performed by: OPHTHALMOLOGY

## 2024-04-18 PROCEDURE — 99024 POSTOP FOLLOW-UP VISIT: CPT | Mod: S$GLB,,, | Performed by: OPHTHALMOLOGY

## 2024-04-18 PROCEDURE — 1159F MED LIST DOCD IN RCRD: CPT | Mod: CPTII,S$GLB,, | Performed by: OPHTHALMOLOGY

## 2024-04-18 PROCEDURE — 71000015 HC POSTOP RECOV 1ST HR: Performed by: OPHTHALMOLOGY

## 2024-04-18 DEVICE — LENS CLAREON AUTONOME 21.0D: Type: IMPLANTABLE DEVICE | Site: EYE | Status: FUNCTIONAL

## 2024-04-18 RX ORDER — MOXIFLOXACIN 5 MG/ML
SOLUTION/ DROPS OPHTHALMIC
Status: DISCONTINUED | OUTPATIENT
Start: 2024-04-18 | End: 2024-04-18 | Stop reason: HOSPADM

## 2024-04-18 RX ORDER — ACETAMINOPHEN 325 MG/1
650 TABLET ORAL EVERY 4 HOURS PRN
Status: DISCONTINUED | OUTPATIENT
Start: 2024-04-18 | End: 2024-04-18 | Stop reason: HOSPADM

## 2024-04-18 RX ORDER — MIDAZOLAM HYDROCHLORIDE 1 MG/ML
1 INJECTION, SOLUTION INTRAMUSCULAR; INTRAVENOUS
Status: DISCONTINUED | OUTPATIENT
Start: 2024-04-18 | End: 2024-04-18 | Stop reason: HOSPADM

## 2024-04-18 RX ORDER — LIDOCAINE HYDROCHLORIDE 40 MG/ML
INJECTION, SOLUTION RETROBULBAR
Status: DISCONTINUED | OUTPATIENT
Start: 2024-04-18 | End: 2024-04-18 | Stop reason: HOSPADM

## 2024-04-18 RX ORDER — TROPICAMIDE 10 MG/ML
1 SOLUTION/ DROPS OPHTHALMIC
Status: COMPLETED | OUTPATIENT
Start: 2024-04-18 | End: 2024-04-18

## 2024-04-18 RX ORDER — CYCLOP/TROP/PROPA/PHEN/KET/WAT 1-1-0.1%
1 DROPS (EA) OPHTHALMIC (EYE)
Status: DISCONTINUED | OUTPATIENT
Start: 2024-04-18 | End: 2024-04-18

## 2024-04-18 RX ORDER — PHENYLEPHRINE HYDROCHLORIDE 100 MG/ML
1 SOLUTION/ DROPS OPHTHALMIC
Status: DISCONTINUED | OUTPATIENT
Start: 2024-04-18 | End: 2024-04-18 | Stop reason: HOSPADM

## 2024-04-18 RX ORDER — PROPARACAINE HYDROCHLORIDE 5 MG/ML
1 SOLUTION/ DROPS OPHTHALMIC
Status: DISCONTINUED | OUTPATIENT
Start: 2024-04-18 | End: 2024-04-18 | Stop reason: HOSPADM

## 2024-04-18 RX ORDER — TETRACAINE HYDROCHLORIDE 5 MG/ML
1 SOLUTION OPHTHALMIC
Status: COMPLETED | OUTPATIENT
Start: 2024-04-18 | End: 2024-04-18

## 2024-04-18 RX ORDER — PHENYLEPHRINE HYDROCHLORIDE 25 MG/ML
1 SOLUTION/ DROPS OPHTHALMIC
Status: COMPLETED | OUTPATIENT
Start: 2024-04-18 | End: 2024-04-18

## 2024-04-18 RX ORDER — PROPARACAINE HYDROCHLORIDE 5 MG/ML
SOLUTION/ DROPS OPHTHALMIC
Status: DISCONTINUED | OUTPATIENT
Start: 2024-04-18 | End: 2024-04-18 | Stop reason: HOSPADM

## 2024-04-18 RX ORDER — MOXIFLOXACIN 5 MG/ML
1 SOLUTION/ DROPS OPHTHALMIC
Status: COMPLETED | OUTPATIENT
Start: 2024-04-18 | End: 2024-04-18

## 2024-04-18 RX ADMIN — TROPICAMIDE 1 DROP: 10 SOLUTION/ DROPS OPHTHALMIC at 09:04

## 2024-04-18 RX ADMIN — MIDAZOLAM HYDROCHLORIDE 2 MG: 1 INJECTION, SOLUTION INTRAMUSCULAR; INTRAVENOUS at 10:04

## 2024-04-18 RX ADMIN — TETRACAINE HYDROCHLORIDE 1 DROP: 5 SOLUTION/ DROPS OPHTHALMIC at 09:04

## 2024-04-18 RX ADMIN — MOXIFLOXACIN OPHTHALMIC 1 DROP: 5 SOLUTION/ DROPS OPHTHALMIC at 09:04

## 2024-04-18 RX ADMIN — MOXIFLOXACIN 1 DROP: 5 SOLUTION/ DROPS OPHTHALMIC at 10:04

## 2024-04-18 RX ADMIN — MIDAZOLAM HYDROCHLORIDE 1 MG: 1 INJECTION, SOLUTION INTRAMUSCULAR; INTRAVENOUS at 10:04

## 2024-04-18 RX ADMIN — PHENYLEPHRINE HYDROCHLORIDE 1 DROP: 25 SOLUTION/ DROPS OPHTHALMIC at 09:04

## 2024-04-18 NOTE — H&P
CC: Painless, progressive loss of vision  Present Illness: Nuclear sclerotic cataract  Allergies/Current Meds: see meds  Mental Status: A&O x3  Pertinent Medical History: n/a     Physical Exam  General: NAD  HEENT: Eye white/quiet  Lungs: Adequate respirations  Heart: + pulses  Abdomen: soft  Rectal/GI/: deferred     Impression: Cataract  Plan: Phacoemulsification with lens implant    ASA Score: II    Mallampati Score: II    Plan for Sedation: Moderate Sedation    Patient or Family History of Anesthesia problems : No    Any changes affecting the anesthesia assessment which may have changed since the initial assessment and H and P: No      Patient's SPO2 is at 89%. Patient placed on 1L NC.      Jan Bustos RN  10/17/23 [id number]

## 2024-04-18 NOTE — OP NOTE
SURGEON:  Jefferson Montes De Oca M.D.    PREOPERATIVE DIAGNOSIS:    Nuclear Sclerotic Cataract Right Eye    POSTOPERATIVE DIAGNOSIS:    Nuclear Sclerotic Cataract Right Eye    PROCEDURES:    Phacoemulsification with  intraocular lens, Right eye (10307)    DATE OF SURGERY: 04/18/2024    IMPLANT: CNA0T0 21.0    ANESTHESIA:  moderate sedation with topical Lidocaine. Patient ID confirmed and re-evaluated the patient and anesthesia plan confirming it is suitable for the patient's condition and procedure.    COMPLICATIONS:  None    ESTIMATED BLOOD LOSS: None    SPECIMENS: None    INDICATIONS:    The patient has a history of painless progressive visual loss and difficulty with activities of daily living, which specifically include difficult driving at night due to glare and difficulty reading small print, secondary to cataract formation.  After a thorough discussion of the risks, benefits, and alternatives to cataract surgery, including, but not limited to, the rare risks of infection, retinal detachment, hemorrhage, need for additional surgery, loss of vision, and even loss of the eye, the patient voices understanding and desires to proceed.    DESCRIPTION OF PROCEDURE:    The patients IOL calculations were reviewed, and the lens selection confirmed.   After verification and marking of the proper eye in the preop holding area, the patient was brought to the operating room in supine position where the eye was prepped and draped in standard sterile fashion with 5% Betadine and a lid speculum placed in the eye.   Topical 4% Lidocaine was used in addition to the preoperative anesthesia and the procedure was begun by the creation of a paracentesis incision through which viscoelastic was used to fill the anterior chamber.  Next, a keratome blade was used to create a triplanar temporal clear corneal incision and a cystotome and Utrata forceps used to fashion a continuous curvilinear capsulorrhexis.  Hydrodissection was carried out using  the Lackey hydrodissection cannula and the nucleus was found to be mobile.  Phacoemulsification of the nucleus was carried out using a quick chop technique, and all remaining epinuclear and cortical material was removed.  The eye was then reformed with Viscoelastic and the  intraocular lens was implanted into the capsular bag.  All remaining viscoelastics were removed from the eye and at the end of the case the pupil was round, the lens was well-centered within the capsular bag and all wounds were found to be water tight.  Drops of Vigamox and Pred Forte were instilled and a shield was placed over the eye. The patient will follow up with Dr. Montes De Oca in the morning.

## 2024-04-18 NOTE — PLAN OF CARE
Chart reviewed. Preop nursing care completed per orders. Safe surgery checklist complete. Pt denies any open wounds cuts or sores. Pt denies any metal in body or use of weight loss injections. Pt AAOX3, VSS on room air. Pt toileted, Bed locked in lowest position, Call light within reach. Pt denies any needs at this time. Belongings placed under stretcher.

## 2024-04-18 NOTE — PROGRESS NOTES
HPI    Same day po phaco/IOL OD  Vision is blurry and some pain.  It feels like something is in his eye  PGB TID OD    DATE OF SURGERY: 04/18/2024     IMPLANT: CNA0T0 21.0    Last edited by Marley Mcgee on 4/18/2024  2:40 PM.            Assessment /Plan     For exam results, see Encounter Report.    Nuclear sclerosis, bilateral    Post-operative state      Slit lamp exam:  L/L: nl  K: clear, wound sealed  AC: 1+ cell  Lens: IOL centered and stable    POD1 s/p Phaco/IOL  Appropriate precautions and post op medications reviewed.  Patient instructed to call or come in if symptoms of redness, decreased vision, or pain are experienced.

## 2024-04-18 NOTE — TELEPHONE ENCOUNTER
----- Message from Roly Flannery MD sent at 4/18/2024 12:05 PM CDT -----  Please call patient with results.  Normal renal and liver function.  No anemia.  PSA within normal limits.  LDL slightly increased.  Will review risk factors at next visit.

## 2024-04-18 NOTE — PLAN OF CARE
Jeff Hope has met all discharge criteria from Phase II. Vital Signs are stable, ambulating  without difficulty. Discharge instructions given, patient verbalized understanding. Discharged from facility via wheelchair in stable condition.

## 2024-04-18 NOTE — DISCHARGE INSTRUCTIONS
CATARACT SURGERY    POST-OPERATIVE INSTRUCTIONS    · Apply drops THREE times a day into operative eye for 30 days.    · DO NOT rub your eye    · Wear protective sunglasses during the day    · Resume moderate activity    · Bathe/shower/wash face normally    · DO NOT apply makeup around the operative eye for 1 week.         You should expect    - Blurry vision and halos for 24-48 hours    - Dilated pupil for 24-48 hours    - Scratchy feeling in the eye for 1-2 days    - Curved shadow in your peripheral vision for 2-3 weeks    - Occasional flickering of lights for up to 1 week    -If you experience severe pain or nausea, please call Dr Montes De Oca or the on-call doctor at [phone]    - Plan to see Dr Montes De Oca tomorrow .      [facility]    [address] [facility] [address]    ** Most patients can drive the next day, but if you do not feel comfortable driving, please arrange for transportation.

## 2024-04-18 NOTE — DISCHARGE SUMMARY
Done   Outcome: Successful outpatient ophthalmic surgical procedure  Preprinted Instructions given to patient.  Regular diet.  Activity: No restrictions  Meds: see Med Rec  Condition: stable  Follow up: 1 day with Dr Montes De Oca  Disposition: Home  Diagnosis: s/p eye surgery  Date of discharge: 04/18/2024

## 2024-04-19 ENCOUNTER — TELEPHONE (OUTPATIENT)
Dept: NEUROLOGY | Facility: CLINIC | Age: 69
End: 2024-04-19
Payer: MEDICARE

## 2024-04-29 ENCOUNTER — OFFICE VISIT (OUTPATIENT)
Dept: CARDIOLOGY | Facility: CLINIC | Age: 69
End: 2024-04-29
Payer: MEDICARE

## 2024-04-29 ENCOUNTER — TELEPHONE (OUTPATIENT)
Dept: OPHTHALMOLOGY | Facility: CLINIC | Age: 69
End: 2024-04-29
Payer: MEDICARE

## 2024-04-29 VITALS
OXYGEN SATURATION: 98 % | BODY MASS INDEX: 28.64 KG/M2 | WEIGHT: 189 LBS | DIASTOLIC BLOOD PRESSURE: 71 MMHG | HEART RATE: 66 BPM | HEIGHT: 68 IN | TEMPERATURE: 97 F | SYSTOLIC BLOOD PRESSURE: 119 MMHG

## 2024-04-29 DIAGNOSIS — R55 NEAR SYNCOPE: Primary | ICD-10-CM

## 2024-04-29 PROCEDURE — 1125F AMNT PAIN NOTED PAIN PRSNT: CPT | Mod: HCNC,CPTII,S$GLB, | Performed by: INTERNAL MEDICINE

## 2024-04-29 PROCEDURE — 99213 OFFICE O/P EST LOW 20 MIN: CPT | Mod: HCNC,S$GLB,, | Performed by: INTERNAL MEDICINE

## 2024-04-29 PROCEDURE — 1157F ADVNC CARE PLAN IN RCRD: CPT | Mod: HCNC,CPTII,S$GLB, | Performed by: INTERNAL MEDICINE

## 2024-04-29 PROCEDURE — 3008F BODY MASS INDEX DOCD: CPT | Mod: HCNC,CPTII,S$GLB, | Performed by: INTERNAL MEDICINE

## 2024-04-29 PROCEDURE — 3078F DIAST BP <80 MM HG: CPT | Mod: HCNC,CPTII,S$GLB, | Performed by: INTERNAL MEDICINE

## 2024-04-29 PROCEDURE — 99999 PR PBB SHADOW E&M-EST. PATIENT-LVL III: CPT | Mod: PBBFAC,HCNC,, | Performed by: INTERNAL MEDICINE

## 2024-04-29 PROCEDURE — 1159F MED LIST DOCD IN RCRD: CPT | Mod: HCNC,CPTII,S$GLB, | Performed by: INTERNAL MEDICINE

## 2024-04-29 PROCEDURE — 3288F FALL RISK ASSESSMENT DOCD: CPT | Mod: HCNC,CPTII,S$GLB, | Performed by: INTERNAL MEDICINE

## 2024-04-29 PROCEDURE — 1160F RVW MEDS BY RX/DR IN RCRD: CPT | Mod: HCNC,CPTII,S$GLB, | Performed by: INTERNAL MEDICINE

## 2024-04-29 PROCEDURE — 3074F SYST BP LT 130 MM HG: CPT | Mod: HCNC,CPTII,S$GLB, | Performed by: INTERNAL MEDICINE

## 2024-04-29 PROCEDURE — 1101F PT FALLS ASSESS-DOCD LE1/YR: CPT | Mod: HCNC,CPTII,S$GLB, | Performed by: INTERNAL MEDICINE

## 2024-04-29 NOTE — TELEPHONE ENCOUNTER
----- Message from Doc Hidalgo sent at 4/29/2024  9:32 AM CDT -----  Consult/Advisory    Name Of Caller:Jeff       Contact Preference: [phone]    Nature of call: Ptn called stating he will like to cancel the second cat sx, but he will like to get the 05- appt  please call to assist

## 2024-04-29 NOTE — PROGRESS NOTES
[facility]    Chief Complaint  Chief Complaint   Patient presents with    Dizziness    Follow-up       HPI:    Two episodes of dizziness while wearing his event monitor.  Showed sinus tachycardia.    Medications  Current Outpatient Medications   Medication Sig Dispense Refill    fluticasone propionate (FLONASE) 50 mcg/actuation nasal spray SHAKE LIQUID AND USE 2 SPRAYS(100 MCG) IN EACH NOSTRIL EVERY DAY 48 g 3    prednisolon/gatiflox/bromfenac (PREDNISOL ACE-GATIFLOX-BROMFEN) 1-0.5-0.075 % DrpS Apply 1 drop to eye 3 (three) times daily. in operative eye for 1 month after surgery 5 mL 3    venlafaxine (EFFEXOR-XR) 37.5 MG 24 hr capsule Take 1 capsule (37.5 mg total) by mouth once daily. 30 capsule 3    celecoxib (CELEBREX) 200 MG capsule TK ONE C PO BID (Patient not taking: Reported on 4/29/2024)      ergocalciferol (ERGOCALCIFEROL) 50,000 unit Cap Take 1 capsule by mouth every 7 days. (Patient not taking: Reported on 4/29/2024)      pantoprazole (PROTONIX) 40 MG tablet  (Patient not taking: Reported on 4/29/2024)      pregabalin (LYRICA) 150 MG capsule Take 150 mg by mouth once daily. (Patient not taking: Reported on 4/29/2024)       No current facility-administered medications for this visit.     Facility-Administered Medications Ordered in Other Visits   Medication Dose Route Frequency Provider Last Rate Last Admin    0.9%  NaCl infusion  500 mL Intravenous Continuous Amado Parekh MD            History  Past Medical History:   Diagnosis Date    History of pulmonary embolus (PE) 4/15/2019    Pt with PE s/p hospitalization.  He was placed on xarelto with high risk with cancer.  He has been out of the medication for the past month. Restart Xarelto 15 mg b.i.d. for 21 days, then 20 mg q.day until cancer no longer active.    Non Hodgkin's lymphoma     Personal history of colonic polyps 09/08/2015    per [facility] report - repeat 9/2020    Peyronie disease     S/P laparoscopic cholecystectomy 5/27/2019      Past Surgical History:   Procedure Laterality Date    CATARACT EXTRACTION W/  INTRAOCULAR LENS IMPLANT Right 4/18/2024    Procedure: EXTRACTION, CATARACT, WITH IOL INSERTION;  Surgeon: Jefferson Montes De Oca MD;  Location: [facility] OR;  Service: Ophthalmology;  Laterality: Right;    ESOPHAGOGASTRODUODENOSCOPY N/A 11/29/2021    Procedure: ESOPHAGOGASTRODUODENOSCOPY (EGD);  Surgeon: Kristine Palmer MD;  [facility]  Service: Endoscopy;  Laterality: N/A;  fully vacc-inst mail-tb-left chest port    GROIN EXPLORATION  2 pre 2005 and 1 post 2005    total of 3 procedures    INJECTION OF ANESTHETIC AGENT AROUND NERVE Left 11/8/2022    Procedure: BLOCK, NERVE, LEFT L3-L5 MEDIAL BRANCH;  Surgeon: Melody Berry MD;  Location: [facility]IN [facility]GT;  Service: Pain Management;  Laterality: Left;    INJECTION, SACROILIAC JOINT Left 10/11/2022    Procedure: INJECTION,SACROILIAC JOINT LEFT CONTRAST;  Surgeon: Melody Berry MD;  Location: [facility]GT;  Service: Pain Management;  Laterality: Left;    INJECTION, SACROILIAC JOINT Left 8/22/2023    Procedure: INJECTION,SACROILIAC JOINT, LEFT SOONER DATE;  Surgeon: Melody Berry MD;  Location: [facility]IN [facility]GT;  Service: Pain Management;  Laterality: Left;    LAPAROSCOPIC CHOLECYSTECTOMY  05/2018    SHOULDER SURGERY Left      Social History     Socioeconomic History    Marital status:     Number of children: 2   Occupational History    Occupation: disabled [employer]    Tobacco Use    Smoking status: Never    Smokeless tobacco: Never   Substance and Sexual Activity    Alcohol use: No    Drug use: No    Sexual activity: Yes     Partners: Female   Social History Narrative    Lives with wife.       Social Determinants of Health     Financial Resource Strain: Medium Risk (11/19/2019)    Overall Financial Resource Strain (CARDIA)     Difficulty of Paying Living Expenses: Somewhat hard   Food Insecurity: Food Insecurity Present (11/19/2019)    Hunger Vital Sign      Worried About Running Out of Food in the Last Year: Often true     Ran Out of Food in the Last Year: Sometimes true   Transportation Needs: No Transportation Needs (11/19/2019)    PRAPARE - Transportation     Lack of Transportation (Medical): No     Lack of Transportation (Non-Medical): No   Physical Activity: Inactive (11/19/2019)    Exercise Vital Sign     Days of Exercise per Week: 0 days     Minutes of Exercise per Session: 0 min   Stress: Stress Concern Present (11/19/2019)    [employer] - Occupational Stress Questionnaire     Feeling of Stress : Rather much   Social Connections: Moderately Isolated (11/19/2019)    Social Connection and Isolation Panel [NHANES]     Frequency of Communication with Friends and Family: Twice a week     Frequency of Social Gatherings with Friends and Family: Twice a week     Attends [employer] Services: Never     Active Member of Clubs or Organizations: No     Attends Club or Organization Meetings: Never     Marital Status:      Family History   Problem Relation Name Age of Onset    Breast cancer Mother      Emphysema Father      Stroke Sister      Diabetes Brother      Ulcers Brother      No Known Problems Daughter      No Known Problems Daughter          Allergies  Review of patient's allergies indicates:   Allergen Reactions    Atorvastatin      myalgias    Rosuvastatin      myalgias       Review of Systems   Review of Systems   Constitutional: Negative for malaise/fatigue, weight gain and weight loss.   Eyes:  Negative for visual disturbance.   Cardiovascular:  Negative for chest pain, claudication, cyanosis, dyspnea on exertion, irregular heartbeat, leg swelling, near-syncope, orthopnea, palpitations, paroxysmal nocturnal dyspnea and syncope.   Respiratory:  Negative for cough, hemoptysis, shortness of breath, sleep disturbances due to breathing and wheezing.    Hematologic/Lymphatic: Negative for bleeding problem. Does not bruise/bleed  easily.   Skin:  Negative for poor wound healing.   Musculoskeletal:  Negative for muscle cramps and myalgias.   Gastrointestinal:  Negative for abdominal pain, anorexia, diarrhea, heartburn, hematemesis, hematochezia, melena, nausea and vomiting.   Genitourinary:  Negative for hematuria and nocturia.   Neurological:  Positive for light-headedness. Negative for excessive daytime sleepiness, dizziness, focal weakness and weakness.       Physical Exam  Vitals:    04/29/24 0820   BP: 119/71   Pulse: 66   Temp: 96.9 °F (36.1 °C)     Wt Readings from Last 1 Encounters:   04/29/24 85.7 kg (189 lb)     Physical Exam  Vitals and nursing note reviewed.   Constitutional:       General: He is not in acute distress.     Appearance: He is not toxic-appearing or diaphoretic.   HENT:      Head: Normocephalic and atraumatic.      Mouth/Throat:      Lips: Pink.      Mouth: Mucous membranes are moist.   Eyes:      General: No scleral icterus.     Conjunctiva/sclera: Conjunctivae normal.   Neck:      Thyroid: No thyromegaly.      Vascular: No carotid bruit, hepatojugular reflux or JVD.      Trachea: Trachea normal.   Cardiovascular:      Rate and Rhythm: Normal rate and regular rhythm. No extrasystoles are present.     Chest Wall: PMI is not displaced.      Pulses:           Carotid pulses are 2+ on the right side and 2+ on the left side.       Radial pulses are 2+ on the right side and 2+ on the left side.        Dorsalis pedis pulses are 2+ on the right side and 2+ on the left side.        Posterior tibial pulses are 2+ on the right side and 2+ on the left side.      Heart sounds: S1 normal and S2 normal. No murmur heard.     No friction rub. No S3 or S4 sounds.   Pulmonary:      Effort: Pulmonary effort is normal. No tachypnea, bradypnea, accessory muscle usage or respiratory distress.      Breath sounds: Normal breath sounds and air entry. No decreased breath sounds, wheezing, rhonchi or rales.   Abdominal:      General: Bowel  sounds are normal. There is no distension or abdominal bruit.      Palpations: Abdomen is soft. There is no hepatomegaly, splenomegaly or pulsatile mass.      Tenderness: There is no abdominal tenderness.   Musculoskeletal:         General: No tenderness or deformity.      Right lower leg: No edema.      Left lower leg: No edema.   Skin:     General: Skin is warm and dry.      Capillary Refill: Capillary refill takes less than 2 seconds.      Coloration: Skin is not cyanotic or pale.      Nails: There is no clubbing.   Neurological:      General: No focal deficit present.      Mental Status: He is alert and oriented to person, place, and time.   Psychiatric:         Attention and Perception: Attention normal.         Mood and Affect: Mood normal.         Speech: Speech normal.         Behavior: Behavior normal. Behavior is cooperative.         Labs  Lab Visit on 04/16/2024   Component Date Value Ref Range Status    WBC 04/16/2024 5.46  3.90 - 12.70 K/uL Final    RBC 04/16/2024 5.06  4.60 - 6.20 M/uL Final    Hemoglobin 04/16/2024 15.2  14.0 - 18.0 g/dL Final    Hematocrit 04/16/2024 45.8  40.0 - 54.0 % Final    MCV 04/16/2024 91  82 - 98 fL Final    MCH 04/16/2024 30.0  27.0 - 31.0 pg Final    MCHC 04/16/2024 33.2  32.0 - 36.0 g/dL Final    RDW 04/16/2024 12.9  11.5 - 14.5 % Final    Platelets 04/16/2024 184  150 - 450 K/uL Final    MPV 04/16/2024 10.2  9.2 - 12.9 fL Final    Immature Granulocytes 04/16/2024 0.4  0.0 - 0.5 % Final    Gran # (ANC) 04/16/2024 2.7  1.8 - 7.7 K/uL Final    Immature Grans (Abs) 04/16/2024 0.02  0.00 - 0.04 K/uL Final    Comment: Mild elevation in immature granulocytes is non specific and   can be seen in a variety of conditions including stress response,   acute inflammation, trauma and pregnancy. Correlation with other   laboratory and clinical findings is essential.      Lymph # 04/16/2024 2.3  1.0 - 4.8 K/uL Final    Mono # 04/16/2024 0.4  0.3 - 1.0 K/uL Final    Eos # 04/16/2024 0.0   0.0 - 0.5 K/uL Final    Baso # 04/16/2024 0.03  0.00 - 0.20 K/uL Final    nRBC 04/16/2024 0  0 /100 WBC Final    Gran % 04/16/2024 48.7  38.0 - 73.0 % Final    Lymph % 04/16/2024 41.8  18.0 - 48.0 % Final    Mono % 04/16/2024 7.9  4.0 - 15.0 % Final    Eosinophil % 04/16/2024 0.7  0.0 - 8.0 % Final    Basophil % 04/16/2024 0.5  0.0 - 1.9 % Final    Differential Method 04/16/2024 Automated   Final    Sodium 04/16/2024 140  136 - 145 mmol/L Final    Potassium 04/16/2024 4.1  3.5 - 5.1 mmol/L Final    Chloride 04/16/2024 105  95 - 110 mmol/L Final    CO2 04/16/2024 28  23 - 29 mmol/L Final    Glucose 04/16/2024 97  70 - 110 mg/dL Final    BUN 04/16/2024 15  8 - 23 mg/dL Final    Creatinine 04/16/2024 0.8  0.5 - 1.4 mg/dL Final    Calcium 04/16/2024 9.7  8.7 - 10.5 mg/dL Final    Total Protein 04/16/2024 7.0  6.0 - 8.4 g/dL Final    Albumin 04/16/2024 3.9  3.5 - 5.2 g/dL Final    Total Bilirubin 04/16/2024 0.4  0.1 - 1.0 mg/dL Final    Comment: For infants and newborns, interpretation of results should be based  on gestational age, weight and in agreement with clinical  observations.    Premature Infant recommended reference ranges:  Up to 24 hours.............<8.0 mg/dL  Up to 48 hours............<12.0 mg/dL  3-5 days..................<15.0 mg/dL  6-29 days.................<15.0 mg/dL      Alkaline Phosphatase 04/16/2024 61  55 - 135 U/L Final    AST 04/16/2024 20  10 - 40 U/L Final    ALT 04/16/2024 14  10 - 44 U/L Final    eGFR 04/16/2024 >60.0  >60 mL/min/1.73 m^2 Final    Anion Gap 04/16/2024 7 (L)  8 - 16 mmol/L Final    Cholesterol 04/16/2024 199  120 - 199 mg/dL Final    Comment: The National Cholesterol Education Program (NCEP) has set the  following guidelines (reference ranges) for Cholesterol:  Optimal.....................<200 mg/dL  Borderline High.............200-239 mg/dL  High........................> or = 240 mg/dL      Triglycerides 04/16/2024 79  30 - 150 mg/dL Final    Comment: The National  Cholesterol Education Program (NCEP) has set the  following guidelines (reference values) for triglycerides:  Normal......................<150 mg/dL  Borderline High.............150-199 mg/dL  High........................200-499 mg/dL      HDL 04/16/2024 57  40 - 75 mg/dL Final    Comment: The National Cholesterol Education Program (NCEP) has set the  following guidelines (reference values) for HDL Cholesterol:  Low...............<40 mg/dL  Optimal...........>60 mg/dL      LDL Cholesterol 04/16/2024 126.2  63.0 - 159.0 mg/dL Final    Comment: The National Cholesterol Education Program (NCEP) has set the  following guidelines (reference values) for LDL Cholesterol:  Optimal.......................<130 mg/dL  Borderline High...............130-159 mg/dL  High..........................160-189 mg/dL  Very High.....................>190 mg/dL      HDL/Cholesterol Ratio 04/16/2024 28.6  20.0 - 50.0 % Final    Total Cholesterol/HDL Ratio 04/16/2024 3.5  2.0 - 5.0 Final    Non-HDL Cholesterol 04/16/2024 142  mg/dL Final    Comment: Risk category and Non-HDL cholesterol goals:  Coronary heart disease (CHD)or equivalent (10-year risk of CHD >20%):  Non-HDL cholesterol goal     <130 mg/dL  Two or more CHD risk factors and 10-year risk of CHD <= 20%:  Non-HDL cholesterol goal     <160 mg/dL  0 to 1 CHD risk factor:  Non-HDL cholesterol goal     <190 mg/dL      PSA, Screen 04/16/2024 0.80  0.00 - 4.00 ng/mL Final    Comment: The testing method is a chemiluminescent microparticle immunoassay   manufactured by [employer] and performed on the    or   Clipabout system. Values obtained with different assay manufacturers   for   methods may be different and cannot be used interchangeably.  PSA Expected levels:  Hormonal Therapy: <0.05 ng/ml  Prostatectomy: <0.01 ng/ml  Radiation Therapy: <1.00 ng/ml     Hospital Outpatient Visit on 03/22/2024   Component Date Value Ref Range Status    BSA 03/22/2024 2.04  m2 Final     LVIDd 03/22/2024 4.91  3.5 - 6.0 cm Final    LV Systolic Volume 03/22/2024 35.20  mL Final    LV Systolic Volume Index 03/22/2024 17.5  mL/m2 Final    LVIDs 03/22/2024 3.01  2.1 - 4.0 cm Final    LV Diastolic Volume 03/22/2024 113.53  mL Final    LV Diastolic Volume Index 03/22/2024 56.48  mL/m2 Final    IVS 03/22/2024 0.77  0.6 - 1.1 cm Final    LVOT diameter 03/22/2024 1.90  cm Final    LVOT area 03/22/2024 2.8  cm2 Final    FS 03/22/2024 39  28 - 44 % Final    Left Ventricle Relative Wall Thick* 03/22/2024 0.33  cm Final    Posterior Wall 03/22/2024 0.81  0.6 - 1.1 cm Final    LV mass 03/22/2024 129.55  g Final    LV Mass Index 03/22/2024 64  g/m2 Final    MV Peak E Bunny 03/22/2024 0.67  m/s Final    TDI LATERAL 03/22/2024 0.09  m/s Final    TDI SEPTAL 03/22/2024 0.09  m/s Final    E/E' ratio 03/22/2024 7.44  m/s Final    MV Peak A Bunny 03/22/2024 0.70  m/s Final    TR Max Bunny 03/22/2024 2.21  m/s Final    E/A ratio 03/22/2024 0.96   Final    IVRT 03/22/2024 83.73  msec Final    E wave deceleration time 03/22/2024 255.40  msec Final    LV SEPTAL E/E' RATIO 03/22/2024 7.44  m/s Final    LV LATERAL E/E' RATIO 03/22/2024 7.44  m/s Final    PV Peak S Bunny 03/22/2024 0.47  m/s Final    PV Peak D Bunny 03/22/2024 0.45  m/s Final    Pulm vein S/D ratio 03/22/2024 1.04   Final    LA size 03/22/2024 3.55  cm Final    Left Atrium Minor Axis 03/22/2024 5.11  cm Final    Left Atrium Major Axis 03/22/2024 4.82  cm Final    LA volume (mod) 03/22/2024 35.99  cm3 Final    LA Volume Index (Mod) 03/22/2024 17.9  mL/m2 Final    RA Major Axis 03/22/2024 4.03  cm Final    AV mean gradient 03/22/2024 3  mmHg Final    AV peak gradient 03/22/2024 5  mmHg Final    Ao peak bunny 03/22/2024 1.12  m/s Final    Ao VTI 03/22/2024 23.80  cm Final    MV stenosis pressure 1/2 time 03/22/2024 76.11  ms Final    MV valve area p 1/2 method 03/22/2024 2.89  cm2 Final    Triscuspid Valve Regurgitation Pea* 03/22/2024 20  mmHg Final    PV PEAK VELOCITY  03/22/2024 1.02  m/s Final    PV peak gradient 03/22/2024 4  mmHg Final    STJ 03/22/2024 2.36  cm Final    Ascending aorta 03/22/2024 2.35  cm Final    IVC diameter 03/22/2024 2.00  cm Final    Mean e' 03/22/2024 0.09  m/s Final    ZLVIDS 03/22/2024 -1.45   Final    ZLVIDD 03/22/2024 -1.82   Final    LA Volume Index 03/22/2024 23.1  mL/m2 Final    LA volume 03/22/2024 46.40  cm3 Final    LA WIDTH 03/22/2024 3.1  cm Final    RA Width 03/22/2024 3.8  cm Final    TV resting pulmonary artery pressu* 03/22/2024 23  mmHg Final    RV TB RVSP 03/22/2024 5  mmHg Final    Est. RA pres 03/22/2024 3  mmHg Final   Office Visit on 03/13/2024   Component Date Value Ref Range Status    QRS Duration 03/13/2024 74  ms Final    [facility] QTC Calculation 03/13/2024 422  ms Final   Lab Visit on 11/14/2023   Component Date Value Ref Range Status    WBC 11/14/2023 6.01  3.90 - 12.70 K/uL Final    RBC 11/14/2023 5.01  4.60 - 6.20 M/uL Final    Hemoglobin 11/14/2023 15.6  14.0 - 18.0 g/dL Final    Hematocrit 11/14/2023 45.4  40.0 - 54.0 % Final    MCV 11/14/2023 91  82 - 98 fL Final    MCH 11/14/2023 31.1 (H)  27.0 - 31.0 pg Final    MCHC 11/14/2023 34.4  32.0 - 36.0 g/dL Final    RDW 11/14/2023 12.7  11.5 - 14.5 % Final    Platelets 11/14/2023 180  150 - 450 K/uL Final    MPV 11/14/2023 10.4  9.2 - 12.9 fL Final    Immature Granulocytes 11/14/2023 0.2  0.0 - 0.5 % Final    Gran # (ANC) 11/14/2023 2.8  1.8 - 7.7 K/uL Final    Immature Grans (Abs) 11/14/2023 0.01  0.00 - 0.04 K/uL Final    Comment: Mild elevation in immature granulocytes is non specific and   can be seen in a variety of conditions including stress response,   acute inflammation, trauma and pregnancy. Correlation with other   laboratory and clinical findings is essential.      Lymph # 11/14/2023 2.6  1.0 - 4.8 K/uL Final    Mono # 11/14/2023 0.5  0.3 - 1.0 K/uL Final    Eos # 11/14/2023 0.1  0.0 - 0.5 K/uL Final    Baso # 11/14/2023 0.03  0.00 - 0.20 K/uL Final    nRBC 11/14/2023  0  0 /100 WBC Final    Gran % 11/14/2023 46.5  38.0 - 73.0 % Final    Lymph % 11/14/2023 43.6  18.0 - 48.0 % Final    Mono % 11/14/2023 8.2  4.0 - 15.0 % Final    Eosinophil % 11/14/2023 1.0  0.0 - 8.0 % Final    Basophil % 11/14/2023 0.5  0.0 - 1.9 % Final    Differential Method 11/14/2023 Automated   Final    Sodium 11/14/2023 141  136 - 145 mmol/L Final    Potassium 11/14/2023 4.2  3.5 - 5.1 mmol/L Final    Chloride 11/14/2023 104  95 - 110 mmol/L Final    CO2 11/14/2023 29  23 - 29 mmol/L Final    Glucose 11/14/2023 88  70 - 110 mg/dL Final    BUN 11/14/2023 19  8 - 23 mg/dL Final    Creatinine 11/14/2023 1.0  0.5 - 1.4 mg/dL Final    Calcium 11/14/2023 9.9  8.7 - 10.5 mg/dL Final    Total Protein 11/14/2023 7.3  6.0 - 8.4 g/dL Final    Albumin 11/14/2023 4.1  3.5 - 5.2 g/dL Final    Total Bilirubin 11/14/2023 0.4  0.1 - 1.0 mg/dL Final    Comment: For infants and newborns, interpretation of results should be based  on gestational age, weight and in agreement with clinical  observations.    Premature Infant recommended reference ranges:  Up to 24 hours.............<8.0 mg/dL  Up to 48 hours............<12.0 mg/dL  3-5 days..................<15.0 mg/dL  6-29 days.................<15.0 mg/dL      Alkaline Phosphatase 11/14/2023 66  55 - 135 U/L Final    AST 11/14/2023 19  10 - 40 U/L Final    ALT 11/14/2023 12  10 - 44 U/L Final    eGFR 11/14/2023 >60.0  >60 mL/min/1.73 m^2 Final    Anion Gap 11/14/2023 8  8 - 16 mmol/L Final    TSH 11/14/2023 0.719  0.400 - 4.000 uIU/mL Final       Imaging  Cardiac event monitor    Result Date: 4/12/2024    Negative event monitor with no clinical arrhythmias.   Symptoms corresponding with normal sinus rhythm/tachycardia  bpm.       Assessment  1. Near syncope  Suspect this may be a bit of volume depletion.      Plan and Discussion    Discussed maintaining good hydration and liberalizing sodium intake when he has these episodes.    The 10-year ASCVD risk score (Hillside DK, et  al., 2019) is: 9.3%    Values used to calculate the score:      Age: 68 years      Sex: Male      Is Non- : Yes      Diabetic: No      Tobacco smoker: No      Systolic Blood Pressure: 119 mmHg      Is BP treated: No      HDL Cholesterol: 57 mg/dL      Total Cholesterol: 199 mg/dL     Follow Up  Follow up if symptoms worsen or fail to improve.      Andres Metz MD

## 2024-05-01 ENCOUNTER — OFFICE VISIT (OUTPATIENT)
Dept: OPHTHALMOLOGY | Facility: CLINIC | Age: 69
End: 2024-05-01
Payer: MEDICARE

## 2024-05-01 DIAGNOSIS — H25.13 NUCLEAR SCLEROSIS, BILATERAL: ICD-10-CM

## 2024-05-01 DIAGNOSIS — Z98.890 POST-OPERATIVE STATE: Primary | ICD-10-CM

## 2024-05-01 PROCEDURE — 1157F ADVNC CARE PLAN IN RCRD: CPT | Mod: HCNC,CPTII,S$GLB, | Performed by: OPHTHALMOLOGY

## 2024-05-01 PROCEDURE — 99999 PR PBB SHADOW E&M-EST. PATIENT-LVL II: CPT | Mod: PBBFAC,HCNC,, | Performed by: OPHTHALMOLOGY

## 2024-05-01 PROCEDURE — 1160F RVW MEDS BY RX/DR IN RCRD: CPT | Mod: HCNC,CPTII,S$GLB, | Performed by: OPHTHALMOLOGY

## 2024-05-01 PROCEDURE — 1159F MED LIST DOCD IN RCRD: CPT | Mod: HCNC,CPTII,S$GLB, | Performed by: OPHTHALMOLOGY

## 2024-05-01 PROCEDURE — 99024 POSTOP FOLLOW-UP VISIT: CPT | Mod: HCNC,S$GLB,, | Performed by: OPHTHALMOLOGY

## 2024-05-01 NOTE — PROGRESS NOTES
HPI    DATE OF SURGERY: 04/18/2024   IMPLANT: CNA0T0 21.     Patient present today for 1 week Post Op OD   No complaints at this time     PGB TID OD   Last edited by Yohan Lewis on 5/1/2024  1:27 PM.            Assessment /Plan     For exam results, see Encounter Report.    Post-operative state    Nuclear sclerosis, bilateral      Slit lamp exam:  L/L: nl  K: clear, wound sealed  AC: trace cell  Iris/Lens: IOL centered and stable    POW1 s/p phaco: Surgery healing well with no signs of infection or abnormal inflammation.    Patient wishes to wait with surgery in the second eye. Risks, benefits, alternatives reviewed. IOL selection reviewed.     Left eye  IOL: CNA0T0 21.5    Mild AMD, pterygium,   Will wear glasses...

## 2024-05-08 ENCOUNTER — OFFICE VISIT (OUTPATIENT)
Dept: NEUROLOGY | Facility: CLINIC | Age: 69
End: 2024-05-08
Payer: MEDICARE

## 2024-05-08 VITALS
HEART RATE: 70 BPM | BODY MASS INDEX: 32.82 KG/M2 | WEIGHT: 215.81 LBS | SYSTOLIC BLOOD PRESSURE: 124 MMHG | DIASTOLIC BLOOD PRESSURE: 78 MMHG

## 2024-05-08 DIAGNOSIS — R55 SYNCOPE AND COLLAPSE: ICD-10-CM

## 2024-05-08 DIAGNOSIS — R42 DIZZINESS AND GIDDINESS: Primary | ICD-10-CM

## 2024-05-08 PROCEDURE — 3074F SYST BP LT 130 MM HG: CPT | Mod: HCNC,CPTII,S$GLB, | Performed by: STUDENT IN AN ORGANIZED HEALTH CARE EDUCATION/TRAINING PROGRAM

## 2024-05-08 PROCEDURE — 99212 OFFICE O/P EST SF 10 MIN: CPT | Mod: HCNC,S$GLB,, | Performed by: STUDENT IN AN ORGANIZED HEALTH CARE EDUCATION/TRAINING PROGRAM

## 2024-05-08 PROCEDURE — 1157F ADVNC CARE PLAN IN RCRD: CPT | Mod: HCNC,CPTII,S$GLB, | Performed by: STUDENT IN AN ORGANIZED HEALTH CARE EDUCATION/TRAINING PROGRAM

## 2024-05-08 PROCEDURE — 3008F BODY MASS INDEX DOCD: CPT | Mod: HCNC,CPTII,S$GLB, | Performed by: STUDENT IN AN ORGANIZED HEALTH CARE EDUCATION/TRAINING PROGRAM

## 2024-05-08 PROCEDURE — 1125F AMNT PAIN NOTED PAIN PRSNT: CPT | Mod: HCNC,CPTII,S$GLB, | Performed by: STUDENT IN AN ORGANIZED HEALTH CARE EDUCATION/TRAINING PROGRAM

## 2024-05-08 PROCEDURE — 99999 PR PBB SHADOW E&M-EST. PATIENT-LVL III: CPT | Mod: PBBFAC,HCNC,, | Performed by: STUDENT IN AN ORGANIZED HEALTH CARE EDUCATION/TRAINING PROGRAM

## 2024-05-08 PROCEDURE — 3078F DIAST BP <80 MM HG: CPT | Mod: HCNC,CPTII,S$GLB, | Performed by: STUDENT IN AN ORGANIZED HEALTH CARE EDUCATION/TRAINING PROGRAM

## 2024-05-08 NOTE — PROGRESS NOTES
"  [facility] - NEUROLOGY 7TH [place] [facility], [place]    Date: 5/8/24  Patient Name: Jeff Hope   MRN: [id number]   PCP: Roly Flannery  Referring Provider: No ref. provider found    Assessment:   Suspect related to blood pressure dropping.  Advised patient to increase fluid and salt intake. However, given ongoing symptoms will obtain imaging of brain as well as MRA to evaluate posterior circulation given critical stenosis in this area can cause dizziness (although usually vertiginous in nature).  Follow up pending imaging results.    Plan:     Problem List Items Addressed This Visit    None  Visit Diagnoses       Dizziness and giddiness    -  Primary    Relevant Orders    MRI Brain Without Contrast    Syncope and collapse        Relevant Orders    MRA Brain without contrast    MRA Neck without contrast            Subha Davies MD    Patient note was created using MModal Dictation.  Any errors in syntax or even information may not have been identified and edited on initial review prior to signing this note.  Subjective:   Interval hx:     Still having dizziness.  Reports it is not vertiginous in nature.    Frequency is 2 episodes per month roughly.    Cardiac workup unrevealing.     HPI  Mr. Jeff Hope is a 68 y.o. male presenting for evaluation of dizziness.      "Patient is a  lifelong NON-smoker with a past medical history of bronchiectasis, HLD, pulmonary embolism, hypogammaglobulinemia, GERD and chronic myofascial pain on Elavil 25 mg nightly and Lyrica 150 mg daily referred to me by Dr. Roly Flannery in consultation for dizziness for the last year or so.  This is episodic lasting as much as 3-5 minutes.  Patient describes the feeling of fogginess of the eyes and lightheadedness like he might pass out but also describing a sensation of imbalance like he might fall.  Denies any sensation of true vertigo or any associated ear fullness ringing roaring hissing or " "hearing loss associated with these events.  Symptoms are very transient and even the most severe episodes only last up to 5 minutes.  They are not specifically associated with sitting up or standing up.  They can occur at rest.  Does not specifically identify any palpitations nausea or sweating.  No known cardiovascular history.  Never seen by Cardiology."     Episodic dizziness lasting 3-5 minutes although sometimes even less frequently.  Feels lightheaded like he might pass out.  Room not spinning.  Reports symptoms are not positional.  He has not lost consciousness. Gets a sensation of imbalance as if he might fall, feels "foggy" in the head.   He had an episode yesterday while in his car.  Reports he only ate 1 cookie in the morning otherwise had not eaten that day.  Did drink some water.       Workup: ENT evaluation normal, carotid dopplers no stenosis.  Cardiac event monitor ordered.  PAST MEDICAL HISTORY:  Past Medical History:   Diagnosis Date    History of pulmonary embolus (PE) 4/15/2019    Pt with PE s/p hospitalization.  He was placed on xarelto with high risk with cancer.  He has been out of the medication for the past month. Restart Xarelto 15 mg b.i.d. for 21 days, then 20 mg q.day until cancer no longer active.    Non Hodgkin's lymphoma     Personal history of colonic polyps 09/08/2015    per [facility] report - repeat 9/2020    Peyronie disease     S/P laparoscopic cholecystectomy 5/27/2019       PAST SURGICAL HISTORY:  Past Surgical History:   Procedure Laterality Date    CATARACT EXTRACTION W/  INTRAOCULAR LENS IMPLANT Right 4/18/2024    Procedure: EXTRACTION, CATARACT, WITH IOL INSERTION;  Surgeon: Jefferson Montes De Oca MD;  Location: [facility];  Service: Ophthalmology;  Laterality: Right;    ESOPHAGOGASTRODUODENOSCOPY N/A 11/29/2021    Procedure: ESOPHAGOGASTRODUODENOSCOPY (EGD);  Surgeon: Kristine Palmer MD;  Location: [facility] ([address] [place] [address]);  Service: Endoscopy;  Laterality: N/A;  fully vacc-inst " mail-tb-left chest port    GROIN EXPLORATION  2 pre 2005 and 1 post 2005    total of 3 procedures    INJECTION OF ANESTHETIC AGENT AROUND NERVE Left 11/8/2022    Procedure: BLOCK, NERVE, LEFT L3-L5 MEDIAL BRANCH;  Surgeon: Melody Berry MD;  Location: [facility], [place], operated by [facility];  Service: Pain Management;  Laterality: Left;    INJECTION, SACROILIAC JOINT Left 10/11/2022    Procedure: INJECTION,SACROILIAC JOINT LEFT CONTRAST;  Surgeon: Melody Berry MD;  Location: [facility], [place], operated by [facility];  Service: Pain Management;  Laterality: Left;    INJECTION, SACROILIAC JOINT Left 8/22/2023    Procedure: INJECTION,SACROILIAC JOINT, LEFT SOONER DATE;  Surgeon: Melody Berry MD;  Location: [facility], [place], operated by [facility];  Service: Pain Management;  Laterality: Left;    LAPAROSCOPIC CHOLECYSTECTOMY  05/2018    SHOULDER SURGERY Left        CURRENT MEDS:  Current Outpatient Medications   Medication Sig Dispense Refill    celecoxib (CELEBREX) 200 MG capsule TK ONE C PO BID (Patient not taking: Reported on 4/29/2024)      ergocalciferol (ERGOCALCIFEROL) 50,000 unit Cap Take 1 capsule by mouth every 7 days. (Patient not taking: Reported on 4/29/2024)      fluticasone propionate (FLONASE) 50 mcg/actuation nasal spray SHAKE LIQUID AND USE 2 SPRAYS(100 MCG) IN EACH NOSTRIL EVERY DAY 48 g 3    pantoprazole (PROTONIX) 40 MG tablet  (Patient not taking: Reported on 4/29/2024)      prednisolon/gatiflox/bromfenac (PREDNISOL ACE-GATIFLOX-BROMFEN) 1-0.5-0.075 % DrpS Apply 1 drop to eye 3 (three) times daily. in operative eye for 1 month after surgery 5 mL 3    venlafaxine (EFFEXOR-XR) 37.5 MG 24 hr capsule Take 1 capsule (37.5 mg total) by mouth once daily. 30 capsule 3     No current facility-administered medications for this visit.     Facility-Administered Medications Ordered in Other Visits   Medication Dose Route Frequency Provider Last Rate Last Admin    0.9%  NaCl infusion  500 mL Intravenous Continuous Amado Parekh MD           ALLERGIES:  Review of patient's  allergies indicates:   Allergen Reactions    Atorvastatin      myalgias    Rosuvastatin      myalgias       FAMILY HISTORY:  Family History   Problem Relation Name Age of Onset    Breast cancer Mother      Emphysema Father      Stroke Sister      Diabetes Brother      Ulcers Brother      No Known Problems Daughter      No Known Problems Daughter         SOCIAL HISTORY:  Social History     Tobacco Use    Smoking status: Never    Smokeless tobacco: Never   Substance Use Topics    Alcohol use: No    Drug use: No       Review of Systems:  12 system review of systems is negative except for the symptoms mentioned in HPI.      Objective:     Vitals:    05/08/24 1226   BP: 124/78   Pulse: 70   Weight: 97.9 kg (215 lb 13.3 oz)     General: NAD, well nourished   Eyes: no tearing, discharge, no erythema   ENT: moist mucous membranes of the oral cavity, nares patent    Neck: Supple, full range of motion  Cardiovascular: Warm and well perfused, pulses equal and symmetrical  Lungs: Normal work of breathing, normal chest wall excursions  Skin: No rash, lesions, or breakdown on exposed skin  Psychiatry: Mood and affect are appropriate   Abdomen: soft, non tender, non distended  Extremeties: No cyanosis, clubbing or edema.    Neurological   MENTAL STATUS: Alert and oriented to person, place, and time. Attention and concentration within normal limits. Speech without dysarthria, able to name and repeat without difficulty. Recent and remote memory within normal limits   CRANIAL NERVES: Visual fields intact. PERRL. EOMI. Facial sensation intact. Face symmetrical. Hearing grossly intact. Full shoulder shrug bilaterally. Tongue protrudes midline   SENSORY: Sensation is intact to light touch throughout.  Joint position perception intact. Negative Romberg.   MOTOR: Normal bulk and tone. No pronator drift.  5/5 deltoid, biceps, triceps, interosseous, hand  bilaterally. 5/5 iliopsoas, knee extension/flexion, foot dorsi/plantarflexion  bilaterally.    REFLEXES: Symmetric and 2+ throughout. Toes down going bilaterally.   CEREBELLAR/COORDINATION/GAIT: Gait steady with normal arm swing and stride length.  Heel to shin intact. Finger to nose intact. Normal rapid alternating movements.

## 2024-05-08 NOTE — PATIENT INSTRUCTIONS
Increase your fluid and salt intake  Compression stockings may be helpful  MRI brain and vessel imaging - if unremarkable would not be concerned about neurological cause of symptoms

## 2024-05-13 ENCOUNTER — OFFICE VISIT (OUTPATIENT)
Dept: PRIMARY CARE CLINIC | Facility: CLINIC | Age: 69
End: 2024-05-13
Payer: MEDICARE

## 2024-05-13 VITALS
WEIGHT: 192.25 LBS | HEIGHT: 68 IN | DIASTOLIC BLOOD PRESSURE: 84 MMHG | TEMPERATURE: 98 F | HEART RATE: 75 BPM | SYSTOLIC BLOOD PRESSURE: 134 MMHG | OXYGEN SATURATION: 98 % | BODY MASS INDEX: 29.14 KG/M2

## 2024-05-13 DIAGNOSIS — C82.90 FOLLICULAR NON-HODGKIN'S LYMPHOMA: ICD-10-CM

## 2024-05-13 DIAGNOSIS — H66.002 ACUTE SUPPURATIVE OTITIS MEDIA OF LEFT EAR WITHOUT SPONTANEOUS RUPTURE OF TYMPANIC MEMBRANE, RECURRENCE NOT SPECIFIED: ICD-10-CM

## 2024-05-13 DIAGNOSIS — H25.11 NUCLEAR SCLEROTIC CATARACT OF RIGHT EYE: ICD-10-CM

## 2024-05-13 DIAGNOSIS — N50.812 PAIN IN BOTH TESTICLES: Primary | ICD-10-CM

## 2024-05-13 DIAGNOSIS — R42 DIZZINESS: ICD-10-CM

## 2024-05-13 DIAGNOSIS — N50.811 PAIN IN BOTH TESTICLES: Primary | ICD-10-CM

## 2024-05-13 PROCEDURE — 1126F AMNT PAIN NOTED NONE PRSNT: CPT | Mod: HCNC,CPTII,S$GLB, | Performed by: INTERNAL MEDICINE

## 2024-05-13 PROCEDURE — 1160F RVW MEDS BY RX/DR IN RCRD: CPT | Mod: HCNC,CPTII,S$GLB, | Performed by: INTERNAL MEDICINE

## 2024-05-13 PROCEDURE — 3075F SYST BP GE 130 - 139MM HG: CPT | Mod: HCNC,CPTII,S$GLB, | Performed by: INTERNAL MEDICINE

## 2024-05-13 PROCEDURE — 3288F FALL RISK ASSESSMENT DOCD: CPT | Mod: HCNC,CPTII,S$GLB, | Performed by: INTERNAL MEDICINE

## 2024-05-13 PROCEDURE — 1157F ADVNC CARE PLAN IN RCRD: CPT | Mod: HCNC,CPTII,S$GLB, | Performed by: INTERNAL MEDICINE

## 2024-05-13 PROCEDURE — 99215 OFFICE O/P EST HI 40 MIN: CPT | Mod: HCNC,S$GLB,, | Performed by: INTERNAL MEDICINE

## 2024-05-13 PROCEDURE — 3079F DIAST BP 80-89 MM HG: CPT | Mod: HCNC,CPTII,S$GLB, | Performed by: INTERNAL MEDICINE

## 2024-05-13 PROCEDURE — 3008F BODY MASS INDEX DOCD: CPT | Mod: HCNC,CPTII,S$GLB, | Performed by: INTERNAL MEDICINE

## 2024-05-13 PROCEDURE — 1159F MED LIST DOCD IN RCRD: CPT | Mod: HCNC,CPTII,S$GLB, | Performed by: INTERNAL MEDICINE

## 2024-05-13 PROCEDURE — 1101F PT FALLS ASSESS-DOCD LE1/YR: CPT | Mod: HCNC,CPTII,S$GLB, | Performed by: INTERNAL MEDICINE

## 2024-05-13 RX ORDER — AMOXICILLIN AND CLAVULANATE POTASSIUM 875; 125 MG/1; MG/1
1 TABLET, FILM COATED ORAL 2 TIMES DAILY
Qty: 14 TABLET | Refills: 0 | Status: SHIPPED | OUTPATIENT
Start: 2024-05-13

## 2024-05-13 RX ORDER — VENLAFAXINE HYDROCHLORIDE 75 MG/1
75 CAPSULE, EXTENDED RELEASE ORAL DAILY
Qty: 30 CAPSULE | Refills: 3 | Status: SHIPPED | OUTPATIENT
Start: 2024-05-13 | End: 2025-05-13

## 2024-05-13 NOTE — PROGRESS NOTES
.  This note has been generated using voice-recognition software. There may be typographical errors that have been missed during proof-reading.     Primary Care Provider Appointment    Subjective:      Patient ID: Jeff Hope is a 68 y.o. male here for follow up.     Chief Complaint: Follow-up  HPI:  This is a pleasant 68-year-old male with bronchiectasis, hyperlipidemia, history of pulmonary embolism on chronic anticoagulation with a follicular non-Hodgkin's lymphoma, h/oacute/reactivated hepatitis B here for f/u.  Pt last seen  4/16/2024.    4/18 pt with cataract surg.    4/29/2024 pt seen by cards concerning near syncope.  Thought to be volume issue with tachy only on event monitor.    5/8/2024 pt seen by neurology.  MRI/MRA ordered    S/p R cataract surg.  Pain in eyes for  2 days after the surgery.  No chg in vision with surgery.  No e/o infection.  Now better but recent ear and face pain with sinus, better with sudafed.  Does have an occational cough with green mucus.  Mostly dry cough.  No fevers or chills.    Improved testicular pain on effexor.    No further significant episodes of dizziness with impending doom.  1 month since a significant episode.  He swould like to go up on the effexor.        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    Pulmonary hypertension    Myalgia due to statin    Vitamin D deficiency    Abnormal MMSE    Allergic rhinitis    Adrenal hypertrophy    Bilateral shoulder bursitis    History of hepatitis B    Trigger finger of left thumb    Bilateral shoulder pain    Chronic thumb pain, bilateral    Breast mass, right    Peyronie's disease    Hypotestosteronemia in male    Iron deficiency anemia    Gastroesophageal reflux disease without esophagitis     Myofascial pain    Osteoarthritis of spine with radiculopathy, lumbosacral region    Decreased range of motion of lumbar spine    Hypogammaglobulinemia    Sacroiliitis    Dizziness    Pain of right upper extremity    Nuclear sclerotic cataract of right eye    Suppurative otitis media of left ear        Past Surgical History:   Procedure Laterality Date    CATARACT EXTRACTION W/  INTRAOCULAR LENS IMPLANT Right 4/18/2024    Procedure: EXTRACTION, CATARACT, WITH IOL INSERTION;  Surgeon: Jefferson Montes De Oca MD;  Location: [facility];  Service: Ophthalmology;  Laterality: Right;    ESOPHAGOGASTRODUODENOSCOPY N/A 11/29/2021    Procedure: ESOPHAGOGASTRODUODENOSCOPY (EGD);  Surgeon: Kristine Palmer MD;  Location: [facility] ENDO (4TH FLR);  Service: Endoscopy;  Laterality: N/A;  fully vacc-inst mail-tb-left chest port    GROIN EXPLORATION  2 pre 2005 and 1 post 2005    total of 3 procedures    INJECTION OF ANESTHETIC AGENT AROUND NERVE Left 11/8/2022    Procedure: BLOCK, NERVE, LEFT L3-L5 MEDIAL BRANCH;  Surgeon: Melody Berry MD;  Location: [facility]IN [facility];  Service: Pain Management;  Laterality: Left;    INJECTION, SACROILIAC JOINT Left 10/11/2022    Procedure: INJECTION,SACROILIAC JOINT LEFT CONTRAST;  Surgeon: Melody Berry MD;  Location: [facility];  Service: Pain Management;  Laterality: Left;    INJECTION, SACROILIAC JOINT Left 8/22/2023    Procedure: INJECTION,SACROILIAC JOINT, LEFT SOONER DATE;  Surgeon: Melody Berry MD;  Location: [facility]IN [facility]GT;  Service: Pain Management;  Laterality: Left;    LAPAROSCOPIC CHOLECYSTECTOMY  05/2018    SHOULDER SURGERY Left        Past Medical History:   Diagnosis Date    History of pulmonary embolus (PE) 4/15/2019    Pt with PE s/p hospitalization.  He was placed on xarelto with high risk with cancer.  He has been out of the medication for the past month. Restart Xarelto 15 mg b.i.d. for 21 days, then 20 mg q.day until cancer no longer active.    Non Hodgkin's lymphoma      "Personal history of colonic polyps 09/08/2015    per [facility] report - repeat 9/2020    Peyronie disease     S/P laparoscopic cholecystectomy 5/27/2019       Social History     Socioeconomic History    Marital status:     Number of children: 2   Occupational History    Occupation: disabled [employer]    Tobacco Use    Smoking status: Never    Smokeless tobacco: Never   Substance and Sexual Activity    Alcohol use: No    Drug use: No    Sexual activity: Yes     Partners: Female   Social History Narrative    Lives with wife.       Social Determinants of Health     Financial Resource Strain: Medium Risk (11/19/2019)    Overall Financial Resource Strain (CARDIA)     Difficulty of Paying Living Expenses: Somewhat hard   Food Insecurity: Food Insecurity Present (11/19/2019)    Hunger Vital Sign     Worried About Running Out of Food in the Last Year: Often true     Ran Out of Food in the Last Year: Sometimes true   Transportation Needs: No Transportation Needs (11/19/2019)    PRAPARE - Transportation     Lack of Transportation (Medical): No     Lack of Transportation (Non-Medical): No   Physical Activity: Inactive (11/19/2019)    Exercise Vital Sign     Days of Exercise per Week: 0 days     Minutes of Exercise per Session: 0 min   Stress: Stress Concern Present (11/19/2019)    [employer] - Occupational Stress Questionnaire     Feeling of Stress : Rather much       Review of Systems         No data to display                 Checklist of Daily Activities:        Objective:   /84 (BP Location: Left arm, Patient Position: Sitting, BP Method: Medium (Manual))   Pulse 75   Temp 98 °F (36.7 °C) (Oral)   Ht 5' 8" (1.727 m)   Wt 87.2 kg (192 lb 3.9 oz)   SpO2 98%   BMI 29.23 kg/m²     Physical Exam  Constitutional:       General: He is not in acute distress.     Appearance: Normal appearance. He is not ill-appearing, toxic-appearing or diaphoretic.   HENT:      Head: Normocephalic " and atraumatic.      Comments: Left tympanic membrane with middle ear effusion/infection.  Right TM dull  Cardiovascular:      Rate and Rhythm: Normal rate and regular rhythm.      Heart sounds: Normal heart sounds.   Pulmonary:      Effort: Pulmonary effort is normal.      Breath sounds: Normal breath sounds.   Abdominal:      Palpations: Abdomen is soft.      Tenderness: There is no abdominal tenderness. There is no guarding or rebound.   Musculoskeletal:      Right lower leg: No edema.      Left lower leg: No edema.   Lymphadenopathy:      Cervical: No cervical adenopathy.   Neurological:      Mental Status: He is alert.             Lab Results   Component Value Date    WBC 5.46 04/16/2024    HGB 15.2 04/16/2024    HCT 45.8 04/16/2024     04/16/2024    CHOL 199 04/16/2024    TRIG 79 04/16/2024    HDL 57 04/16/2024    ALT 14 04/16/2024    AST 20 04/16/2024     04/16/2024    K 4.1 04/16/2024     04/16/2024    CREATININE 0.8 04/16/2024    BUN 15 04/16/2024    CO2 28 04/16/2024    TSH 0.719 11/14/2023    PSA 0.80 04/16/2024    INR 1.0 10/14/2022    HGBA1C 5.4 02/14/2023       Current Outpatient Medications on File Prior to Visit   Medication Sig Dispense Refill    ergocalciferol (ERGOCALCIFEROL) 50,000 unit Cap Take 1 capsule by mouth every 7 days.      fluticasone propionate (FLONASE) 50 mcg/actuation nasal spray SHAKE LIQUID AND USE 2 SPRAYS(100 MCG) IN EACH NOSTRIL EVERY DAY 48 g 3    prednisolon/gatiflox/bromfenac (PREDNISOL ACE-GATIFLOX-BROMFEN) 1-0.5-0.075 % DrpS Apply 1 drop to eye 3 (three) times daily. in operative eye for 1 month after surgery 5 mL 3     Current Facility-Administered Medications on File Prior to Visit   Medication Dose Route Frequency Provider Last Rate Last Admin    0.9%  NaCl infusion  500 mL Intravenous Continuous Amado Parekh MD             Assessment:   68 y.o. male with multiple co-morbid illnesses here for continued follow up of medical problems.      Plan:      Problem List Items Addressed This Visit          Ophtho    Nuclear sclerotic cataract of right eye     Status post cataract surgery.  Eye pain for 2 days after surgery.  No improvement in vision noted.  Patient now noted to have face pain and ear pain.  Middle ear infection.  Will treat today.            ENT    Suppurative otitis media of left ear     Left middle ear infection.  Noted on exam.  Face and eye pain.  Augmentin.  Patient to call if not improving.  Follow-up in 1 week to evaluate improvement.            Renal/    Pain in both testicles - Primary     Patient is status post 3 surgeries.  Worsening of pain at last visit..   Followed by PM in the past.  Doing very well with trial of venlafaxine low dose.  Patient requesting increase in dose.            Oncology    Follicular non-Hodgkin's lymphoma     Recently seen by Oncology at [facility].  On chemotherapy in the past.  Most recent CT scan with slight enlargement noted of lymph nodes.  Patient has oncology appointment today.  Await response oncology concerning CT scan.            Other    Dizziness     Much better.  One month since significant event.  Patient does have middle ear infection today.  Not noted in the past on exam.  Extensive workup with Cardiology and neurology.  On Holter, tachycardia only noted during events.  MRI and MRA with Neurology pending.  Continue hydration per cardiology.            Health Maintenance         Date Due Completion Date    COVID-19 Vaccine (7 - 2023-24 season) 09/01/2023 6/9/2022    Colorectal Cancer Screening 05/18/2024 5/18/2021    Override on 5/18/2021: Done (repeat 3 year)    Pneumococcal Vaccines (Age 65+) (3 of 3 - PPSV23 or PCV20) 08/18/2025 8/18/2020    Hemoglobin A1c (Diabetic Prevention Screening) 02/14/2026 2/14/2023    Lipid Panel 04/16/2029 4/16/2024    TETANUS VACCINE 06/15/2030 6/15/2020          Medications Reconciliation:   I have not reconciled the patient's home medications with the patient/family.  I have updated all changes.  Refer to After-Visit Medication List.      Medication List            Accurate as of May 13, 2024 11:59 PM. If you have any questions, ask your nurse or doctor.                START taking these medications      amoxicillin-clavulanate 875-125mg 875-125 mg per tablet  Commonly known as: AUGMENTIN  Take 1 tablet by mouth 2 (two) times daily.  Started by: Roly Flannery MD            CHANGE how you take these medications      venlafaxine 75 MG 24 hr capsule  Commonly known as: EFFEXOR-XR  Take 1 capsule (75 mg total) by mouth once daily.  What changed:   medication strength  how much to take  Changed by: Roly Flannery MD            CONTINUE taking these medications      ergocalciferol 50,000 unit Cap  Commonly known as: ERGOCALCIFEROL     fluticasone propionate 50 mcg/actuation nasal spray  Commonly known as: FLONASE  SHAKE LIQUID AND USE 2 SPRAYS(100 MCG) IN EACH NOSTRIL EVERY DAY     prednisol ace-gatiflox-bromfen 1-0.5-0.075 % Drps  Apply 1 drop to eye 3 (three) times daily. in operative eye for 1 month after surgery            STOP taking these medications      celecoxib 200 MG capsule  Commonly known as: CeleBREX  Stopped by: Roly Flannery MD     pantoprazole 40 MG tablet  Commonly known as: PROTONIX  Stopped by: Roly Flannery MD               Where to Get Your Medications        These medications were sent to [id number] - [facility] [address]  [address] [facility] [address]      Phone: [phone]   amoxicillin-clavulanate 875-125mg 875-125 mg per tablet  venlafaxine 75 MG 24 hr capsule              Follow up in about 4 weeks (around 6/10/2024). Total clinical care time was forty min. The following issues were discussed:  Results of [facility] at [facility], Effexor free text testicular pain, dizziness, acute otitis media, MRI requested by Neurology, recent cataract surgery.     Roly Flannery  MD  Internal Medicine  [facility]  [phone]

## 2024-05-16 ENCOUNTER — TELEPHONE (OUTPATIENT)
Dept: ADMINISTRATIVE | Facility: CLINIC | Age: 69
End: 2024-05-16
Payer: MEDICARE

## 2024-05-16 PROBLEM — N50.812 PAIN IN BOTH TESTICLES: Status: ACTIVE | Noted: 2019-11-19

## 2024-05-16 PROBLEM — N50.811 PAIN IN BOTH TESTICLES: Status: ACTIVE | Noted: 2019-11-19

## 2024-05-16 PROBLEM — H66.42 SUPPURATIVE OTITIS MEDIA OF LEFT EAR: Status: ACTIVE | Noted: 2024-05-16

## 2024-05-16 NOTE — ASSESSMENT & PLAN NOTE
Status post cataract surgery.  Eye pain for 2 days after surgery.  No improvement in vision noted.  Patient now noted to have face pain and ear pain.  Middle ear infection.  Will treat today.

## 2024-05-16 NOTE — ASSESSMENT & PLAN NOTE
Patient is status post 3 surgeries.  Worsening of pain at last visit..   Followed by PM in the past.  Doing very well with trial of venlafaxine low dose.  Patient requesting increase in dose.

## 2024-05-16 NOTE — ASSESSMENT & PLAN NOTE
Recently seen by Oncology at [facility].  On chemotherapy in the past.  Most recent CT scan with slight enlargement noted of lymph nodes.  Patient has oncology appointment today.  Await response oncology concerning CT scan.

## 2024-05-16 NOTE — ASSESSMENT & PLAN NOTE
Left middle ear infection.  Noted on exam.  Face and eye pain.  Augmentin.  Patient to call if not improving.  Follow-up in 1 week to evaluate improvement.

## 2024-05-16 NOTE — ASSESSMENT & PLAN NOTE
Much better.  One month since significant event.  Patient does have middle ear infection today.  Not noted in the past on exam.  Extensive workup with Cardiology and neurology.  On Holter, tachycardia only noted during events.  MRI and MRA with Neurology pending.  Continue hydration per cardiology.

## 2024-05-17 ENCOUNTER — OFFICE VISIT (OUTPATIENT)
Dept: INTERNAL MEDICINE | Facility: CLINIC | Age: 69
End: 2024-05-17
Payer: MEDICARE

## 2024-05-17 VITALS
SYSTOLIC BLOOD PRESSURE: 126 MMHG | WEIGHT: 191.38 LBS | BODY MASS INDEX: 29.01 KG/M2 | OXYGEN SATURATION: 97 % | HEIGHT: 68 IN | DIASTOLIC BLOOD PRESSURE: 86 MMHG | HEART RATE: 68 BPM

## 2024-05-17 DIAGNOSIS — C82.90 FOLLICULAR NON-HODGKIN'S LYMPHOMA: ICD-10-CM

## 2024-05-17 DIAGNOSIS — E78.2 MIXED HYPERLIPIDEMIA: ICD-10-CM

## 2024-05-17 DIAGNOSIS — D50.8 OTHER IRON DEFICIENCY ANEMIA: ICD-10-CM

## 2024-05-17 DIAGNOSIS — I27.20 PULMONARY HYPERTENSION: ICD-10-CM

## 2024-05-17 DIAGNOSIS — I70.0 AORTIC ATHEROSCLEROSIS: ICD-10-CM

## 2024-05-17 DIAGNOSIS — E29.1 HYPOTESTOSTERONEMIA IN MALE: ICD-10-CM

## 2024-05-17 DIAGNOSIS — I70.0 THORACIC AORTA ATHEROSCLEROSIS: ICD-10-CM

## 2024-05-17 DIAGNOSIS — Z00.00 ENCOUNTER FOR PREVENTIVE HEALTH EXAMINATION: Primary | ICD-10-CM

## 2024-05-17 DIAGNOSIS — J47.9 BRONCHIECTASIS WITHOUT COMPLICATION: ICD-10-CM

## 2024-05-17 DIAGNOSIS — H25.11 NUCLEAR SCLEROTIC CATARACT OF RIGHT EYE: ICD-10-CM

## 2024-05-17 DIAGNOSIS — M47.27 OSTEOARTHRITIS OF SPINE WITH RADICULOPATHY, LUMBOSACRAL REGION: ICD-10-CM

## 2024-05-17 DIAGNOSIS — Z86.59 HISTORY OF DEPRESSION: ICD-10-CM

## 2024-05-17 DIAGNOSIS — E55.9 VITAMIN D DEFICIENCY: ICD-10-CM

## 2024-05-17 DIAGNOSIS — C79.52 MALIGNANT NEOPLASM METASTATIC TO BONE MARROW: ICD-10-CM

## 2024-05-17 DIAGNOSIS — K21.9 GASTROESOPHAGEAL REFLUX DISEASE WITHOUT ESOPHAGITIS: ICD-10-CM

## 2024-05-17 DIAGNOSIS — Z74.09 OTHER REDUCED MOBILITY: ICD-10-CM

## 2024-05-17 DIAGNOSIS — H66.90 OTITIS MEDIA, UNSPECIFIED LATERALITY, UNSPECIFIED OTITIS MEDIA TYPE: ICD-10-CM

## 2024-05-17 DIAGNOSIS — M79.18 MYOFASCIAL PAIN: ICD-10-CM

## 2024-05-17 DIAGNOSIS — R42 DIZZINESS: ICD-10-CM

## 2024-05-17 PROCEDURE — 99999 PR PBB SHADOW E&M-EST. PATIENT-LVL IV: CPT | Mod: PBBFAC,HCNC,, | Performed by: NURSE PRACTITIONER

## 2024-05-17 PROCEDURE — 1126F AMNT PAIN NOTED NONE PRSNT: CPT | Mod: HCNC,CPTII,S$GLB, | Performed by: NURSE PRACTITIONER

## 2024-05-17 PROCEDURE — 3074F SYST BP LT 130 MM HG: CPT | Mod: HCNC,CPTII,S$GLB, | Performed by: NURSE PRACTITIONER

## 2024-05-17 PROCEDURE — G0439 PPPS, SUBSEQ VISIT: HCPCS | Mod: HCNC,S$GLB,, | Performed by: NURSE PRACTITIONER

## 2024-05-17 PROCEDURE — 1123F ACP DISCUSS/DSCN MKR DOCD: CPT | Mod: HCNC,CPTII,S$GLB, | Performed by: NURSE PRACTITIONER

## 2024-05-17 PROCEDURE — 3079F DIAST BP 80-89 MM HG: CPT | Mod: HCNC,CPTII,S$GLB, | Performed by: NURSE PRACTITIONER

## 2024-05-17 PROCEDURE — 3288F FALL RISK ASSESSMENT DOCD: CPT | Mod: HCNC,CPTII,S$GLB, | Performed by: NURSE PRACTITIONER

## 2024-05-17 PROCEDURE — 1160F RVW MEDS BY RX/DR IN RCRD: CPT | Mod: HCNC,CPTII,S$GLB, | Performed by: NURSE PRACTITIONER

## 2024-05-17 PROCEDURE — 1159F MED LIST DOCD IN RCRD: CPT | Mod: HCNC,CPTII,S$GLB, | Performed by: NURSE PRACTITIONER

## 2024-05-17 PROCEDURE — 1170F FXNL STATUS ASSESSED: CPT | Mod: HCNC,CPTII,S$GLB, | Performed by: NURSE PRACTITIONER

## 2024-05-17 PROCEDURE — 1101F PT FALLS ASSESS-DOCD LE1/YR: CPT | Mod: HCNC,CPTII,S$GLB, | Performed by: NURSE PRACTITIONER

## 2024-05-17 NOTE — PATIENT INSTRUCTIONS
Counseling and Referral of Other Preventative  (Italic type indicates deductible and co-insurance are waived)    Patient Name: Jeff Hope  Today's Date: 5/17/2024    Health Maintenance       Date Due Completion Date    COVID-19 Vaccine (7 - 2023-24 season) 09/01/2023 6/9/2022    Colorectal Cancer Screening 05/18/2024 5/18/2021    Override on 5/18/2021: Done (repeat 3 year)    Pneumococcal Vaccines (Age 65+) (3 of 3 - PPSV23 or PCV20) 08/18/2025 8/18/2020    Hemoglobin A1c (Diabetic Prevention Screening) 02/14/2026 2/14/2023    Lipid Panel 04/16/2029 4/16/2024    TETANUS VACCINE 06/15/2030 6/15/2020        No orders of the defined types were placed in this encounter.      The following information is provided to all patients.  This information is to help you find resources for any of the problems found today that may be affecting your health:                  Living healthy guide: www.[facility].[place].gov      Understanding Diabetes: www.diabetes.org      Eating healthy: www.cdc.gov/healthyweight      [facility] home safety checklist: www.cdc.gov/steadi/patient.html      Agency on Aging: www.goea.[place].gov      Alcoholics anonymous (AA): www.aa.org      Physical Activity: www.eric.nih.gov/gs3jjoz      Tobacco use: www.quitwithusla.org

## 2024-05-17 NOTE — PROGRESS NOTES
Jeff Hope presented for an initial Medicare AWV today. The following components were reviewed and updated:  Maria E gentleman.     Medical history  Family History  Social history  Allergies and Current Medications  Health Risk Assessment  Health Maintenance  Care Team    **See Completed Assessments for Annual Wellness visit with in the encounter summary    The following assessments were completed:  Depression Screening  Cognitive function Screening    Timed Get Up Test  Whisper Test    Opioid documentation:  Patient does not have a current opioid prescription.        Wt Readings from Last 3 Encounters:   05/17/24 86.8 kg (191 lb 5.8 oz)   05/13/24 87.2 kg (192 lb 3.9 oz)   05/08/24 97.9 kg (215 lb 13.3 oz)     Temp Readings from Last 3 Encounters:   05/13/24 98 °F (36.7 °C) (Oral)   04/29/24 96.9 °F (36.1 °C) (Oral)   04/18/24 97.8 °F (36.6 °C) (Temporal)     BP Readings from Last 3 Encounters:   05/17/24 126/86   05/13/24 134/84   05/08/24 124/78     Pulse Readings from Last 3 Encounters:   05/17/24 68   05/13/24 75   05/08/24 70       Physical Exam  General: Well developed, well nourished. No distress.  HEENT: Head is normocephalic, atraumatic; ears are normal.   Eyes: Clear conjunctiva.  Neck: Supple, symmetrical neck; trachea midline.  Lungs: Clear to auscultation bilaterally and normal respiratory effort.  Cardiovascular: Heart with regular rate and rhythm. No murmurs, gallops or rubs  Extremities: No LE edema. Pulses 2+ and symmetric.   Skin: Skin color, texture, turgor normal. No rashes.  Musculoskeletal: Normal gait.   Neurologic: Normal strength and tone. No focal numbness or weakness.       Diagnoses and health risks identified today and associated recommendations/orders:  1  Encounter for preventive health examination  -     Ambulatory Referral/Consult to Enhanced Annual Wellness Visit (eAWV)     2 Thoracic aorta atherosclerosis  Chronic, stable. Followed by PcP    3 Aortic  atherosclerosis  Chronic,stable. Followed by PcP    4 Bronchiectasis without complication  Chronic,stable. Followed by PcP    5 Follicular non-Hodgkin's lymphoma  Chronic. Being worked up with his Oncologist, Dr. Castillo at [facility] for 'questionable' concern of recurrence. [place] [facility] [place]. Castillo.     6 Gastroesophageal reflux disease without esophagitis  Chronic, stable. Followed by PCP    7 History of depression  Chronic. Stable. Effexor therapy. Followed by PcP    8 Other iron deficiency anemia  Chronic, stable. Followed by PcP    9 Hypotestosteronemia in male  Chronic. Followed by PCP    10 Malignant neoplasm metastatic to bone marrow  Chronic. Being worked up with his Oncologist, Dr. Castillo at [facility] for 'questionable' concern of recurrence. [place] [facility] [place] by Dr. Castillo.     11 Mixed hyperlipidemia  Lab Results   Component Value Date    CHOL 199 04/16/2024    CHOL 212 (H) 02/14/2023    CHOL 228 (H) 03/08/2022     Lab Results   Component Value Date    HDL 57 04/16/2024    HDL 57 02/14/2023    HDL 62 03/08/2022     Lab Results   Component Value Date    LDLCALC 126.2 04/16/2024    LDLCALC 132.8 02/14/2023    LDLCALC 136.6 03/08/2022     Lab Results   Component Value Date    TRIG 79 04/16/2024    TRIG 111 02/14/2023    TRIG 147 03/08/2022       Lab Results   Component Value Date    CHOLHDL 28.6 04/16/2024    CHOLHDL 26.9 02/14/2023    CHOLHDL 27.2 03/08/2022    Chronic, stable. Followed by PcP    12 Myofascial pain  Chronic, stable. Followed by PCP    13 Nuclear sclerotic cataract of right eye  Chronic, stable. Discharged from Dr. ENRIQUETA Montes De Oca. Completing gtts. Followed by PcP    14 Osteoarthritis of spine with radiculopathy, lumbosacral region  Chronic, stable. Followed by PCP    15 Pulmonary hypertension  Chronic, stable. Followed by PcP    16 Vitamin D deficiency  Chronic, stable. Supplement. Followed by PCP    17 Other reduced mobility  Chronic. Stable. Followed  by PCP    18 Otitis media, unspecified laterality, unspecified otitis media type  Stable. Improving on the prescribed: Flonase and Augmentin. Followed by PCP, noted follow up on Monday, 5/20/2024\.     19 Dizziness  Chronic. [place]rolog[place] [facility] [place]. Followed by PCP and Neurology.     Vaccines:   COVID: declines.     Provided Jeff with a 5-10 year written screening schedule and personal prevention plan. Recommendations were developed using the USPSTF age appropriate recommendations. Education, counseling, and referrals were provided as needed.  After Visit Summary printed and given to patient which includes a list of additional screenings\tests needed.  Future Appointments   Date Time Provider Department Center   5/21/2024  8:00 AM Roly Flannery MD [facility] [address] [facility]   6/11/2024  9:20 AM Roly Flannery MD [facility] [address] [facility]          Medication List            Accurate as of May 17, 2024  9:58 AM. If you have any questions, ask your nurse or doctor.                CONTINUE taking these medications      amoxicillin-clavulanate 875-125mg 875-125 mg per tablet  Commonly known as: AUGMENTIN  Take 1 tablet by mouth 2 (two) times daily.     ergocalciferol 50,000 unit Cap  Commonly known as: ERGOCALCIFEROL     fluticasone propionate 50 mcg/actuation nasal spray  Commonly known as: FLONASE  SHAKE LIQUID AND USE 2 SPRAYS(100 MCG) IN EACH NOSTRIL EVERY DAY     prednisol ace-gatiflox-bromfen 1-0.5-0.075 % Drps  Apply 1 drop to eye 3 (three) times daily. in operative eye for 1 month after surgery     venlafaxine 75 MG 24 hr capsule  Commonly known as: EFFEXOR-XR  Take 1 capsule (75 mg total) by mouth once daily.               YANELY Hodges      I offered to discuss advanced care planning, including how to pick a person who would make decisions for you if you were unable to make them for yourself, called a health care power of , and what kind of decisions  you might make such as use of life sustaining treatments such as ventilators and tube feeding when faced with a life limiting illness recorded on a living will that they will need to know. (How you want to be cared for as you near the end of your natural life)     X Patient is interested in learning more about how to make advanced directives.  I provided them paperwork and offered to discuss this with them. (Wife: Ms. Paiz)

## 2024-05-21 ENCOUNTER — OFFICE VISIT (OUTPATIENT)
Dept: PRIMARY CARE CLINIC | Facility: CLINIC | Age: 69
End: 2024-05-21
Payer: MEDICARE

## 2024-05-21 VITALS
TEMPERATURE: 98 F | BODY MASS INDEX: 28.9 KG/M2 | HEART RATE: 72 BPM | OXYGEN SATURATION: 98 % | WEIGHT: 190.69 LBS | HEIGHT: 68 IN | DIASTOLIC BLOOD PRESSURE: 76 MMHG | SYSTOLIC BLOOD PRESSURE: 110 MMHG

## 2024-05-21 DIAGNOSIS — H66.002 NON-RECURRENT ACUTE SUPPURATIVE OTITIS MEDIA OF LEFT EAR WITHOUT SPONTANEOUS RUPTURE OF TYMPANIC MEMBRANE: Primary | ICD-10-CM

## 2024-05-21 DIAGNOSIS — H25.11 NUCLEAR SCLEROTIC CATARACT OF RIGHT EYE: ICD-10-CM

## 2024-05-21 PROCEDURE — 1125F AMNT PAIN NOTED PAIN PRSNT: CPT | Mod: HCNC,CPTII,S$GLB, | Performed by: INTERNAL MEDICINE

## 2024-05-21 PROCEDURE — 1101F PT FALLS ASSESS-DOCD LE1/YR: CPT | Mod: HCNC,CPTII,S$GLB, | Performed by: INTERNAL MEDICINE

## 2024-05-21 PROCEDURE — 1159F MED LIST DOCD IN RCRD: CPT | Mod: HCNC,CPTII,S$GLB, | Performed by: INTERNAL MEDICINE

## 2024-05-21 PROCEDURE — 3008F BODY MASS INDEX DOCD: CPT | Mod: HCNC,CPTII,S$GLB, | Performed by: INTERNAL MEDICINE

## 2024-05-21 PROCEDURE — 1160F RVW MEDS BY RX/DR IN RCRD: CPT | Mod: HCNC,CPTII,S$GLB, | Performed by: INTERNAL MEDICINE

## 2024-05-21 PROCEDURE — 3078F DIAST BP <80 MM HG: CPT | Mod: HCNC,CPTII,S$GLB, | Performed by: INTERNAL MEDICINE

## 2024-05-21 PROCEDURE — 1157F ADVNC CARE PLAN IN RCRD: CPT | Mod: HCNC,CPTII,S$GLB, | Performed by: INTERNAL MEDICINE

## 2024-05-21 PROCEDURE — 99212 OFFICE O/P EST SF 10 MIN: CPT | Mod: HCNC,S$GLB,, | Performed by: INTERNAL MEDICINE

## 2024-05-21 PROCEDURE — 3074F SYST BP LT 130 MM HG: CPT | Mod: HCNC,CPTII,S$GLB, | Performed by: INTERNAL MEDICINE

## 2024-05-21 PROCEDURE — 3288F FALL RISK ASSESSMENT DOCD: CPT | Mod: HCNC,CPTII,S$GLB, | Performed by: INTERNAL MEDICINE

## 2024-05-21 NOTE — ASSESSMENT & PLAN NOTE
Continued flashers. Improving.    Message sent to ophthalmology to contact pt about what to expect.

## 2024-05-21 NOTE — PROGRESS NOTES
.  This note has been generated using voice-recognition software. There may be typographical errors that have been missed during proof-reading.     Primary Care Provider Appointment    Subjective:      Patient ID: Jeff Hope is a 68 y.o. male here for follow up.     Chief Complaint: Follow-up  HPI:  This is a pleasant 68-year-old male with bronchiectasis, hyperlipidemia, history of pulmonary embolism on chronic anticoagulation with a follicular non-Hodgkin's lymphoma, h/oacute/reactivated hepatitis B here for f/u.  Pt last seen  5/13/2024 5/13 pt seen by heme onc for lymphoma.  Does not feel increased LN size indicates return on lymphoma.    5/17 pt seen for eAWV  No further ear pain.  No dizziness.  Some pressure over B eyes. C/o ongoing but improving flashes in eye s/p cataract surg.       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    Pulmonary hypertension    Myalgia due to statin    Vitamin D deficiency    Abnormal MMSE    Allergic rhinitis    Adrenal hypertrophy    Bilateral shoulder bursitis    History of hepatitis B    Trigger finger of left thumb    Bilateral shoulder pain    Chronic thumb pain, bilateral    Breast mass, right    Peyronie's disease    Hypotestosteronemia in male    Iron deficiency anemia    Gastroesophageal reflux disease without esophagitis    Myofascial pain    Osteoarthritis of spine with radiculopathy, lumbosacral region    Decreased range of motion of lumbar spine    Hypogammaglobulinemia    Sacroiliitis    Dizziness    Pain of right upper extremity    Nuclear sclerotic cataract of right eye    Suppurative otitis media of left ear    Thoracic aorta atherosclerosis        Past Surgical History:   Procedure Laterality Date    CATARACT EXTRACTION W/   INTRAOCULAR LENS IMPLANT Right 2024    Procedure: EXTRACTION, CATARACT, WITH IOL INSERTION;  Surgeon: Jefferson Montes De Oca MD;  [facility];  Service: Ophthalmology;  Laterality: Right;    ESOPHAGOGASTRODUODENOSCOPY N/A 2021    Procedure: ESOPHAGOGASTRODUODENOSCOPY (EGD);  Surgeon: Krsitine Palmer MD;  Location: [facility] ENDO (4TH FLR);  Service: Endoscopy;  Laterality: N/A;  fully vacc-inst mail-tb-left chest port    GROIN EXPLORATION  2 pre  and 1 post     total of 3 procedures    INJECTION OF ANESTHETIC AGENT AROUND NERVE Left 2022    Procedure: BLOCK, NERVE, LEFT L3-L5 MEDIAL BRANCH;  Surgeon: Melody Berry MD;  Location: [facility] PAIN MGT;  Service: Pain Management;  Laterality: Left;    INJECTION, SACROILIAC JOINT Left 10/11/2022    Procedure: INJECTION,SACROILIAC JOINT LEFT CONTRAST;  Surgeon: Melody Berry MD;  Location: [facility] PAIN MGT;  Service: Pain Management;  Laterality: Left;    INJECTION, SACROILIAC JOINT Left 2023    Procedure: INJECTION,SACROILIAC JOINT, LEFT SOONER DATE;  Surgeon: Melody Berry MD;  Location: [facility] PAIN MGT;  Service: Pain Management;  Laterality: Left;    LAPAROSCOPIC CHOLECYSTECTOMY  2018    SHOULDER SURGERY Left        Past Medical History:   Diagnosis Date    History of pulmonary embolus (PE) 04/15/2019    Pt with PE s/p hospitalization.  He was placed on xarelto with high risk with cancer.  He has been out of the medication for the past month. Restart Xarelto 15 mg b.i.d. for 21 days, then 20 mg q.day until cancer no longer active.    Non Hodgkin's lymphoma     Personal history of colonic polyps 2015    per [facility] report - repeat 2020    Peyronie disease     S/P laparoscopic cholecystectomy 2019    Thoracic aorta atherosclerosis     Imagin       Social History     Socioeconomic History    Marital status:     Number of children: 2   Occupational History    Occupation: disabled [employer]    Tobacco Use     "Smoking status: Never    Smokeless tobacco: Never   Substance and Sexual Activity    Alcohol use: No    Drug use: No    Sexual activity: Yes     Partners: Female   Social History Narrative    Lives with wife.       Social Determinants of Health     Financial Resource Strain: High Risk (5/17/2024)    Overall Financial Resource Strain (CARDIA)     Difficulty of Paying Living Expenses: Very hard   Food Insecurity: Food Insecurity Present (5/17/2024)    Hunger Vital Sign     Worried About Running Out of Food in the Last Year: Never true     Ran Out of Food in the Last Year: Sometimes true   Transportation Needs: No Transportation Needs (5/17/2024)    PRAPARE - Transportation     Lack of Transportation (Medical): No     Lack of Transportation (Non-Medical): No   Physical Activity: Sufficiently Active (5/17/2024)    Exercise Vital Sign     Days of Exercise per Week: 3 days     Minutes of Exercise per Session: 80 min   Stress: Stress Concern Present (5/17/2024)    [place] [employer] - Occupational Stress Questionnaire     Feeling of Stress : To some extent   Housing Stability: Low Risk  (5/17/2024)    Housing Stability Vital Sign     Unable to Pay for Housing in the Last Year: No     Homeless in the Last Year: No       Review of Systems         No data to display                 Checklist of Daily Activities:        Objective:   /76 (BP Location: Left arm, Patient Position: Sitting, BP Method: Medium (Manual))   Pulse 72   Temp 98.2 °F (36.8 °C) (Oral)   Ht 5' 8" (1.727 m)   Wt 86.5 kg (190 lb 11.2 oz)   SpO2 98%   BMI 29.00 kg/m²     Physical Exam  Constitutional:       General: He is not in acute distress.     Appearance: Normal appearance. He is normal weight. He is not ill-appearing, toxic-appearing or diaphoretic.   HENT:      Right Ear: No drainage. No middle ear effusion. Tympanic membrane is not injected.      Left Ear: No drainage. A middle ear effusion is present. Tympanic " membrane is injected.      Ears:      Comments: R TM dull  Neurological:      Mental Status: He is alert.             Lab Results   Component Value Date    WBC 5.46 04/16/2024    HGB 15.2 04/16/2024    HCT 45.8 04/16/2024     04/16/2024    CHOL 199 04/16/2024    TRIG 79 04/16/2024    HDL 57 04/16/2024    ALT 14 04/16/2024    AST 20 04/16/2024     04/16/2024    K 4.1 04/16/2024     04/16/2024    CREATININE 0.8 04/16/2024    BUN 15 04/16/2024    CO2 28 04/16/2024    TSH 0.719 11/14/2023    PSA 0.80 04/16/2024    INR 1.0 10/14/2022    HGBA1C 5.4 02/14/2023       Current Outpatient Medications on File Prior to Visit   Medication Sig Dispense Refill    amoxicillin-clavulanate 875-125mg (AUGMENTIN) 875-125 mg per tablet Take 1 tablet by mouth 2 (two) times daily. 14 tablet 0    ergocalciferol (ERGOCALCIFEROL) 50,000 unit Cap Take 1 capsule by mouth every 7 days.      fluticasone propionate (FLONASE) 50 mcg/actuation nasal spray SHAKE LIQUID AND USE 2 SPRAYS(100 MCG) IN EACH NOSTRIL EVERY DAY 48 g 3    prednisolon/gatiflox/bromfenac (PREDNISOL ACE-GATIFLOX-BROMFEN) 1-0.5-0.075 % DrpS Apply 1 drop to eye 3 (three) times daily. in operative eye for 1 month after surgery 5 mL 3    venlafaxine (EFFEXOR-XR) 75 MG 24 hr capsule Take 1 capsule (75 mg total) by mouth once daily. 30 capsule 3     Current Facility-Administered Medications on File Prior to Visit   Medication Dose Route Frequency Provider Last Rate Last Admin    0.9%  NaCl infusion  500 mL Intravenous Continuous Amado Parekh MD             Assessment:   68 y.o. male with multiple co-morbid illnesses here for continued follow up of medical problems.      Plan:     Problem List Items Addressed This Visit          Ophtho    Nuclear sclerotic cataract of right eye     Continued flashers. Improving.    Message sent to ophthalmology to contact pt about what to expect.              ENT    Suppurative otitis media of left ear - Primary     Much better.   Completed abx.  No SE.    To call if not continuing to heal            Health Maintenance         Date Due Completion Date    COVID-19 Vaccine (7 - 2023-24 season) 09/01/2023 6/9/2022    Colorectal Cancer Screening 05/18/2024 5/18/2021    Override on 5/18/2021: Done (repeat 3 year)    Pneumococcal Vaccines (Age 65+) (3 of 3 - PPSV23 or PCV20) 08/18/2025 8/18/2020    Hemoglobin A1c (Diabetic Prevention Screening) 02/14/2026 2/14/2023    Lipid Panel 04/16/2029 4/16/2024    TETANUS VACCINE 06/15/2030 6/15/2020          Medications Reconciliation:   I have not reconciled the patient's home medications with the patient/family. I have updated all changes.  Refer to After-Visit Medication List.      Medication List            Accurate as of May 21, 2024  8:24 AM. If you have any questions, ask your nurse or doctor.                CONTINUE taking these medications      amoxicillin-clavulanate 875-125mg 875-125 mg per tablet  Commonly known as: AUGMENTIN  Take 1 tablet by mouth 2 (two) times daily.     ergocalciferol 50,000 unit Cap  Commonly known as: ERGOCALCIFEROL     fluticasone propionate 50 mcg/actuation nasal spray  Commonly known as: FLONASE  SHAKE LIQUID AND USE 2 SPRAYS(100 MCG) IN EACH NOSTRIL EVERY DAY     prednisol ace-gatiflox-bromfen 1-0.5-0.075 % Drps  Apply 1 drop to eye 3 (three) times daily. in operative eye for 1 month after surgery     venlafaxine 75 MG 24 hr capsule  Commonly known as: EFFEXOR-XR  Take 1 capsule (75 mg total) by mouth once daily.                   No follow-ups on file. Total clinical care time was 15 min. The following issues were discussed: OM and flashers     Roly Flannery MD  Internal Medicine  [facility]  [phone]

## 2024-05-29 ENCOUNTER — TELEPHONE (OUTPATIENT)
Dept: PRIMARY CARE CLINIC | Facility: CLINIC | Age: 69
End: 2024-05-29
Payer: MEDICARE

## 2024-05-29 NOTE — TELEPHONE ENCOUNTER
----- Message from Roly Flannery MD sent at 5/27/2024  5:06 PM CDT -----    It looks like he canceled his surgery for his 2nd cataract.  Please see if he continues to have floaters as it appears they want to see him but I do not see them documenting an appointment  ----- Message -----  From: Marley Mcgee  Sent: 5/21/2024   9:07 AM CDT  To: Roly Flannery MD    I can schedule him to see Dr Childress today at the main campus on the 10th floor for 9:30 am  ----- Message -----  From: Roly Flannery MD  Sent: 5/21/2024   8:20 AM CDT  To: Tavon Paulino A Staff    Pt in clinic today asking how long he should expect to have flashers s/p cataract surg.

## 2024-05-29 NOTE — TELEPHONE ENCOUNTER
Spoke to pt, he states he is still having flashers and would like to be seen as soon as possible by Dr Childress. He cancelled 2nd surgery, he would like to wait on that a while he stated. Could you please reach out to patient to schedule appt with Dr Childress or Dr Montes De Oca soon, thank you so much.     Mela

## 2024-05-30 ENCOUNTER — TELEPHONE (OUTPATIENT)
Dept: OPHTHALMOLOGY | Facility: CLINIC | Age: 69
End: 2024-05-30
Payer: MEDICARE

## 2024-05-31 ENCOUNTER — TELEPHONE (OUTPATIENT)
Dept: OPHTHALMOLOGY | Facility: CLINIC | Age: 69
End: 2024-05-31
Payer: MEDICARE

## 2024-05-31 ENCOUNTER — OFFICE VISIT (OUTPATIENT)
Dept: OPHTHALMOLOGY | Facility: CLINIC | Age: 69
End: 2024-05-31
Payer: MEDICARE

## 2024-05-31 DIAGNOSIS — Z98.890 POST-OPERATIVE STATE: ICD-10-CM

## 2024-05-31 DIAGNOSIS — H53.19 PHOTOPSIA OF RIGHT EYE: Primary | ICD-10-CM

## 2024-05-31 DIAGNOSIS — H43.393 FLOATERS, BILATERAL: ICD-10-CM

## 2024-05-31 DIAGNOSIS — H25.13 NUCLEAR SCLEROSIS, BILATERAL: ICD-10-CM

## 2024-05-31 PROCEDURE — 1159F MED LIST DOCD IN RCRD: CPT | Mod: HCNC,CPTII,S$GLB, | Performed by: OPHTHALMOLOGY

## 2024-05-31 PROCEDURE — 1157F ADVNC CARE PLAN IN RCRD: CPT | Mod: HCNC,CPTII,S$GLB, | Performed by: OPHTHALMOLOGY

## 2024-05-31 PROCEDURE — 1101F PT FALLS ASSESS-DOCD LE1/YR: CPT | Mod: HCNC,CPTII,S$GLB, | Performed by: OPHTHALMOLOGY

## 2024-05-31 PROCEDURE — 1126F AMNT PAIN NOTED NONE PRSNT: CPT | Mod: HCNC,CPTII,S$GLB, | Performed by: OPHTHALMOLOGY

## 2024-05-31 PROCEDURE — 92014 COMPRE OPH EXAM EST PT 1/>: CPT | Mod: 24,HCNC,S$GLB, | Performed by: OPHTHALMOLOGY

## 2024-05-31 PROCEDURE — 1160F RVW MEDS BY RX/DR IN RCRD: CPT | Mod: HCNC,CPTII,S$GLB, | Performed by: OPHTHALMOLOGY

## 2024-05-31 PROCEDURE — 3288F FALL RISK ASSESSMENT DOCD: CPT | Mod: HCNC,CPTII,S$GLB, | Performed by: OPHTHALMOLOGY

## 2024-05-31 PROCEDURE — 99999 PR PBB SHADOW E&M-EST. PATIENT-LVL II: CPT | Mod: PBBFAC,HCNC,, | Performed by: OPHTHALMOLOGY

## 2024-05-31 NOTE — PROGRESS NOTES
HPI    Referred: Dr. Montes De Oca     69 y/o male present to clinic for concerns of flashes of lights. Pt states   after cataract extraction he has unresolved flashes of lights of right   eye. Eye pain has subsided. He has sinus pressure headaches.     Eyemeds  No gtts   Last edited by Brigido Jimenez on 5/31/2024  8:44 AM.          OCT - No ME OU      A/P    Photopsias OD after cataract surgery  No breaks or tears.  Healthiest retina I've seen in awhile  Likely just noticing diffraction from rim/capsule effect    RD precautions    2. NS OS  Ok for CE when ready      Retina PRN

## 2024-06-03 PROBLEM — Z00.00 HEALTHCARE MAINTENANCE: Status: RESOLVED | Noted: 2019-11-19 | Resolved: 2024-06-03

## 2024-07-22 NOTE — PROGRESS NOTES
.  This note has been generated using voice-recognition software. There may be typographical errors that have been missed during proof-reading.     Primary Care Provider Appointment    Subjective:      Patient ID: Jeff Hope is a 68 y.o. male here for follow up.     Chief Complaint: Follow-up  HPI:  This is a pleasant 68-year-old male with bronchiectasis, hyperlipidemia, history of pulmonary embolism on chronic anticoagulation with a follicular non-Hodgkin's lymphoma, h/oacute/reactivated hepatitis B here for f/u.  Pt last seen   05/21/2024 05/31/2024 patient seen by Ophthalmology for flushing after cataract extraction.  No concerning findings.  Pt upset about his cataract surgery and the eye pain and retinal specialilst sent him home without glasses.  Still having flashes.  Not as intense as previously, can orccur when open or closed.    Under truck about 1 month ago and had an episode of vertigo with vomiting.  No hearing loss.  Lasted about only a few minutes.    Green mucus for a few days.  Getting better.  Post nasal gtt.  No fevers/chills/HA.   Stopped effexor due to not being able to sleep.   Pt states that he will not be able to sleep for days at a time.  Does take benadryl at times to help with sleep.    To schedule appt with [facility].      Complains of oily skin and development of skin changes over forehead.  PHQ9 6    C/o oily skin on face/scalp/ears.      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    Pulmonary hypertension    Myalgia due to statin    Vitamin D deficiency    Abnormal MMSE    Allergic rhinitis    Adrenal hypertrophy    Bilateral shoulder bursitis    History of hepatitis B    Trigger finger of left thumb    Bilateral shoulder pain     Chronic thumb pain, bilateral    Breast mass, right    Peyronie's disease    Hypotestosteronemia in male    Iron deficiency anemia    Gastroesophageal reflux disease without esophagitis    Myofascial pain    Osteoarthritis of spine with radiculopathy, lumbosacral region    Decreased range of motion of lumbar spine    Hypogammaglobulinemia    Sacroiliitis    Dizziness    Pain of right upper extremity    Nuclear sclerotic cataract of right eye    Thoracic aorta atherosclerosis    Photopsia of right eye    Floaters, bilateral    Acute recurrent frontal sinusitis    Other acne        Past Surgical History:   Procedure Laterality Date    CATARACT EXTRACTION W/  INTRAOCULAR LENS IMPLANT Right 4/18/2024    Procedure: EXTRACTION, CATARACT, WITH IOL INSERTION;  Surgeon: Jefferson Montes De Oca MD;  Location: [facility];  Service: Ophthalmology;  Laterality: Right;    ESOPHAGOGASTRODUODENOSCOPY N/A 11/29/2021    Procedure: ESOPHAGOGASTRODUODENOSCOPY (EGD);  Surgeon: Kristine Palmer MD;  Location: [facility] ENDO 4TH FLR);  Service: Endoscopy;  Laterality: N/A;  fully vacc-inst mail-tb-left chest port    GROIN EXPLORATION  2 pre 2005 and 1 post 2005    total of 3 procedures    INJECTION OF ANESTHETIC AGENT AROUND NERVE Left 11/8/2022    Procedure: BLOCK, NERVE, LEFT L3-L5 MEDIAL BRANCH;  Surgeon: Melody Berry MD;  Location: [facility];  Service: Pain Management;  Laterality: Left;    INJECTION, SACROILIAC JOINT Left 10/11/2022    Procedure: INJECTION,SACROILIAC JOINT LEFT CONTRAST;  Surgeon: Melody Berry MD;  Location: [facility];  Service: Pain Management;  Laterality: Left;    INJECTION, SACROILIAC JOINT Left 8/22/2023    Procedure: INJECTION,SACROILIAC JOINT, LEFT SOONER DATE;  Surgeon: Melody Berry MD;  Location: [facility];  Service: Pain Management;  Laterality: Left;    LAPAROSCOPIC CHOLECYSTECTOMY  05/2018    SHOULDER SURGERY Left        Past Medical History:   Diagnosis Date    History of pulmonary  embolus (PE) 04/15/2019    Pt with PE s/p hospitalization.  He was placed on xarelto with high risk with cancer.  He has been out of the medication for the past month. Restart Xarelto 15 mg b.i.d. for 21 days, then 20 mg q.day until cancer no longer active.    Non Hodgkin's lymphoma     Personal history of colonic polyps 2015    per [facility] report - repeat 2020    Peyronie disease     S/P laparoscopic cholecystectomy 2019    Thoracic aorta atherosclerosis     Imagin       Social History     Socioeconomic History    Marital status:     Number of children: 2   Occupational History    Occupation: disabled [employer]    Tobacco Use    Smoking status: Never    Smokeless tobacco: Never   Substance and Sexual Activity    Alcohol use: No    Drug use: No    Sexual activity: Yes     Partners: Female   Social History Narrative    Lives with wife.       Social Determinants of Health     Financial Resource Strain: High Risk (2024)    Overall Financial Resource Strain (CARDIA)     Difficulty of Paying Living Expenses: Very hard   Food Insecurity: Food Insecurity Present (2024)    Hunger Vital Sign     Worried About Running Out of Food in the Last Year: Never true     Ran Out of Food in the Last Year: Sometimes true   Transportation Needs: No Transportation Needs (2024)    PRAPARE - Transportation     Lack of Transportation (Medical): No     Lack of Transportation (Non-Medical): No   Physical Activity: Sufficiently Active (2024)    Exercise Vital Sign     Days of Exercise per Week: 3 days     Minutes of Exercise per Session: 80 min   Stress: Stress Concern Present (2024)    [employer] [place] [employer] - Occupational Stress Questionnaire     Feeling of Stress : To some extent   Housing Stability: Low Risk  (2024)    Housing Stability Vital Sign     Unable to Pay for Housing in the Last Year: No     Homeless in the Last Year: No       Review of Systems          "No data to display                 Checklist of Daily Activities:        Objective:   /82 (BP Location: Left arm, Patient Position: Sitting, BP Method: Medium (Manual))   Pulse 64   Temp 98.4 °F (36.9 °C) (Oral)   Ht 5' 8" (1.727 m)   Wt 85.6 kg (188 lb 11.4 oz)   SpO2 100%   BMI 28.69 kg/m²     Physical Exam  Constitutional:       General: He is not in acute distress.     Appearance: Normal appearance. He is normal weight. He is not ill-appearing, toxic-appearing or diaphoretic.   HENT:      Head: Normocephalic.      Ears:      Comments: Slight erythema of L TM.    Skin:     Comments: Some papules over forehead and cheeks.  No pustules   Neurological:      Mental Status: He is alert.             Lab Results   Component Value Date    WBC 5.46 04/16/2024    HGB 15.2 04/16/2024    HCT 45.8 04/16/2024     04/16/2024    CHOL 199 04/16/2024    TRIG 79 04/16/2024    HDL 57 04/16/2024    ALT 14 04/16/2024    AST 20 04/16/2024     04/16/2024    K 4.1 04/16/2024     04/16/2024    CREATININE 0.8 04/16/2024    BUN 15 04/16/2024    CO2 28 04/16/2024    TSH 0.719 11/14/2023    PSA 0.80 04/16/2024    INR 1.0 10/14/2022    HGBA1C 5.4 02/14/2023       Current Outpatient Medications on File Prior to Visit   Medication Sig Dispense Refill    amoxicillin-clavulanate 875-125mg (AUGMENTIN) 875-125 mg per tablet Take 1 tablet by mouth 2 (two) times daily. 14 tablet 0    ergocalciferol (ERGOCALCIFEROL) 50,000 unit Cap Take 1 capsule by mouth every 7 days.      fluticasone propionate (FLONASE) 50 mcg/actuation nasal spray SHAKE LIQUID AND USE 2 SPRAYS(100 MCG) IN EACH NOSTRIL EVERY DAY 48 g 3    prednisolon/gatiflox/bromfenac (PREDNISOL ACE-GATIFLOX-BROMFEN) 1-0.5-0.075 % DrpS Apply 1 drop to eye 3 (three) times daily. in operative eye for 1 month after surgery 5 mL 3    venlafaxine (EFFEXOR-XR) 75 MG 24 hr capsule Take 1 capsule (75 mg total) by mouth once daily. 30 capsule 3     Current " Facility-Administered Medications on File Prior to Visit   Medication Dose Route Frequency Provider Last Rate Last Admin    0.9%  NaCl infusion  500 mL Intravenous Continuous Amado Parekh MD             Assessment:   68 y.o. male with multiple co-morbid illnesses here for continued follow up of medical problems.      Plan:     Problem List Items Addressed This Visit          Psychiatric    Current mild episode of major depressive disorder - Primary     PHQ 9 6 today.  Pt upset about vision changes.  Situational.  Self d/c venlafaxine due to worsening insomnia.  Will stay up for days.    Frustrated about vision issues.    Will follow.   Trial of trazodone for insomnia.              ENT    Acute recurrent frontal sinusitis     Flonase and OTC meds.  Not saline nasal spray.  Getting better and present for 5 days  Call if nto continuing to get better.  Saline nasal spray.              Derm    Other acne     ? Due to rosacea?  In area where wearing hat.  Increased oily skin.    Daily oil free wash over face and ears and scalp.    May need to chg if still an issue in a few weeks.    Wash hat with free and clear soap regularly.              Other    Healthcare maintenance     ACP reviewed 11/2023   Yearly labs with PSA 4/2024  Colon--pt to schedule with [facility].           Dizziness     No further associted with ear infection but notes single episode of vertigo a few months ago lasting only a short presiod of time resolving spontaneously.     Different than previous episodes. Suspect benign paroxysmal positional vertigo .  MRI ordered per Neurology never done.   Patient to call with further episodes.     Staff will schedule MRI/MRA            Health Maintenance         Date Due Completion Date    COVID-19 Vaccine (7 - 2023-24 season) 09/01/2023 6/9/2022    Colorectal Cancer Screening 05/18/2024 5/18/2021    Override on 5/18/2021: Done (repeat 3 year)    Influenza Vaccine (1) 09/01/2024 11/14/2023    Pneumococcal Vaccines  (Age 65+) (3 of 3 - PPSV23 or PCV20) 08/18/2025 8/18/2020    Hemoglobin A1c (Diabetic Prevention Screening) 02/14/2026 2/14/2023    Lipid Panel 04/16/2029 4/16/2024    TETANUS VACCINE 06/15/2030 6/15/2020          Medications Reconciliation:   I have not reconciled the patient's home medications with the patient/family. I have updated all changes.  Refer to After-Visit Medication List.      Medication List            Accurate as of July 23, 2024 11:59 PM. If you have any questions, ask your nurse or doctor.                START taking these medications      traZODone 50 MG tablet  Commonly known as: DESYREL  Take 1 tablet (50 mg total) by mouth every evening.  Started by: Roly Flannery MD            CONTINUE taking these medications      amoxicillin-clavulanate 875-125mg 875-125 mg per tablet  Commonly known as: AUGMENTIN  Take 1 tablet by mouth 2 (two) times daily.     ergocalciferol 50,000 unit Cap  Commonly known as: ERGOCALCIFEROL     fluticasone propionate 50 mcg/actuation nasal spray  Commonly known as: FLONASE  SHAKE LIQUID AND USE 2 SPRAYS(100 MCG) IN EACH NOSTRIL EVERY DAY     prednisol ace-gatiflox-bromfen 1-0.5-0.075 % Drps  Apply 1 drop to eye 3 (three) times daily. in operative eye for 1 month after surgery     venlafaxine 75 MG 24 hr capsule  Commonly known as: EFFEXOR-XR  Take 1 capsule (75 mg total) by mouth once daily.               Where to Get Your Medications        These medications were sent to [id number] - [facility] [address]  [address] [facility] [address]      Phone: [phone]   traZODone 50 MG tablet              Follow up in about 6 weeks (around 9/3/2024). Total clinical care time was  36 min. The following issues were discussed:  acute sinus infection, ongoing I flashers, episode of dizziness, insomnia, need for colonoscopy, skin changes.     Roly Flannery MD  Internal Medicine  [facility]  [phone]

## 2024-07-23 ENCOUNTER — OFFICE VISIT (OUTPATIENT)
Dept: PRIMARY CARE CLINIC | Facility: CLINIC | Age: 69
End: 2024-07-23
Payer: MEDICARE

## 2024-07-23 VITALS
BODY MASS INDEX: 28.6 KG/M2 | WEIGHT: 188.69 LBS | TEMPERATURE: 98 F | OXYGEN SATURATION: 100 % | HEART RATE: 64 BPM | SYSTOLIC BLOOD PRESSURE: 130 MMHG | DIASTOLIC BLOOD PRESSURE: 82 MMHG | HEIGHT: 68 IN

## 2024-07-23 DIAGNOSIS — Z00.00 HEALTHCARE MAINTENANCE: ICD-10-CM

## 2024-07-23 DIAGNOSIS — J01.11 ACUTE RECURRENT FRONTAL SINUSITIS: ICD-10-CM

## 2024-07-23 DIAGNOSIS — R42 DIZZINESS: ICD-10-CM

## 2024-07-23 DIAGNOSIS — L70.8 OTHER ACNE: ICD-10-CM

## 2024-07-23 DIAGNOSIS — F32.0 CURRENT MILD EPISODE OF MAJOR DEPRESSIVE DISORDER WITHOUT PRIOR EPISODE: Primary | ICD-10-CM

## 2024-07-23 PROBLEM — H66.42 SUPPURATIVE OTITIS MEDIA OF LEFT EAR: Status: RESOLVED | Noted: 2024-05-16 | Resolved: 2024-07-23

## 2024-07-23 RX ORDER — TRAZODONE HYDROCHLORIDE 50 MG/1
50 TABLET ORAL NIGHTLY
Qty: 30 TABLET | Refills: 2 | Status: SHIPPED | OUTPATIENT
Start: 2024-07-23 | End: 2025-07-23

## 2024-07-23 NOTE — ASSESSMENT & PLAN NOTE
? Due to rosacea?  In area where wearing hat.  Increased oily skin.    Daily oil free wash over face and ears and scalp.    May need to chg if still an issue in a few weeks.    Wash hat with free and clear soap regularly.

## 2024-07-23 NOTE — ASSESSMENT & PLAN NOTE
No further associted with ear infection but notes single episode of vertigo a few months ago lasting only a short presiod of time resolving spontaneously.     Different than previous episodes. Suspect benign paroxysmal positional vertigo .  MRI ordered per Neurology never done.   Patient to call with further episodes.     Staff will schedule MRI/MRA

## 2024-07-23 NOTE — PATIENT INSTRUCTIONS
Thank you so much for coming in to see me today.  Please do not hesitate to call with any questions or concerns or if you feel that you need to be seen.       Please use saline nasal spray in a can 3-4 times a day and oil free face wash.

## 2024-07-23 NOTE — ASSESSMENT & PLAN NOTE
PHQ 9 6 today.  Pt upset about vision changes.  Situational.  Self d/c venlafaxine due to worsening insomnia.  Will stay up for days.    Frustrated about vision issues.    Will follow.   Trial of trazodone for insomnia.

## 2024-07-23 NOTE — ASSESSMENT & PLAN NOTE
Flonase and OTC meds.  Not saline nasal spray.  Getting better and present for 5 days  Call if nto continuing to get better.  Saline nasal spray.

## 2024-07-29 ENCOUNTER — TELEPHONE (OUTPATIENT)
Dept: PRIMARY CARE CLINIC | Facility: CLINIC | Age: 69
End: 2024-07-29
Payer: MEDICARE

## 2024-07-29 NOTE — TELEPHONE ENCOUNTER
----- Message from Roly Flannery MD sent at 7/28/2024 12:11 PM CDT -----    Please schedule MRI/MRA ordered by Neurology -has not been done

## 2024-08-15 ENCOUNTER — OFFICE VISIT (OUTPATIENT)
Dept: URGENT CARE | Facility: CLINIC | Age: 69
End: 2024-08-15
Payer: MEDICARE

## 2024-08-15 VITALS
SYSTOLIC BLOOD PRESSURE: 112 MMHG | BODY MASS INDEX: 28.49 KG/M2 | WEIGHT: 188 LBS | HEIGHT: 68 IN | DIASTOLIC BLOOD PRESSURE: 63 MMHG | RESPIRATION RATE: 18 BRPM | HEART RATE: 65 BPM | OXYGEN SATURATION: 97 % | TEMPERATURE: 98 F

## 2024-08-15 DIAGNOSIS — T78.40XA ALLERGIC REACTION, INITIAL ENCOUNTER: Primary | ICD-10-CM

## 2024-08-15 DIAGNOSIS — R21 RASH: ICD-10-CM

## 2024-08-15 RX ORDER — MUPIROCIN 20 MG/G
OINTMENT TOPICAL
Qty: 22 G | Refills: 1 | Status: SHIPPED | OUTPATIENT
Start: 2024-08-15
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
## 2024-08-20 NOTE — PATIENT INSTRUCTIONS
Recommendations:  -  Cancel all previously scheduled lab appointments.    -  Labs CBC, CMP, HBV DNA quant every 2 months, next in Sept 2020.   -  Steroid pills may take now that HBV DNA is below the limit of measurement.  -  Continue Vemlidy daily (script runs out early Feb 2021).   -  Oncologist retired, he will be seen by new doctor, Michael, not sure.    -  Avoid alcohol, smoking, sedatives and meds with codeine.  -  Avoid high intake of Tylenol (more than 4 extra-strength pills in one day)  -  Call us if any bleeding, fevers, confusion, disorientation occur  -  Return in 4 months, video/audio visit.    Saw patient briefly in clinic with PCP    A1c elevated above 9, patient continues to slowly gain weight, patient is not obtaining Trulicity due to backorder    Taught patient how to use Ozempic pen.  Reviewed with patient individual steps to prepare and use Ozempic: wash hands; ensure liquid is clear and colorless; remove protective seal from pen needle, screw needle onto pen, remove both outer and inner needle caps; prime pen by dialing to flow check symbol and checking for drops (first use only); dial pen to appropriate dose; inject into indicated injection site; press and hold dose button until dial reads 0; count to 6; remove needle from skin; dispose of pen needle appropriately.  Pen can be stored at room temperature up to 56 days.  Patient verbalized understanding and displayed successful technique.      Also having trouble sleeping, reviewed nonformed recommendations on sleep hygiene and ways to help    Recommend continuing Lantus and metformin    Pharmacist Tracking Tool  Reason For Outreach: Embedded Pharmacist  Demographics:  Intervention Method: In Person  Type of Intervention: New  Topics Addressed: Diabetes  Pharmacologic Interventions: Medication Initiation  Non-Pharmacologic Interventions: Care coordination, Disease state education, and Medication/Device education  Time:  Direct Patient Care:  5  mins  Care Coordination:  5  mins  Recommendation Recipient: Patient/Caregiver and Provider  Outcome: Accepted     Cory Lang, PharmD, BCACP  [facility] Clinical Pharmacist

## 2024-08-25 ENCOUNTER — HOSPITAL ENCOUNTER (OUTPATIENT)
Dept: RADIOLOGY | Facility: HOSPITAL | Age: 69
Discharge: HOME | End: 2024-08-25
Attending: STUDENT IN AN ORGANIZED HEALTH CARE EDUCATION/TRAINING PROGRAM
Payer: MEDICARE

## 2024-08-25 DIAGNOSIS — R55 SYNCOPE AND COLLAPSE: ICD-10-CM

## 2024-08-25 DIAGNOSIS — R42 DIZZINESS AND GIDDINESS: ICD-10-CM

## 2024-08-25 PROCEDURE — 70551 MRI BRAIN STEM W/O DYE: CPT | Mod: 26,HCNC,, | Performed by: RADIOLOGY

## 2024-08-25 PROCEDURE — 70544 MR ANGIOGRAPHY HEAD W/O DYE: CPT | Mod: 26,59,HCNC, | Performed by: RADIOLOGY

## 2024-08-25 PROCEDURE — 70547 MR ANGIOGRAPHY NECK W/O DYE: CPT | Mod: TC,HCNC

## 2024-08-25 PROCEDURE — 70547 MR ANGIOGRAPHY NECK W/O DYE: CPT | Mod: 26,HCNC,, | Performed by: RADIOLOGY

## 2024-08-25 PROCEDURE — 70551 MRI BRAIN STEM W/O DYE: CPT | Mod: TC,HCNC

## 2024-08-25 PROCEDURE — 70544 MR ANGIOGRAPHY HEAD W/O DYE: CPT | Mod: 59,TC,HCNC

## 2024-09-10 ENCOUNTER — OFFICE VISIT (OUTPATIENT)
Dept: PRIMARY CARE CLINIC | Facility: CLINIC | Age: 69
End: 2024-09-10
Payer: MEDICARE

## 2024-09-10 VITALS
OXYGEN SATURATION: 98 % | SYSTOLIC BLOOD PRESSURE: 128 MMHG | BODY MASS INDEX: 28.99 KG/M2 | HEIGHT: 68 IN | DIASTOLIC BLOOD PRESSURE: 74 MMHG | TEMPERATURE: 98 F | HEART RATE: 60 BPM | WEIGHT: 191.25 LBS

## 2024-09-10 DIAGNOSIS — Z85.72 HISTORY OF FOLLICULAR LYMPHOMA: ICD-10-CM

## 2024-09-10 DIAGNOSIS — F32.0 CURRENT MILD EPISODE OF MAJOR DEPRESSIVE DISORDER WITHOUT PRIOR EPISODE: Primary | ICD-10-CM

## 2024-09-10 DIAGNOSIS — L70.8 OTHER ACNE: ICD-10-CM

## 2024-09-10 DIAGNOSIS — Z00.00 HEALTHCARE MAINTENANCE: ICD-10-CM

## 2024-09-10 PROBLEM — H25.11 NUCLEAR SCLEROTIC CATARACT OF RIGHT EYE: Status: RESOLVED | Noted: 2024-04-18 | Resolved: 2024-09-10

## 2024-09-10 PROBLEM — C79.52 MALIGNANT NEOPLASM METASTATIC TO BONE MARROW: Status: RESOLVED | Noted: 2019-12-10 | Resolved: 2024-09-10

## 2024-09-10 PROBLEM — R42 DIZZINESS: Status: RESOLVED | Noted: 2023-07-18 | Resolved: 2024-09-10

## 2024-09-10 PROBLEM — J01.11 ACUTE RECURRENT FRONTAL SINUSITIS: Status: RESOLVED | Noted: 2024-07-23 | Resolved: 2024-09-10

## 2024-09-10 PROCEDURE — 3078F DIAST BP <80 MM HG: CPT | Mod: HCNC,CPTII,S$GLB, | Performed by: INTERNAL MEDICINE

## 2024-09-10 PROCEDURE — 1101F PT FALLS ASSESS-DOCD LE1/YR: CPT | Mod: HCNC,CPTII,S$GLB, | Performed by: INTERNAL MEDICINE

## 2024-09-10 PROCEDURE — 1160F RVW MEDS BY RX/DR IN RCRD: CPT | Mod: HCNC,CPTII,S$GLB, | Performed by: INTERNAL MEDICINE

## 2024-09-10 PROCEDURE — 3008F BODY MASS INDEX DOCD: CPT | Mod: HCNC,CPTII,S$GLB, | Performed by: INTERNAL MEDICINE

## 2024-09-10 PROCEDURE — G2211 COMPLEX E/M VISIT ADD ON: HCPCS | Mod: HCNC,S$GLB,, | Performed by: INTERNAL MEDICINE

## 2024-09-10 PROCEDURE — 3074F SYST BP LT 130 MM HG: CPT | Mod: HCNC,CPTII,S$GLB, | Performed by: INTERNAL MEDICINE

## 2024-09-10 PROCEDURE — 1123F ACP DISCUSS/DSCN MKR DOCD: CPT | Mod: HCNC,CPTII,S$GLB, | Performed by: INTERNAL MEDICINE

## 2024-09-10 PROCEDURE — 99215 OFFICE O/P EST HI 40 MIN: CPT | Mod: HCNC,S$GLB,, | Performed by: INTERNAL MEDICINE

## 2024-09-10 PROCEDURE — 1125F AMNT PAIN NOTED PAIN PRSNT: CPT | Mod: HCNC,CPTII,S$GLB, | Performed by: INTERNAL MEDICINE

## 2024-09-10 PROCEDURE — 1159F MED LIST DOCD IN RCRD: CPT | Mod: HCNC,CPTII,S$GLB, | Performed by: INTERNAL MEDICINE

## 2024-09-10 PROCEDURE — 3288F FALL RISK ASSESSMENT DOCD: CPT | Mod: HCNC,CPTII,S$GLB, | Performed by: INTERNAL MEDICINE

## 2024-09-10 RX ORDER — DOXEPIN 3 MG/1
1 TABLET, FILM COATED ORAL NIGHTLY PRN
Qty: 30 TABLET | Refills: 0 | Status: SHIPPED | OUTPATIENT
Start: 2024-09-10

## 2024-09-10 NOTE — PATIENT INSTRUCTIONS
Thank you so much for coming in to see me today.  Please do not hesitate to call with any questions or concerns or if you feel that you need to be seen.       Please try oil free Cetaphil or Aveeno face wash.   24-Jul-2018

## 2024-09-10 NOTE — PROGRESS NOTES
.  This note has been generated using voice-recognition software. There may be typographical errors that have been missed during proof-reading.     Primary Care Provider Appointment    Subjective:      Patient ID: Jeff Hope is a 68 y.o. male here for follow up.     Chief Complaint: Follow-up  HPI:  This is a pleasant 68-year-old male with bronchiectasis, hyperlipidemia, history of pulmonary embolism on chronic anticoagulation with a follicular non-Hodgkin's lymphoma, h/oacute/reactivated hepatitis B here for f/u.  Pt last seen 07/23/2024   08/15/2024 patient seen in urgent care for allergic reaction.  No further eye ain. Rare flashers.  He has been feeling better.  Tried trazodone for sleep.  Having trouble maintaining sleep.    No further dizziness spells.   Colon scheduled fo rthe 16th.    PET scan in November.      Advance Care Planning     Date: 09/10/2024    ACP Reviewed/No Changes  Voluntary advance care planning discussion had today with patient. Previously completed HCPOA and LW in electronic medical record is current, no changes made.      A total of 8 min was spent on advance care planning, goals of care discussion, emotional support, formulating and communicating prognosis and exploring burden/benefit of various approaches of treatment. This discussion occurred on a fully voluntary basis with the verbal consent of the patient and/or family.          Patient Active Problem List   Diagnosis    Bronchiectasis    History of follicular lymphoma    Mixed hyperlipidemia    Current mild episode of major depressive disorder    Aortic atherosclerosis    Pain in both testicles    Healthcare maintenance    Pulmonary hypertension    Myalgia due to statin    Vitamin D deficiency    Abnormal MMSE    Allergic rhinitis    Adrenal hypertrophy    Bilateral shoulder bursitis    History of hepatitis B    Trigger finger of left thumb    Bilateral shoulder pain    Chronic thumb pain, bilateral    Breast mass, right     Peyronie's disease    Hypotestosteronemia in male    Iron deficiency anemia    Gastroesophageal reflux disease without esophagitis    Myofascial pain    Osteoarthritis of spine with radiculopathy, lumbosacral region    Decreased range of motion of lumbar spine    Hypogammaglobulinemia    Sacroiliitis    Pain of right upper extremity    Thoracic aorta atherosclerosis    Photopsia of right eye    Floaters, bilateral    Other acne        Past Surgical History:   Procedure Laterality Date    CATARACT EXTRACTION W/  INTRAOCULAR LENS IMPLANT Right 4/18/2024    Procedure: EXTRACTION, CATARACT, WITH IOL INSERTION;  Surgeon: Jefferson Montes De Oca MD;  Location: [facility];  Service: Ophthalmology;  Laterality: Right;    ESOPHAGOGASTRODUODENOSCOPY N/A 11/29/2021    Procedure: ESOPHAGOGASTRODUODENOSCOPY (EGD);  Surgeon: Kristine Palmer MD;  [facility]  Service: Endoscopy;  Laterality: N/A;  fully vacc-inst mail-tb-left chest port    GROIN EXPLORATION  2 pre 2005 and 1 post 2005    total of 3 procedures    INJECTION OF ANESTHETIC AGENT AROUND NERVE Left 11/8/2022    Procedure: BLOCK, NERVE, LEFT L3-L5 MEDIAL BRANCH;  Surgeon: Melody Berry MD;  Location: [facility] PAIN MGT;  Service: Pain Management;  Laterality: Left;    INJECTION, SACROILIAC JOINT Left 10/11/2022    Procedure: INJECTION,SACROILIAC JOINT LEFT CONTRAST;  Surgeon: Melody Berry MD;  Location: [facility] PAIN MGT;  Service: Pain Management;  Laterality: Left;    INJECTION, SACROILIAC JOINT Left 8/22/2023    Procedure: INJECTION,SACROILIAC JOINT, LEFT SOONER DATE;  Surgeon: Melody Berry MD;  Location: [facility] PAIN MGT;  Service: Pain Management;  Laterality: Left;    LAPAROSCOPIC CHOLECYSTECTOMY  05/2018    SHOULDER SURGERY Left        Past Medical History:   Diagnosis Date    History of pulmonary embolus (PE) 04/15/2019    Pt with PE s/p hospitalization.  He was placed on xarelto with high risk with cancer.  He has been out of the medication for  the past month. Restart Xarelto 15 mg b.i.d. for 21 days, then 20 mg q.day until cancer no longer active.    Non Hodgkin's lymphoma     Personal history of colonic polyps 2015    per [facility] report - repeat 2020    Peyronie disease     S/P laparoscopic cholecystectomy 2019    Thoracic aorta atherosclerosis     Imagin       Social History     Socioeconomic History    Marital status:     Number of children: 2   Occupational History    Occupation: disabled [employer]    Tobacco Use    Smoking status: Never    Smokeless tobacco: Never   Substance and Sexual Activity    Alcohol use: No    Drug use: No    Sexual activity: Yes     Partners: Female   Social History Narrative    Lives with wife.       Social Determinants of Health     Financial Resource Strain: High Risk (2024)    Overall Financial Resource Strain (CARDIA)     Difficulty of Paying Living Expenses: Very hard   Food Insecurity: Food Insecurity Present (2024)    Hunger Vital Sign     Worried About Running Out of Food in the Last Year: Never true     Ran Out of Food in the Last Year: Sometimes true   Transportation Needs: No Transportation Needs (2024)    PRAPARE - Transportation     Lack of Transportation (Medical): No     Lack of Transportation (Non-Medical): No   Physical Activity: Sufficiently Active (2024)    Exercise Vital Sign     Days of Exercise per Week: 3 days     Minutes of Exercise per Session: 80 min   Stress: Stress Concern Present (2024)    [employer] - Occupational Stress Questionnaire     Feeling of Stress : To some extent   Housing Stability: Low Risk  (2024)    Housing Stability Vital Sign     Unable to Pay for Housing in the Last Year: No     Homeless in the Last Year: No       Review of Systems         No data to display                 Checklist of Daily Activities:        Objective:   /74 (BP Location: Left arm, Patient Position: Sitting, BP  "Method: Medium (Manual))   Pulse 60   Temp 98.4 °F (36.9 °C) (Oral)   Ht 5' 8" (1.727 m)   Wt 86.7 kg (191 lb 4 oz)   SpO2 98%   BMI 29.08 kg/m²     Physical Exam  Constitutional:       General: He is not in acute distress.     Appearance: Normal appearance. He is not ill-appearing, toxic-appearing or diaphoretic.   HENT:      Head: Normocephalic and atraumatic.   Cardiovascular:      Rate and Rhythm: Normal rate and regular rhythm.      Heart sounds: Normal heart sounds.   Pulmonary:      Effort: Pulmonary effort is normal.      Breath sounds: Normal breath sounds.   Abdominal:      Palpations: Abdomen is soft.      Tenderness: There is no abdominal tenderness. There is no guarding or rebound.   Musculoskeletal:      Right lower leg: No edema.      Left lower leg: No edema.   Lymphadenopathy:      Cervical: No cervical adenopathy.   Neurological:      Mental Status: He is alert.             Lab Results   Component Value Date    WBC 5.46 04/16/2024    HGB 15.2 04/16/2024    HCT 45.8 04/16/2024     04/16/2024    CHOL 199 04/16/2024    TRIG 79 04/16/2024    HDL 57 04/16/2024    ALT 14 04/16/2024    AST 20 04/16/2024     04/16/2024    K 4.1 04/16/2024     04/16/2024    CREATININE 0.8 04/16/2024    BUN 15 04/16/2024    CO2 28 04/16/2024    TSH 0.719 11/14/2023    PSA 0.80 04/16/2024    INR 1.0 10/14/2022    HGBA1C 5.4 02/14/2023       Current Outpatient Medications on File Prior to Visit   Medication Sig Dispense Refill    amoxicillin-clavulanate 875-125mg (AUGMENTIN) 875-125 mg per tablet Take 1 tablet by mouth 2 (two) times daily. 14 tablet 0    ergocalciferol (ERGOCALCIFEROL) 50,000 unit Cap Take 1 capsule by mouth every 7 days.      fluticasone propionate (FLONASE) 50 mcg/actuation nasal spray SHAKE LIQUID AND USE 2 SPRAYS(100 MCG) IN EACH NOSTRIL EVERY DAY 48 g 3    mupirocin (BACTROBAN) 2 % ointment Apply to affected area 3 times daily 22 g 1    prednisolon/gatiflox/bromfenac (PREDNISOL " ACE-GATIFLOX-BROMFEN) 1-0.5-0.075 % DrpS Apply 1 drop to eye 3 (three) times daily. in operative eye for 1 month after surgery 5 mL 3    [DISCONTINUED] traZODone (DESYREL) 50 MG tablet Take 1 tablet (50 mg total) by mouth every evening. 30 tablet 2    [DISCONTINUED] venlafaxine (EFFEXOR-XR) 75 MG 24 hr capsule Take 1 capsule (75 mg total) by mouth once daily. 30 capsule 3     Current Facility-Administered Medications on File Prior to Visit   Medication Dose Route Frequency Provider Last Rate Last Admin    0.9%  NaCl infusion  500 mL Intravenous Continuous Amado Parekh MD             Assessment:   68 y.o. male with multiple co-morbid illnesses here for continued follow up of medical problems.      Plan:     Problem List Items Addressed This Visit          Psychiatric    Current mild episode of major depressive disorder - Primary     Significant insomnia.  Trazodone not very helpful  Trial of doxepin.              Derm    Other acne     Better but c/o ongoing oily skin.  Did not try an oil free face wash.  Only using water to wash face  Try face wash.              Oncology    History of follicular lymphoma       Other    Healthcare maintenance     ACP reviewed today   Yearly labs with PSA 4/2024  Colon--Awtry Sept 16th.               Health Maintenance         Date Due Completion Date    Colorectal Cancer Screening 05/18/2024 5/18/2021    Override on 5/18/2021: Done (repeat 3 year)    Influenza Vaccine (1) 09/01/2024 11/14/2023    COVID-19 Vaccine (7 - 2023-24 season) 09/01/2024 6/9/2022    Pneumococcal Vaccines (Age 65+) (3 of 3 - PPSV23 or PCV20) 08/18/2025 8/18/2020    Hemoglobin A1c (Diabetic Prevention Screening) 02/14/2026 2/14/2023    Lipid Panel 04/16/2029 4/16/2024    TETANUS VACCINE 06/15/2030 6/15/2020          Medications Reconciliation:   I have reconciled the patient's home medications with the patient/family. I have updated all changes.  Refer to After-Visit Medication List.      Medication List             Accurate as of September 10, 2024  9:37 AM. If you have any questions, ask your nurse or doctor.                START taking these medications      doxepin 3 mg Tab  Commonly known as: SILENOR  Take 1 tablet by mouth nightly as needed (sleep).  Started by: Roly Flannery MD     FLUAD TRIV 2024-25(65Y UP)(PF) 45 mcg (15 mcg x 3)/0.5 mL IM vaccine (> or = 66 yo)  Generic drug: influenza (adjuvanted)  Inject 0.5 mLs into the muscle once. for 1 dose            CONTINUE taking these medications      amoxicillin-clavulanate 875-125mg 875-125 mg per tablet  Commonly known as: AUGMENTIN  Take 1 tablet by mouth 2 (two) times daily.     ergocalciferol 50,000 unit Cap  Commonly known as: ERGOCALCIFEROL     fluticasone propionate 50 mcg/actuation nasal spray  Commonly known as: FLONASE  SHAKE LIQUID AND USE 2 SPRAYS(100 MCG) IN EACH NOSTRIL EVERY DAY     mupirocin 2 % ointment  Commonly known as: BACTROBAN  Apply to affected area 3 times daily     prednisol ace-gatiflox-bromfen 1-0.5-0.075 % Drps  Apply 1 drop to eye 3 (three) times daily. in operative eye for 1 month after surgery            STOP taking these medications      traZODone 50 MG tablet  Commonly known as: DESYREL  Stopped by: Roly Flannery MD     venlafaxine 75 MG 24 hr capsule  Commonly known as: EFFEXOR-XR  Stopped by: Roly Flannery MD               Where to Get Your Medications        These medications were sent to [facility]  [address] [address]      Hours: Mon-Fri, 8a-5:30p Phone: [phone]   FLUAD TRIV 2024-25(65Y UP)(PF) 45 mcg (15 mcg x 3)/0.5 mL IM vaccine (> or = 66 yo)       These medications were sent to [id number] [place], [place] - [address]  [address] [address]      Phone: [phone]   doxepin 3 mg Tab              Follow up in about 2 months (around 11/10/2024). Total clinical care time was 41  min. The following issues were discussed: eye pain and floaters, dizziness and resutls of MRI/MRA, colon needed, allergic reaction mariano neomycin, insomnia     Roly Flannery MD  Internal Medicine  [facility]  [phone]

## 2024-09-10 NOTE — ASSESSMENT & PLAN NOTE
Better but c/o ongoing oily skin.  Did not try an oil free face wash.  Only using water to wash face  Try face wash.

## 2024-10-21 PROBLEM — Z00.00 HEALTHCARE MAINTENANCE: Status: RESOLVED | Noted: 2019-11-19 | Resolved: 2024-10-21

## 2024-11-11 NOTE — PROGRESS NOTES
.  This note has been generated using voice-recognition software. There may be typographical errors that have been missed during proof-reading.     Primary Care Provider Appointment    Subjective:      Patient ID: Jeff Hope is a 68 y.o. male here for follow up.     Chief Complaint: Follow-up, Health Maintenance, and Pain    HPI:  This is a pleasant 68-year-old male with bronchiectasis, hyperlipidemia, history of pulmonary embolism on chronic anticoagulation with a follicular non-Hodgkin's lymphoma, h/oacute/reactivated hepatitis B here for f/u.  Pt last seen 9/10/2024.      Pt has a PET scan for tomorrow.    Taking doxepin and does work but is expensive.  Found with OTC coupon cheaper.    Is using rthe OTC acne wash.  Improved but still greasy and acne is better.    No further ear infections.      Patient Active Problem List   Diagnosis    Bronchiectasis    History of follicular lymphoma    Mixed hyperlipidemia    Current mild episode of major depressive disorder    Aortic atherosclerosis    Pain in both testicles    Health care maintenance    Pulmonary hypertension    Myalgia due to statin    Vitamin D deficiency    Abnormal MMSE    Allergic rhinitis    Adrenal hypertrophy    Bilateral shoulder bursitis    History of hepatitis B    Trigger finger of left thumb    Bilateral shoulder pain    Chronic thumb pain, bilateral    Breast mass, right    Peyronie's disease    Hypotestosteronemia in male    Iron deficiency anemia    Gastroesophageal reflux disease without esophagitis    Myofascial pain    Osteoarthritis of spine with radiculopathy, lumbosacral region    Decreased range of motion of lumbar spine    Hypogammaglobulinemia    Sacroiliitis    Pain of right upper extremity    Thoracic aorta atherosclerosis    Photopsia of right eye    Floaters, bilateral    Other acne        Past Surgical History:   Procedure Laterality Date    CATARACT EXTRACTION W/  INTRAOCULAR LENS IMPLANT Right 4/18/2024    Procedure:  EXTRACTION, CATARACT, WITH IOL INSERTION;  Surgeon: Jefferson Montes De Oca MD;  Location: [facility] OR;  Service: Ophthalmology;  Laterality: Right;    ESOPHAGOGASTRODUODENOSCOPY N/A 2021    Procedure: ESOPHAGOGASTRODUODENOSCOPY (EGD);  Surgeon: Kristine Palmer MD;  Location: [facility] ENDO (4TH FLR);  Service: Endoscopy;  Laterality: N/A;  fully vacc-inst mail-tb-left chest port    GROIN EXPLORATION  2 pre  and 1 post     total of 3 procedures    INJECTION OF ANESTHETIC AGENT AROUND NERVE Left 2022    Procedure: BLOCK, NERVE, LEFT L3-L5 MEDIAL BRANCH;  Surgeon: Melody Berry MD;  Location: [facility] PAIN MGT;  Service: Pain Management;  Laterality: Left;    INJECTION, SACROILIAC JOINT Left 10/11/2022    Procedure: INJECTION,SACROILIAC JOINT LEFT CONTRAST;  Surgeon: Melody Berry MD;  Location: [facility] PAIN MGT;  Service: Pain Management;  Laterality: Left;    INJECTION, SACROILIAC JOINT Left 2023    Procedure: INJECTION,SACROILIAC JOINT, LEFT SOONER DATE;  Surgeon: Melody Berry MD;  Location: [facility] PAIN MGT;  Service: Pain Management;  Laterality: Left;    LAPAROSCOPIC CHOLECYSTECTOMY  2018    SHOULDER SURGERY Left        Past Medical History:   Diagnosis Date    History of pulmonary embolus (PE) 04/15/2019    Pt with PE s/p hospitalization.  He was placed on xarelto with high risk with cancer.  He has been out of the medication for the past month. Restart Xarelto 15 mg b.i.d. for 21 days, then 20 mg q.day until cancer no longer active.    Non Hodgkin's lymphoma     Personal history of colonic polyps 2015    per [facility] report - repeat 2020    Peyronie disease     S/P laparoscopic cholecystectomy 2019    Thoracic aorta atherosclerosis     Imagin       Social History     Socioeconomic History    Marital status:     Number of children: 2   Occupational History    Occupation: disabled [employer]    Tobacco Use    Smoking status: Never    Smokeless tobacco: Never  "  Substance and Sexual Activity    Alcohol use: No    Drug use: No    Sexual activity: Yes     Partners: Female   Social History Narrative    Lives with wife.       Social Drivers of Health     Financial Resource Strain: High Risk (5/17/2024)    Overall Financial Resource Strain (CARDIA)     Difficulty of Paying Living Expenses: Very hard   Food Insecurity: Food Insecurity Present (5/17/2024)    Hunger Vital Sign     Worried About Running Out of Food in the Last Year: Never true     Ran Out of Food in the Last Year: Sometimes true   Transportation Needs: No Transportation Needs (5/17/2024)    PRAPARE - Transportation     Lack of Transportation (Medical): No     Lack of Transportation (Non-Medical): No   Physical Activity: Sufficiently Active (5/17/2024)    Exercise Vital Sign     Days of Exercise per Week: 3 days     Minutes of Exercise per Session: 80 min   Stress: Stress Concern Present (5/17/2024)    [employer] - Occupational Stress Questionnaire     Feeling of Stress : To some extent   Housing Stability: Low Risk  (5/17/2024)    Housing Stability Vital Sign     Unable to Pay for Housing in the Last Year: No     Homeless in the Last Year: No       Review of Systems         No data to display                 Checklist of Daily Activities:        Objective:   /68 (BP Location: Left arm, Patient Position: Sitting)   Pulse 68   Temp 99.2 °F (37.3 °C) (Oral)   Resp 18   Ht 5' 8" (1.727 m)   Wt 86.7 kg (191 lb 2.2 oz)   SpO2 99%   BMI 29.06 kg/m²     Physical Exam  Constitutional:       General: He is not in acute distress.     Appearance: Normal appearance. He is not ill-appearing, toxic-appearing or diaphoretic.   HENT:      Head: Normocephalic and atraumatic.   Cardiovascular:      Rate and Rhythm: Normal rate and regular rhythm.      Heart sounds: Normal heart sounds.   Pulmonary:      Effort: Pulmonary effort is normal.      Breath sounds: Normal breath sounds. "   Abdominal:      Palpations: Abdomen is soft.      Tenderness: There is no abdominal tenderness. There is no guarding or rebound.   Musculoskeletal:      Right lower leg: No edema.      Left lower leg: No edema.   Lymphadenopathy:      Cervical: No cervical adenopathy.   Neurological:      Mental Status: He is alert.             Lab Results   Component Value Date    WBC 5.46 04/16/2024    HGB 15.2 04/16/2024    HCT 45.8 04/16/2024     04/16/2024    CHOL 199 04/16/2024    TRIG 79 04/16/2024    HDL 57 04/16/2024    ALT 14 04/16/2024    AST 20 04/16/2024     04/16/2024    K 4.1 04/16/2024     04/16/2024    CREATININE 0.8 04/16/2024    BUN 15 04/16/2024    CO2 28 04/16/2024    TSH 0.719 11/14/2023    PSA 0.80 04/16/2024    INR 1.0 10/14/2022    HGBA1C 5.4 02/14/2023       Current Outpatient Medications on File Prior to Visit   Medication Sig Dispense Refill    ergocalciferol (ERGOCALCIFEROL) 50,000 unit Cap Take 1 capsule by mouth every 7 days.      fluticasone propionate (FLONASE) 50 mcg/actuation nasal spray SHAKE LIQUID AND USE 2 SPRAYS(100 MCG) IN EACH NOSTRIL EVERY DAY 48 g 3    [DISCONTINUED] doxepin (SILENOR) 3 mg Tab Take 1 tablet by mouth nightly as needed (sleep). 30 tablet 0    [DISCONTINUED] amoxicillin-clavulanate 875-125mg (AUGMENTIN) 875-125 mg per tablet Take 1 tablet by mouth 2 (two) times daily. (Patient not taking: Reported on 11/12/2024) 14 tablet 0    [DISCONTINUED] mupirocin (BACTROBAN) 2 % ointment Apply to affected area 3 times daily (Patient not taking: Reported on 11/12/2024) 22 g 1    [DISCONTINUED] prednisolon/gatiflox/bromfenac (PREDNISOL ACE-GATIFLOX-BROMFEN) 1-0.5-0.075 % DrpS Apply 1 drop to eye 3 (three) times daily. in operative eye for 1 month after surgery (Patient not taking: Reported on 11/12/2024) 5 mL 3     Current Facility-Administered Medications on File Prior to Visit   Medication Dose Route Frequency Provider Last Rate Last Admin    0.9%  NaCl infusion  500  mL Intravenous Continuous Amado Parekh MD             Assessment:   68 y.o. male with multiple co-morbid illnesses here for continued follow up of medical problems.      Plan:     Problem List Items Addressed This Visit          Psychiatric    Current mild episode of major depressive disorder - Primary     Significant insomnia.  Doxepin helps.  Will increase dose to 6mg and give a good rx coupon.    Trial of doxepin--higher dose.  To call if would like to stop and using nightly.              Oncology    History of follicular lymphoma     Followed by Oncology at [facility].  On chemotherapy in the past.  Most recent CT scan with slight enlargement noted of lymph nodes.  PET scan for f/u tomorrow  Await PET scan and continue onc f/u            Other    Health care maintenance     ACP reviewed 7/2024   Yearly labs with PSA 4/2024  Colon--Awtry Sept 16th.   Needs to be scanned.    Flu vaccine today            Health Maintenance         Date Due Completion Date    Colorectal Cancer Screening 05/18/2024 5/18/2021    Override on 5/18/2021: Done (repeat 3 year)    Influenza Vaccine (1) 09/01/2024 11/14/2023    COVID-19 Vaccine (7 - 2024-25 season) 09/01/2024 6/9/2022    Pneumococcal Vaccines (Age 65+) (3 of 3 - PPSV23 or PCV20) 08/18/2025 8/18/2020    Hemoglobin A1c (Diabetic Prevention Screening) 02/14/2026 2/14/2023    Lipid Panel 04/16/2029 4/16/2024    TETANUS VACCINE 06/15/2030 6/15/2020          Medications Reconciliation:   I have reconciled the patient's home medications with the patient/family. I have updated all changes.  Refer to After-Visit Medication List.      Medication List            Accurate as of November 12, 2024  8:45 AM. If you have any questions, ask your nurse or doctor.                CHANGE how you take these medications      doxepin 6 mg Tab  Commonly known as: SILENOR  Take 6 mg by mouth nightly as needed (insomnia).  What changed:   medication strength  how much to take  reasons to take  this  Changed by: Roly Flannery MD            CONTINUE taking these medications      ergocalciferol 50,000 unit Cap  Commonly known as: ERGOCALCIFEROL     fluticasone propionate 50 mcg/actuation nasal spray  Commonly known as: FLONASE  SHAKE LIQUID AND USE 2 SPRAYS(100 MCG) IN EACH NOSTRIL EVERY DAY            STOP taking these medications      amoxicillin-clavulanate 875-125mg 875-125 mg per tablet  Commonly known as: AUGMENTIN  Stopped by: Roly Flannery MD     mupirocin 2 % ointment  Commonly known as: BACTROBAN  Stopped by: Roly Flannery MD     prednisol ace-gatiflox-bromfen 1-0.5-0.075 % Drps  Stopped by: Roly Flannery MD               Where to Get Your Medications        You can get these medications from any pharmacy    Bring a paper prescription for each of these medications  doxepin 6 mg Tab              Follow up in about 2 months (around 1/12/2025). Total clinical care time was 29 min. The following issues were discussed: h/o lymphoma, flu vaccine, doxepin for sleep     Roly Flannery MD  Internal Medicine  [facility]  [phone]

## 2024-11-12 ENCOUNTER — OFFICE VISIT (OUTPATIENT)
Dept: PRIMARY CARE CLINIC | Facility: CLINIC | Age: 69
End: 2024-11-12
Payer: MEDICARE

## 2024-11-12 VITALS
TEMPERATURE: 99 F | DIASTOLIC BLOOD PRESSURE: 68 MMHG | HEART RATE: 68 BPM | BODY MASS INDEX: 28.97 KG/M2 | SYSTOLIC BLOOD PRESSURE: 112 MMHG | WEIGHT: 191.13 LBS | RESPIRATION RATE: 18 BRPM | HEIGHT: 68 IN | OXYGEN SATURATION: 99 %

## 2024-11-12 DIAGNOSIS — F32.0 CURRENT MILD EPISODE OF MAJOR DEPRESSIVE DISORDER WITHOUT PRIOR EPISODE: Primary | ICD-10-CM

## 2024-11-12 DIAGNOSIS — Z85.72 HISTORY OF FOLLICULAR LYMPHOMA: ICD-10-CM

## 2024-11-12 DIAGNOSIS — Z23 FLU VACCINE NEED: ICD-10-CM

## 2024-11-12 DIAGNOSIS — Z00.00 HEALTH CARE MAINTENANCE: ICD-10-CM

## 2024-11-12 PROCEDURE — 1101F PT FALLS ASSESS-DOCD LE1/YR: CPT | Mod: HCNC,CPTII,S$GLB, | Performed by: INTERNAL MEDICINE

## 2024-11-12 PROCEDURE — 90653 IIV ADJUVANT VACCINE IM: CPT | Mod: HCNC,S$GLB,, | Performed by: INTERNAL MEDICINE

## 2024-11-12 PROCEDURE — 1157F ADVNC CARE PLAN IN RCRD: CPT | Mod: HCNC,CPTII,S$GLB, | Performed by: INTERNAL MEDICINE

## 2024-11-12 PROCEDURE — G0008 ADMIN INFLUENZA VIRUS VAC: HCPCS | Mod: HCNC,S$GLB,, | Performed by: INTERNAL MEDICINE

## 2024-11-12 PROCEDURE — 99213 OFFICE O/P EST LOW 20 MIN: CPT | Mod: HCNC,S$GLB,, | Performed by: INTERNAL MEDICINE

## 2024-11-12 PROCEDURE — 3008F BODY MASS INDEX DOCD: CPT | Mod: HCNC,CPTII,S$GLB, | Performed by: INTERNAL MEDICINE

## 2024-11-12 PROCEDURE — G2211 COMPLEX E/M VISIT ADD ON: HCPCS | Mod: HCNC,S$GLB,, | Performed by: INTERNAL MEDICINE

## 2024-11-12 PROCEDURE — 1159F MED LIST DOCD IN RCRD: CPT | Mod: HCNC,CPTII,S$GLB, | Performed by: INTERNAL MEDICINE

## 2024-11-12 PROCEDURE — 1160F RVW MEDS BY RX/DR IN RCRD: CPT | Mod: HCNC,CPTII,S$GLB, | Performed by: INTERNAL MEDICINE

## 2024-11-12 PROCEDURE — 3074F SYST BP LT 130 MM HG: CPT | Mod: HCNC,CPTII,S$GLB, | Performed by: INTERNAL MEDICINE

## 2024-11-12 PROCEDURE — 3078F DIAST BP <80 MM HG: CPT | Mod: HCNC,CPTII,S$GLB, | Performed by: INTERNAL MEDICINE

## 2024-11-12 PROCEDURE — 1125F AMNT PAIN NOTED PAIN PRSNT: CPT | Mod: HCNC,CPTII,S$GLB, | Performed by: INTERNAL MEDICINE

## 2024-11-12 PROCEDURE — 3288F FALL RISK ASSESSMENT DOCD: CPT | Mod: HCNC,CPTII,S$GLB, | Performed by: INTERNAL MEDICINE

## 2024-11-12 RX ORDER — DOXEPIN 6 MG/1
6 TABLET, FILM COATED ORAL NIGHTLY PRN
Qty: 30 TABLET | Refills: 3 | Status: SHIPPED | OUTPATIENT
Start: 2024-11-12

## 2024-11-12 NOTE — ASSESSMENT & PLAN NOTE
ACP reviewed 7/2024   Yearly labs with PSA 4/2024  Colon--Awtry Sept 16th.   Needs to be scanned.    Flu vaccine today

## 2024-11-12 NOTE — ASSESSMENT & PLAN NOTE
Followed by Oncology at [facility].  On chemotherapy in the past.  Most recent CT scan with slight enlargement noted of lymph nodes.  PET scan for f/u tomorrow  Await PET scan and continue onc f/u

## 2024-11-12 NOTE — ASSESSMENT & PLAN NOTE
Significant insomnia.  Doxepin helps.  Will increase dose to 6mg and give a good rx coupon.    Trial of doxepin--higher dose.  To call if would like to stop and using nightly.

## 2024-12-24 ENCOUNTER — OFFICE VISIT (OUTPATIENT)
Dept: URGENT CARE | Facility: CLINIC | Age: 69
End: 2024-12-24
Payer: MEDICARE

## 2024-12-24 VITALS
OXYGEN SATURATION: 98 % | TEMPERATURE: 98 F | HEART RATE: 73 BPM | BODY MASS INDEX: 28.97 KG/M2 | WEIGHT: 191.13 LBS | DIASTOLIC BLOOD PRESSURE: 68 MMHG | SYSTOLIC BLOOD PRESSURE: 123 MMHG | RESPIRATION RATE: 18 BRPM | HEIGHT: 68 IN

## 2024-12-24 DIAGNOSIS — J01.90 ACUTE BACTERIAL SINUSITIS: Primary | ICD-10-CM

## 2024-12-24 DIAGNOSIS — B96.89 ACUTE BACTERIAL SINUSITIS: Primary | ICD-10-CM

## 2024-12-24 DIAGNOSIS — R05.1 ACUTE COUGH: ICD-10-CM

## 2024-12-24 PROCEDURE — 99213 OFFICE O/P EST LOW 20 MIN: CPT | Mod: S$GLB,,,

## 2024-12-24 RX ORDER — AMOXICILLIN AND CLAVULANATE POTASSIUM 875; 125 MG/1; MG/1
1 TABLET, FILM COATED ORAL EVERY 12 HOURS
Qty: 14 TABLET | Refills: 0 | Status: SHIPPED | OUTPATIENT
Start: 2024-12-24 | End: 2024-12-31

## 2024-12-24 RX ORDER — BENZONATATE 100 MG/1
100 CAPSULE ORAL 3 TIMES DAILY PRN
Qty: 21 CAPSULE | Refills: 0 | Status: SHIPPED | OUTPATIENT
Start: 2024-12-24 | End: 2024-12-31

## 2024-12-24 NOTE — PATIENT INSTRUCTIONS
"                                                          Sinusitis     If your condition worsens or fails to improve we recommend that you receive another evaluation at the urgent care/ER immediately or contact your PCP to discuss your concerns. You must understand that you've received an urgent care treatment only and that you may be released before all your medical problems are known or treated. You the patient will arrange for followup care as instructed.   Take antibiotics with food and as directed.     Flonase (fluticasone) is a nasal spray which is available over the counter and may help with your symptoms   Zyrtec D, Claritin D or allegra D can also help with symptoms of congestion and drainage.   If you have hypertension avoid using the "D" which is the decongestant   If you just have drainage you can take plain zyrtec, claritin or allegra     Rest and fluids are also important.     Tylenol or ibuprofen can also be used as directed for pain unless you have an allergy to them or medical condition such as stomach ulcers, kidney or liver disease or blood thinners etc for which you should not be taking these type of medications.     If symptoms do not improve in 2-3 days please return for evaluation  "

## 2024-12-24 NOTE — PROGRESS NOTES
"Subjective:      Patient ID: Jeff Hope is a 69 y.o. male.    Vitals:  height is 5' 8" (1.727 m) and weight is 86.7 kg (191 lb 2.2 oz). His oral temperature is 98.3 °F (36.8 °C). His blood pressure is 123/68 and his pulse is 73. His respiration is 18 and oxygen saturation is 98%.     Chief Complaint: Sinus Problem    70 yo male complaining of congestion, cough, and post nasal drip. Pt states he been having symptoms for over a week. Pt states he's coughing up thick mucus.  Pt states he took many OTC medicines an none worked. Pt states he has some discomfort around is eyes and neck. Pt states when he cough his lower abdomen hurts. Denies any shortness of breath.     Sinus Problem  This is a new problem. The current episode started 1 to 4 weeks ago. The problem has been gradually worsening since onset. There has been no fever. His pain is at a severity of 5/10. The pain is moderate. Associated symptoms include congestion, coughing and sinus pressure. Past treatments include nasal decongestants and oral decongestants. The treatment provided no relief.       Constitution: Negative.   HENT:  Positive for congestion and sinus pressure.    Cardiovascular: Negative.    Eyes: Negative.    Respiratory:  Positive for cough.    Gastrointestinal: Negative.    Endocrine: negative.   Genitourinary: Negative.    Skin: Negative.    Allergic/Immunologic: Negative.    Neurological: Negative.    Hematologic/Lymphatic: Negative.    Psychiatric/Behavioral: Negative.        Objective:     Physical Exam   Constitutional: He is oriented to person, place, and time.   HENT:   Head: Normocephalic and atraumatic.   Ears:   Right Ear: Tympanic membrane, external ear and ear canal normal.   Left Ear: Tympanic membrane, external ear and ear canal normal.   Nose: Purulent discharge and congestion present. Right sinus exhibits maxillary sinus tenderness. Left sinus exhibits maxillary sinus tenderness.   Mouth/Throat: Mucous membranes are moist. " "No oropharyngeal exudate or posterior oropharyngeal erythema.   Eyes: Conjunctivae are normal. Pupils are equal, round, and reactive to light.   Neck: Neck supple.   Cardiovascular: Regular rhythm and normal heart sounds.   Pulmonary/Chest: Breath sounds normal. No stridor. No respiratory distress. He has no wheezes. He has no rales.   Abdominal: Normal appearance. Soft.   Musculoskeletal: Normal range of motion.         General: Normal range of motion.   Neurological: He is alert and oriented to person, place, and time.   Skin: Skin is warm and dry.   Psychiatric: His behavior is normal.       Assessment:     1. Acute bacterial sinusitis    2. Acute cough        Plan:       Acute bacterial sinusitis  -     amoxicillin-clavulanate 875-125mg (AUGMENTIN) 875-125 mg per tablet; Take 1 tablet by mouth every 12 (twelve) hours. for 7 days  Dispense: 14 tablet; Refill: 0    Acute cough  -     benzonatate (TESSALON) 100 MG capsule; Take 1 capsule (100 mg total) by mouth 3 (three) times daily as needed for Cough.  Dispense: 21 capsule; Refill: 0      Patient Instructions                                                             Sinusitis     If your condition worsens or fails to improve we recommend that you receive another evaluation at the urgent care/ER immediately or contact your PCP to discuss your concerns. You must understand that you've received an urgent care treatment only and that you may be released before all your medical problems are known or treated. You the patient will arrange for followup care as instructed.   Take antibiotics with food and as directed.     Flonase (fluticasone) is a nasal spray which is available over the counter and may help with your symptoms   Zyrtec D, Claritin D or allegra D can also help with symptoms of congestion and drainage.   If you have hypertension avoid using the "D" which is the decongestant   If you just have drainage you can take plain zyrtec, claritin or allegra "     Rest and fluids are also important.     Tylenol or ibuprofen can also be used as directed for pain unless you have an allergy to them or medical condition such as stomach ulcers, kidney or liver disease or blood thinners etc for which you should not be taking these type of medications.     If symptoms do not improve in 2-3 days please return for evaluation

## 2025-01-13 PROBLEM — C82.9A: Status: ACTIVE | Noted: 2019-02-12

## 2025-01-14 ENCOUNTER — OFFICE VISIT (OUTPATIENT)
Dept: PRIMARY CARE CLINIC | Facility: CLINIC | Age: 70
End: 2025-01-14
Payer: MEDICARE

## 2025-01-14 VITALS
SYSTOLIC BLOOD PRESSURE: 118 MMHG | DIASTOLIC BLOOD PRESSURE: 68 MMHG | TEMPERATURE: 98 F | OXYGEN SATURATION: 98 % | HEIGHT: 68 IN | RESPIRATION RATE: 18 BRPM | BODY MASS INDEX: 29.32 KG/M2 | HEART RATE: 67 BPM | WEIGHT: 193.44 LBS

## 2025-01-14 DIAGNOSIS — M75.51 BILATERAL SHOULDER BURSITIS: ICD-10-CM

## 2025-01-14 DIAGNOSIS — C82.9A: Primary | ICD-10-CM

## 2025-01-14 DIAGNOSIS — I27.20 PULMONARY HYPERTENSION: ICD-10-CM

## 2025-01-14 DIAGNOSIS — J47.9 BRONCHIECTASIS WITHOUT COMPLICATION: ICD-10-CM

## 2025-01-14 DIAGNOSIS — Z00.00 HEALTH CARE MAINTENANCE: ICD-10-CM

## 2025-01-14 DIAGNOSIS — M75.52 BILATERAL SHOULDER BURSITIS: ICD-10-CM

## 2025-01-14 DIAGNOSIS — K11.9 LESION OF PAROTID GLAND: ICD-10-CM

## 2025-01-14 DIAGNOSIS — F32.0 CURRENT MILD EPISODE OF MAJOR DEPRESSIVE DISORDER WITHOUT PRIOR EPISODE: ICD-10-CM

## 2025-01-14 DIAGNOSIS — J30.2 SEASONAL ALLERGIC RHINITIS, UNSPECIFIED TRIGGER: ICD-10-CM

## 2025-01-14 PROCEDURE — 1125F AMNT PAIN NOTED PAIN PRSNT: CPT | Mod: HCNC,CPTII,S$GLB, | Performed by: INTERNAL MEDICINE

## 2025-01-14 PROCEDURE — 3008F BODY MASS INDEX DOCD: CPT | Mod: HCNC,CPTII,S$GLB, | Performed by: INTERNAL MEDICINE

## 2025-01-14 PROCEDURE — 3074F SYST BP LT 130 MM HG: CPT | Mod: HCNC,CPTII,S$GLB, | Performed by: INTERNAL MEDICINE

## 2025-01-14 PROCEDURE — G2211 COMPLEX E/M VISIT ADD ON: HCPCS | Mod: HCNC,S$GLB,, | Performed by: INTERNAL MEDICINE

## 2025-01-14 PROCEDURE — 3288F FALL RISK ASSESSMENT DOCD: CPT | Mod: HCNC,CPTII,S$GLB, | Performed by: INTERNAL MEDICINE

## 2025-01-14 PROCEDURE — 3078F DIAST BP <80 MM HG: CPT | Mod: HCNC,CPTII,S$GLB, | Performed by: INTERNAL MEDICINE

## 2025-01-14 PROCEDURE — 1159F MED LIST DOCD IN RCRD: CPT | Mod: HCNC,CPTII,S$GLB, | Performed by: INTERNAL MEDICINE

## 2025-01-14 PROCEDURE — 1157F ADVNC CARE PLAN IN RCRD: CPT | Mod: HCNC,CPTII,S$GLB, | Performed by: INTERNAL MEDICINE

## 2025-01-14 PROCEDURE — 1101F PT FALLS ASSESS-DOCD LE1/YR: CPT | Mod: HCNC,CPTII,S$GLB, | Performed by: INTERNAL MEDICINE

## 2025-01-14 PROCEDURE — 99214 OFFICE O/P EST MOD 30 MIN: CPT | Mod: HCNC,S$GLB,, | Performed by: INTERNAL MEDICINE

## 2025-01-14 RX ORDER — DICLOFENAC SODIUM 10 MG/G
4 GEL TOPICAL 3 TIMES DAILY PRN
Qty: 150 G | Refills: 1 | Status: SHIPPED | OUTPATIENT
Start: 2025-01-14

## 2025-01-14 RX ORDER — FLUTICASONE PROPIONATE 50 MCG
SPRAY, SUSPENSION (ML) NASAL
Qty: 48 G | Refills: 11 | Status: SHIPPED | OUTPATIENT
Start: 2025-01-14

## 2025-01-14 NOTE — ASSESSMENT & PLAN NOTE
Significant insomnia.  Doxepin helps andpt is feeling much better abnd more rested on increased dose.    Continue  doxepin--higher dose.

## 2025-01-14 NOTE — ASSESSMENT & PLAN NOTE
Followed by Oncology at [facility].  On chemotherapy in the past.  PET scan UTD  No B symptoms and doing well.  Continue f/u onc  Pt to call onc to review test

## 2025-01-14 NOTE — PROGRESS NOTES
.  This note has been generated using voice-recognition software. There may be typographical errors that have been missed during proof-reading.     Primary Care Provider Appointment    Subjective:      Patient ID: Jeff Hope is a 69 y.o. male here for follow up.     Chief Complaint: Depression    HPI:  This is a pleasant 69-year-old male with bronchiectasis, hyperlipidemia, history of pulmonary embolism on chronic anticoagulation with a follicular non-Hodgkin's lymphoma in remission, h/o acute/reactivated hepatitis B, pulmonary HTN, Peyronie's disease, GERD, statin myalgias here for f/u.  Pt last seen 11/12/2024.    12/24 pt seen in  for bacterial sinusitis.  Augmentin.      He is doing OK on increased silenor.  He states that he cannot eat with taking the med as it works better.  No handover with this med.  Getting only 5 hours of sleep.  He does feel like he rests on the medication.    Going to the gym.  2 lb wt gain since last visit.    Does not want covid vaccine.    L shoulder starting to hurt again.  Lifting weights.  Better with CSI in the past.      Patient Active Problem List   Diagnosis    Bronchiectasis    Follicular lymphoma, unspecified, in remission    Mixed hyperlipidemia    Current mild episode of major depressive disorder    Aortic atherosclerosis    Pain in both testicles    Health care maintenance    Pulmonary hypertension    Myalgia due to statin    Vitamin D deficiency    Abnormal MMSE    Allergic rhinitis    Adrenal hypertrophy    Bilateral shoulder bursitis    History of hepatitis B    Trigger finger of left thumb    Bilateral shoulder pain    Chronic thumb pain, bilateral    Breast mass, right    Peyronie's disease    Hypotestosteronemia in male    Iron deficiency anemia    Gastroesophageal reflux disease without esophagitis    Myofascial pain    Osteoarthritis of spine with radiculopathy, lumbosacral region    Decreased range of motion of lumbar spine    Hypogammaglobulinemia     Sacroiliitis    Pain of right upper extremity    Thoracic aorta atherosclerosis    Photopsia of right eye    Floaters, bilateral    Other acne    Lesion of parotid gland        Past Surgical History:   Procedure Laterality Date    CATARACT EXTRACTION W/  INTRAOCULAR LENS IMPLANT Right 4/18/2024    Procedure: EXTRACTION, CATARACT, WITH IOL INSERTION;  Surgeon: Jefferson Montes De Oca MD;  [facility];  Service: Ophthalmology;  Laterality: Right;    ESOPHAGOGASTRODUODENOSCOPY N/A 11/29/2021    Procedure: ESOPHAGOGASTRODUODENOSCOPY (EGD);  Surgeon: Kristine Palmer MD;  Location: [facility] ENDO (4TH FLR);  Service: Endoscopy;  Laterality: N/A;  fully vacc-inst mail-tb-left chest port    GROIN EXPLORATION  2 pre 2005 and 1 post 2005    total of 3 procedures    INJECTION OF ANESTHETIC AGENT AROUND NERVE Left 11/8/2022    Procedure: BLOCK, NERVE, LEFT L3-L5 MEDIAL BRANCH;  Surgeon: Melody Berry MD;  Location: [facility] PAIN MGT;  Service: Pain Management;  Laterality: Left;    INJECTION, SACROILIAC JOINT Left 10/11/2022    Procedure: INJECTION,SACROILIAC JOINT LEFT CONTRAST;  Surgeon: Melody Berry MD;  Location: [facility] PAIN MGT;  Service: Pain Management;  Laterality: Left;    INJECTION, SACROILIAC JOINT Left 8/22/2023    Procedure: INJECTION,SACROILIAC JOINT, LEFT SOONER DATE;  Surgeon: Melody Berry MD;  Location: [facility] PAIN MGT;  Service: Pain Management;  Laterality: Left;    LAPAROSCOPIC CHOLECYSTECTOMY  05/2018    SHOULDER SURGERY Left        Past Medical History:   Diagnosis Date    History of pulmonary embolus (PE) 04/15/2019    Pt with PE s/p hospitalization.  He was placed on xarelto with high risk with cancer.  He has been out of the medication for the past month. Restart Xarelto 15 mg b.i.d. for 21 days, then 20 mg q.day until cancer no longer active.    Non Hodgkin's lymphoma     Personal history of colonic polyps 09/08/2015    per [facility] report - repeat 9/2020    Peyronie disease     S/P laparoscopic  "cholecystectomy 2019    Thoracic aorta atherosclerosis     Imagin       Social History     Socioeconomic History    Marital status:     Number of children: 2   Occupational History    Occupation: disabled [employer]    Tobacco Use    Smoking status: Never    Smokeless tobacco: Never   Substance and Sexual Activity    Alcohol use: No    Drug use: No    Sexual activity: Yes     Partners: Female   Social History Narrative    Lives with wife.       Social Drivers of Health     Financial Resource Strain: High Risk (2024)    Overall Financial Resource Strain (CARDIA)     Difficulty of Paying Living Expenses: Very hard   Food Insecurity: Food Insecurity Present (2024)    Hunger Vital Sign     Worried About Running Out of Food in the Last Year: Never true     Ran Out of Food in the Last Year: Sometimes true   Transportation Needs: No Transportation Needs (2024)    PRAPARE - Transportation     Lack of Transportation (Medical): No     Lack of Transportation (Non-Medical): No   Physical Activity: Sufficiently Active (2024)    Exercise Vital Sign     Days of Exercise per Week: 3 days     Minutes of Exercise per Session: 80 min   Stress: Stress Concern Present (2024)    [employer] [place] [employer] - Occupational Stress Questionnaire     Feeling of Stress : To some extent   Housing Stability: Low Risk  (2024)    Housing Stability Vital Sign     Unable to Pay for Housing in the Last Year: No     Homeless in the Last Year: No       Review of Systems         No data to display                 Checklist of Daily Activities:        Objective:   /68 (BP Location: Left arm, Patient Position: Sitting)   Pulse 67   Temp 97.7 °F (36.5 °C) (Oral)   Resp 18   Ht 5' 8" (1.727 m)   Wt 87.8 kg (193 lb 7.3 oz)   SpO2 98%   BMI 29.41 kg/m²     Physical Exam  Constitutional:       General: He is not in acute distress.     Appearance: Normal appearance. He is not " ill-appearing, toxic-appearing or diaphoretic.   HENT:      Head: Normocephalic and atraumatic.   Cardiovascular:      Rate and Rhythm: Normal rate and regular rhythm.      Heart sounds: Normal heart sounds.   Pulmonary:      Effort: Pulmonary effort is normal.      Breath sounds: Normal breath sounds.   Abdominal:      Palpations: Abdomen is soft.      Tenderness: There is no abdominal tenderness. There is no guarding or rebound.   Musculoskeletal:      Right lower leg: No edema.      Left lower leg: No edema.   Lymphadenopathy:      Cervical: No cervical adenopathy.   Neurological:      Mental Status: He is alert.             Lab Results   Component Value Date    WBC 5.46 04/16/2024    HGB 15.2 04/16/2024    HCT 45.8 04/16/2024     04/16/2024    CHOL 199 04/16/2024    TRIG 79 04/16/2024    HDL 57 04/16/2024    ALT 14 04/16/2024    AST 20 04/16/2024     04/16/2024    K 4.1 04/16/2024     04/16/2024    CREATININE 0.8 04/16/2024    BUN 15 04/16/2024    CO2 28 04/16/2024    TSH 0.719 11/14/2023    PSA 0.80 04/16/2024    INR 1.0 10/14/2022    HGBA1C 5.4 02/14/2023       Current Outpatient Medications on File Prior to Visit   Medication Sig Dispense Refill    doxepin (SILENOR) 6 mg Tab Take 6 mg by mouth nightly as needed (insomnia). 30 tablet 3    ergocalciferol (ERGOCALCIFEROL) 50,000 unit Cap Take 1 capsule by mouth every 7 days.      [DISCONTINUED] fluticasone propionate (FLONASE) 50 mcg/actuation nasal spray SHAKE LIQUID AND USE 2 SPRAYS(100 MCG) IN EACH NOSTRIL EVERY DAY 48 g 3    [DISCONTINUED] amoxicillin-clavulanate 875-125mg (AUGMENTIN) 875-125 mg per tablet Take 1 tablet by mouth every 12 (twelve) hours. for 7 days 14 tablet 0    [DISCONTINUED] benzonatate (TESSALON) 100 MG capsule Take 1 capsule (100 mg total) by mouth 3 (three) times daily as needed for Cough. 21 capsule 0     Current Facility-Administered Medications on File Prior to Visit   Medication Dose Route Frequency Provider Last  "Rate Last Admin    0.9%  NaCl infusion  500 mL Intravenous Continuous Amado Parekh MD             Assessment:   69 y.o. male with multiple co-morbid illnesses here for continued follow up of medical problems.      Plan:     Problem List Items Addressed This Visit          Psychiatric    Current mild episode of major depressive disorder     Significant insomnia.  Doxepin helps andpt is feeling much better abnd more rested on increased dose.    Continue  doxepin--higher dose.              ENT    Allergic rhinitis     Recurrent sinusitis.    Continue flonase and add saline spray 2-3 times a day.           Lesion of parotid gland     Noted on PET.  6MM.  Nonspecific.    To ENT for eval with lymphoma in remission         Relevant Orders    Ambulatory referral/consult to ENT       Pulmonary    Bronchiectasis     Noted on CT of chest in [place] 11/2018:  "Minimal bibasilar bronchiectasis ".  Currently asymptomatic reactive airway dz in the past.  Doing well today  Will follow.  Encouraged to have next covid vaccine--refusing            Cardiac/Vascular    Pulmonary hypertension     Noted on ECHO at [facility]. 12/2013.  Patient does have bronchiectasis noted on imaging.  No recent exacerbation  Asymptomatic.  Will follow.  No need for further imaging at this time.            Oncology    Follicular lymphoma, unspecified, in remission - Primary     Followed by Oncology at [facility].  On chemotherapy in the past.  PET scan UTD  No B symptoms and doing well.  Continue f/u onc  Pt to call onc to review test            Orthopedic    Bilateral shoulder bursitis     Followed by ortho.  R pain recently.  S/p CSI.  Was much better.  Lifting weights  Lifting weights--avoid irritating movements.  Voltaren gel for up to 10 days and if not better, back to ortho for CSI.              Other    Health care maintenance     ACP reviewed 7/2024   Yearly labs with PSA 4/2024  Refusing covid vaccine today            Health Maintenance         Date Due " Completion Date    COVID-19 Vaccine (7 - 2024-25 season) 09/01/2024 6/9/2022    Pneumococcal Vaccines (Age 50+) (3 of 3 - PPSV23, PCV20 or PCV21) 08/18/2025 8/18/2020    Hemoglobin A1c (Diabetic Prevention Screening) 02/14/2026 2/14/2023    Colorectal Cancer Screening 09/16/2027 9/16/2024    Override on 5/18/2021: Done (repeat 3 year)    Lipid Panel 04/16/2029 4/16/2024    TETANUS VACCINE 06/15/2030 6/15/2020          Medications Reconciliation:   I have not reconciled the patient's home medications with the patient/family. I have updated all changes.  Refer to After-Visit Medication List.      Medication List            Accurate as of January 14, 2025  8:47 AM. If you have any questions, ask your nurse or doctor.                START taking these medications      diclofenac sodium 1 % Gel  Commonly known as: VOLTAREN ARTHRITIS PAIN  Apply 4 g topically 3 (three) times daily as needed (shoulder pain). For 10 days  Started by: Roly Flannery MD            CONTINUE taking these medications      doxepin 6 mg Tab  Commonly known as: SILENOR  Take 6 mg by mouth nightly as needed (insomnia).     ergocalciferol 50,000 unit Cap  Commonly known as: ERGOCALCIFEROL     fluticasone propionate 50 mcg/actuation nasal spray  Commonly known as: FLONASE  SHAKE LIQUID AND USE 2 SPRAYS(100 MCG) IN EACH NOSTRIL EVERY DAY            STOP taking these medications      amoxicillin-clavulanate 875-125mg 875-125 mg per tablet  Commonly known as: AUGMENTIN  Stopped by: Roly Flannery MD     benzonatate 100 MG capsule  Commonly known as: TESSALON  Stopped by: Roly Flannery MD               Where to Get Your Medications        These medications were sent to [id number] - [facility] [address] AT [facility] [address] [facility]  [address] [address]      Phone: [phone]   diclofenac sodium 1 % Gel  fluticasone propionate 50 mcg/actuation nasal spray              Follow up in  about 3 months (around 4/14/2025). Total clinical care time was 31 min. The following issues were discussed: review of PET scan and recurrent sinusitits, should bursitis, f/u onc and ENT     Roly Flannery MD  Internal Medicine  [facility]  [phone]

## 2025-01-14 NOTE — ASSESSMENT & PLAN NOTE
Noted on ECHO at [facility]. 12/2013.  Patient does have bronchiectasis noted on imaging.  No recent exacerbation  Asymptomatic.  Will follow.  No need for further imaging at this time.

## 2025-01-14 NOTE — ASSESSMENT & PLAN NOTE
Followed by ortho.  R pain recently.  S/p CSI.  Was much better.  Lifting weights  Lifting weights--avoid irritating movements.  Voltaren gel for up to 10 days and if not better, back to ortho for CSI.

## 2025-01-21 NOTE — TELEPHONE ENCOUNTER
----- Message from Cindy Radford sent at 1/14/2022  3:26 PM CST -----  Contact: self @ [phone]  Pt last spoke with Wilbur concerning his Stool sample testing.  He would like to know where he needs to bring the sample after it is collected.  Pls call.      [FreeTextEntry1] : Ms. Duckworth is a 64-year-old female with a PMHx of HTN, HLD, hypothyroidism, and migraines on sumatriptan PRN for many years presents today to establish care with our office for management of migraines. Patient is accompanied with her daughter who is translating during this encounter. Patient states she was diagnosed many years ago with migraines and sumatriptan as needed was working for her. Recently, within the last couple of months, she noticed her migraines were worsening and she has been taking the sumatriptan every day. Although sumatriptan does provide her relief, she is seeking options for preventive tx at this time. Patient endorses her migraine headaches are daily. Located in the temple region and radiates to behind the eye. Associated with photophobia, phonophobia, nausea, and visual disturbances.  Denies dizziness, falls, slurred speech, weakness.   Her last MRI head w/w/o was done in October 2024 which showed no acute pathology.

## 2025-02-10 ENCOUNTER — OFFICE VISIT (OUTPATIENT)
Dept: OTOLARYNGOLOGY | Facility: CLINIC | Age: 70
End: 2025-02-10
Payer: MEDICARE

## 2025-02-10 VITALS
BODY MASS INDEX: 28.83 KG/M2 | SYSTOLIC BLOOD PRESSURE: 108 MMHG | WEIGHT: 189.63 LBS | HEART RATE: 78 BPM | DIASTOLIC BLOOD PRESSURE: 71 MMHG

## 2025-02-10 DIAGNOSIS — K11.9 LESION OF PAROTID GLAND: ICD-10-CM

## 2025-02-10 DIAGNOSIS — R93.89 ABNORMAL FINDING ON IMAGING: Primary | ICD-10-CM

## 2025-02-10 PROCEDURE — 3078F DIAST BP <80 MM HG: CPT | Mod: HCNC,CPTII,S$GLB, | Performed by: OTOLARYNGOLOGY

## 2025-02-10 PROCEDURE — 99214 OFFICE O/P EST MOD 30 MIN: CPT | Mod: HCNC,S$GLB,, | Performed by: OTOLARYNGOLOGY

## 2025-02-10 PROCEDURE — 1126F AMNT PAIN NOTED NONE PRSNT: CPT | Mod: HCNC,CPTII,S$GLB, | Performed by: OTOLARYNGOLOGY

## 2025-02-10 PROCEDURE — 1159F MED LIST DOCD IN RCRD: CPT | Mod: HCNC,CPTII,S$GLB, | Performed by: OTOLARYNGOLOGY

## 2025-02-10 PROCEDURE — 1160F RVW MEDS BY RX/DR IN RCRD: CPT | Mod: HCNC,CPTII,S$GLB, | Performed by: OTOLARYNGOLOGY

## 2025-02-10 PROCEDURE — 1157F ADVNC CARE PLAN IN RCRD: CPT | Mod: HCNC,CPTII,S$GLB, | Performed by: OTOLARYNGOLOGY

## 2025-02-10 PROCEDURE — 99999 PR PBB SHADOW E&M-EST. PATIENT-LVL III: CPT | Mod: PBBFAC,HCNC,, | Performed by: OTOLARYNGOLOGY

## 2025-02-10 PROCEDURE — 3074F SYST BP LT 130 MM HG: CPT | Mod: HCNC,CPTII,S$GLB, | Performed by: OTOLARYNGOLOGY

## 2025-02-10 PROCEDURE — 3008F BODY MASS INDEX DOCD: CPT | Mod: HCNC,CPTII,S$GLB, | Performed by: OTOLARYNGOLOGY

## 2025-02-10 NOTE — PROGRESS NOTES
History of Present Illness:   Jeff Hope is a 69 y.o. year old male evaluated in the Otolaryngology-Head and Neck Surgery Clinic at [facility]. The patient was referred by Dr. Flannery for evaluation of   Abnormal finding in the parotid on PET scan. Patient had a PET scan for recently diagnosed follicular lymphoma.  He had some uptake in the left parotid area.  He reports no distinctive mass there.  He has not had recent dental work.  He did have upper respiratory infection but this was end of December  After the PET scan was done in November. Denies any other neck masses.  Denies any facial weakness           Past Medical/Surgical History  Past Medical History:   Diagnosis Date    History of pulmonary embolus (PE) 04/15/2019    Pt with PE s/p hospitalization.  He was placed on xarelto with high risk with cancer.  He has been out of the medication for the past month. Restart Xarelto 15 mg b.i.d. for 21 days, then 20 mg q.day until cancer no longer active.    Non Hodgkin's lymphoma     Personal history of colonic polyps 2015    per [facility] report - repeat 2020    Peyronie disease     S/P laparoscopic cholecystectomy 2019    Thoracic aorta atherosclerosis     Imagin     His  has a past surgical history that includes Shoulder surgery (Left); Groin exploration (2 pre  and 1 post ); Laparoscopic cholecystectomy (2018); Esophagogastroduodenoscopy (N/A, 2021); injection, sacroiliac joint (Left, 10/11/2022); Injection of anesthetic agent around nerve (Left, 2022); injection, sacroiliac joint (Left, 2023); and Cataract extraction w/  intraocular lens implant (Right, 2024).     Past Family/Social History  His family history includes Breast cancer in his mother; Diabetes in his brother; Emphysema in his father; No Known Problems in his daughter and daughter; Stroke in his sister; Ulcers in his brother.  He  reports that he has never smoked. He has never used  smokeless tobacco. He reports that he does not drink alcohol and does not use drugs.     Medications/Allergies/Immunizations  His current medication(s) include:   Current Outpatient Medications   Medication Sig Dispense Refill    doxepin (SILENOR) 6 mg Tab Take 6 mg by mouth nightly as needed (insomnia). 30 tablet 3    fluticasone propionate (FLONASE) 50 mcg/actuation nasal spray SHAKE LIQUID AND USE 2 SPRAYS(100 MCG) IN EACH NOSTRIL EVERY DAY 48 g 11    diclofenac sodium (VOLTAREN ARTHRITIS PAIN) 1 % Gel Apply 4 g topically 3 (three) times daily as needed (shoulder pain). For 10 days (Patient not taking: Reported on 2/10/2025) 150 g 1    ergocalciferol (ERGOCALCIFEROL) 50,000 unit Cap Take 1 capsule by mouth every 7 days. (Patient not taking: Reported on 2/10/2025)       No current facility-administered medications for this visit.     Facility-Administered Medications Ordered in Other Visits   Medication Dose Route Frequency Provider Last Rate Last Admin    0.9%  NaCl infusion  500 mL Intravenous Continuous Amado Parekh MD            Allergies: Atorvastatin, Rosuvastatin, and Neosporin [benzalkonium chloride]     Immunizations:   Immunization History   Administered Date(s) Administered    COVID-19 MRNA, LN-S PF (MODERNA HALF 0.25 ML DOSE) 06/09/2022    COVID-19, MRNA, LN-S, PF (MODERNA FULL 0.5 ML DOSE) 02/09/2021, 02/09/2021, 03/09/2021, 03/09/2021, 08/18/2021    Hepatitis A, Adult 02/06/2020, 08/31/2020    Influenza (FLUAD) - Quadrivalent - Adjuvanted - PF *Preferred* (65+) 10/19/2020, 12/21/2021, 11/14/2023    Influenza - Trivalent - Fluad - Adjuvanted - PF (65 years and older 11/12/2024    Influenza - Trivalent - Fluzone High Dose - PF (65 years and older) 12/10/2019    Pneumococcal Conjugate - 13 Valent 06/06/2019    Pneumococcal Polysaccharide - 23 Valent 08/18/2020    RSVpreF (Arexvy) 12/15/2023    Tdap 06/15/2020    Zoster Recombinant 05/24/2021, 08/23/2021         Review of Systems   Constitutional:  Negative for fever, weight loss and weight gain.  Skin: Negative for rash, itchiness, dryness  HENT:  As per HPI  Cardiovascular: Negative for chest pain and dyspnea on exertion .   Respiratory: Is not experiencing shortness of breath.   Gastrointestinal: Negative for nausea and vomiting.   Neurological: Negative for headaches.   Lymph/Heme: Negative for lymphadenopathy or easy bruising  Musculoskeletal: Negative for joint or muscle pain  Psychiatric: The patient is not nervous/anxious.        All other systems are negative except for that listed in the HPI.      PHYSICAL EXAM:   Vital Signs:  /71 (BP Location: Left arm, Patient Position: Sitting)   Pulse 78   Wt 86 kg (189 lb 9.5 oz)   BMI 28.83 kg/m²      General:  Well-developed, well-nourished  Communication and Voice:  Clear pitch and clarity  Hearing: Hearing adequate for verbal communication bilaterally   Inspection:  Normocephalic and atraumatic without mass or lesion  Palpation:  Facial skeleton intact without bony stepoffs  Parotid Glands:  No mass or tenderness I do not appreciate any mass in the left parotid there are normal intraparotid lymph nodes with no pathologic increase in size or firmness  Facial Strength:  Facial motility symmetric and full bilaterally  Pinna:  External ear intact and fully developed  External canal:  Canal is patent with intact skin  Tympanic Membrane:  Clear and mobile  External nose:  No scar or anatomic deformity  Internal Nose:  Septum intact and midline.  No edema, polyp, or rhinorrhea.  TMJ:  No pain to palpation with full mobility  Oral cavity, Lips, Teeth, and Gums:  Mucosa and teeth intact and viable, No lesions, masses or ulcers  Oropharynx: No erythema or exudate, no masses or ulcerations, non-obstructive tonsils  Nasopharynx:  No mass or lesion with intact mucosa  Hypopharynx:  Not well visualized secondary to gagging  Larynx:  Not well visualized secondary to gagging  Neck, Trachea, Lymphatics:  Midline  trachea without mass or lesion, no lymphadenopathy  Thyroid:  No mass or nodularity  Eyes: No nystagmus with equal extraocular motion bilaterally  Neuro/Psych/Balance: Patient oriented and appropriate in interaction;  Appropriate mood and affect;  Gait is intact with no imbalance; Cranial nerves I-XII are intact  Respiratory effort:  Equal inspiration and expiration without stridor  Peripheral Vascular:  Warm extremities with equal pulses       RADIOLOGIC REVIEW:     I have personally reviewed the PET scan patient has brought on a CD.  There was nonspecific uptake in the parotid likely consistent with lymph node versus benign neoplasm.  This does not correlate on clinical exam.    ASSESSMENT:   1. Lesion of parotid gland            PLAN:     I reassured the patient as far as the parotid finding.  If this persists on future PET scan happy to revisit with a dedicated CT neck.  Otherwise no workup necessary at this point with very low  suspicion for neoplasm.     I believe that Mr. Hope has a good understanding of the issues involved and I answered all of his questions.     DISCLAIMER: This note was prepared with [employer] voice recognition transcription software. Garbled syntax, mangled pronouns, and other bizarre constructions may be attributed to that software system. While efforts were made to correct any mistakes made by this voice recognition program, some errors and/or omissions may remain in the note that were missed when the note was originally created.

## 2025-02-24 DIAGNOSIS — Z00.00 ENCOUNTER FOR MEDICARE ANNUAL WELLNESS EXAM: ICD-10-CM

## 2025-04-09 ENCOUNTER — TELEPHONE (OUTPATIENT)
Dept: HEMATOLOGY/ONCOLOGY | Facility: CLINIC | Age: 70
End: 2025-04-09
Payer: MEDICARE

## 2025-04-09 NOTE — TELEPHONE ENCOUNTER
Returned call to patient.  Left voicemail requesting a call back.  Callback number given.       ----- Message from Margo sent at 4/8/2025 11:52 AM CDT -----  Regarding: Scheduling  Scheduling Request   Appt Type: Date/Time Preference: Treating Provider:Gustavo Castillo MD Caller Name:Jeff Hope Contact Preference: [phone] Please leave voicemail if no response.  Comments/notes:Patient called to schedule his f/u appt with . Patient has questions about scheduling.

## 2025-04-10 ENCOUNTER — TELEPHONE (OUTPATIENT)
Dept: HEMATOLOGY/ONCOLOGY | Facility: CLINIC | Age: 70
End: 2025-04-10
Payer: MEDICARE

## 2025-04-10 RX ORDER — SODIUM CHLORIDE 0.9 % (FLUSH) 0.9 %
10 SYRINGE (ML) INJECTION
OUTPATIENT
Start: 2025-04-10

## 2025-04-10 RX ORDER — HEPARIN 100 UNIT/ML
500 SYRINGE INTRAVENOUS
OUTPATIENT
Start: 2025-04-10

## 2025-04-10 NOTE — TELEPHONE ENCOUNTER
Returned call to patient.  Scheduled appointment with Dr. Castillo for his 1 year FUV.  Also placed orders for a port flush, which he states is coming up soon.  Will notify infusion team.  All matters handled at this time.      ----- Message from Stevie sent at 4/9/2025 10:46 AM CDT -----  Regarding: Returning a Missed Call  Contact: [phone]  Returning a Missed Call Caller: Jeff  Returning call to: Sapna Howell Caller can be reached @:  [phone] Nature of the call: Returning call to schedule

## 2025-04-14 ENCOUNTER — INFUSION (OUTPATIENT)
Dept: INFUSION THERAPY | Facility: OTHER | Age: 70
End: 2025-04-14
Attending: INTERNAL MEDICINE
Payer: MEDICARE

## 2025-04-14 DIAGNOSIS — C82.9A: Primary | ICD-10-CM

## 2025-04-14 PROCEDURE — A4216 STERILE WATER/SALINE, 10 ML: HCPCS | Mod: HCNC | Performed by: INTERNAL MEDICINE

## 2025-04-14 PROCEDURE — 25000003 PHARM REV CODE 250: Mod: HCNC | Performed by: INTERNAL MEDICINE

## 2025-04-14 PROCEDURE — 63600175 PHARM REV CODE 636 W HCPCS: Mod: HCNC | Performed by: INTERNAL MEDICINE

## 2025-04-14 RX ORDER — SODIUM CHLORIDE 0.9 % (FLUSH) 0.9 %
10 SYRINGE (ML) INJECTION
Status: DISCONTINUED | OUTPATIENT
Start: 2025-04-14 | End: 2025-04-14 | Stop reason: HOSPADM

## 2025-04-14 RX ORDER — HEPARIN 100 UNIT/ML
500 SYRINGE INTRAVENOUS
OUTPATIENT
Start: 2025-04-14

## 2025-04-14 RX ORDER — SODIUM CHLORIDE 0.9 % (FLUSH) 0.9 %
10 SYRINGE (ML) INJECTION
OUTPATIENT
Start: 2025-04-14

## 2025-04-14 RX ORDER — HEPARIN 100 UNIT/ML
500 SYRINGE INTRAVENOUS
Status: DISCONTINUED | OUTPATIENT
Start: 2025-04-14 | End: 2025-04-14 | Stop reason: HOSPADM

## 2025-04-14 RX ADMIN — HEPARIN 500 UNITS: 100 SYRINGE at 08:04

## 2025-04-14 RX ADMIN — Medication 10 ML: at 08:04

## 2025-04-14 NOTE — PROGRESS NOTES
.  This note has been generated using voice-recognition software. There may be typographical errors that have been missed during proof-reading.     Primary Care Provider Appointment    Subjective:      Patient ID: Jeff Hope is a 69 y.o. male here for follow up.     Chief Complaint: Follow-up    HPI:  This is a pleasant 69-year-old male with bronchiectasis, hyperlipidemia, history of pulmonary embolism on chronic anticoagulation with a follicular non-Hodgkin's lymphoma in remission, h/o acute/reactivated hepatitis B, pulmonary HTN, Peyronie's disease, GERD, statin myalgias here for f/u.  Pt last seen   01/14/2025.   02/10/2025 patient seen by ENT for parotid gland lesion seen on PET scan.  Per ENT low suspicion of neoplasm, consider going back if continues to be present on follow-up  PET scan    Doing well.  Doing OK on the sleep meds.  No SE of sleep meds.    Decreased soda from 6-7 to 3 per day.    Pt notes L arm pain.  No CP.  Unsure of trigger.  Not at night.  No SOB.  Never occurs with exercise.  Hurting pain.  Does not sound neuropathic.  No radiation.  Lasting up to 1 minutes.  No sweating.  Pt able to ride bike and work in yard and work in yard without any pain in arm.  10/10    Advance Care Planning     Date: 04/15/2025    ACP Reviewed/No Changes  Voluntary advance care planning discussion had today with patient. Previously completed HCPOA and LW in electronic medical record is current, no changes made.       A total of 20 min was spent on advance care planning, goals of care discussion, emotional support, formulating and communicating prognosis and exploring burden/benefit of various approaches of treatment. This discussion occurred on a fully voluntary basis with the verbal consent of the patient and/or family.    New NADIAOST completed to change feeding tube selection            Problem List[1]     Past Surgical History:   Procedure Laterality Date    CATARACT EXTRACTION W/  INTRAOCULAR LENS IMPLANT  Right 2024    Procedure: EXTRACTION, CATARACT, WITH IOL INSERTION;  Surgeon: Jefferson Montes De Oca MD;  Location: [facility];  Service: Ophthalmology;  Laterality: Right;    ESOPHAGOGASTRODUODENOSCOPY N/A 2021    Procedure: ESOPHAGOGASTRODUODENOSCOPY (EGD);  Surgeon: Kristine Palmer MD;  Location: [facility] ENDO (4TH FLR);  Service: Endoscopy;  Laterality: N/A;  fully vacc-inst mail-tb-left chest port    GROIN EXPLORATION  2 pre  and 1 post     total of 3 procedures    INJECTION OF ANESTHETIC AGENT AROUND NERVE Left 2022    Procedure: BLOCK, NERVE, LEFT L3-L5 MEDIAL BRANCH;  Surgeon: Melody Berry MD;  Location: [facility] PAIN MGT;  Service: Pain Management;  Laterality: Left;    INJECTION, SACROILIAC JOINT Left 10/11/2022    Procedure: INJECTION,SACROILIAC JOINT LEFT CONTRAST;  Surgeon: Melody Berry MD;  Location: [facility] PAIN MGT;  Service: Pain Management;  Laterality: Left;    INJECTION, SACROILIAC JOINT Left 2023    Procedure: INJECTION,SACROILIAC JOINT, LEFT SOONER DATE;  Surgeon: Melody Berry MD;  Location: [facility] PAIN MGT;  Service: Pain Management;  Laterality: Left;    LAPAROSCOPIC CHOLECYSTECTOMY  2018    SHOULDER SURGERY Left        Past Medical History:   Diagnosis Date    History of pulmonary embolus (PE) 04/15/2019    Pt with PE s/p hospitalization.  He was placed on xarelto with high risk with cancer.  He has been out of the medication for the past month. Restart Xarelto 15 mg b.i.d. for 21 days, then 20 mg q.day until cancer no longer active.    Non Hodgkin's lymphoma     Personal history of colonic polyps 2015    per [facility] report - repeat 2020    Peyronie disease     S/P laparoscopic cholecystectomy 2019    Thoracic aorta atherosclerosis     Imagin       Social History[2]    Review of Systems         No data to display                 Checklist of Daily Activities:        Objective:   /78 (BP Location: Left arm, Patient Position: Sitting)    "Pulse 65   Temp 98.6 °F (37 °C) (Oral)   Ht 5' 8" (1.727 m)   Wt 87.8 kg (193 lb 9 oz)   SpO2 97%   BMI 29.43 kg/m²     Physical Exam  Constitutional:       General: He is not in acute distress.     Appearance: Normal appearance. He is not ill-appearing, toxic-appearing or diaphoretic.   HENT:      Head: Normocephalic and atraumatic.   Cardiovascular:      Rate and Rhythm: Normal rate and regular rhythm.      Heart sounds: Normal heart sounds.   Pulmonary:      Effort: Pulmonary effort is normal.      Breath sounds: Normal breath sounds.   Abdominal:      Palpations: Abdomen is soft.      Tenderness: There is no abdominal tenderness. There is no guarding or rebound.   Musculoskeletal:      Right lower leg: No edema.      Left lower leg: No edema.   Lymphadenopathy:      Cervical: No cervical adenopathy.   Neurological:      Mental Status: He is alert.             Lab Results   Component Value Date    WBC 5.46 04/16/2024    HGB 15.2 04/16/2024    HCT 45.8 04/16/2024     04/16/2024    CHOL 199 04/16/2024    TRIG 79 04/16/2024    HDL 57 04/16/2024    ALT 14 04/16/2024    AST 20 04/16/2024     04/16/2024    K 4.1 04/16/2024     04/16/2024    CREATININE 0.8 04/16/2024    BUN 15 04/16/2024    CO2 28 04/16/2024    TSH 0.719 11/14/2023    PSA 0.80 04/16/2024    INR 1.0 10/14/2022    HGBA1C 5.4 02/14/2023       Medications Ordered Prior to Encounter[3]      Assessment:   69 y.o. male with multiple co-morbid illnesses here for continued follow up of medical problems.      Plan:     Problem List Items Addressed This Visit          ENT    Lesion of parotid gland    Noted on PET.  6MM.  Nonspecific.  Seen by ENT.  02/2025.  [place] low suspicion of neoplasm.    Will continue to monitor with yearly PET scans.            Cardiac/Vascular    Mixed hyperlipidemia - Primary      Intolerance of statins  -Myalgias.  Multiple trials.   Labs today for evaluation.  Consider alternative to statin.         Relevant " Orders    CBC Auto Differential    Comprehensive Metabolic Panel    Lipid Panel    TSH       Orthopedic    Myalgia due to statin    Indication for high-dose statin with increased ACC risk score.  Patient with myalgias associated with statin use in the past.  Multiple trials.  Lipids today.  Will   Discussed starting  alternative to statin         Pain of left upper extremity     Not associated with shoulder pain.  Not associated with elbow pain.  Pain begins closer to neck, entire arm hurts, sudden onset, lasting approximately 1 minute, 10/10, patient describes  as pain, no shortness of breath or diaphoresis, not associated with activity, occurs every few weeks    EKG today.  Will ask Cardiology to evaluate for atypical chest pain and possible stress test.         Relevant Orders    IN OFFICE EKG 12-LEAD (to [place])    Ambulatory referral/consult to Cardiology       Other    Health care maintenance    ACP reviewed   Today and new LA post completed to change feeding tube to trial only.  Patient  confirms he wishes to be a do not resuscitate.  Check next visit to see if LA post signature went through.   Yearly labs  Today.  One day early for PSA.  Patient agrees to PSA after discussion.  Will get at next visit.            Other Visit Diagnoses         Screening for malignant neoplasm of prostate                Health Maintenance         Date Due Completion Date    COVID-19 Vaccine (7 - 2024-25 season) 09/01/2024 6/9/2022    Pneumococcal Vaccines (Age 50+) (3 of 3 - PPSV23, PCV20 or PCV21) 08/18/2025 8/18/2020    Hemoglobin A1c (Diabetic Prevention Screening) 02/14/2026 2/14/2023    Colorectal Cancer Screening 09/16/2027 9/16/2024    Override on 5/18/2021: Done (repeat 3 year)    Lipid Panel 04/16/2029 4/16/2024    TETANUS VACCINE 06/15/2030 6/15/2020          Medications Reconciliation:   I have not reconciled the patient's home medications with the patient/family. I have updated all changes.  Refer to After-Visit  Medication List.      Medication List            Accurate as of April 15, 2025  9:24 AM. If you have any questions, ask your nurse or doctor.                CONTINUE taking these medications      doxepin 6 mg Tab  Commonly known as: SILENOR  Take 6 mg by mouth nightly as needed (insomnia).     fluticasone propionate 50 mcg/actuation nasal spray  Commonly known as: FLONASE  SHAKE LIQUID AND USE 2 SPRAYS(100 MCG) IN EACH NOSTRIL EVERY DAY                   Follow up in about 3 months (around 7/15/2025). Total clinical care time was 52 min. The following issues were discussed:  arm pain, reviewed living will and power-of--no changes, reviewed LA post -changes made, need for yearly labs.     Roly Flannery MD  Internal Medicine  [facility]  [phone]               [1]   Patient Active Problem List  Diagnosis    Bronchiectasis    Follicular lymphoma, unspecified, in remission    Mixed hyperlipidemia    Current mild episode of major depressive disorder    Aortic atherosclerosis    Pain in both testicles    Health care maintenance    Pulmonary hypertension    Myalgia due to statin    Vitamin D deficiency    Abnormal MMSE    Allergic rhinitis    Adrenal hypertrophy    Bilateral shoulder bursitis    History of hepatitis B    Trigger finger of left thumb    Bilateral shoulder pain    Chronic thumb pain, bilateral    Breast mass, right    Peyronie's disease    Hypotestosteronemia in male    Iron deficiency anemia    Gastroesophageal reflux disease without esophagitis    Myofascial pain    Osteoarthritis of spine with radiculopathy, lumbosacral region    Decreased range of motion of lumbar spine    Hypogammaglobulinemia    Sacroiliitis    Pain of right upper extremity    Thoracic aorta atherosclerosis    Photopsia of right eye    Floaters, bilateral    Other acne    Lesion of parotid gland    Pain of left upper extremity   [2]   Social History  Socioeconomic History    Marital  status:     Number of children: 2   Occupational History    Occupation: disabled [employer]    Tobacco Use    Smoking status: Never    Smokeless tobacco: Never   Substance and Sexual Activity    Alcohol use: No    Drug use: No    Sexual activity: Yes     Partners: Female   Social History Narrative    Lives with wife.       Social Drivers of Health     Financial Resource Strain: High Risk (5/17/2024)    Overall Financial Resource Strain (CARDIA)     Difficulty of Paying Living Expenses: Very hard   Food Insecurity: Food Insecurity Present (5/17/2024)    Hunger Vital Sign     Worried About Running Out of Food in the Last Year: Never true     Ran Out of Food in the Last Year: Sometimes true   Transportation Needs: No Transportation Needs (5/17/2024)    PRAPARE - Transportation     Lack of Transportation (Medical): No     Lack of Transportation (Non-Medical): No   Physical Activity: Sufficiently Active (5/17/2024)    Exercise Vital Sign     Days of Exercise per Week: 3 days     Minutes of Exercise per Session: 80 min   Stress: Stress Concern Present (5/17/2024)    [employer] [place] [employer] - Occupational Stress Questionnaire     Feeling of Stress : To some extent   Housing Stability: Unknown (5/17/2024)    Housing Stability Vital Sign     Unable to Pay for Housing in the Last Year: No     Homeless in the Last Year: No   [3]   Current Outpatient Medications on File Prior to Visit   Medication Sig Dispense Refill    doxepin (SILENOR) 6 mg Tab Take 6 mg by mouth nightly as needed (insomnia). 30 tablet 3    fluticasone propionate (FLONASE) 50 mcg/actuation nasal spray SHAKE LIQUID AND USE 2 SPRAYS(100 MCG) IN EACH NOSTRIL EVERY DAY 48 g 11     Current Facility-Administered Medications on File Prior to Visit   Medication Dose Route Frequency Provider Last Rate Last Admin    0.9%  NaCl infusion  500 mL Intravenous Continuous Amado Parekh MD        [DISCONTINUED] heparin, porcine (PF) 100 unit/mL  injection flush 500 Units  500 Units Intravenous PRN Gustavo Castillo MD   500 Units at 04/14/25 0846    [DISCONTINUED] sodium chloride 0.9% flush 10 mL  10 mL Intravenous PRN Gustavo Castillo MD   10 mL at 04/14/25 0846

## 2025-04-14 NOTE — PLAN OF CARE
Problem: Infection  Goal: Absence of Infection Signs and Symptoms  Outcome: Progressing    Problem: Adult Inpatient Plan of Care  Goal: Plan of Care Review  Outcome: Progressing     Problem: Adult Inpatient Plan of Care  Goal: Optimal Comfort and Wellbeing  Outcome: Progressing       Pt arrived to clinic for port flush. Pt with no complaints and port accessed and flushed w/o complication. All questions answered, scheduled for next appt and left clinic  in NAD.

## 2025-04-15 ENCOUNTER — LAB VISIT (OUTPATIENT)
Dept: LAB | Facility: HOSPITAL | Age: 70
End: 2025-04-15
Attending: INTERNAL MEDICINE
Payer: MEDICARE

## 2025-04-15 ENCOUNTER — RESULTS FOLLOW-UP (OUTPATIENT)
Dept: PRIMARY CARE CLINIC | Facility: CLINIC | Age: 70
End: 2025-04-15

## 2025-04-15 ENCOUNTER — OFFICE VISIT (OUTPATIENT)
Dept: PRIMARY CARE CLINIC | Facility: CLINIC | Age: 70
End: 2025-04-15
Payer: MEDICARE

## 2025-04-15 VITALS
TEMPERATURE: 99 F | HEIGHT: 68 IN | WEIGHT: 193.56 LBS | DIASTOLIC BLOOD PRESSURE: 78 MMHG | BODY MASS INDEX: 29.34 KG/M2 | SYSTOLIC BLOOD PRESSURE: 128 MMHG | OXYGEN SATURATION: 97 % | HEART RATE: 65 BPM

## 2025-04-15 DIAGNOSIS — M79.602 PAIN OF LEFT UPPER EXTREMITY: ICD-10-CM

## 2025-04-15 DIAGNOSIS — E78.2 MIXED HYPERLIPIDEMIA: Primary | ICD-10-CM

## 2025-04-15 DIAGNOSIS — T46.6X5A MYALGIA DUE TO STATIN: ICD-10-CM

## 2025-04-15 DIAGNOSIS — K11.9 LESION OF PAROTID GLAND: ICD-10-CM

## 2025-04-15 DIAGNOSIS — Z12.5 SCREENING FOR MALIGNANT NEOPLASM OF PROSTATE: ICD-10-CM

## 2025-04-15 DIAGNOSIS — M79.10 MYALGIA DUE TO STATIN: ICD-10-CM

## 2025-04-15 DIAGNOSIS — Z00.00 HEALTH CARE MAINTENANCE: ICD-10-CM

## 2025-04-15 DIAGNOSIS — E78.2 MIXED HYPERLIPIDEMIA: ICD-10-CM

## 2025-04-15 LAB
ABSOLUTE EOSINOPHIL (OHS): 0.04 K/UL
ABSOLUTE MONOCYTE (OHS): 0.54 K/UL (ref 0.3–1)
ABSOLUTE NEUTROPHIL COUNT (OHS): 3.1 K/UL (ref 1.8–7.7)
ALBUMIN SERPL BCP-MCNC: 4.3 G/DL (ref 3.5–5.2)
ALP SERPL-CCNC: 69 UNIT/L (ref 40–150)
ALT SERPL W/O P-5'-P-CCNC: 16 UNIT/L (ref 10–44)
ANION GAP (OHS): 9 MMOL/L (ref 8–16)
AST SERPL-CCNC: 20 UNIT/L (ref 11–45)
BASOPHILS # BLD AUTO: 0.02 K/UL
BASOPHILS NFR BLD AUTO: 0.3 %
BILIRUB SERPL-MCNC: 0.5 MG/DL (ref 0.1–1)
BUN SERPL-MCNC: 15 MG/DL (ref 8–23)
CALCIUM SERPL-MCNC: 9.8 MG/DL (ref 8.7–10.5)
CHLORIDE SERPL-SCNC: 104 MMOL/L (ref 95–110)
CHOLEST SERPL-MCNC: 212 MG/DL (ref 120–199)
CHOLEST/HDLC SERPL: 3.3 {RATIO} (ref 2–5)
CO2 SERPL-SCNC: 28 MMOL/L (ref 23–29)
CREAT SERPL-MCNC: 0.9 MG/DL (ref 0.5–1.4)
ERYTHROCYTE [DISTWIDTH] IN BLOOD BY AUTOMATED COUNT: 12.7 % (ref 11.5–14.5)
GFR SERPLBLD CREATININE-BSD FMLA CKD-EPI: >60 ML/MIN/1.73/M2
GLUCOSE SERPL-MCNC: 88 MG/DL (ref 70–110)
HCT VFR BLD AUTO: 48.4 % (ref 40–54)
HDLC SERPL-MCNC: 64 MG/DL (ref 40–75)
HDLC SERPL: 30.2 % (ref 20–50)
HGB BLD-MCNC: 15.9 GM/DL (ref 14–18)
IMM GRANULOCYTES # BLD AUTO: 0.02 K/UL (ref 0–0.04)
IMM GRANULOCYTES NFR BLD AUTO: 0.3 % (ref 0–0.5)
LDLC SERPL CALC-MCNC: 132.2 MG/DL (ref 63–159)
LYMPHOCYTES # BLD AUTO: 2.39 K/UL (ref 1–4.8)
MCH RBC QN AUTO: 29.2 PG (ref 27–31)
MCHC RBC AUTO-ENTMCNC: 32.9 G/DL (ref 32–36)
MCV RBC AUTO: 89 FL (ref 82–98)
NONHDLC SERPL-MCNC: 148 MG/DL
NUCLEATED RBC (/100WBC) (OHS): 0 /100 WBC
OHS QRS DURATION: 78 MS
OHS QTC CALCULATION: 401 MS
PLATELET # BLD AUTO: 181 K/UL (ref 150–450)
PMV BLD AUTO: 10.1 FL (ref 9.2–12.9)
POTASSIUM SERPL-SCNC: 4.4 MMOL/L (ref 3.5–5.1)
PROT SERPL-MCNC: 7.8 GM/DL (ref 6–8.4)
RBC # BLD AUTO: 5.45 M/UL (ref 4.6–6.2)
RELATIVE EOSINOPHIL (OHS): 0.7 %
RELATIVE LYMPHOCYTE (OHS): 39.1 % (ref 18–48)
RELATIVE MONOCYTE (OHS): 8.8 % (ref 4–15)
RELATIVE NEUTROPHIL (OHS): 50.8 % (ref 38–73)
SODIUM SERPL-SCNC: 141 MMOL/L (ref 136–145)
TRIGL SERPL-MCNC: 79 MG/DL (ref 30–150)
TSH SERPL-ACNC: 0.98 UIU/ML (ref 0.4–4)
WBC # BLD AUTO: 6.11 K/UL (ref 3.9–12.7)

## 2025-04-15 PROCEDURE — 82247 BILIRUBIN TOTAL: CPT | Mod: HCNC

## 2025-04-15 PROCEDURE — 93010 ELECTROCARDIOGRAM REPORT: CPT | Mod: HCNC,S$GLB,, | Performed by: STUDENT IN AN ORGANIZED HEALTH CARE EDUCATION/TRAINING PROGRAM

## 2025-04-15 PROCEDURE — G2211 COMPLEX E/M VISIT ADD ON: HCPCS | Mod: HCNC,S$GLB,, | Performed by: INTERNAL MEDICINE

## 2025-04-15 PROCEDURE — 82465 ASSAY BLD/SERUM CHOLESTEROL: CPT | Mod: HCNC

## 2025-04-15 PROCEDURE — 1101F PT FALLS ASSESS-DOCD LE1/YR: CPT | Mod: HCNC,CPTII,S$GLB, | Performed by: INTERNAL MEDICINE

## 2025-04-15 PROCEDURE — 1125F AMNT PAIN NOTED PAIN PRSNT: CPT | Mod: HCNC,CPTII,S$GLB, | Performed by: INTERNAL MEDICINE

## 2025-04-15 PROCEDURE — 3288F FALL RISK ASSESSMENT DOCD: CPT | Mod: HCNC,CPTII,S$GLB, | Performed by: INTERNAL MEDICINE

## 2025-04-15 PROCEDURE — 99497 ADVNCD CARE PLAN 30 MIN: CPT | Mod: ,,, | Performed by: INTERNAL MEDICINE

## 2025-04-15 PROCEDURE — 1123F ACP DISCUSS/DSCN MKR DOCD: CPT | Mod: HCNC,CPTII,S$GLB, | Performed by: INTERNAL MEDICINE

## 2025-04-15 PROCEDURE — 36415 COLL VENOUS BLD VENIPUNCTURE: CPT | Mod: HCNC

## 2025-04-15 PROCEDURE — 1159F MED LIST DOCD IN RCRD: CPT | Mod: HCNC,CPTII,S$GLB, | Performed by: INTERNAL MEDICINE

## 2025-04-15 PROCEDURE — 3074F SYST BP LT 130 MM HG: CPT | Mod: HCNC,CPTII,S$GLB, | Performed by: INTERNAL MEDICINE

## 2025-04-15 PROCEDURE — 85025 COMPLETE CBC W/AUTO DIFF WBC: CPT | Mod: HCNC

## 2025-04-15 PROCEDURE — 99215 OFFICE O/P EST HI 40 MIN: CPT | Mod: HCNC,25,S$GLB, | Performed by: INTERNAL MEDICINE

## 2025-04-15 PROCEDURE — 3008F BODY MASS INDEX DOCD: CPT | Mod: HCNC,CPTII,S$GLB, | Performed by: INTERNAL MEDICINE

## 2025-04-15 PROCEDURE — 3078F DIAST BP <80 MM HG: CPT | Mod: HCNC,CPTII,S$GLB, | Performed by: INTERNAL MEDICINE

## 2025-04-15 PROCEDURE — 84443 ASSAY THYROID STIM HORMONE: CPT | Mod: HCNC

## 2025-04-15 PROCEDURE — 93005 ELECTROCARDIOGRAM TRACING: CPT | Mod: HCNC,S$GLB,, | Performed by: INTERNAL MEDICINE

## 2025-04-15 RX ORDER — EZETIMIBE 10 MG/1
10 TABLET ORAL DAILY
Qty: 90 TABLET | Refills: 3 | Status: SHIPPED | OUTPATIENT
Start: 2025-04-15 | End: 2026-04-15

## 2025-04-15 NOTE — ASSESSMENT & PLAN NOTE
Indication for high-dose statin with increased ACC risk score.  Patient with myalgias associated with statin use in the past.  Multiple trials.  Lipids today.  Will   Discussed starting  alternative to statin

## 2025-04-15 NOTE — ASSESSMENT & PLAN NOTE
Intolerance of statins  -Myalgias.  Multiple trials.   Labs today for evaluation.  Consider alternative to statin.

## 2025-04-15 NOTE — ASSESSMENT & PLAN NOTE
ACP reviewed   Today and new LA post completed to change feeding tube to trial only.  Patient  confirms he wishes to be a do not resuscitate.  Check next visit to see if LA post signature went through.   Yearly labs  Today.  One day early for PSA.  Patient agrees to PSA after discussion.  Will get at next visit.

## 2025-04-15 NOTE — ASSESSMENT & PLAN NOTE
Noted on PET.  6MM.  Nonspecific.  Seen by ENT.  02/2025.  [place] low suspicion of neoplasm.    Will continue to monitor with yearly PET scans.

## 2025-04-15 NOTE — ASSESSMENT & PLAN NOTE
Not associated with shoulder pain.  Not associated with elbow pain.  Pain begins closer to neck, entire arm hurts, sudden onset, lasting approximately 1 minute, 10/10, patient describes  as pain, no shortness of breath or diaphoresis, not associated with activity, occurs every few weeks    EKG today.  Will ask Cardiology to evaluate for atypical chest pain and possible stress test.

## 2025-04-16 ENCOUNTER — TELEPHONE (OUTPATIENT)
Dept: PRIMARY CARE CLINIC | Facility: CLINIC | Age: 70
End: 2025-04-16
Payer: MEDICARE

## 2025-04-16 NOTE — TELEPHONE ENCOUNTER
----- Message from Roly Flannery MD sent at 4/15/2025  3:04 PM CDT -----  Please call patient with results.    LDL continues to be elevated.  I would like to put him on Zetia.  This is a different type of cholesterol medication that will not cause muscle aches.  No anemia.    Normal thyroid function.  Normal renal and liver labs.  ----- Message -----  From: Lab, Background User  Sent: 4/15/2025   1:15 PM CDT  To: Roly Flannery MD

## 2025-04-17 ENCOUNTER — OFFICE VISIT (OUTPATIENT)
Dept: CARDIOLOGY | Facility: CLINIC | Age: 70
End: 2025-04-17
Payer: MEDICARE

## 2025-04-17 ENCOUNTER — TELEPHONE (OUTPATIENT)
Dept: PRIMARY CARE CLINIC | Facility: CLINIC | Age: 70
End: 2025-04-17
Payer: MEDICARE

## 2025-04-17 VITALS
HEART RATE: 72 BPM | SYSTOLIC BLOOD PRESSURE: 114 MMHG | BODY MASS INDEX: 31.32 KG/M2 | DIASTOLIC BLOOD PRESSURE: 76 MMHG | WEIGHT: 194.88 LBS | HEIGHT: 66 IN | OXYGEN SATURATION: 96 %

## 2025-04-17 DIAGNOSIS — I20.89 ANGINAL EQUIVALENT: Primary | ICD-10-CM

## 2025-04-17 DIAGNOSIS — M79.602 PAIN OF LEFT UPPER EXTREMITY: ICD-10-CM

## 2025-04-17 PROCEDURE — 99999 PR PBB SHADOW E&M-EST. PATIENT-LVL III: CPT | Mod: PBBFAC,HCNC,, | Performed by: STUDENT IN AN ORGANIZED HEALTH CARE EDUCATION/TRAINING PROGRAM

## 2025-04-17 NOTE — PROGRESS NOTES
"    PCP - Roly Flannery MD  Referring Physician:     Subjective:   Patient ID:  Jeff Hope is a 69 y.o. male with past medical history of:   Hx of PE  Non-Hodgkin lymphoma  HTN  HLD, intolerant to statins    Referred by PCP for left arm pain. Per their last note:  "Pt notes L arm pain. No CP. Unsure of trigger. Not at night. No SOB. Never occurs with exercise. Hurting pain. Does not sound neuropathic. No radiation. Lasting up to 1 minutes. No sweating. Pt able to ride bike and work in yard and work in yard without any pain in arm."    Today, patient reiterates the above history but does state that he sometimes gets left arm pain when working in his yard that stops when he rests.  No chest pain but he does get shortness of breath with exertion.  Pain lasts for approximately 1-5 minutes before going away.  He gets about 2 episodes of pain per month.  He does exercise regularly and usually does not get this pain when he exercises.    Most recent EKG shows sinus bradycardia.     History:     Social History     Tobacco Use    Smoking status: Never    Smokeless tobacco: Never   Substance Use Topics    Alcohol use: No     Family History   Problem Relation Name Age of Onset    Breast cancer Mother      Emphysema Father      Stroke Sister      Diabetes Brother      Ulcers Brother      No Known Problems Daughter      No Known Problems Daughter         Meds:     Review of patient's allergies indicates:   Allergen Reactions    Atorvastatin      myalgias    Rosuvastatin      myalgias    Neosporin [benzalkonium chloride] Rash     With topical use     Current Medications[1]      Objective:   /76   Pulse 72   Ht 5' 6" (1.676 m)   Wt 88.4 kg (194 lb 14.2 oz)   SpO2 96%   BMI 31.46 kg/m²     Physical Exam  Gen: No apparent distress, resting comfortably  HEENT: Pupils equal and reactive to light  Cardio: Regular rate, point of maximal impulse not displaced, no murmur noted, 2+ radial pulses bilaterally, 2+ DP pulses " "bilaterally  Resp: CTAB, no wheezing  Abd: Soft, non-tender, non-distended  Skin: Warm, dry, no peripheral edema noted  Neuro: Alert and oriented x3  Psych: Normal mood and affect      Labs:     Lab Results   Component Value Date     04/15/2025     04/16/2024    K 4.4 04/15/2025    K 4.1 04/16/2024     04/15/2025     04/16/2024    CO2 28 04/15/2025    CO2 28 04/16/2024    BUN 15 04/15/2025    CREATININE 0.9 04/15/2025    GLUCOSE 88 04/15/2025    ANIONGAP 9 04/15/2025     Lab Results   Component Value Date    HGBA1C 5.4 02/14/2023     No results found for: "BNP", "BNPTRIAGEBLO"    Lab Results   Component Value Date    WBC 6.11 04/15/2025    HGB 15.9 04/15/2025    HGB 15.2 04/16/2024    HCT 48.4 04/15/2025    HCT 45.8 04/16/2024     04/15/2025     04/16/2024    GRAN 2.7 04/16/2024    GRAN 48.7 04/16/2024     Lab Results   Component Value Date    CHOL 212 (H) 04/15/2025    CHOL 199 04/16/2024    HDL 64 04/15/2025    LDLCALC 126.2 04/16/2024    TRIG 79 04/15/2025    TRIG 79 04/16/2024       Lab Results   Component Value Date     04/15/2025     04/16/2024    K 4.4 04/15/2025    K 4.1 04/16/2024     04/15/2025     04/16/2024    CO2 28 04/15/2025    CO2 28 04/16/2024    BUN 15 04/15/2025    CREATININE 0.9 04/15/2025    GLUCOSE 88 04/15/2025    ANIONGAP 9 04/15/2025     Lab Results   Component Value Date    HGBA1C 5.4 02/14/2023     No results found for: "BNP", "BNPTRIAGEBLO" Lab Results   Component Value Date    WBC 6.11 04/15/2025    HGB 15.9 04/15/2025    HGB 15.2 04/16/2024    HCT 48.4 04/15/2025    HCT 45.8 04/16/2024     04/15/2025     04/16/2024    GRAN 2.7 04/16/2024    GRAN 48.7 04/16/2024     Lab Results   Component Value Date    CHOL 212 (H) 04/15/2025    CHOL 199 04/16/2024    HDL 64 04/15/2025    LDLCALC 126.2 04/16/2024    TRIG 79 04/15/2025    TRIG 79 04/16/2024                Cardiovascular Imaging:     Echo: No results found for: " ""EF"    Stress test:     C:    Assessment & Plan:     Dyspnea on exertion/Atypical chest pain  The patient has left shoulder pain may be secondary to nerve issues due to prior surgeries on his left shoulder; however, he is also having dyspnea on exertion and we will evaluate with a exercise stress echocardiogram.    RTC in one year or sooner if needed      Signed:  Jose Luis Ramires MD  [facility]                [1]   Current Outpatient Medications:     doxepin (SILENOR) 6 mg Tab, Take 6 mg by mouth nightly as needed (insomnia)., Disp: 30 tablet, Rfl: 3    ezetimibe (ZETIA) 10 mg tablet, Take 1 tablet (10 mg total) by mouth once daily., Disp: 90 tablet, Rfl: 3    fluticasone propionate (FLONASE) 50 mcg/actuation nasal spray, SHAKE LIQUID AND USE 2 SPRAYS(100 MCG) IN EACH NOSTRIL EVERY DAY, Disp: 48 g, Rfl: 11  No current facility-administered medications for this visit.    Facility-Administered Medications Ordered in Other Visits:     0.9%  NaCl infusion, 500 mL, Intravenous, Continuous, Amado Parekh MD    "

## 2025-05-13 ENCOUNTER — RESULTS FOLLOW-UP (OUTPATIENT)
Dept: CARDIOLOGY | Facility: CLINIC | Age: 70
End: 2025-05-13

## 2025-05-13 ENCOUNTER — HOSPITAL ENCOUNTER (OUTPATIENT)
Dept: CARDIOLOGY | Facility: HOSPITAL | Age: 70
Discharge: HOME | End: 2025-05-13
Attending: STUDENT IN AN ORGANIZED HEALTH CARE EDUCATION/TRAINING PROGRAM
Payer: MEDICARE

## 2025-05-13 VITALS — HEIGHT: 66 IN | WEIGHT: 195 LBS | BODY MASS INDEX: 31.34 KG/M2

## 2025-05-13 DIAGNOSIS — I20.89 ANGINAL EQUIVALENT: ICD-10-CM

## 2025-05-13 LAB
AORTIC SIZE INDEX (SOV): 1.5 CM/M2
AORTIC SIZE INDEX: 1.4 CM/M2
ASCENDING AORTA: 2.8 CM
AV AREA BY CONTINUOUS VTI: 2 CM2
AV INDEX (PROSTH): 0.54
AV LVOT MEAN GRADIENT: 1 MMHG
AV LVOT PEAK GRADIENT: 2 MMHG
AV MEAN GRADIENT: 6 MMHG
AV PEAK GRADIENT: 12 MMHG
AV VALVE AREA BY VELOCITY RATIO: 1.8 CM²
AV VALVE AREA: 2.1 CM2
AV VELOCITY RATIO: 0.47
BSA FOR ECHO PROCEDURE: 2.03 M2
CV ECHO LV RWT: 0.29 CM
CV STRESS BASE HR: 64 BPM
DIASTOLIC BLOOD PRESSURE: 66 MMHG
DOP CALC AO PEAK VEL: 1.7 M/S
DOP CALC AO VTI: 36.1 CM
DOP CALC LVOT AREA: 3.8 CM2
DOP CALC LVOT DIAMETER: 2.2 CM
DOP CALC LVOT PEAK VEL: 0.8 M/S
DOP CALC LVOT STROKE VOLUME: 74.5 CM3
DOP CALCLVOT PEAK VEL VTI: 19.6 CM
E WAVE DECELERATION TIME: 223 MS
E/A RATIO: 0.92
E/E' RATIO: 8 M/S
ECHO EF ESTIMATED: 62 %
ECHO LV POSTERIOR WALL: 0.7 CM (ref 0.6–1.1)
FRACTIONAL SHORTENING: 33.3 % (ref 28–44)
INTERVENTRICULAR SEPTUM: 0.7 CM (ref 0.6–1.1)
IVC DIAMETER: 1.44 CM
IVRT: 83 MS
LA MAJOR: 4.5 CM
LA MINOR: 4.8 CM
LA WIDTH: 3.5 CM
LEFT ATRIUM SIZE: 3.8 CM
LEFT ATRIUM VOLUME INDEX: 27 ML/M2
LEFT ATRIUM VOLUME: 53 CM3
LEFT INTERNAL DIMENSION IN SYSTOLE: 3.2 CM (ref 2.1–4)
LEFT VENTRICLE DIASTOLIC VOLUME INDEX: 53.54 ML/M2
LEFT VENTRICLE DIASTOLIC VOLUME: 106 ML
LEFT VENTRICLE MASS INDEX: 54 G/M2
LEFT VENTRICLE SYSTOLIC VOLUME INDEX: 20.2 ML/M2
LEFT VENTRICLE SYSTOLIC VOLUME: 40 ML
LEFT VENTRICULAR INTERNAL DIMENSION IN DIASTOLE: 4.8 CM (ref 3.5–6)
LEFT VENTRICULAR MASS: 106.9 G
LV LATERAL E/E' RATIO: 6.7
LV SEPTAL E/E' RATIO: 9.6
MV A" WAVE DURATION": 94.2 MS
MV PEAK A VEL: 0.73 M/S
MV PEAK E VEL: 0.67 M/S
OHS CV CPX 1 MINUTE RECOVERY HEART RATE: 109 BPM
OHS CV CPX 85 PERCENT MAX PREDICTED HEART RATE MALE: 128
OHS CV CPX ESTIMATED METS: 10
OHS CV CPX MAX PREDICTED HEART RATE: 151
OHS CV CPX PATIENT IS FEMALE: 0
OHS CV CPX PATIENT IS MALE: 1
OHS CV CPX PEAK DIASTOLIC BLOOD PRESSURE: 61 MMHG
OHS CV CPX PEAK HEAR RATE: 133 BPM
OHS CV CPX PEAK RATE PRESSURE PRODUCT: NORMAL
OHS CV CPX PEAK SYSTOLIC BLOOD PRESSURE: 155 MMHG
OHS CV CPX PERCENT MAX PREDICTED HEART RATE ACHIEVED: 88
OHS CV CPX RATE PRESSURE PRODUCT PRESENTING: 7552
OHS CV RV/LV RATIO: 0.67 CM
PISA TR MAX VEL: 2.5 M/S
PULM VEIN A" WAVE DURATION": 94.2 MS
PULM VEIN S/D RATIO: 0.89
PULMONIC VEIN PEAK A VELOCITY: 0.2 M/S
PV PEAK D VEL: 0.66 M/S
PV PEAK S VEL: 0.59 M/S
RA MAJOR: 4.52 CM
RA PRESSURE ESTIMATED: 3 MMHG
RA WIDTH: 3.35 CM
RIGHT VENTRICLE DIASTOLIC BASEL DIMENSION: 3.2 CM
RV TB RVSP: 6 MMHG
RV TISSUE DOPPLER FREE WALL SYSTOLIC VELOCITY 1 (APICAL 4 CHAMBER VIEW): 17.4 CM/S
SINUS: 2.99 CM
STJ: 2.2 CM
STRESS ECHO POST EXERCISE DUR MIN: 6 MINUTES
STRESS ECHO POST EXERCISE DUR SEC: 31 SECONDS
SYSTOLIC BLOOD PRESSURE: 118 MMHG
TDI LATERAL: 0.1 M/S
TDI SEPTAL: 0.07 M/S
TDI: 0.09 M/S
TRICUSPID ANNULAR PLANE SYSTOLIC EXCURSION: 2.5 CM
TV PEAK GRADIENT: 25 MMHG
TV REST PULMONARY ARTERY PRESSURE: 28 MMHG
Z-SCORE OF LEFT VENTRICULAR DIMENSION IN END DIASTOLE: -1.75
Z-SCORE OF LEFT VENTRICULAR DIMENSION IN END SYSTOLE: -0.74

## 2025-05-13 PROCEDURE — 93351 STRESS TTE COMPLETE: CPT | Mod: HCNC

## 2025-05-13 PROCEDURE — 93351 STRESS TTE COMPLETE: CPT | Mod: 26,HCNC,, | Performed by: INTERNAL MEDICINE

## 2025-06-03 ENCOUNTER — HOSPITAL ENCOUNTER (OUTPATIENT)
Dept: RADIOLOGY | Facility: HOSPITAL | Age: 70
Discharge: HOME | End: 2025-06-03
Attending: INTERNAL MEDICINE
Payer: MEDICARE

## 2025-06-03 ENCOUNTER — TELEPHONE (OUTPATIENT)
Dept: PRIMARY CARE CLINIC | Facility: CLINIC | Age: 70
End: 2025-06-03
Payer: MEDICARE

## 2025-06-03 ENCOUNTER — OFFICE VISIT (OUTPATIENT)
Dept: PRIMARY CARE CLINIC | Facility: CLINIC | Age: 70
End: 2025-06-03
Payer: MEDICARE

## 2025-06-03 VITALS
WEIGHT: 193.44 LBS | OXYGEN SATURATION: 96 % | DIASTOLIC BLOOD PRESSURE: 74 MMHG | HEIGHT: 66 IN | BODY MASS INDEX: 31.09 KG/M2 | TEMPERATURE: 98 F | SYSTOLIC BLOOD PRESSURE: 118 MMHG | HEART RATE: 69 BPM
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
## 2025-06-03 PROCEDURE — 99213 OFFICE O/P EST LOW 20 MIN: CPT | Mod: HCNC,25,S$GLB, | Performed by: INTERNAL MEDICINE

## 2025-06-03 PROCEDURE — 1125F AMNT PAIN NOTED PAIN PRSNT: CPT | Mod: CPTII,HCNC,S$GLB, | Performed by: INTERNAL MEDICINE

## 2025-06-03 PROCEDURE — 96372 THER/PROPH/DIAG INJ SC/IM: CPT | Mod: HCNC,S$GLB,, | Performed by: INTERNAL MEDICINE

## 2025-06-03 PROCEDURE — 3078F DIAST BP <80 MM HG: CPT | Mod: CPTII,HCNC,S$GLB, | Performed by: INTERNAL MEDICINE

## 2025-06-03 PROCEDURE — 1157F ADVNC CARE PLAN IN RCRD: CPT | Mod: CPTII,HCNC,S$GLB, | Performed by: INTERNAL MEDICINE

## 2025-06-03 PROCEDURE — 3074F SYST BP LT 130 MM HG: CPT | Mod: CPTII,HCNC,S$GLB, | Performed by: INTERNAL MEDICINE

## 2025-06-03 PROCEDURE — 71100 X-RAY EXAM RIBS UNI 2 VIEWS: CPT | Mod: TC,HCNC,RT

## 2025-06-03 PROCEDURE — 3008F BODY MASS INDEX DOCD: CPT | Mod: CPTII,HCNC,S$GLB, | Performed by: INTERNAL MEDICINE

## 2025-06-03 RX ORDER — KETOROLAC TROMETHAMINE 30 MG/ML
30 INJECTION, SOLUTION INTRAMUSCULAR; INTRAVENOUS
Status: COMPLETED | OUTPATIENT
Start: 2025-06-03 | End: 2025-06-03

## 2025-06-03 RX ORDER — MELOXICAM 7.5 MG/1
7.5 TABLET ORAL DAILY
Qty: 15 TABLET | Refills: 0 | Status: SHIPPED | OUTPATIENT
Start: 2025-06-03

## 2025-06-03 RX ORDER — TIZANIDINE 4 MG/1
4 TABLET ORAL EVERY 6 HOURS PRN
Qty: 20 TABLET | Refills: 0 | Status: SHIPPED | OUTPATIENT
Start: 2025-06-03 | End: 2025-06-13

## 2025-06-03 RX ADMIN — KETOROLAC TROMETHAMINE 30 MG: 30 INJECTION, SOLUTION INTRAMUSCULAR; INTRAVENOUS at 12:06

## 2025-06-03 NOTE — PROGRESS NOTES
.  This note has been generated using voice-recognition software. There may be typographical errors that have been missed during proof-reading.     Primary Care Provider Appointment    Subjective:      Patient ID: Jeff Hope is a 69 y.o. male here for follow up.     Chief Complaint: Follow-up    HPI:  This is a pleasant 69-year-old male with bronchiectasis, hyperlipidemia, history of pulmonary embolism on chronic anticoagulation with a follicular non-Hodgkin's lymphoma in remission, h/o acute/reactivated hepatitis B, pulmonary HTN, Peyronie's disease, GERD, statin myalgias here for f/u.  Pt last seen   04/15/2025.    04/17/2025 patient seen by Cardiology for anginal equivalent.  Stress test ordered  -Negative.  Follow-up 1 year.      Patient here for back and hip pain.  To epidurals in the past which were effective radiculopathy.  Last seen by pain management 08/2023.  SI joint injection.    Pain with activity sometime and sometmes better with activity.  Pain worse if on the left side.  Does not appear to be SI joint pain.  Restarted pain at easter.  Pain in L lower back and no radiation.  Similar to pain with epidurals.    Fall in the tub yesterday but no pain from this.  No weakness.  Pain with certain movements, fabian when bending over or straightening up.      Problem List[1]     Past Surgical History:   Procedure Laterality Date    CATARACT EXTRACTION W/  INTRAOCULAR LENS IMPLANT Right 4/18/2024    Procedure: EXTRACTION, CATARACT, WITH IOL INSERTION;  Surgeon: Jefferson Montes De Oca MD;  Location: [facility];  Service: Ophthalmology;  Laterality: Right;    ESOPHAGOGASTRODUODENOSCOPY N/A 11/29/2021    Procedure: ESOPHAGOGASTRODUODENOSCOPY (EGD);  Surgeon: Kristine Palmer MD;  Location: [facility] ([address] [place] [address]);  Service: Endoscopy;  Laterality: N/A;  fully vacc-inst mail-tb-left chest port    GROIN EXPLORATION  2 pre 2005 and 1 post 2005    total of 3 procedures    INJECTION OF ANESTHETIC AGENT AROUND NERVE Left 11/8/2022  "   Procedure: BLOCK, NERVE, LEFT L3-L5 MEDIAL BRANCH;  Surgeon: Melody Berry MD;  Location: [facility];  Service: Pain Management;  Laterality: Left;    INJECTION OF ANESTHETIC AGENT AROUND NERVE Left 2025    Procedure: BLOCK, NERVE LEFT L3, 4, 5 MEDIAL BRANCH;  Surgeon: Melody Berry MD;  Location: [facility];  Service: Pain Management;  Laterality: Left;    INJECTION, SACROILIAC JOINT Left 10/11/2022    Procedure: INJECTION,SACROILIAC JOINT LEFT CONTRAST;  Surgeon: Melody Berry MD;  Location: [facility];  Service: Pain Management;  Laterality: Left;    INJECTION, SACROILIAC JOINT Left 2023    Procedure: INJECTION,SACROILIAC JOINT, LEFT SOONER DATE;  Surgeon: Melody Berry MD;  Location: [facility];  Service: Pain Management;  Laterality: Left;    LAPAROSCOPIC CHOLECYSTECTOMY  2018    SHOULDER SURGERY Left        Past Medical History:   Diagnosis Date    History of pulmonary embolus (PE) 04/15/2019    Pt with PE s/p hospitalization.  He was placed on xarelto with high risk with cancer.  He has been out of the medication for the past month. Restart Xarelto 15 mg b.i.d. for 21 days, then 20 mg q.day until cancer no longer active.    Non Hodgkin's lymphoma     Personal history of colonic polyps 2015    per [facility] report - repeat 2020    Peyronie disease     S/P laparoscopic cholecystectomy 2019    Thoracic aorta atherosclerosis     Imagin       Social History[2]    Review of Systems         No data to display                 Checklist of Daily Activities:        Objective:   /74 (BP Location: Right arm, Patient Position: Sitting)   Pulse 69   Temp 98.2 °F (36.8 °C) (Oral)   Ht 5' 6" (1.676 m)   Wt 87.8 kg (193 lb 7.3 oz)   SpO2 96%   BMI 31.22 kg/m²     Physical Exam  Constitutional:       Appearance: Normal appearance.   Musculoskeletal:      Comments:   No red flags.  No spinal tenderness.  Some rib tenderness.  No bruising.   Neurological: "      Mental Status: He is alert.             Lab Results   Component Value Date    WBC 6.11 04/15/2025    HGB 15.9 04/15/2025    HCT 48.4 04/15/2025     04/15/2025    CHOL 212 (H) 04/15/2025    TRIG 79 04/15/2025    HDL 64 04/15/2025    ALT 16 04/15/2025    AST 20 04/15/2025     04/15/2025    K 4.4 04/15/2025     04/15/2025    CREATININE 0.9 04/15/2025    BUN 15 04/15/2025    CO2 28 04/15/2025    TSH 0.983 04/15/2025    PSA 0.80 04/16/2024    INR 1.0 10/14/2022    HGBA1C 5.4 02/14/2023       Medications Ordered Prior to Encounter[3]      Assessment:   69 y.o. male with multiple co-morbid illnesses here for continued follow up of medical problems.      Plan:     Problem List Items Addressed This Visit          Orthopedic    Fall - Primary    Patient fell in bathtub.  Denies increase in pain.  Worsening overall pain in back and hip.  Epidurals in the past for radiculopathy were very effective.  Last seen by pain management 08/2023.    X-ray for evaluation of some rib tenderness.  Back to pain management.    Toradol IM now.    Zanaflex and Mobic.         Relevant Orders    X-Ray Ribs 2 View Right (Completed)    Ambulatory referral/consult to Pain Clinic       Health Maintenance         Date Due Completion Date    COVID-19 Vaccine (7 - 2024-25 season) 09/01/2024 6/9/2022    Pneumococcal Vaccines (Age 50+) (3 of 3 - PPSV23, PCV20 or PCV21) 08/18/2025 8/18/2020    Influenza Vaccine (1) 09/01/2025 11/12/2024    Hemoglobin A1c (Diabetic Prevention Screening) 02/14/2026 2/14/2023    Colorectal Cancer Screening 09/16/2027 9/16/2024    Override on 5/18/2021: Done (repeat 3 year)    Lipid Panel 04/15/2030 4/15/2025    TETANUS VACCINE 06/15/2030 6/15/2020          Medications Reconciliation:   I have not reconciled the patient's home medications with the patient/family. I have updated all changes.  Refer to After-Visit Medication List.      Medication List            Accurate as of Jovana 3, 2025 11:59 PM. If  you have any questions, ask your nurse or doctor.                START taking these medications      meloxicam 7.5 MG tablet  Commonly known as: MOBIC  Take 1 tablet (7.5 mg total) by mouth once daily.  Started by: Roly Flannery MD     tiZANidine 4 MG tablet  Commonly known as: ZANAFLEX  Take 1 tablet (4 mg total) by mouth every 6 (six) hours as needed (muscle spasm).  Started by: Roly Flannery MD            CONTINUE taking these medications      doxepin 6 mg Tab  Commonly known as: SILENOR  Take 6 mg by mouth nightly as needed (insomnia).     ezetimibe 10 mg tablet  Commonly known as: ZETIA  Take 1 tablet (10 mg total) by mouth once daily.     fluticasone propionate 50 mcg/actuation nasal spray  Commonly known as: FLONASE  SHAKE LIQUID AND USE 2 SPRAYS(100 MCG) IN EACH NOSTRIL EVERY DAY               Where to Get Your Medications        These medications were sent to [id number] - [facility] [address]  [address] [facility] [address]      Phone: [phone]   meloxicam 7.5 MG tablet  tiZANidine 4 MG tablet              No follow-ups on file. Total clinical care time was 23 min. The following issues were discussed: back pain and fall     Roly Flannery MD  Internal Medicine  [facility]  [phone]               [1]   Patient Active Problem List  Diagnosis    Bronchiectasis    Follicular lymphoma, unspecified, in remission    Mixed hyperlipidemia    Current mild episode of major depressive disorder    Aortic atherosclerosis    Pain in both testicles    Health care maintenance    Pulmonary hypertension    Myalgia due to statin    Vitamin D deficiency    Abnormal MMSE    Allergic rhinitis    Adrenal hypertrophy    Bilateral shoulder bursitis    History of hepatitis B    Trigger finger of left thumb    Bilateral shoulder pain    Chronic thumb pain, bilateral    Breast mass, right     Peyronie's disease    Hypotestosteronemia in male    Iron deficiency anemia    Gastroesophageal reflux disease without esophagitis    Myofascial pain    Osteoarthritis of spine with radiculopathy, lumbosacral region    Decreased range of motion of lumbar spine    Hypogammaglobulinemia    Sacroiliitis    Pain of right upper extremity    Thoracic aorta atherosclerosis    Photopsia of right eye    Floaters, bilateral    Other acne    Lesion of parotid gland    Pain of left upper extremity    Fall   [2]   Social History  Socioeconomic History    Marital status:     Number of children: 2   Occupational History    Occupation: disabled [employer]    Tobacco Use    Smoking status: Never    Smokeless tobacco: Never   Substance and Sexual Activity    Alcohol use: No    Drug use: No    Sexual activity: Yes     Partners: Female   Social History Narrative    Lives with wife.       Social Drivers of Health     Financial Resource Strain: High Risk (5/17/2024)    Overall Financial Resource Strain (CARDIA)     Difficulty of Paying Living Expenses: Very hard   Food Insecurity: Food Insecurity Present (5/17/2024)    Hunger Vital Sign     Worried About Running Out of Food in the Last Year: Never true     Ran Out of Food in the Last Year: Sometimes true   Transportation Needs: No Transportation Needs (5/17/2024)    PRAPARE - Transportation     Lack of Transportation (Medical): No     Lack of Transportation (Non-Medical): No   Physical Activity: Sufficiently Active (5/17/2024)    Exercise Vital Sign     Days of Exercise per Week: 3 days     Minutes of Exercise per Session: 80 min   Stress: Stress Concern Present (5/17/2024)    [employer] [place] [employer] - Occupational Stress Questionnaire     Feeling of Stress : To some extent   Housing Stability: Unknown (5/17/2024)    Housing Stability Vital Sign     Unable to Pay for Housing in the Last Year: No     Homeless in the Last Year: No   [3]   Current Outpatient  Medications on File Prior to Visit   Medication Sig Dispense Refill    doxepin (SILENOR) 6 mg Tab Take 6 mg by mouth nightly as needed (insomnia). 30 tablet 3    ezetimibe (ZETIA) 10 mg tablet Take 1 tablet (10 mg total) by mouth once daily. 90 tablet 3    fluticasone propionate (FLONASE) 50 mcg/actuation nasal spray SHAKE LIQUID AND USE 2 SPRAYS(100 MCG) IN EACH NOSTRIL EVERY DAY 48 g 11     Current Facility-Administered Medications on File Prior to Visit   Medication Dose Route Frequency Provider Last Rate Last Admin    0.9%  NaCl infusion  500 mL Intravenous Continuous Amado Parekh MD

## 2025-06-04 ENCOUNTER — RESULTS FOLLOW-UP (OUTPATIENT)
Dept: PRIMARY CARE CLINIC | Facility: CLINIC | Age: 70
End: 2025-06-04

## 2025-06-05 ENCOUNTER — TELEPHONE (OUTPATIENT)
Dept: PAIN MEDICINE | Facility: CLINIC | Age: 70
End: 2025-06-05
Payer: MEDICARE

## 2025-06-05 ENCOUNTER — TELEPHONE (OUTPATIENT)
Dept: PRIMARY CARE CLINIC | Facility: CLINIC | Age: 70
End: 2025-06-05
Payer: MEDICARE

## 2025-06-06 ENCOUNTER — OFFICE VISIT (OUTPATIENT)
Dept: PAIN MEDICINE | Facility: CLINIC | Age: 70
End: 2025-06-06
Payer: MEDICARE

## 2025-06-06 VITALS
OXYGEN SATURATION: 97 % | HEIGHT: 66 IN | WEIGHT: 196.63 LBS | DIASTOLIC BLOOD PRESSURE: 65 MMHG | SYSTOLIC BLOOD PRESSURE: 108 MMHG | HEART RATE: 59 BPM | RESPIRATION RATE: 19 BRPM | BODY MASS INDEX: 31.6 KG/M2

## 2025-06-06 DIAGNOSIS — M47.816 LUMBAR SPONDYLOSIS: Primary | ICD-10-CM
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
## 2025-06-06 PROCEDURE — 99999 PR PBB SHADOW E&M-EST. PATIENT-LVL III: CPT | Mod: PBBFAC,HCNC,, | Performed by: ANESTHESIOLOGY

## 2025-06-06 NOTE — H&P (VIEW-ONLY)
PCP: Roly Flannery MD    REFERRING PHYSICIAN: Rloy Flannery MD    CHIEF COMPLAINT: left lower back pain    Original HISTORY OF PRESENT ILLNESS: Jeff Hope presents to the clinic for the evaluation of the above pain. The pain started a year ago.    Original Pain Description:  The pain is located in the superior left buttock and sometimes radiates to his midline lumbar back. The pain is described as aching and dull. Exacerbating factors: Sitting, Standing, Laying, Sleeping on that side, getting up in the morning, and Walking. Mitigating factors heat, ice, medications, physical therapy, and rest. Symptoms interfere with daily activity and sleeping. The patient feels like symptoms have been improving with physical therapy. Patient denies night fever/night sweats, urinary incontinence, bowel incontinence, significant weight loss, significant motor weakness, and loss of sensations.    Original PAIN SCORES:  Best: Pain is 6  Worst: Pain is 8  Usually: Pain is 6  Current: Pain is 5      INTERVAL HISTORY (10/24/22):  Jeff Hope returns for follow up. At our last visit we performed a left SIJ on 10/11/22. He reports minimal relief after injection. He returns today again reporting pain in the left low back and buttock. He notices it mainly with sitting, standing, or laying in bed. It does not appear to be severe in his estimation, but he is hoping that we can assist. His SIJ does not seem very painful today, but he does have pain with facet loading.     INTERVAL HISTORY (11/14/22):  Patient returns to clinic today for follow up. Patient with recent Left L3-5 MBB on 11/8/22. Patient reports 80% relief for 2 days followed by return of symptoms. Pain today is 4/10 in the low back on the left side. Pain does not radiate and stays in the low back. Patient continues with celecoxib, lyrica and norco with some relief. Denies saddle anesthesia, bladder/bowel incontinence, new weakness or loss of sensation.    INTERVAL  "HISTORY (7/21/23):  Jeff Hope returns to clinic for reevaluation of low back pain. He had a left L3-5 MBB performed on 11/8/22 with reassuring resolution of pain at that time. The second procedure was scheduled on 12/6/23, but it was canceled. Pain today is 4/10. He reports that his low back pain is worse on the L and worsened with exertion. His L SI injection gave him relief for about 6 months. He noticed an increase in pain with exertion about 2 months ago similar to his low back pain previously. He denies any new changes in health since previous evaluation.     INTERVAL HISTORY (6/6/2025):  Patient returns today for follow up. Patient was last seen on 8/22/2023 for a left SI joint injection. Today, patient returns for evaluation of his chronic left sided low back pain. His pain is similar in character and location as prior, however he notes it has worsened in severity over the past two months. He denies inciting event prior to worsening of his pain. Pain is localized in the left low back and occasionally radiates into the left buttocks. Pain is constant and aching in character. Pain is 8/10 in severity. Symptoms are worse with lumbar extension or lying on his left side and are relieved with rest. Patient continues with heat and ice with mild relief. He's been utilizing OTC Tylenol and ibuprofen with mild relief.     Of note, patient had a fall in the bathtub on 6/2/25 in which he landed on his right thorax. He saw his PCP on 6/3/25 who ordered an XR Right Ribs which was negative for fracture. Patient denies landing on his buttocks. Since the fall, he notes sharp and stabbing pain with the sensation of "catching" when he extends his back. Of note, he also has a history of left testicular pain which was managed with a nerve block, and he has noticed his left testicular pain has been more noticeable since the fall as well. He was prescribed short courses of Mobic and tizanidine by his PCP which has helped " moderately. Patient is interested in a procedure for relief as he has benefited from injections in the past.         6 weeks of Conservative therapy (PT/Chiro/Home Exercises with Dates)  2022-May 2022  Still doing home exercises    Treatments / Medications: (Ice/Heat/NSAIDS/APAP/etc):  Tylenol (mild relief)  Ibuprofen (mild relief)  Celebrex (mild relief)  Norco (shoulder pain, helps with back pain)  Lyrica (shoulder pain, unsure if it helps with back pain)       Report:  Consistent with prescribed medications    Interventional Pain Procedures: (Previous injections)  10/11/22: Left SIJ injection - 80% for 7 months  22: Left L3-5 MBB - 80% relief for 2 days  23: Left SIJ injection    Past Medical History:   Diagnosis Date    History of pulmonary embolus (PE) 04/15/2019    Pt with PE s/p hospitalization.  He was placed on xarelto with high risk with cancer.  He has been out of the medication for the past month. Restart Xarelto 15 mg b.i.d. for 21 days, then 20 mg q.day until cancer no longer active.    Non Hodgkin's lymphoma     Personal history of colonic polyps 2015    per [facility] report - repeat 2020    Peyronie disease     S/P laparoscopic cholecystectomy 2019    Thoracic aorta atherosclerosis     Imagin     Past Surgical History:   Procedure Laterality Date    CATARACT EXTRACTION W/  INTRAOCULAR LENS IMPLANT Right 2024    Procedure: EXTRACTION, CATARACT, WITH IOL INSERTION;  Surgeon: Jefferson Montes De Oca MD;  Location: [facility];  Service: Ophthalmology;  Laterality: Right;    ESOPHAGOGASTRODUODENOSCOPY N/A 2021    Procedure: ESOPHAGOGASTRODUODENOSCOPY (EGD);  Surgeon: Kristine Palmer MD;  Location: [facility] ENDO ([address] [place] [address]);  Service: Endoscopy;  Laterality: N/A;  fully vacc-inst mail-tb-left chest port    GROIN EXPLORATION  2 pre  and 1 post     total of 3 procedures    INJECTION OF ANESTHETIC AGENT AROUND NERVE Left 2022    Procedure: BLOCK, NERVE, LEFT L3-L5  MEDIAL BRANCH;  Surgeon: Melody Berry MD;  Location: [facility];  Service: Pain Management;  Laterality: Left;    INJECTION, SACROILIAC JOINT Left 10/11/2022    Procedure: INJECTION,SACROILIAC JOINT LEFT CONTRAST;  Surgeon: Melody Berry MD;  Location: [facility];  Service: Pain Management;  Laterality: Left;    INJECTION, SACROILIAC JOINT Left 8/22/2023    Procedure: INJECTION,SACROILIAC JOINT, LEFT SOONER DATE;  Surgeon: Melody Berry MD;  Location: [facility];  Service: Pain Management;  Laterality: Left;    LAPAROSCOPIC CHOLECYSTECTOMY  05/2018    SHOULDER SURGERY Left      Social History     Socioeconomic History    Marital status:     Number of children: 2   Occupational History    Occupation: disabled [employer]    Tobacco Use    Smoking status: Never    Smokeless tobacco: Never   Substance and Sexual Activity    Alcohol use: No    Drug use: No    Sexual activity: Yes     Partners: Female   Social History Narrative    Lives with wife.       Social Drivers of Health     Financial Resource Strain: High Risk (5/17/2024)    Overall Financial Resource Strain (CARDIA)     Difficulty of Paying Living Expenses: Very hard   Food Insecurity: Food Insecurity Present (5/17/2024)    Hunger Vital Sign     Worried About Running Out of Food in the Last Year: Never true     Ran Out of Food in the Last Year: Sometimes true   Transportation Needs: No Transportation Needs (5/17/2024)    PRAPARE - Transportation     Lack of Transportation (Medical): No     Lack of Transportation (Non-Medical): No   Physical Activity: Sufficiently Active (5/17/2024)    Exercise Vital Sign     Days of Exercise per Week: 3 days     Minutes of Exercise per Session: 80 min   Stress: Stress Concern Present (5/17/2024)    [employer] - Occupational Stress Questionnaire     Feeling of Stress : To some extent   Housing Stability: Unknown (5/17/2024)    Housing Stability Vital Sign      Unable to Pay for Housing in the Last Year: No     Homeless in the Last Year: No     Family History   Problem Relation Name Age of Onset    Breast cancer Mother      Emphysema Father      Stroke Sister      Diabetes Brother      Ulcers Brother      No Known Problems Daughter      No Known Problems Daughter         Review of patient's allergies indicates:   Allergen Reactions    Atorvastatin      myalgias    Rosuvastatin      myalgias    Neosporin [benzalkonium chloride] Rash     With topical use       Current Outpatient Medications   Medication Sig    doxepin (SILENOR) 6 mg Tab Take 6 mg by mouth nightly as needed (insomnia).    ezetimibe (ZETIA) 10 mg tablet Take 1 tablet (10 mg total) by mouth once daily.    fluticasone propionate (FLONASE) 50 mcg/actuation nasal spray SHAKE LIQUID AND USE 2 SPRAYS(100 MCG) IN EACH NOSTRIL EVERY DAY    meloxicam (MOBIC) 7.5 MG tablet Take 1 tablet (7.5 mg total) by mouth once daily.    tiZANidine (ZANAFLEX) 4 MG tablet Take 1 tablet (4 mg total) by mouth every 6 (six) hours as needed (muscle spasm).     No current facility-administered medications for this visit.     Facility-Administered Medications Ordered in Other Visits   Medication    0.9%  NaCl infusion       ROS:  GENERAL: No fever. No chills. No fatigue. Denies weight loss. Denies weight gain.  HEENT: Denies headaches. Denies vision change. Denies eye pain. Denies double vision. Denies ear pain.   CV: Denies chest pain.   PULM: Denies of shortness of breath.  GI: Denies constipation. No diarrhea. No abdominal pain. Denies nausea. Denies vomiting. No blood in stool.  HEME: Denies bleeding problems.  : Denies urgency. No painful urination. No blood in urine. Testicular pain  MS: Denies joint stiffness. Low back pain, L-sided. Right thoracic/rib pain.   SKIN: Denies rash.   NEURO: Denies seizures. No weakness.  PSYCH:  Denies difficulty sleeping. No anxiety. Denies depression. No suicidal thoughts.       VITALS:   Vitals:  "   06/06/25 1050   BP: 108/65   Pulse: (!) 59   Resp: 19   SpO2: 97%   Weight: 89.2 kg (196 lb 10.4 oz)   Height: 5' 6" (1.676 m)   PainSc:   8   PainLoc: Back       PHYSICAL EXAM:   GENERAL: Well appearing, in no acute distress, alert and oriented x3.  PSYCH:  Mood and affect appropriate.  SKIN: Skin color, texture, turgor normal, no rashes or lesions.  HEENT:  Normocephalic, atraumatic. Cranial nerves grossly intact.  PULM: No evidence of respiratory difficulty, symmetric chest rise.  GI:  Non-distended  BACK:  Left SIJ: +mildly CRESENCIO, negative Compression, distraction, Gaenslen's, and thigh thrust tests. Positive facet loading on the left today. Normal range of motion. No pain to palpation over the spinous processes. No pain to palpation over facet joints.  Straight leg raising in the supine position is negative to radicular pain.   EXTREMITIES: No deformities, edema, or skin discoloration.   MUSCULOSKELETAL: No L piriformis TTP. Hip (log roll, compression, FADIR) provocative maneuvers are negative. Bilateral upper and lower extremity strength is normal and symmetric. No atrophy is noted.  NEURO: Sensation is equal and appropriate bilaterally. Bilateral upper and lower extremity coordination and muscle stretch reflexes are physiologic and symmetric. Plantar response are downgoing.   GAIT: normal.      LABS:  No pertinent    IMAGING:      MRI LUMBAR SPINE (9/9/2022)  FINDINGS:  Lumbar spine alignment is normal.  No spondylolisthesis.  No spondylolysis.  Vertebral body heights are well maintained without evidence for fracture.  No marrow signal abnormality to suggest an infiltrative process.     There is degenerative disc desiccation throughout the lumbar spine with mild associated height loss at L3-L4 and L4-L5.  Multifocal annular fissures most conspicuous at L1-L2 and L5-S1.  No endplate edema.     Distal spinal cord demonstrates normal contour and signal intensity.  Cauda equina appears normal without findings " to suggest arachnoiditis.  Conus medullaris terminates at L1.     Right renal cortical cyst.  Remaining visualized abdominal organs demonstrate no significant abnormalities.  SI joints are symmetric.  Paraspinal musculature demonstrates normal bulk.     T12-L1: No spinal canal stenosis or neural foraminal narrowing.     L1-L2: Circumferential disc bulge causes mild mass effect on the ventral thecal sac and left lateral recess.  Bilateral facet arthropathy and bilateral ligamentum flavum buckling.  No spinal canal stenosis or neural foraminal narrowing.     L2-L3: Right foraminal/extraforaminal broad-based protrusion and bilateral ligamentum flavum buckling.  Findings contribute to mild right neural foraminal narrowing.  No spinal canal stenosis.     L3-L4: Circumferential disc bulge and bilateral ligamentum flavum buckling.  Findings contribute to mild-to-moderate left and mild right neural foraminal narrowing.  No spinal canal stenosis.     L4-L5: Circumferential disc bulge, bilateral facet arthropathy and bilateral ligamentum flavum buckling.  Findings contribute to mild left and mild-to-moderate right neural foraminal narrowing.     L5-S1: Posterior broad-based disc bulge and bilateral facet arthropathy.  No spinal canal stenosis or neural foraminal narrowing.     Impression:     1. Mild lumbar degenerative changes contributing to mild-to-moderate neural foraminal narrowing from L2-L3 through L4-L5.  No spinal canal stenosis.    ASSESSMENT: 69 y.o. year old male with pain, consistent with:    Encounter Diagnoses   Name Primary?    Fall, initial encounter     Lumbar spondylosis Yes        DISCUSSION: Mr. Hope comes to us with left low back pain. He initially improved with PT and HEP, however symptoms have progressed. He indicates pain in the left lumbar region, and facet loading was positive on that side on exam. He is also noting a recurrence of testicular pain on that side as well.     PLAN:  Continue Heat,  ice, Tylenol prn  Continue HEP. [facility] previously provided  Schedule repeat left L3,4,5 MBB  Can consider procedure for left testicular pain in the future following management of low back pain if symptoms persist  If LMBB effective will proceed with RFA      Jaziel Neri M.D.  [employer] [facility] PGY-2  06/06/2025

## 2025-06-27 ENCOUNTER — PATIENT MESSAGE (OUTPATIENT)
Dept: PAIN MEDICINE | Facility: OTHER | Age: 70
End: 2025-06-27
Payer: MEDICARE

## 2025-07-01 ENCOUNTER — HOSPITAL ENCOUNTER (OUTPATIENT)
Facility: OTHER | Age: 70
Discharge: HOME | End: 2025-07-01
Attending: ANESTHESIOLOGY | Admitting: ANESTHESIOLOGY
Payer: MEDICARE

## 2025-07-01 VITALS
HEIGHT: 69 IN | WEIGHT: 195 LBS | RESPIRATION RATE: 18 BRPM | BODY MASS INDEX: 28.88 KG/M2 | OXYGEN SATURATION: 94 % | SYSTOLIC BLOOD PRESSURE: 136 MMHG | HEART RATE: 92 BPM | TEMPERATURE: 98 F | DIASTOLIC BLOOD PRESSURE: 71 MMHG

## 2025-07-01 DIAGNOSIS — G89.29 CHRONIC PAIN: ICD-10-CM

## 2025-07-01 DIAGNOSIS — M47.816 LUMBAR SPONDYLOSIS: Primary | ICD-10-CM

## 2025-07-01 PROCEDURE — 64494 INJ PARAVERT F JNT L/S 2 LEV: CPT | Mod: KX,HCNC,LT, | Performed by: ANESTHESIOLOGY

## 2025-07-01 PROCEDURE — 64494 INJ PARAVERT F JNT L/S 2 LEV: CPT | Mod: HCNC,LT | Performed by: ANESTHESIOLOGY

## 2025-07-01 PROCEDURE — 25000003 PHARM REV CODE 250: Mod: HCNC | Performed by: ANESTHESIOLOGY

## 2025-07-01 PROCEDURE — 64493 INJ PARAVERT F JNT L/S 1 LEV: CPT | Mod: KX,HCNC,LT, | Performed by: ANESTHESIOLOGY

## 2025-07-01 PROCEDURE — 64493 INJ PARAVERT F JNT L/S 1 LEV: CPT | Mod: HCNC,LT | Performed by: ANESTHESIOLOGY

## 2025-07-01 PROCEDURE — 63600175 PHARM REV CODE 636 W HCPCS: Mod: HCNC | Performed by: ANESTHESIOLOGY

## 2025-07-01 RX ORDER — LIDOCAINE HYDROCHLORIDE 20 MG/ML
INJECTION, SOLUTION INFILTRATION; PERINEURAL
Status: DISCONTINUED | OUTPATIENT
Start: 2025-07-01 | End: 2025-07-01 | Stop reason: HOSPADM

## 2025-07-01 RX ORDER — BUPIVACAINE HYDROCHLORIDE 2.5 MG/ML
INJECTION, SOLUTION EPIDURAL; INFILTRATION; INTRACAUDAL; PERINEURAL
Status: DISCONTINUED | OUTPATIENT
Start: 2025-07-01 | End: 2025-07-01 | Stop reason: HOSPADM

## 2025-07-01 RX ORDER — ALPRAZOLAM 0.5 MG/1
0.5 TABLET, ORALLY DISINTEGRATING ORAL ONCE
Status: COMPLETED | OUTPATIENT
Start: 2025-07-01 | End: 2025-07-01

## 2025-07-01 RX ORDER — SODIUM CHLORIDE 9 MG/ML
INJECTION, SOLUTION INTRAVENOUS CONTINUOUS
Status: DISCONTINUED | OUTPATIENT
Start: 2025-07-01 | End: 2025-07-01 | Stop reason: HOSPADM

## 2025-07-01 RX ADMIN — ALPRAZOLAM 0.5 MG: 0.5 TABLET, ORALLY DISINTEGRATING ORAL at 07:07

## 2025-07-01 NOTE — DISCHARGE SUMMARY
Discharge Note  Short Stay      SUMMARY     Admit Date: 7/1/2025    Attending Physician: Ana Cavanaugh      Discharge Physician: Ana Cavanaugh      Discharge Date: 7/1/2025 8:20 AM    Procedure(s) (LRB):  BLOCK, NERVE LEFT L3, 4, 5 MEDIAL BRANCH (Left)    Final Diagnosis: Lumbar spondylosis [M47.816]    Disposition: Home or self care    Patient Instructions:   Current Discharge Medication List        CONTINUE these medications which have NOT CHANGED    Details   doxepin (SILENOR) 6 mg Tab Take 6 mg by mouth nightly as needed (insomnia).  Qty: 30 tablet, Refills: 3      ezetimibe (ZETIA) 10 mg tablet Take 1 tablet (10 mg total) by mouth once daily.  Qty: 90 tablet, Refills: 3      fluticasone propionate (FLONASE) 50 mcg/actuation nasal spray SHAKE LIQUID AND USE 2 SPRAYS(100 MCG) IN EACH NOSTRIL EVERY DAY  Qty: 48 g, Refills: 11      meloxicam (MOBIC) 7.5 MG tablet Take 1 tablet (7.5 mg total) by mouth once daily.  Qty: 15 tablet, Refills: 0                 Discharge Diagnosis: Lumbar spondylosis [M47.816]  Condition on Discharge: Stable with no complications to procedure   Diet on Discharge: Same as before.  Activity: as per instruction sheet.  Discharge to: Home with a responsible adult.  Follow up: 2-4 weeks       Please call my office or pager at [phone] if experienced any weakness or loss of sensation, fever > 101.5, pain uncontrolled with oral medications, persistent nausea/vomiting/or diarrhea, redness or drainage from the incisions, or any other worrisome concerns. If physician on call was not reached or could not communicate with our office for any reason please go to the nearest emergency department

## 2025-07-01 NOTE — OP NOTE
Diagnostic Lumbar Medial Branch Block Under Fluoroscopy    The procedure, risks, benefits, and options were discussed with the patient. There are no contraindications to the procedure. The patent expressed understanding and agreed to the procedure. Informed written consent was obtained prior to the start of the procedure and can be found in the patient's chart.    PATIENT NAME: Jeff Hope   MRN: [id number]     DATE OF PROCEDURE: 07/01/2025                                           PROCEDURE:  Diagnostic Left L3, L4, and L5 Lumbar Medial Branch Block under Fluoroscopy    PRE-OP DIAGNOSIS: Lumbar spondylosis [M47.816] Lumbar spondylosis [M47.816]    POST-OP DIAGNOSIS: Same    PHYSICIAN: Melody Berry MD    ASSISTANTS: Ana Cavanaugh MD     MEDICATIONS INJECTED:  Bupivicaine 0.25%    LOCAL ANESTHETIC INJECTED:   Xylocaine 2%    SEDATION: None    ESTIMATED BLOOD LOSS:  None    COMPLICATIONS:  None.    INTERVAL HISTORY: Patient has clinical and imaging findings suggestive of facet mediated pain.    TECHNIQUE: Time-out was performed to identify the patient and procedure to be performed. With the patient laying in a prone position, the surgical area was prepped and draped in the usual sterile fashion using ChloraPrep and fenestrated drape. The levels were determined under fluoroscopic guidance. Skin anesthesia was achieved by injecting Lidocaine 2% over the injection sites. A 25 gauge, 3.5 inch needle was introduced into the medial branch nerves at the junctions of the superior articular process and the transverse processes of the targeted sites using AP, lateral and/or contralateral oblique fluoroscopic imaging. After negative aspiration for blood or CSF was confirmed, 1 mL of the anesthetic listed above was then slowly injected at each site. The needles were removed and bleeding was nil. A sterile dressing was applied. No specimens collected. The patient tolerated the procedure well.      The patient was  monitored after the procedure in the recovery area. They were given post-procedure and discharge instructions to follow at home. The patient was discharged in a stable condition.    Ana Cavanaugh MD     I reviewed and edited the fellow's note. I conducted my own interview and physical examination. I agree with the findings. I was present and supervising all critical portions of the procedure.

## 2025-07-01 NOTE — DISCHARGE INSTRUCTIONS

## 2025-07-02 ENCOUNTER — TELEPHONE (OUTPATIENT)
Dept: PAIN MEDICINE | Facility: CLINIC | Age: 70
End: 2025-07-02
Payer: MEDICARE

## 2025-07-02 NOTE — TELEPHONE ENCOUNTER
Pt was a duplicate message. SARA Delacruz received pain diary.     Alycia called in to report his pain diary results from his procedure 07/01/25. Patient states he did not receive 80% of relief. Staff scheduled patient a f/u to discuss other pain relief options.

## 2025-07-02 NOTE — TELEPHONE ENCOUNTER
Patent called in to report his pain diary results from his procedure 07/01/25. Patient states he did not receive 80% of relief. Staff scheduled patient a f/u to discuss other pain relief options.

## 2025-07-02 NOTE — TELEPHONE ENCOUNTER
Copied from CRM #[id number]. Topic: General Inquiry - Patient Advice  >> Jul 2, 2025  2:09 PM Gamal wrote:  Pain Diary .... Dr. Berry              Hour 1   3              Hour 2    4              Hour 3    4              Hour 4    7              Hour 5    7              Hour 6    8              Hour 12   8              Hour 24    8              Additional Information : Patient did npt receive relief

## 2025-07-14 ENCOUNTER — INFUSION (OUTPATIENT)
Dept: INFUSION THERAPY | Facility: OTHER | Age: 70
End: 2025-07-14
Attending: INTERNAL MEDICINE
Payer: MEDICARE

## 2025-07-14 VITALS
RESPIRATION RATE: 17 BRPM | DIASTOLIC BLOOD PRESSURE: 69 MMHG | OXYGEN SATURATION: 100 % | TEMPERATURE: 98 F | HEART RATE: 66 BPM | SYSTOLIC BLOOD PRESSURE: 125 MMHG

## 2025-07-14 DIAGNOSIS — C82.9A: Primary | ICD-10-CM

## 2025-07-14 PROCEDURE — 63600175 PHARM REV CODE 636 W HCPCS: Mod: HCNC | Performed by: INTERNAL MEDICINE

## 2025-07-14 PROCEDURE — 96523 IRRIG DRUG DELIVERY DEVICE: CPT | Mod: HCNC

## 2025-07-14 RX ORDER — SODIUM CHLORIDE 0.9 % (FLUSH) 0.9 %
10 SYRINGE (ML) INJECTION
OUTPATIENT
Start: 2025-07-14

## 2025-07-14 RX ORDER — SODIUM CHLORIDE 0.9 % (FLUSH) 0.9 %
10 SYRINGE (ML) INJECTION
Status: DISCONTINUED | OUTPATIENT
Start: 2025-07-14 | End: 2025-07-14 | Stop reason: HOSPADM

## 2025-07-14 RX ORDER — HEPARIN 100 UNIT/ML
500 SYRINGE INTRAVENOUS
Status: DISCONTINUED | OUTPATIENT
Start: 2025-07-14 | End: 2025-07-14 | Stop reason: HOSPADM

## 2025-07-14 RX ORDER — HEPARIN 100 UNIT/ML
500 SYRINGE INTRAVENOUS
OUTPATIENT
Start: 2025-07-14

## 2025-07-14 RX ADMIN — HEPARIN 500 UNITS: 100 SYRINGE at 08:07

## 2025-07-14 NOTE — PLAN OF CARE
Problem: Adult Inpatient Plan of Care  Goal: Plan of Care Review  Outcome: Progressing       Patient came to clinic today for port flush. VS and assessment complete with no issues noted. Port accessed, positive blood return noted and then flushed easily w/o complications. Port de-accessed, scheduled for next appt and left clinic in NAD.

## 2025-07-15 ENCOUNTER — TELEPHONE (OUTPATIENT)
Dept: PRIMARY CARE CLINIC | Facility: CLINIC | Age: 70
End: 2025-07-15
Payer: MEDICARE
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
## 2025-07-15 NOTE — ASSESSMENT & PLAN NOTE
Patient fell in bathtub.  Denies increase in pain.  Worsening overall pain in back and hip.  Epidurals in the past for radiculopathy were very effective.  Last seen by pain management 08/2023.    X-ray for evaluation of some rib tenderness.  Back to pain management.    Toradol IM now.    Zanaflex and Mobic.

## 2025-07-15 NOTE — TELEPHONE ENCOUNTER
Copied from CRM #[id number]. Topic: General Inquiry - Return Call  >> Jul 15, 2025  8:51 AM Joceline wrote:  Type: Returning a call    Who left a message?n/a    When did the practice call?7/15/25    Does patient know what this is regarding:n/a    Who called and best call back number:[phone]    Would the patient rather a call back or a response via [facility]? call    Comments:patient stated he missed an appt      Called back and LVM. Jose scheduled.

## 2025-07-23 ENCOUNTER — OFFICE VISIT (OUTPATIENT)
Dept: PAIN MEDICINE | Facility: CLINIC | Age: 70
End: 2025-07-23
Payer: MEDICARE

## 2025-07-23 VITALS
OXYGEN SATURATION: 100 % | TEMPERATURE: 98 F | SYSTOLIC BLOOD PRESSURE: 132 MMHG | DIASTOLIC BLOOD PRESSURE: 61 MMHG | WEIGHT: 199.5 LBS | HEART RATE: 60 BPM | RESPIRATION RATE: 18 BRPM | HEIGHT: 69 IN | BODY MASS INDEX: 29.55 KG/M2

## 2025-07-23 DIAGNOSIS — M46.1 SACROILIITIS: Primary | ICD-10-CM

## 2025-07-23 DIAGNOSIS — M51.360 DEGENERATION OF INTERVERTEBRAL DISC OF LUMBAR REGION WITH DISCOGENIC BACK PAIN: ICD-10-CM

## 2025-07-23 PROCEDURE — 99999 PR PBB SHADOW E&M-EST. PATIENT-LVL III: CPT | Mod: PBBFAC,HCNC,, | Performed by: ANESTHESIOLOGY

## 2025-07-23 NOTE — PROGRESS NOTES
PCP: Roly Flannery MD    REFERRING PHYSICIAN: No ref. provider found    CHIEF COMPLAINT: left lower back pain    Original HISTORY OF PRESENT ILLNESS: Jeff Hope presents to the clinic for the evaluation of the above pain. The pain started a year ago.    Original Pain Description:  The pain is located in the superior left buttock and sometimes radiates to his midline lumbar back. The pain is described as aching and dull. Exacerbating factors: Sitting, Standing, Laying, Sleeping on that side, getting up in the morning, and Walking. Mitigating factors heat, ice, medications, physical therapy, and rest. Symptoms interfere with daily activity and sleeping. The patient feels like symptoms have been improving with physical therapy. Patient denies night fever/night sweats, urinary incontinence, bowel incontinence, significant weight loss, significant motor weakness, and loss of sensations.    Original PAIN SCORES:  Best: Pain is 6  Worst: Pain is 8  Usually: Pain is 6  Current: Pain is 5      INTERVAL HISTORY (10/24/22):  Jeff Hope returns for follow up. At our last visit we performed a left SIJ on 10/11/22. He reports minimal relief after injection. He returns today again reporting pain in the left low back and buttock. He notices it mainly with sitting, standing, or laying in bed. It does not appear to be severe in his estimation, but he is hoping that we can assist. His SIJ does not seem very painful today, but he does have pain with facet loading.     INTERVAL HISTORY (11/14/22):  Patient returns to clinic today for follow up. Patient with recent Left L3-5 MBB on 11/8/22. Patient reports 80% relief for 2 days followed by return of symptoms. Pain today is 4/10 in the low back on the left side. Pain does not radiate and stays in the low back. Patient continues with celecoxib, lyrica and norco with some relief. Denies saddle anesthesia, bladder/bowel incontinence, new weakness or loss of  "sensation.    INTERVAL HISTORY (7/21/23):  Jeff Hope returns to clinic for reevaluation of low back pain. He had a left L3-5 MBB performed on 11/8/22 with reassuring resolution of pain at that time. The second procedure was scheduled on 12/6/23, but it was canceled. Pain today is 4/10. He reports that his low back pain is worse on the L and worsened with exertion. His L SI injection gave him relief for about 6 months. He noticed an increase in pain with exertion about 2 months ago similar to his low back pain previously. He denies any new changes in health since previous evaluation.     INTERVAL HISTORY (6/6/2025):  Patient returns today for follow up. Patient was last seen on 8/22/2023 for a left SI joint injection. Today, patient returns for evaluation of his chronic left sided low back pain. His pain is similar in character and location as prior, however he notes it has worsened in severity over the past two months. He denies inciting event prior to worsening of his pain. Pain is localized in the left low back and occasionally radiates into the left buttocks. Pain is constant and aching in character. Pain is 8/10 in severity. Symptoms are worse with lumbar extension or lying on his left side and are relieved with rest. Patient continues with heat and ice with mild relief. He's been utilizing OTC Tylenol and ibuprofen with mild relief.     Of note, patient had a fall in the bathtub on 6/2/25 in which he landed on his right thorax. He saw his PCP on 6/3/25 who ordered an XR Right Ribs which was negative for fracture. Patient denies landing on his buttocks. Since the fall, he notes sharp and stabbing pain with the sensation of "catching" when he extends his back. Of note, he also has a history of left testicular pain which was managed with a nerve block, and he has noticed his left testicular pain has been more noticeable since the fall as well. He was prescribed short courses of Mobic and tizanidine by his " PCP which has helped moderately. Patient is interested in a procedure for relief as he has benefited from injections in the past.     Interval History 25: Jeff Hope returns for follow up. At our last visit we decided to trial a left sided LMBB to assist with his low back pain. However, he did not get too much relief from his first block, so we did not continue with that treatment. He returns today to discuss other options. Today, his pain is predominantly in the left low back, non-radiating, worst with walking, causing him to have to stop walking. Pain ranges from 4-8/10.       6 weeks of Conservative therapy (PT/Chiro/Home Exercises with Dates)  2022-May 2022  Still doing home exercises    Treatments / Medications: (Ice/Heat/NSAIDS/APAP/etc):  Tylenol (mild relief)  Ibuprofen (mild relief)  Celebrex (mild relief)  Norco (shoulder pain, helps with back pain)  Lyrica (shoulder pain, unsure if it helps with back pain)       Report:  Consistent with prescribed medications    Interventional Pain Procedures: (Previous injections)  10/11/22: Left SIJ injection - 80% for 7 months  22: Left L3-5 MBB - 80% relief for 2 days  23: Left SIJ injection - 80% relief for 1 year  25: Left L3-5 LMBB     Past Medical History:   Diagnosis Date    History of pulmonary embolus (PE) 04/15/2019    Pt with PE s/p hospitalization.  He was placed on xarelto with high risk with cancer.  He has been out of the medication for the past month. Restart Xarelto 15 mg b.i.d. for 21 days, then 20 mg q.day until cancer no longer active.    Non Hodgkin's lymphoma     Personal history of colonic polyps 2015    per [facility] report - repeat 2020    Peyronie disease     S/P laparoscopic cholecystectomy 2019    Thoracic aorta atherosclerosis     Imagin     Past Surgical History:   Procedure Laterality Date    CATARACT EXTRACTION W/  INTRAOCULAR LENS IMPLANT Right 2024    Procedure: EXTRACTION, CATARACT,  WITH IOL INSERTION;  Surgeon: Jefferson Montes De Oca MD;  Location: [facility] OR;  Service: Ophthalmology;  Laterality: Right;    ESOPHAGOGASTRODUODENOSCOPY N/A 11/29/2021    Procedure: ESOPHAGOGASTRODUODENOSCOPY (EGD);  Surgeon: Kristine Palmer MD;  Location: [facility] ENDO (4TH FLR);  Service: Endoscopy;  Laterality: N/A;  fully vacc-inst mail-tb-left chest port    GROIN EXPLORATION  2 pre 2005 and 1 post 2005    total of 3 procedures    INJECTION OF ANESTHETIC AGENT AROUND NERVE Left 11/8/2022    Procedure: BLOCK, NERVE, LEFT L3-L5 MEDIAL BRANCH;  Surgeon: Melody Berry MD;  Location: [facility];  Service: Pain Management;  Laterality: Left;    INJECTION OF ANESTHETIC AGENT AROUND NERVE Left 7/1/2025    Procedure: BLOCK, NERVE LEFT L3, 4, 5 MEDIAL BRANCH;  Surgeon: Melody Berry MD;  Location: [facility];  Service: Pain Management;  Laterality: Left;    INJECTION, SACROILIAC JOINT Left 10/11/2022    Procedure: INJECTION,SACROILIAC JOINT LEFT CONTRAST;  Surgeon: Melody Berry MD;  Location: [facility];  Service: Pain Management;  Laterality: Left;    INJECTION, SACROILIAC JOINT Left 8/22/2023    Procedure: INJECTION,SACROILIAC JOINT, LEFT SOONER DATE;  Surgeon: Melody Berry MD;  Location: [facility];  Service: Pain Management;  Laterality: Left;    LAPAROSCOPIC CHOLECYSTECTOMY  05/2018    SHOULDER SURGERY Left      Social History     Socioeconomic History    Marital status:     Number of children: 2   Occupational History    Occupation: disabled [employer]    Tobacco Use    Smoking status: Never    Smokeless tobacco: Never   Substance and Sexual Activity    Alcohol use: No    Drug use: No    Sexual activity: Yes     Partners: Female   Social History Narrative    Lives with wife.       Social Drivers of Health     Financial Resource Strain: High Risk (5/17/2024)    Overall Financial Resource Strain (CARDIA)     Difficulty of Paying Living Expenses: Very hard   Food Insecurity: Food  Insecurity Present (5/17/2024)    Hunger Vital Sign     Worried About Running Out of Food in the Last Year: Never true     Ran Out of Food in the Last Year: Sometimes true   Transportation Needs: No Transportation Needs (5/17/2024)    PRAPARE - Transportation     Lack of Transportation (Medical): No     Lack of Transportation (Non-Medical): No   Physical Activity: Sufficiently Active (5/17/2024)    Exercise Vital Sign     Days of Exercise per Week: 3 days     Minutes of Exercise per Session: 80 min   Stress: Stress Concern Present (5/17/2024)    [facility] [facility] - Occupational Stress Questionnaire     Feeling of Stress : To some extent   Housing Stability: Unknown (5/17/2024)    Housing Stability Vital Sign     Unable to Pay for Housing in the Last Year: No     Homeless in the Last Year: No     Family History   Problem Relation Name Age of Onset    Breast cancer Mother      Emphysema Father      Stroke Sister      Diabetes Brother      Ulcers Brother      No Known Problems Daughter      No Known Problems Daughter         Review of patient's allergies indicates:   Allergen Reactions    Atorvastatin      myalgias    Rosuvastatin      myalgias    Neosporin [benzalkonium chloride] Rash     With topical use       Current Outpatient Medications   Medication Sig    doxepin (SILENOR) 6 mg Tab Take 6 mg by mouth nightly as needed (insomnia).    ezetimibe (ZETIA) 10 mg tablet Take 1 tablet (10 mg total) by mouth once daily.    fluticasone propionate (FLONASE) 50 mcg/actuation nasal spray SHAKE LIQUID AND USE 2 SPRAYS(100 MCG) IN EACH NOSTRIL EVERY DAY    meloxicam (MOBIC) 7.5 MG tablet Take 1 tablet (7.5 mg total) by mouth once daily.     No current facility-administered medications for this visit.     Facility-Administered Medications Ordered in Other Visits   Medication    0.9%  NaCl infusion       ROS:  GENERAL: No fever. No chills. No fatigue. Denies weight loss. Denies weight gain.  HEENT: Denies  "headaches. Denies vision change. Denies eye pain. Denies double vision. Denies ear pain.   CV: Denies chest pain.   PULM: Denies of shortness of breath.  GI: Denies constipation. No diarrhea. No abdominal pain. Denies nausea. Denies vomiting. No blood in stool.  HEME: Denies bleeding problems.  : Denies urgency. No painful urination. No blood in urine. Testicular pain  MS: Denies joint stiffness. Low back pain, L-sided. Right thoracic/rib pain.   SKIN: Denies rash.   NEURO: Denies seizures. No weakness.  PSYCH:  Denies difficulty sleeping. No anxiety. Denies depression. No suicidal thoughts.       VITALS:   Vitals:    07/23/25 0919   BP: 132/61   Pulse: 60   Resp: 18   Temp: 98.1 °F (36.7 °C)   TempSrc: Oral   SpO2: 100%   Weight: 90.5 kg (199 lb 8.3 oz)   Height: 5' 9" (1.753 m)   PainSc:   5   PainLoc: Back       PHYSICAL EXAM:   GENERAL: Well appearing, in no acute distress, alert and oriented x3.  PSYCH:  Mood and affect appropriate.  SKIN: Skin color, texture, turgor normal, no rashes or lesions.  HEENT:  Normocephalic, atraumatic. Cranial nerves grossly intact.  PULM: No evidence of respiratory difficulty, symmetric chest rise.  GI:  Non-distended  BACK:  Left SIJ: +CRESENCIO, negative Compression, distraction, + Gaenslen's, and + thigh thrust tests. Positive facet loading on the left today. Normal range of motion. No pain to palpation over the spinous processes. No pain to palpation over facet joints.  Straight leg raising in the supine position is negative to radicular pain.   EXTREMITIES: No deformities, edema, or skin discoloration.   MUSCULOSKELETAL: No L piriformis TTP. Hip (log roll, compression, FADIR) provocative maneuvers are negative. Bilateral upper and lower extremity strength is normal and symmetric. No atrophy is noted.  NEURO: Sensation is equal and appropriate bilaterally. Bilateral upper and lower extremity coordination and muscle stretch reflexes are physiologic and symmetric. Plantar response " are downgoing.   GAIT: normal.      LABS:  No pertinent    IMAGING:      MRI LUMBAR SPINE (9/9/2022)  FINDINGS:  Lumbar spine alignment is normal.  No spondylolisthesis.  No spondylolysis.  Vertebral body heights are well maintained without evidence for fracture.  No marrow signal abnormality to suggest an infiltrative process.     There is degenerative disc desiccation throughout the lumbar spine with mild associated height loss at L3-L4 and L4-L5.  Multifocal annular fissures most conspicuous at L1-L2 and L5-S1.  No endplate edema.     Distal spinal cord demonstrates normal contour and signal intensity.  Cauda equina appears normal without findings to suggest arachnoiditis.  Conus medullaris terminates at L1.     Right renal cortical cyst.  Remaining visualized abdominal organs demonstrate no significant abnormalities.  SI joints are symmetric.  Paraspinal musculature demonstrates normal bulk.     T12-L1: No spinal canal stenosis or neural foraminal narrowing.     L1-L2: Circumferential disc bulge causes mild mass effect on the ventral thecal sac and left lateral recess.  Bilateral facet arthropathy and bilateral ligamentum flavum buckling.  No spinal canal stenosis or neural foraminal narrowing.     L2-L3: Right foraminal/extraforaminal broad-based protrusion and bilateral ligamentum flavum buckling.  Findings contribute to mild right neural foraminal narrowing.  No spinal canal stenosis.     L3-L4: Circumferential disc bulge and bilateral ligamentum flavum buckling.  Findings contribute to mild-to-moderate left and mild right neural foraminal narrowing.  No spinal canal stenosis.     L4-L5: Circumferential disc bulge, bilateral facet arthropathy and bilateral ligamentum flavum buckling.  Findings contribute to mild left and mild-to-moderate right neural foraminal narrowing.     L5-S1: Posterior broad-based disc bulge and bilateral facet arthropathy.  No spinal canal stenosis or neural foraminal narrowing.      Impression:     1. Mild lumbar degenerative changes contributing to mild-to-moderate neural foraminal narrowing from L2-L3 through L4-L5.  No spinal canal stenosis.    ASSESSMENT: 69 y.o. year old male with pain, consistent with:    Encounter Diagnosis   Name Primary?    Degeneration of intervertebral disc of lumbar region with discogenic back pain Yes          DISCUSSION: Mr. Hope comes to us with left low back pain. He initially improved with PT and HEP, however symptoms have progressed. He indicates pain in the left lumbar region. He get's excellent relief with SIJ injections. He is also noting a recurrence of testicular pain on that side as well.     PLAN:  Continue Heat, ice, Tylenol prn  Continue HEP. [facility] previously provided  Schedule left SIJ injection  Consider repeat MRI  Can consider procedure for left testicular pain in the future following management of low back pain if symptoms persist      Melody Berry  07/23/2025

## 2025-07-24 ENCOUNTER — OFFICE VISIT (OUTPATIENT)
Dept: PRIMARY CARE CLINIC | Facility: CLINIC | Age: 70
End: 2025-07-24
Payer: MEDICARE

## 2025-07-24 ENCOUNTER — LAB VISIT (OUTPATIENT)
Dept: LAB | Facility: HOSPITAL | Age: 70
End: 2025-07-24
Attending: INTERNAL MEDICINE
Payer: MEDICARE

## 2025-07-24 VITALS
OXYGEN SATURATION: 96 % | BODY MASS INDEX: 29.48 KG/M2 | HEIGHT: 69 IN | SYSTOLIC BLOOD PRESSURE: 118 MMHG | WEIGHT: 199.06 LBS | TEMPERATURE: 99 F | DIASTOLIC BLOOD PRESSURE: 76 MMHG | HEART RATE: 68 BPM

## 2025-07-24 DIAGNOSIS — Z00.00 HEALTH CARE MAINTENANCE: ICD-10-CM

## 2025-07-24 DIAGNOSIS — E78.2 MIXED HYPERLIPIDEMIA: ICD-10-CM
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
## 2025-07-24 DIAGNOSIS — E78.2 MIXED HYPERLIPIDEMIA: Primary | ICD-10-CM

## 2025-07-24 DIAGNOSIS — R29.818 NEUROGENIC CLAUDICATION: ICD-10-CM

## 2025-07-24 DIAGNOSIS — Z12.5 SCREENING FOR MALIGNANT NEOPLASM OF PROSTATE: ICD-10-CM

## 2025-07-24 LAB
CHOLEST SERPL-MCNC: 175 MG/DL (ref 120–199)
CHOLEST/HDLC SERPL: 3.1 {RATIO} (ref 2–5)
HDLC SERPL-MCNC: 57 MG/DL (ref 40–75)
HDLC SERPL: 32.6 % (ref 20–50)
LDLC SERPL CALC-MCNC: 102.4 MG/DL (ref 63–159)
NONHDLC SERPL-MCNC: 118 MG/DL
PSA SERPL-MCNC: 0.58 NG/ML
TRIGL SERPL-MCNC: 78 MG/DL (ref 30–150)

## 2025-07-24 PROCEDURE — 36415 COLL VENOUS BLD VENIPUNCTURE: CPT | Mod: HCNC

## 2025-07-24 PROCEDURE — 84153 ASSAY OF PSA TOTAL: CPT | Mod: HCNC

## 2025-07-24 PROCEDURE — 80061 LIPID PANEL: CPT | Mod: HCNC

## 2025-07-24 RX ORDER — MELOXICAM 7.5 MG/1
7.5 TABLET ORAL DAILY
Qty: 15 TABLET | Refills: 0 | Status: CANCELLED | OUTPATIENT
Start: 2025-07-24

## 2025-07-24 RX ORDER — GABAPENTIN 300 MG/1
300 CAPSULE ORAL NIGHTLY
Qty: 30 CAPSULE | Refills: 11 | Status: SHIPPED | OUTPATIENT
Start: 2025-07-24 | End: 2025-07-24

## 2025-07-24 RX ORDER — GABAPENTIN 300 MG/1
300 CAPSULE ORAL 3 TIMES DAILY
Qty: 90 CAPSULE | Refills: 4 | Status: SHIPPED | OUTPATIENT
Start: 2025-07-24 | End: 2026-07-24

## 2025-07-24 RX ORDER — EZETIMIBE 10 MG/1
10 TABLET ORAL DAILY
Qty: 90 TABLET | Refills: 3 | Status: SHIPPED | OUTPATIENT
Start: 2025-07-24 | End: 2026-07-24

## 2025-07-24 NOTE — ASSESSMENT & PLAN NOTE
Pain after 3 blocks.  Improved intensity of pain since   Block.  Patient seen by pain management and plan for another block upcoming.  Patient states he took gabapentin recently which we had discontinued, and this worked for his pain.    Restart gabapentin t.I.d. as tolerated while in the past.  Side effects discussed.

## 2025-07-24 NOTE — ASSESSMENT & PLAN NOTE
Patient now has ruled out of bed twice in the past 7 months.  He denies any significant pain from most recent episode.  Some bruising on elbow.    Move side table from beside bed.  On the Internet patient was shown small bed rails that can be placed to prevent him from rolling out again and hurting himself.

## 2025-07-24 NOTE — PROGRESS NOTES
.  This note has been generated using voice-recognition software. There may be typographical errors that have been missed during proof-reading.     Primary Care Provider Appointment    Subjective:      Patient ID: Jeff Hope is a 69 y.o. male here for follow up.     Chief Complaint: Follow-up    HPI:  This is a pleasant 69-year-old male with bronchiectasis, hyperlipidemia, history of pulmonary embolism on chronic anticoagulation with a follicular non-Hodgkin's lymphoma in remission, h/o acute/reactivated hepatitis B, pulmonary HTN, Peyronie's disease, GERD, statin myalgias here for f/u.  Pt last seen 6/3/2025.    6/6/2025 pt seen by PM  7/1/2025 pt admitted for L3/4 nerve block.    7/14 port flush    No change in pain with nerve block.  Slightly better.  Plan for another block.  He is walking and this is triggered.  3 block claudication.  Flare in pain in testicle.  Fell out of bed.  This is the second episode in the past 7 months.     11 lb weight gain in the past year.        Problem List[1]     Past Surgical History:   Procedure Laterality Date    CATARACT EXTRACTION W/  INTRAOCULAR LENS IMPLANT Right 4/18/2024    Procedure: EXTRACTION, CATARACT, WITH IOL INSERTION;  Surgeon: Jefferson Montes De Oca MD;  Location: [facility] OR;  Service: Ophthalmology;  Laterality: Right;    ESOPHAGOGASTRODUODENOSCOPY N/A 11/29/2021    Procedure: ESOPHAGOGASTRODUODENOSCOPY (EGD);  Surgeon: Kristine Palmer MD;  Location: [facility] ENDO [address]);  Service: Endoscopy;  Laterality: N/A;  fully vacc-inst mail-tb-left chest port    GROIN EXPLORATION  2 pre 2005 and 1 post 2005    total of 3 procedures    INJECTION OF ANESTHETIC AGENT AROUND NERVE Left 11/8/2022    Procedure: BLOCK, NERVE, LEFT L3-L5 MEDIAL BRANCH;  Surgeon: Melody Berry MD;  Location: [facility];  Service: Pain Management;  Laterality: Left;    INJECTION OF ANESTHETIC AGENT AROUND NERVE Left 7/1/2025    Procedure: BLOCK, NERVE LEFT L3, 4, 5 MEDIAL BRANCH;  Surgeon:  "Melody Berry MD;  Location: [facility] PAIN MGT;  Service: Pain Management;  Laterality: Left;    INJECTION, SACROILIAC JOINT Left 10/11/2022    Procedure: INJECTION,SACROILIAC JOINT LEFT CONTRAST;  Surgeon: Melody Berry MD;  Location: [facility] PAIN MGT;  Service: Pain Management;  Laterality: Left;    INJECTION, SACROILIAC JOINT Left 2023    Procedure: INJECTION,SACROILIAC JOINT, LEFT SOONER DATE;  Surgeon: Melody Berry MD;  Location: [facility] PAIN MGT;  Service: Pain Management;  Laterality: Left;    LAPAROSCOPIC CHOLECYSTECTOMY  2018    SHOULDER SURGERY Left        Past Medical History:   Diagnosis Date    History of pulmonary embolus (PE) 04/15/2019    Pt with PE s/p hospitalization.  He was placed on xarelto with high risk with cancer.  He has been out of the medication for the past month. Restart Xarelto 15 mg b.i.d. for 21 days, then 20 mg q.day until cancer no longer active.    Non Hodgkin's lymphoma     Personal history of colonic polyps 2015    per [facility] report - repeat 2020    Peyronie disease     S/P laparoscopic cholecystectomy 2019    Thoracic aorta atherosclerosis     Imagin       Social History[2]    Review of Systems         No data to display                 Checklist of Daily Activities:  Bathing: Independent  Dressing: Independent  Grooming: Independent  Oral Care: Independent  Toileting: Independent  Transferring: Independent  Walking: Independent  Climbing Stairs: Independent  Eating: Independent  Shopping: Independent  Cooking: Independent  Managing Medications: Independent  Using the Phone: Independent  Housework: Indpendent  Laundry: Independent  Driving: Independent  Managing Finances: Independent     Objective:   /76 (BP Location: Left arm, Patient Position: Sitting)   Pulse 68   Temp 98.5 °F (36.9 °C) (Oral)   Ht 5' 9" (1.753 m)   Wt 90.3 kg (199 lb 1.2 oz)   SpO2 96%   BMI 29.40 kg/m²     Physical Exam  Constitutional:       General: He is not in " acute distress.     Appearance: Normal appearance. He is not ill-appearing, toxic-appearing or diaphoretic.   HENT:      Head: Normocephalic and atraumatic.   Cardiovascular:      Rate and Rhythm: Normal rate and regular rhythm.      Heart sounds: Normal heart sounds.   Pulmonary:      Effort: Pulmonary effort is normal.      Breath sounds: Normal breath sounds.   Abdominal:      Palpations: Abdomen is soft.      Tenderness: There is no abdominal tenderness. There is no guarding or rebound.   Musculoskeletal:      Right lower leg: No edema.      Left lower leg: No edema.   Lymphadenopathy:      Cervical: No cervical adenopathy.   Neurological:      Mental Status: He is alert.             Lab Results   Component Value Date    WBC 6.11 04/15/2025    HGB 15.9 04/15/2025    HCT 48.4 04/15/2025     04/15/2025    CHOL 212 (H) 04/15/2025    TRIG 79 04/15/2025    HDL 64 04/15/2025    ALT 16 04/15/2025    AST 20 04/15/2025     04/15/2025    K 4.4 04/15/2025     04/15/2025    CREATININE 0.9 04/15/2025    BUN 15 04/15/2025    CO2 28 04/15/2025    TSH 0.983 04/15/2025    PSA 0.80 04/16/2024    INR 1.0 10/14/2022    HGBA1C 5.4 02/14/2023       Medications Ordered Prior to Encounter[3]      Assessment:   69 y.o. male with multiple co-morbid illnesses here for continued follow up of medical problems.      Plan:     Problem List Items Addressed This Visit          Neuro    Neurogenic claudication     Pain after 3 blocks.  Improved intensity of pain since   Block.  Patient seen by pain management and plan for another block upcoming.  Patient states he took gabapentin recently which we had discontinued, and this worked for his pain.    Restart gabapentin t.I.d. as tolerated while in the past.  Side effects discussed.            Cardiac/Vascular    Mixed hyperlipidemia - Primary      Myalgias on statins.  Multiple trials.  Now on Zetia.    Lab on Zetia         Relevant Orders    Lipid Panel       Orthopedic    Fall  from bed     Patient now has ruled out of bed twice in the past 7 months.  He denies any significant pain from most recent episode.  Some bruising on elbow.    Move side table from beside bed.  On the Internet patient was shown small bed rails that can be placed to prevent him from rolling out again and hurting himself.            Other    Health care maintenance    ACP reviewed   Today and new LA post completed to change feeding tube to trial only.  Patient  confirms he wishes to be a do not resuscitate.  Check next visit to see if LA post signature went through.   Yearly labs  04/15/2025   PSA today   Med rec today   ADLs today            Other Visit Diagnoses         Screening for malignant neoplasm of prostate        Relevant Orders    PSA, Screening            Health Maintenance         Date Due Completion Date    COVID-19 Vaccine (7 - 2024-25 season) 09/01/2024 6/9/2022    Pneumococcal Vaccines (Age 50+) (3 of 3 - PPSV23, PCV20 or PCV21) 08/18/2025 8/18/2020    Influenza Vaccine (1) 09/01/2025 11/12/2024    Hemoglobin A1c (Diabetic Prevention Screening) 02/14/2026 2/14/2023    Colorectal Cancer Screening 09/16/2027 9/16/2024    Override on 5/18/2021: Done (repeat 3 year)    Lipid Panel 04/15/2030 4/15/2025    TETANUS VACCINE 06/15/2030 6/15/2020          Medications Reconciliation:   I have reconciled the patient's home medications with the patient/family. I have updated all changes.  Refer to After-Visit Medication List.      Medication List            Accurate as of July 24, 2025  2:30 PM. If you have any questions, ask your nurse or doctor.                START taking these medications      gabapentin 300 MG capsule  Commonly known as: NEURONTIN  Take 1 capsule (300 mg total) by mouth 3 (three) times daily.  Started by: Roly Flannery MD            CONTINUE taking these medications      ezetimibe 10 mg tablet  Commonly known as: ZETIA  Take 1 tablet (10 mg total) by mouth once daily.     fluticasone  propionate 50 mcg/actuation nasal spray  Commonly known as: FLONASE  SHAKE LIQUID AND USE 2 SPRAYS(100 MCG) IN EACH NOSTRIL EVERY DAY            STOP taking these medications      doxepin 6 mg Tab  Commonly known as: SILENOR  Stopped by: Roly Flannery MD     meloxicam 7.5 MG tablet  Commonly known as: MOBIC  Stopped by: Roly Flannery MD               Where to Get Your Medications        These medications were sent to [facility][id number] - [facility] [address]  [address] [facility] [address]      Phone: [phone]   ezetimibe 10 mg tablet  gabapentin 300 MG capsule              Follow up in about 3 months (around 10/24/2025). Total clinical care time was   Thirty min. The following issues were discussed:  hyperlipidemia and Zetia, PSA, med rec, ADLs, review of last labs, chronic pain and  ANGELI     Roly Flannery MD  Internal Medicine  [facility]  [phone]                   [1]   Patient Active Problem List  Diagnosis    Bronchiectasis    Follicular lymphoma, unspecified, in remission    Mixed hyperlipidemia    Current mild episode of major depressive disorder    Aortic atherosclerosis    Pain in both testicles    Health care maintenance    Pulmonary hypertension    Myalgia due to statin    Vitamin D deficiency    Abnormal MMSE    Allergic rhinitis    Adrenal hypertrophy    Bilateral shoulder bursitis    History of hepatitis B    Trigger finger of left thumb    Bilateral shoulder pain    Chronic thumb pain, bilateral    Breast mass, right    Peyronie's disease    Hypotestosteronemia in male    Neurogenic claudication    Iron deficiency anemia    Gastroesophageal reflux disease without esophagitis    Myofascial pain    Osteoarthritis of spine with radiculopathy, lumbosacral region    Decreased range of motion of lumbar spine    Hypogammaglobulinemia    Sacroiliitis    Pain of right upper  extremity    Thoracic aorta atherosclerosis    Photopsia of right eye    Floaters, bilateral    Other acne    Lesion of parotid gland    Pain of left upper extremity    Fall    Fall from bed   [2]   Social History  Socioeconomic History    Marital status:     Number of children: 2   Occupational History    Occupation: disabled [employer]    Tobacco Use    Smoking status: Never    Smokeless tobacco: Never   Substance and Sexual Activity    Alcohol use: No    Drug use: No    Sexual activity: Yes     Partners: Female   Social History Narrative    Lives with wife.       Social Drivers of Health     Financial Resource Strain: High Risk (5/17/2024)    Overall Financial Resource Strain (CARDIA)     Difficulty of Paying Living Expenses: Very hard   Food Insecurity: Food Insecurity Present (5/17/2024)    Hunger Vital Sign     Worried About Running Out of Food in the Last Year: Never true     Ran Out of Food in the Last Year: Sometimes true   Transportation Needs: No Transportation Needs (5/17/2024)    PRAPARE - Transportation     Lack of Transportation (Medical): No     Lack of Transportation (Non-Medical): No   Physical Activity: Sufficiently Active (5/17/2024)    Exercise Vital Sign     Days of Exercise per Week: 3 days     Minutes of Exercise per Session: 80 min   Stress: Stress Concern Present (5/17/2024)    [employer] [employer] - Occupational Stress Questionnaire     Feeling of Stress : To some extent   Housing Stability: Unknown (5/17/2024)    Housing Stability Vital Sign     Unable to Pay for Housing in the Last Year: No     Homeless in the Last Year: No   [3]   Current Outpatient Medications on File Prior to Visit   Medication Sig Dispense Refill    fluticasone propionate (FLONASE) 50 mcg/actuation nasal spray SHAKE LIQUID AND USE 2 SPRAYS(100 MCG) IN EACH NOSTRIL EVERY DAY 48 g 11    [DISCONTINUED] ezetimibe (ZETIA) 10 mg tablet Take 1 tablet (10 mg total) by mouth once daily. 90  tablet 3    [DISCONTINUED] meloxicam (MOBIC) 7.5 MG tablet Take 1 tablet (7.5 mg total) by mouth once daily. 15 tablet 0    [DISCONTINUED] doxepin (SILENOR) 6 mg Tab Take 6 mg by mouth nightly as needed (insomnia). (Patient not taking: Reported on 7/24/2025) 30 tablet 3     Current Facility-Administered Medications on File Prior to Visit   Medication Dose Route Frequency Provider Last Rate Last Admin    0.9%  NaCl infusion  500 mL Intravenous Continuous Amado Parekh MD

## 2025-07-24 NOTE — ASSESSMENT & PLAN NOTE
ACP reviewed   Today and new LA post completed to change feeding tube to trial only.  Patient  confirms he wishes to be a do not resuscitate.  Check next visit to see if LA post signature went through.   Yearly labs  04/15/2025   PSA today   Med rec today   ADLs today

## 2025-07-25 ENCOUNTER — TELEPHONE (OUTPATIENT)
Dept: PRIMARY CARE CLINIC | Facility: CLINIC | Age: 70
End: 2025-07-25
Payer: MEDICARE

## 2025-07-25 NOTE — TELEPHONE ENCOUNTER
----- Message from Roly Flannery MD sent at 7/25/2025  3:20 PM CDT -----  Please call patient with results.  improvement in LDL on zetia.  Will discuss further at next visit.  PSA WNL  ----- Message -----  From: Lab, Background User  Sent: 7/24/2025  11:01 PM CDT  To: Roly Flannery MD

## 2025-08-06 ENCOUNTER — PATIENT MESSAGE (OUTPATIENT)
Dept: PAIN MEDICINE | Facility: OTHER | Age: 70
End: 2025-08-06
Payer: MEDICARE

## 2025-08-06 ENCOUNTER — TELEPHONE (OUTPATIENT)
Dept: PRIMARY CARE CLINIC | Facility: CLINIC | Age: 70
End: 2025-08-06
Payer: MEDICARE

## 2025-08-06 ENCOUNTER — OFFICE VISIT (OUTPATIENT)
Dept: INTERNAL MEDICINE | Facility: CLINIC | Age: 70
End: 2025-08-06
Payer: MEDICARE

## 2025-08-06 VITALS
HEIGHT: 69 IN | DIASTOLIC BLOOD PRESSURE: 72 MMHG | HEART RATE: 86 BPM | BODY MASS INDEX: 29.59 KG/M2 | SYSTOLIC BLOOD PRESSURE: 116 MMHG | WEIGHT: 199.75 LBS | OXYGEN SATURATION: 97 %

## 2025-08-06 DIAGNOSIS — N50.812 PAIN IN LEFT TESTICLE: ICD-10-CM

## 2025-08-06 DIAGNOSIS — N48.1 BALANITIS: Primary | ICD-10-CM

## 2025-08-06 PROCEDURE — 1101F PT FALLS ASSESS-DOCD LE1/YR: CPT | Mod: CPTII,HCNC,S$GLB, | Performed by: STUDENT IN AN ORGANIZED HEALTH CARE EDUCATION/TRAINING PROGRAM

## 2025-08-06 PROCEDURE — 1160F RVW MEDS BY RX/DR IN RCRD: CPT | Mod: CPTII,HCNC,S$GLB, | Performed by: STUDENT IN AN ORGANIZED HEALTH CARE EDUCATION/TRAINING PROGRAM

## 2025-08-06 PROCEDURE — 3288F FALL RISK ASSESSMENT DOCD: CPT | Mod: CPTII,HCNC,S$GLB, | Performed by: STUDENT IN AN ORGANIZED HEALTH CARE EDUCATION/TRAINING PROGRAM

## 2025-08-06 PROCEDURE — 3074F SYST BP LT 130 MM HG: CPT | Mod: CPTII,HCNC,S$GLB, | Performed by: STUDENT IN AN ORGANIZED HEALTH CARE EDUCATION/TRAINING PROGRAM

## 2025-08-06 PROCEDURE — 3078F DIAST BP <80 MM HG: CPT | Mod: CPTII,HCNC,S$GLB, | Performed by: STUDENT IN AN ORGANIZED HEALTH CARE EDUCATION/TRAINING PROGRAM

## 2025-08-06 PROCEDURE — 1159F MED LIST DOCD IN RCRD: CPT | Mod: CPTII,HCNC,S$GLB, | Performed by: STUDENT IN AN ORGANIZED HEALTH CARE EDUCATION/TRAINING PROGRAM

## 2025-08-06 PROCEDURE — 3008F BODY MASS INDEX DOCD: CPT | Mod: CPTII,HCNC,S$GLB, | Performed by: STUDENT IN AN ORGANIZED HEALTH CARE EDUCATION/TRAINING PROGRAM

## 2025-08-06 PROCEDURE — 1125F AMNT PAIN NOTED PAIN PRSNT: CPT | Mod: CPTII,HCNC,S$GLB, | Performed by: STUDENT IN AN ORGANIZED HEALTH CARE EDUCATION/TRAINING PROGRAM

## 2025-08-06 PROCEDURE — 99204 OFFICE O/P NEW MOD 45 MIN: CPT | Mod: HCNC,S$GLB,, | Performed by: STUDENT IN AN ORGANIZED HEALTH CARE EDUCATION/TRAINING PROGRAM

## 2025-08-06 PROCEDURE — 1157F ADVNC CARE PLAN IN RCRD: CPT | Mod: CPTII,HCNC,S$GLB, | Performed by: STUDENT IN AN ORGANIZED HEALTH CARE EDUCATION/TRAINING PROGRAM

## 2025-08-06 PROCEDURE — 99999 PR PBB SHADOW E&M-EST. PATIENT-LVL III: CPT | Mod: PBBFAC,HCNC,, | Performed by: STUDENT IN AN ORGANIZED HEALTH CARE EDUCATION/TRAINING PROGRAM

## 2025-08-06 RX ORDER — CLOTRIMAZOLE 1 %
CREAM (GRAM) TOPICAL 2 TIMES DAILY
Qty: 28 G | Refills: 0 | Status: SHIPPED | OUTPATIENT
Start: 2025-08-06

## 2025-08-06 NOTE — TELEPHONE ENCOUNTER
Pt called earlier to report he has bumps on penis and would prefer to see a male doctor at Located within [facility], appt scheduled for today with Dr Sanchez to address this, pt notified of appt.

## 2025-08-06 NOTE — PROGRESS NOTES
SUBJECTIVE     Chief Complaint   Patient presents with    Rash       HPI  Jeff Hope is a 69 y.o. male with HLD, pulmonary HTN, aortic atherosclerosis, bronchiectasis, h/o follicular lymphma, h/o PE, iron deficiency anemia, adrenal hypertrophy, GERD, h/o HBV infection that presents for evaluation of rash.     This is a new patient to me but established to [facility]. PCP is Roly Flannery MD.     PENILE RASH:  He reports a rash on his foreskin noticed 2-3 days ago that has become more intense recently. Initially, the area was tender. The rash is located on the skin when the foreskin is pulled back. He denies pain with urination, urethral discharge, fever, chills, nausea, and vomiting. He reports no sexual activity due to curvature of his erect penis.     CHRONIC LEFT TESTICULAR PAIN:  He reports chronic left testicle pain ongoing for years that has recently increased in intensity. He has a history of receiving nerve blocks from multiple providers, including a urologist, for management of this condition. He previously underwent surgery at [facility] related to this issue. He denies associated symptoms of fevers, chills, nausea, or vomiting with the testicle pain. No pain in testicles is reported currently.     Prior testicular ultrasound from 2017 reviewed and showing surgical changes to the left epididymis.         PAST MEDICAL HISTORY:  Past Medical History:   Diagnosis Date    History of pulmonary embolus (PE) 04/15/2019    Pt with PE s/p hospitalization.  He was placed on xarelto with high risk with cancer.  He has been out of the medication for the past month. Restart Xarelto 15 mg b.i.d. for 21 days, then 20 mg q.day until cancer no longer active.    Non Hodgkin's lymphoma     Personal history of colonic polyps 2015    per [facility] report - repeat 2020    Peyronie disease     S/P laparoscopic cholecystectomy 2019    Thoracic aorta atherosclerosis     Imagin       PAST SURGICAL HISTORY:  Past  Surgical History:   Procedure Laterality Date    CATARACT EXTRACTION W/  INTRAOCULAR LENS IMPLANT Right 4/18/2024    Procedure: EXTRACTION, CATARACT, WITH IOL INSERTION;  Surgeon: Jefferson Montes De Oca MD;  Location: [facility];  Service: Ophthalmology;  Laterality: Right;    ESOPHAGOGASTRODUODENOSCOPY N/A 11/29/2021    Procedure: ESOPHAGOGASTRODUODENOSCOPY (EGD);  Surgeon: Kristine Palmer MD;  Location: [facility] ENDO ([address] [place] [address]);  Service: Endoscopy;  Laterality: N/A;  fully vacc-inst mail-tb-left chest port    GROIN EXPLORATION  2 pre 2005 and 1 post 2005    total of 3 procedures    INJECTION OF ANESTHETIC AGENT AROUND NERVE Left 11/8/2022    Procedure: BLOCK, NERVE, LEFT L3-L5 MEDIAL BRANCH;  Surgeon: Melody Berry MD;  Location: [facility] PAIN MGT;  Service: Pain Management;  Laterality: Left;    INJECTION OF ANESTHETIC AGENT AROUND NERVE Left 7/1/2025    Procedure: BLOCK, NERVE LEFT L3, 4, 5 MEDIAL BRANCH;  Surgeon: Melody Berry MD;  Location: [facility] PAIN MGT;  Service: Pain Management;  Laterality: Left;    INJECTION, SACROILIAC JOINT Left 10/11/2022    Procedure: INJECTION,SACROILIAC JOINT LEFT CONTRAST;  Surgeon: Melody Berry MD;  Location: [facility] PAIN [facility]GT;  Service: Pain Management;  Laterality: Left;    INJECTION, SACROILIAC JOINT Left 8/22/2023    Procedure: INJECTION,SACROILIAC JOINT, LEFT SOONER DATE;  Surgeon: Melody Berry MD;  Location: [facility];  Service: Pain Management;  Laterality: Left;    LAPAROSCOPIC CHOLECYSTECTOMY  05/2018    SHOULDER SURGERY Left        FAMILY HISTORY:  Family History   Problem Relation Name Age of Onset    Breast cancer Mother      Emphysema Father      Stroke Sister      Diabetes Brother      Ulcers Brother      No Known Problems Daughter      No Known Problems Daughter         ALLERGIES AND MEDICATIONS: updated and reviewed.  Review of patient's allergies indicates:   Allergen Reactions    Atorvastatin      myalgias    Rosuvastatin      myalgias    Neosporin  "[benzalkonium chloride] Rash     With topical use     Current Medications[1]    ROS  Review of Systems   Constitutional: Negative.  Negative for chills and fever.   HENT: Negative.  Negative for congestion and sore throat.    Respiratory:  Negative for chest tightness and shortness of breath.    Cardiovascular:  Negative for chest pain and palpitations.   Gastrointestinal:  Negative for abdominal pain, constipation and diarrhea.   Genitourinary:  Positive for penile pain and testicular pain. Negative for dysuria, frequency, genital sores, penile discharge, penile swelling and scrotal swelling.   Musculoskeletal:  Negative for back pain, joint swelling and neck pain.   Skin:  Positive for rash.   Neurological:  Negative for dizziness, syncope and headaches.   Psychiatric/Behavioral: Negative.           OBJECTIVE     Physical Exam  Vitals:    08/06/25 0910   BP: 116/72   Pulse: 86    Body mass index is 29.5 kg/m².  Weight: 90.6 kg (199 lb 11.8 oz)   Height: 5' 9" (175.3 cm)     Physical Exam  Vitals reviewed.   Constitutional:       General: He is not in acute distress.     Appearance: Normal appearance.   HENT:      Head: Normocephalic and atraumatic.      Mouth/Throat:      Mouth: Mucous membranes are moist.      Pharynx: Oropharynx is clear.   Eyes:      Extraocular Movements: Extraocular movements intact.      Conjunctiva/sclera: Conjunctivae normal.      Pupils: Pupils are equal, round, and reactive to light.   Cardiovascular:      Rate and Rhythm: Normal rate and regular rhythm.      Pulses: Normal pulses.      Heart sounds: Normal heart sounds.   Pulmonary:      Effort: Pulmonary effort is normal.      Breath sounds: Normal breath sounds.   Abdominal:      General: There is no distension.   Genitourinary:     Penis: Uncircumcised. Erythema (around the edge of the foreskin) present. No phimosis or paraphimosis.       Testes: Normal. Cremasteric reflex is present.         Right: Mass, tenderness or swelling not " present.         Left: Mass, tenderness or swelling not present.   Musculoskeletal:         General: Normal range of motion.      Cervical back: Normal range of motion and neck supple.   Skin:     General: Skin is warm and dry.   Neurological:      General: No focal deficit present.      Mental Status: He is alert.           ASSESSMENT     69 y.o. male with     1. Balanitis    2. Pain in left testicle        PLAN:     1. Balanitis  - Counseled on hygiene for foreskin.   - Return precautions given.   - clotrimazole (LOTRIMIN) 1 % cream; Apply topically 2 (two) times daily.  Dispense: 28 g; Refill: 0    2. Pain in left testicle  - Acute on chronic pain. Prior ultrasound study done at [facility] in 2017 reviewed. Repeat ultrasound. Will need to follow up with urology.   - Urinalysis, Reflex to Urine Culture Urine, Clean Catch; Future  - US Scrotum And Testicles; Future      RTC PRN       Se Sanchez MD  Family Medicine  [facility]  08/06/2025    This note was generated with the assistance of ambient listening technology. Verbal consent was obtained by the patient and accompanying visitor(s) for the recording of patient appointment to facilitate this note. I attest to having reviewed and edited the generated note for accuracy, though some syntax or spelling errors may persist. Please contact the author of this note for any clarification.      No follow-ups on file.                       [1]   Current Outpatient Medications   Medication Sig Dispense Refill    ezetimibe (ZETIA) 10 mg tablet Take 1 tablet (10 mg total) by mouth once daily. 90 tablet 3    fluticasone propionate (FLONASE) 50 mcg/actuation nasal spray SHAKE LIQUID AND USE 2 SPRAYS(100 MCG) IN EACH NOSTRIL EVERY DAY 48 g 11    gabapentin (NEURONTIN) 300 MG capsule Take 1 capsule (300 mg total) by mouth 3 (three) times daily. 90 capsule 4     No current facility-administered medications for this visit.     Facility-Administered  Medications Ordered in Other Visits   Medication Dose Route Frequency Provider Last Rate Last Admin    0.9%  NaCl infusion  500 mL Intravenous Continuous Amado Parekh MD

## 2025-08-07 ENCOUNTER — PATIENT MESSAGE (OUTPATIENT)
Dept: PAIN MEDICINE | Facility: OTHER | Age: 70
End: 2025-08-07
Payer: MEDICARE

## 2025-08-08 ENCOUNTER — TELEPHONE (OUTPATIENT)
Dept: PAIN MEDICINE | Facility: CLINIC | Age: 70
End: 2025-08-08
Payer: MEDICARE

## 2025-08-08 NOTE — TELEPHONE ENCOUNTER
Pt asked question about topical cream was ok to take. Pt was instructed that cream would be fine and he should be fine for upcoming procedure on Monday the (8/11/25)

## 2025-08-08 NOTE — TELEPHONE ENCOUNTER
Copied from CRM #[id number]. Topic: General Inquiry - Patient Advice  >> Aug 8, 2025  8:47 AM Alena wrote:  Type: Patient Call Back    Who called: Patient     What is the request in detail: Pt is requesting a call back to discuss that he is not on antibiotics and need to discuss the procedure. Please advise       Would the patient rather a call back or a response via [facility]?  Call     Best call back number: [phone]     Additional Information:

## 2025-08-11 ENCOUNTER — HOSPITAL ENCOUNTER (OUTPATIENT)
Facility: OTHER | Age: 70
Discharge: HOME | End: 2025-08-11
Attending: ANESTHESIOLOGY | Admitting: ANESTHESIOLOGY
Payer: MEDICARE

## 2025-08-11 VITALS
HEART RATE: 65 BPM | HEIGHT: 68 IN | BODY MASS INDEX: 28.79 KG/M2 | OXYGEN SATURATION: 97 % | DIASTOLIC BLOOD PRESSURE: 67 MMHG | RESPIRATION RATE: 16 BRPM | WEIGHT: 190 LBS | TEMPERATURE: 98 F | SYSTOLIC BLOOD PRESSURE: 141 MMHG

## 2025-08-11 DIAGNOSIS — G89.29 CHRONIC PAIN: ICD-10-CM

## 2025-08-11 DIAGNOSIS — M46.1 SACROILIITIS: Primary | ICD-10-CM

## 2025-08-11 PROCEDURE — 25500020 PHARM REV CODE 255: Mod: HCNC | Performed by: ANESTHESIOLOGY

## 2025-08-11 PROCEDURE — 25000003 PHARM REV CODE 250: Mod: HCNC | Performed by: ANESTHESIOLOGY

## 2025-08-11 PROCEDURE — 27096 INJECT SACROILIAC JOINT: CPT | Mod: HCNC,LT | Performed by: ANESTHESIOLOGY

## 2025-08-11 PROCEDURE — 63600175 PHARM REV CODE 636 W HCPCS: Mod: HCNC | Performed by: ANESTHESIOLOGY

## 2025-08-11 PROCEDURE — 27096 INJECT SACROILIAC JOINT: CPT | Mod: HCNC,LT,, | Performed by: ANESTHESIOLOGY

## 2025-08-11 RX ORDER — BUPIVACAINE HYDROCHLORIDE 2.5 MG/ML
INJECTION, SOLUTION EPIDURAL; INFILTRATION; INTRACAUDAL; PERINEURAL
Status: DISCONTINUED | OUTPATIENT
Start: 2025-08-11 | End: 2025-08-11 | Stop reason: HOSPADM

## 2025-08-11 RX ORDER — SODIUM CHLORIDE 9 MG/ML
INJECTION, SOLUTION INTRAVENOUS CONTINUOUS
Status: DISCONTINUED | OUTPATIENT
Start: 2025-08-11 | End: 2025-08-11 | Stop reason: HOSPADM

## 2025-08-11 RX ORDER — ALPRAZOLAM 0.5 MG/1
1 TABLET, ORALLY DISINTEGRATING ORAL
Status: DISCONTINUED | OUTPATIENT
Start: 2025-08-11 | End: 2025-08-11 | Stop reason: HOSPADM

## 2025-08-11 RX ORDER — LIDOCAINE HYDROCHLORIDE 20 MG/ML
INJECTION, SOLUTION INFILTRATION; PERINEURAL
Status: DISCONTINUED | OUTPATIENT
Start: 2025-08-11 | End: 2025-08-11 | Stop reason: HOSPADM

## 2025-08-11 RX ORDER — TRIAMCINOLONE ACETONIDE 40 MG/ML
INJECTION, SUSPENSION INTRA-ARTICULAR; INTRAMUSCULAR
Status: DISCONTINUED | OUTPATIENT
Start: 2025-08-11 | End: 2025-08-11 | Stop reason: HOSPADM

## 2025-08-11 RX ADMIN — ALPRAZOLAM 1 MG: 0.5 TABLET, ORALLY DISINTEGRATING ORAL at 07:08

## 2025-08-13 ENCOUNTER — HOSPITAL ENCOUNTER (OUTPATIENT)
Dept: RADIOLOGY | Facility: HOSPITAL | Age: 70
Discharge: HOME | End: 2025-08-13
Attending: STUDENT IN AN ORGANIZED HEALTH CARE EDUCATION/TRAINING PROGRAM
Payer: MEDICARE

## 2025-08-13 DIAGNOSIS — N50.812 PAIN IN LEFT TESTICLE: ICD-10-CM

## 2025-08-13 PROCEDURE — 76870 US EXAM SCROTUM: CPT | Mod: 26,HCNC,, | Performed by: STUDENT IN AN ORGANIZED HEALTH CARE EDUCATION/TRAINING PROGRAM

## 2025-08-13 PROCEDURE — 76870 US EXAM SCROTUM: CPT | Mod: TC,HCNC

## 2025-08-13 PROCEDURE — 93975 VASCULAR STUDY: CPT | Mod: 26,HCNC,, | Performed by: STUDENT IN AN ORGANIZED HEALTH CARE EDUCATION/TRAINING PROGRAM

## (undated) DEVICE — GLOVE SENSICARE PI SURG 7.5

## (undated) DEVICE — DRESSING LEUKOPLAST FLEX 1X3IN

## (undated) DEVICE — SOL POVIDONE SCRUB IODINE 4 OZ

## (undated) DEVICE — SOL BETADINE 5%

## (undated) DEVICE — Device